# Patient Record
Sex: MALE | Race: WHITE | NOT HISPANIC OR LATINO | Employment: OTHER | URBAN - METROPOLITAN AREA
[De-identification: names, ages, dates, MRNs, and addresses within clinical notes are randomized per-mention and may not be internally consistent; named-entity substitution may affect disease eponyms.]

---

## 2017-11-20 DIAGNOSIS — I63.00 CEREBROVASCULAR ACCIDENT (CVA) DUE TO THROMBOSIS OF PRECEREBRAL ARTERY (H): Primary | ICD-10-CM

## 2017-11-20 DIAGNOSIS — I63.9 STROKE (H): Primary | ICD-10-CM

## 2017-11-22 ENCOUNTER — HOSPITAL ENCOUNTER (OUTPATIENT)
Dept: LAB | Facility: CLINIC | Age: 82
Discharge: HOME | End: 2017-11-22
Attending: PSYCHIATRY & NEUROLOGY | Admitting: PSYCHIATRY & NEUROLOGY
Payer: MEDICARE

## 2017-11-22 DIAGNOSIS — I63.9 STROKE (H): ICD-10-CM

## 2017-11-22 LAB
CHOLEST SERPL-MCNC: 146 MG/DL
HDLC SERPL-MCNC: 77 MG/DL
LDLC SERPL CALC-MCNC: 59 MG/DL
NONHDLC SERPL-MCNC: 69 MG/DL
PLATELET REACTIVITY P2Y12: 105 PRU
TRIGL SERPL-MCNC: 52 MG/DL

## 2017-11-22 PROCEDURE — 80061 LIPID PANEL: CPT | Performed by: PSYCHIATRY & NEUROLOGY

## 2017-11-22 PROCEDURE — 36415 COLL VENOUS BLD VENIPUNCTURE: CPT | Performed by: PSYCHIATRY & NEUROLOGY

## 2017-11-22 PROCEDURE — 85576 BLOOD PLATELET AGGREGATION: CPT | Performed by: PSYCHIATRY & NEUROLOGY

## 2017-12-08 ENCOUNTER — HOSPITAL ENCOUNTER (OUTPATIENT)
Dept: CARDIOLOGY | Facility: CLINIC | Age: 82
Discharge: HOME | End: 2017-12-08
Attending: PSYCHIATRY & NEUROLOGY | Admitting: PSYCHIATRY & NEUROLOGY
Payer: MEDICARE

## 2017-12-08 DIAGNOSIS — I65.29 CAROTID ARTERY STENOSIS, UNILATERAL: ICD-10-CM

## 2017-12-08 DIAGNOSIS — I63.00 CEREBROVASCULAR ACCIDENT (CVA) DUE TO THROMBOSIS OF PRECEREBRAL ARTERY (H): ICD-10-CM

## 2017-12-08 DIAGNOSIS — G45.9 TRANSIENT CEREBRAL ISCHEMIA, UNSPECIFIED TYPE: ICD-10-CM

## 2017-12-08 PROCEDURE — 27210995 ZZH RX 272: Performed by: PSYCHIATRY & NEUROLOGY

## 2017-12-08 PROCEDURE — 25500064 ZZH RX 255 OP 636: Performed by: PSYCHIATRY & NEUROLOGY

## 2017-12-08 PROCEDURE — 40000264 ECHO COMPLETE BUBBLE STUDY WITH OPTISON

## 2017-12-08 PROCEDURE — 0298T ZZC EXT ECG > 48HR TO 21 DAY REVIEW AND INTERPRETATN: CPT | Performed by: INTERNAL MEDICINE

## 2017-12-08 PROCEDURE — 93306 TTE W/DOPPLER COMPLETE: CPT | Mod: 26 | Performed by: INTERNAL MEDICINE

## 2017-12-08 RX ORDER — MAGNESIUM HYDROXIDE 1200 MG/15ML
30 LIQUID ORAL ONCE
Status: COMPLETED | OUTPATIENT
Start: 2017-12-08 | End: 2017-12-08

## 2017-12-08 RX ADMIN — HYDROCODONE BITARTRATE AND ACETAMINOPHEN 30 ML: 500; 5 TABLET ORAL at 13:52

## 2017-12-08 RX ADMIN — HUMAN ALBUMIN MICROSPHERES AND PERFLUTREN 1 ML: 10; .22 INJECTION, SOLUTION INTRAVENOUS at 14:00

## 2018-08-21 ENCOUNTER — TRANSFERRED RECORDS (OUTPATIENT)
Dept: HEALTH INFORMATION MANAGEMENT | Facility: CLINIC | Age: 83
End: 2018-08-21

## 2018-10-17 ENCOUNTER — TRANSFERRED RECORDS (OUTPATIENT)
Dept: HEALTH INFORMATION MANAGEMENT | Facility: CLINIC | Age: 83
End: 2018-10-17

## 2018-12-20 ENCOUNTER — OFFICE VISIT (OUTPATIENT)
Dept: CARDIOLOGY | Facility: CLINIC | Age: 83
End: 2018-12-20
Payer: MEDICARE

## 2018-12-20 VITALS
WEIGHT: 189.6 LBS | HEIGHT: 69 IN | SYSTOLIC BLOOD PRESSURE: 136 MMHG | DIASTOLIC BLOOD PRESSURE: 60 MMHG | BODY MASS INDEX: 28.08 KG/M2 | HEART RATE: 56 BPM

## 2018-12-20 DIAGNOSIS — I71.21 ASCENDING AORTIC ANEURYSM (H): Primary | ICD-10-CM

## 2018-12-20 DIAGNOSIS — I35.1 NON-RHEUMATIC AORTIC REGURGITATION: ICD-10-CM

## 2018-12-20 DIAGNOSIS — I10 ESSENTIAL HYPERTENSION: ICD-10-CM

## 2018-12-20 DIAGNOSIS — E78.5 HYPERLIPIDEMIA LDL GOAL <100: ICD-10-CM

## 2018-12-20 PROCEDURE — 99204 OFFICE O/P NEW MOD 45 MIN: CPT | Performed by: INTERNAL MEDICINE

## 2018-12-20 PROCEDURE — 93000 ELECTROCARDIOGRAM COMPLETE: CPT | Performed by: INTERNAL MEDICINE

## 2018-12-20 RX ORDER — CITALOPRAM HYDROBROMIDE 40 MG/1
40 TABLET ORAL DAILY
COMMUNITY
End: 2022-01-01

## 2018-12-20 RX ORDER — LANOLIN ALCOHOL/MO/W.PET/CERES
1000 CREAM (GRAM) TOPICAL DAILY
Status: ON HOLD | COMMUNITY
End: 2022-01-01

## 2018-12-20 RX ORDER — PNV NO.95/FERROUS FUM/FOLIC AC 28MG-0.8MG
1 TABLET ORAL
Status: ON HOLD | COMMUNITY
End: 2019-06-28

## 2018-12-20 ASSESSMENT — MIFFLIN-ST. JEOR: SCORE: 1555.4

## 2018-12-20 NOTE — LETTER
12/20/2018      Clinic [facility]  No address on file      RE: Tk Richmond       Dear Colleague,    I had the pleasure of seeing Tk Richmond in the [facility].    Service Date: 12/20/2018      PRIMARY CARE PHYSICIAN:  Dr. Maliha Holden at [facility].      Dear Dr. Holden:      I had the pleasure of seeing your patient, Tk Richmond, at [facility] for evaluation of moderate aortic insufficiency and moderately severe ascending aortic aneurysm.      HISTORY OF PRESENT ILLNESS:  Tk Richmond is a delightful 83-year-old male with a history of previous cerebrovascular accident in 09/2004 and 03/2012.  He had mild aphasia in 2012 which gradually has improved.  The patient underwent an echocardiogram in 2017 because of some arrhythmias.  The patient also had peripheral edema.  The echo showed mild concentric left ventricular hypertrophy with ejection fraction of 60%-65%.  Normal right heart.  The left atrium was mildly enlarged.  No intraatrial shunt.  Mild to moderate aortic regurgitation.  Mild aortic root dilatation with moderately severe dilatation of the ascending aorta at 4.6 cm.  Additionally the patient has had some dyspnea and bilateral lower extremity edema over the last 6-8 months.  A followup echocardiogram was performed on 10/16/2018 demonstrating moderate to severely dilated ascending aortic aneurysm at 4.8 cm.  There was mild aortic valvular sclerosis without stenosis and moderate central aortic regurgitation.  Left ventricular size and function was normal and LVH was no longer noted.  Ejection fraction 55%.  Grade I left ventricular diastolic dysfunction was present.  Right ventricle was grossly normal size and function.  RVSP was 25 mmHg and the inferior vena cava was normal size.  The patient has never had a known myocardial infarction.  He has been told that he had hypertension at one time but when treated he became  hypotensive and the medication was discontinued.  The patient does not exercise to any major extent.  He previously smoked cigars up until 1970.  He denies palpitations, syncope or presyncope.  His peripheral edema is being treated with compression stockings.  The patient remains on aspirin and clopidogrel for previous stroke.      The patient's most recent lipid profile on 10/17 includes total cholesterol 139, triglycerides 48, HDL 70, LDL 59, VLDL 9.6 with a ratio of 2.0.  Additionally at that time his BUN was 19, creatinine 1.36 with normal up to 1.30.  Glucose 77.      PAST MEDICAL HISTORY:  As listed.      REVIEW OF SYSTEMS:  A 12-point review of systems is performed with pertinent positives and negatives listed in the HPI.  All other review of systems are asked and are negative.  The patient notes that he is not on a low-salt diet and uses a salt shaker and eats salty foods.      PHYSICAL EXAMINATION:  As listed below.      EKG today interpreted with the patient and wife.  This demonstrates sinus bradycardia at a rate of 52 beats per minute.  Possible left axis deviation and left anterior fascicular block.  Possible left atrial enlargement.  EKG is otherwise normal.      ASSESSMENT:   1.  Tk Richmond is a pleasant 83-year-old male with multiple cardiovascular risk factors including borderline to mild hypertension, previous stroke and hyperlipidemia.  The patient is very sedentary.  He was a previous smoker until 1970.  We are asked to see this patient to evaluate his ascending aortic aneurysm and aortic insufficiency.  The aneurysm is significant and needs a better measurement.  We are going to perform a CT scan with and without dye of his thoracic vessels to look at this aorta.  The aortic insufficiency is considered at most moderate at this time.  No intervention is necessary.  There is normal left ventricular size and systolic function suggesting that the aortic insufficiency is not severe.  His  creatinine is borderline elevated.  We will retest before his procedure and after po fluids.  2.  I suspect this patient has some hypertension.  At this point I would like to see what his blood pressure does with a low-salt diet and some mild exercise.  This can include walking in a grocery store or a stationary bicycle.   3.  I have had a long discussion with this patient regarding the need for reducing his salt intake to 2000 mg or less.   4.  Despite his risk factors there is no sense of angina or increased shortness of breath.  It is not my intention to perform any kind of a cardiovascular stress test at this time.   5.  Peripheral edema.  His total protein and albumin are reduced.  This may exacerbate his peripheral edema.  A diet enriched with proteins may help reduce his edema.  Low proteins may be nutritional or renal losses.  Evaluation by his PMD is indicated.     PLAN:   1.  CT coronary angiogram of the thoracic vessels.   2.  Diet and exercise as mentioned above.   3.  Follow up with Dr. Cleveland in 6 months to evaluate his blood pressure and well-being.  An echocardiogram may be ordered for this date after we review the CT of his chest.      It is my pleasure to assist in the care of Tk Goode.  All his questions were answered to his satisfaction.  I spent 45 minutes with the patient and his wife, greater than 50% of that time counseling on all of the above issues.  I appreciate the opportunity to care for this patient.      Susie Cleveland MD       cc:   [place] [address]         SUSIE CLEVELAND MD, [facility]             D: 2018   T: 2018   MT: GLENN      Name:     TK GOODE   MRN:      [id number]        Account:      [id number]   :      1935           Service Date: 2018      Document: [id number]           Outpatient Encounter Medications as of 2018   Medication Sig Dispense  Refill     aspirin 325 MG tablet Take 1 tablet by mouth daily. 90 tablet 3     brinzolamide-brimonidine (SIMBRINZA) 1-0.2 % ophthalmic suspension 1 drop 2 times daily       Calcium Carbonate-Vitamin D (CALCIUM 600+D PO) Take by mouth daily Two tablets       citalopram (CELEXA) 40 MG tablet Take 40 mg by mouth daily       CLOPIDOGREL BISULFATE PO Take 75 mg by mouth daily.       Ferrous Sulfate (IRON) 325 (65 Fe) MG tablet Take 1 tablet by mouth 3 times daily (with meals)       LOVASTATIN PO Take 40 mg by mouth At Bedtime.       Misc Natural Products (GLUCOSAMINE CHONDROITIN ADV PO) Take by mouth daily Two tablets       Multiple Vitamins-Minerals (CENTRUM SILVER ADULT 50+ PO) Take by mouth daily       Multiple Vitamins-Minerals (PRESERVISION AREDS 2+MULTI VIT PO) Take by mouth 2 times daily Two tablets       vitamin B-12 (CYANOCOBALAMIN) 1000 MCG tablet Take by mouth daily       [DISCONTINUED] brimonidine (ALPHAGAN P) 0.1 % ophthalmic solution Place 1 drop into both eyes 2 times daily.       [DISCONTINUED] lisinopril-hydrochlorothiazide (PRINZIDE,ZESTORETIC) 20-25 MG per tablet Take 1 tablet by mouth daily.       No facility-administered encounter medications on file as of 12/20/2018.        Again, thank you for allowing me to participate in the care of your patient.      Sincerely,    Uriel Anderson MD     [facility]

## 2018-12-20 NOTE — LETTER
2018    Clinic [facility]  No address on file    RE: Tk Richmond       Dear Colleague,    I had the pleasure of seeing Tk Richmond in the [facility].    HPI and Plan:   See dictation:[id number]    Orders Placed This Encounter   Procedures     CT Chest Angio w/o & w Contrast     Follow-Up with Cardiologist     EKG 12-lead complete w/read - Clinics (performed today)       Orders Placed This Encounter   Medications     citalopram (CELEXA) 40 MG tablet     Sig: Take 40 mg by mouth daily     brinzolamide-brimonidine (SIMBRINZA) 1-0.2 % ophthalmic suspension     Si drop 2 times daily     Ferrous Sulfate (IRON) 325 (65 Fe) MG tablet     Sig: Take 1 tablet by mouth 3 times daily (with meals)     vitamin B-12 (CYANOCOBALAMIN) 1000 MCG tablet     Sig: Take by mouth daily     Multiple Vitamins-Minerals (PRESERVISION AREDS 2+MULTI VIT PO)     Sig: Take by mouth 2 times daily Two tablets     Multiple Vitamins-Minerals (CENTRUM SILVER ADULT 50+ PO)     Sig: Take by mouth daily     Calcium Carbonate-Vitamin D (CALCIUM 600+D PO)     Sig: Take by mouth daily Two tablets     Misc Natural Products (GLUCOSAMINE CHONDROITIN ADV PO)     Sig: Take by mouth daily Two tablets       Medications Discontinued During This Encounter   Medication Reason     brimonidine (ALPHAGAN P) 0.1 % ophthalmic solution Alternate therapy     lisinopril-hydrochlorothiazide (PRINZIDE,ZESTORETIC) 20-25 MG per tablet Discontinued by another Health Care Provider         Encounter Diagnoses   Name Primary?     Ascending aortic aneurysm (H) Yes     Hyperlipidemia LDL goal <100      Essential hypertension        CURRENT MEDICATIONS:  Current Outpatient Medications   Medication Sig Dispense Refill     aspirin 325 MG tablet Take 1 tablet by mouth daily. 90 tablet 3     brinzolamide-brimonidine (SIMBRINZA) 1-0.2 % ophthalmic suspension 1 drop 2 times daily       Calcium Carbonate-Vitamin D (CALCIUM 600+D PO) Take by  mouth daily Two tablets       citalopram (CELEXA) 40 MG tablet Take 40 mg by mouth daily       CLOPIDOGREL BISULFATE PO Take 75 mg by mouth daily.       Ferrous Sulfate (IRON) 325 (65 Fe) MG tablet Take 1 tablet by mouth 3 times daily (with meals)       LOVASTATIN PO Take 40 mg by mouth At Bedtime.       Misc Natural Products (GLUCOSAMINE CHONDROITIN ADV PO) Take by mouth daily Two tablets       Multiple Vitamins-Minerals (CENTRUM SILVER ADULT 50+ PO) Take by mouth daily       Multiple Vitamins-Minerals (PRESERVISION AREDS 2+MULTI VIT PO) Take by mouth 2 times daily Two tablets       vitamin B-12 (CYANOCOBALAMIN) 1000 MCG tablet Take by mouth daily         ALLERGIES   No Known Allergies    PAST MEDICAL HISTORY:  Past Medical History:   Diagnosis Date     Glaucoma      Hyperlipidemia LDL goal < 100      Hypertension      Stroke (H)     , speech deficit       PAST SURGICAL HISTORY:  History reviewed. No pertinent surgical history.    FAMILY HISTORY:  Family History   Problem Relation Age of Onset     Breast Cancer Mother      Diabetes No family hx of      Hypertension No family hx of      Cancer - colorectal No family hx of        SOCIAL HISTORY:  Social History     Socioeconomic History     Marital status:      Spouse name: None     Number of children: None     Years of education: None     Highest education level: None   Social Needs     Financial resource strain: None     Food insecurity - worry: None     Food insecurity - inability: None     Transportation needs - medical: None     Transportation needs - non-medical: None   Occupational History     None   Tobacco Use     Smoking status: Former Smoker     Years: 10.00     Types: Cigars     Last attempt to quit: 1972     Years since quittin.8     Smokeless tobacco: Never Used   Substance and Sexual Activity     Alcohol use: Yes     Alcohol/week: 0.0 oz     Comment:  oxx     Drug use: No     Sexual activity: Yes     Partners: Female   Other Topics  "Concern     Parent/sibling w/ CABG, MI or angioplasty before 65F 55M? Not Asked   Social History Narrative     None       Review of Systems:  Skin:  Negative       Eyes:  Positive for glasses    ENT:  Negative      Respiratory:  Positive for dyspnea on exertion     Cardiovascular:    edema;Positive for;dizziness occ dizziness  Gastroenterology: Positive for heartburn occ  Genitourinary:  Negative      Musculoskeletal:  Negative      Neurologic:  Negative      Psychiatric:  Negative      Heme/Lymph/Imm:  Negative      Endocrine:  Negative        Physical Exam:  Vitals: /60 (BP Location: Right arm, Patient Position: Sitting, Cuff Size: Adult Large)   Pulse 56   Ht 1.753 m (5' 9\")   Wt 86 kg (189 lb 9.6 oz)   BMI 28.00 kg/m       Constitutional:  cooperative, alert and oriented, well developed, well nourished, in no acute distress        Skin:  warm and dry to the touch;no apparent skin lesions or masses noted     healed skin graft on top of head    Head:  normocephalic, no masses or lesions        Eyes:  pupils equal and round, conjunctivae and lids unremarkable, sclera white, no xanthalasma, EOMS intact, no nystagmus        Lymph:      ENT:  no pallor or cyanosis, dentition good        Neck:  carotid pulses are full and equal bilaterally, JVP normal, no carotid bruit        Respiratory:  normal breath sounds, clear to auscultation, normal A-P diameter, normal symmetry, normal respiratory excursion, no use of accessory muscles         Cardiac: regular rhythm;normal S1 and S2;no S3 or S4;apical impulse not displaced       systolic ejection murmur;LLSB;radiation to the RUSB;grade 2   early diastolic murmur;blowing;RUSB;LLSB;grade 2    pulses full and equal, no bruits auscultated                                        GI:  abdomen soft, non-tender, BS normoactive, no mass, no HSM, no bruits        Extremities and Muscular Skeletal:  no deformities, clubbing, cyanosis, erythema observed   1+;pitting;bilateral LE " edema     bilateral compression stockings    Neurological:  no gross motor deficits;affect appropriate        Psych:  Alert and Oriented x 3        CC  No referring provider defined for this encounter.                Thank you for allowing me to participate in the care of your patient.      Sincerely,     Uriel Anderson MD     [facility]    cc:   No referring provider defined for this encounter.

## 2018-12-20 NOTE — PROGRESS NOTES
Service Date: 12/20/2018      PRIMARY CARE PHYSICIAN:  Dr. Maliha Holden at [facility].      Dear Dr. Holden:      I had the pleasure of seeing your patient, Tk Richmond, at [facility] for evaluation of moderate aortic insufficiency and moderately severe ascending aortic aneurysm.      HISTORY OF PRESENT ILLNESS:  Tk Richmond is a delightful 83-year-old male with a history of previous cerebrovascular accident in 09/2004 and 03/2012.  He had mild aphasia in 2012 which gradually has improved.  The patient underwent an echocardiogram in 2017 because of some arrhythmias.  The patient also had peripheral edema.  The echo showed mild concentric left ventricular hypertrophy with ejection fraction of 60%-65%.  Normal right heart.  The left atrium was mildly enlarged.  No intraatrial shunt.  Mild to moderate aortic regurgitation.  Mild aortic root dilatation with moderately severe dilatation of the ascending aorta at 4.6 cm.  Additionally the patient has had some dyspnea and bilateral lower extremity edema over the last 6-8 months.  A followup echocardiogram was performed on 10/16/2018 demonstrating moderate to severely dilated ascending aortic aneurysm at 4.8 cm.  There was mild aortic valvular sclerosis without stenosis and moderate central aortic regurgitation.  Left ventricular size and function was normal and LVH was no longer noted.  Ejection fraction 55%.  Grade I left ventricular diastolic dysfunction was present.  Right ventricle was grossly normal size and function.  RVSP was 25 mmHg and the inferior vena cava was normal size.  The patient has never had a known myocardial infarction.  He has been told that he had hypertension at one time but when treated he became hypotensive and the medication was discontinued.  The patient does not exercise to any major extent.  He previously smoked cigars up until 1970.  He denies palpitations, syncope or presyncope.  His peripheral  edema is being treated with compression stockings.  The patient remains on aspirin and clopidogrel for previous stroke.      The patient's most recent lipid profile on 10/17 includes total cholesterol 139, triglycerides 48, HDL 70, LDL 59, VLDL 9.6 with a ratio of 2.0.  Additionally at that time his BUN was 19, creatinine 1.36 with normal up to 1.30.  Glucose 77.      PAST MEDICAL HISTORY:  As listed.      REVIEW OF SYSTEMS:  A 12-point review of systems is performed with pertinent positives and negatives listed in the HPI.  All other review of systems are asked and are negative.  The patient notes that he is not on a low-salt diet and uses a salt shaker and eats salty foods.      PHYSICAL EXAMINATION:  As listed below.      EKG today interpreted with the patient and wife.  This demonstrates sinus bradycardia at a rate of 52 beats per minute.  Possible left axis deviation and left anterior fascicular block.  Possible left atrial enlargement.  EKG is otherwise normal.      ASSESSMENT:   1.  Tk Richmond is a pleasant 83-year-old male with multiple cardiovascular risk factors including borderline to mild hypertension, previous stroke and hyperlipidemia.  The patient is very sedentary.  He was a previous smoker until 1970.  We are asked to see this patient to evaluate his ascending aortic aneurysm and aortic insufficiency.  The aneurysm is significant and needs a better measurement.  We are going to perform a CT scan with and without dye of his thoracic vessels to look at this aorta.  The aortic insufficiency is considered at most moderate at this time.  No intervention is necessary.  There is normal left ventricular size and systolic function suggesting that the aortic insufficiency is not severe.  His creatinine is borderline elevated.  We will retest before his procedure and after po fluids.  2.  I suspect this patient has some hypertension.  At this point I would like to see what his blood pressure does with a  low-salt diet and some mild exercise.  This can include walking in a grocery store or a stationary bicycle.   3.  I have had a long discussion with this patient regarding the need for reducing his salt intake to 2000 mg or less.   4.  Despite his risk factors there is no sense of angina or increased shortness of breath.  It is not my intention to perform any kind of a cardiovascular stress test at this time.   5.  Peripheral edema.  His total protein and albumin are reduced.  This may exacerbate his peripheral edema.  A diet enriched with proteins may help reduce his edema.  Low proteins may be nutritional or renal losses.  Evaluation by his PMD is indicated.     PLAN:   1.  CT coronary angiogram of the thoracic vessels.   2.  Diet and exercise as mentioned above.   3.  Follow up with Dr. Cleveland in 6 months to evaluate his blood pressure and well-being.  An echocardiogram may be ordered for this date after we review the CT of his chest.      It is my pleasure to assist in the care of Tk Goode.  All his questions were answered to his satisfaction.  I spent 45 minutes with the patient and his wife, greater than 50% of that time counseling on all of the above issues.  I appreciate the opportunity to care for this patient.      Susie Cleevland MD       cc:   [place] [address]         SUSIE CLEVELAND MD, FACC             D: 2018   T: 2018   MT: GLENN      Name:     TK GOODE   MRN:      [id number]        Account:      [id number]   :      1935           Service Date: 2018      Document: [id number]

## 2018-12-20 NOTE — PROGRESS NOTES
HPI and Plan:   See dictation:[id number]    Orders Placed This Encounter   Procedures     CT Chest Angio w/o & w Contrast     Follow-Up with Cardiologist     EKG 12-lead complete w/read - Clinics (performed today)       Orders Placed This Encounter   Medications     citalopram (CELEXA) 40 MG tablet     Sig: Take 40 mg by mouth daily     brinzolamide-brimonidine (SIMBRINZA) 1-0.2 % ophthalmic suspension     Si drop 2 times daily     Ferrous Sulfate (IRON) 325 (65 Fe) MG tablet     Sig: Take 1 tablet by mouth 3 times daily (with meals)     vitamin B-12 (CYANOCOBALAMIN) 1000 MCG tablet     Sig: Take by mouth daily     Multiple Vitamins-Minerals (PRESERVISION AREDS 2+MULTI VIT PO)     Sig: Take by mouth 2 times daily Two tablets     Multiple Vitamins-Minerals (CENTRUM SILVER ADULT 50+ PO)     Sig: Take by mouth daily     Calcium Carbonate-Vitamin D (CALCIUM 600+D PO)     Sig: Take by mouth daily Two tablets     Misc Natural Products (GLUCOSAMINE CHONDROITIN ADV PO)     Sig: Take by mouth daily Two tablets       Medications Discontinued During This Encounter   Medication Reason     brimonidine (ALPHAGAN P) 0.1 % ophthalmic solution Alternate therapy     lisinopril-hydrochlorothiazide (PRINZIDE,ZESTORETIC) 20-25 MG per tablet Discontinued by another Health Care Provider         Encounter Diagnoses   Name Primary?     Ascending aortic aneurysm (H) Yes     Hyperlipidemia LDL goal <100      Essential hypertension        CURRENT MEDICATIONS:  Current Outpatient Medications   Medication Sig Dispense Refill     aspirin 325 MG tablet Take 1 tablet by mouth daily. 90 tablet 3     brinzolamide-brimonidine (SIMBRINZA) 1-0.2 % ophthalmic suspension 1 drop 2 times daily       Calcium Carbonate-Vitamin D (CALCIUM 600+D PO) Take by mouth daily Two tablets       citalopram (CELEXA) 40 MG tablet Take 40 mg by mouth daily       CLOPIDOGREL BISULFATE PO Take 75 mg by mouth daily.       Ferrous Sulfate (IRON) 325 (65 Fe) MG tablet Take 1  tablet by mouth 3 times daily (with meals)       LOVASTATIN PO Take 40 mg by mouth At Bedtime.       Misc Natural Products (GLUCOSAMINE CHONDROITIN ADV PO) Take by mouth daily Two tablets       Multiple Vitamins-Minerals (CENTRUM SILVER ADULT 50+ PO) Take by mouth daily       Multiple Vitamins-Minerals (PRESERVISION AREDS 2+MULTI VIT PO) Take by mouth 2 times daily Two tablets       vitamin B-12 (CYANOCOBALAMIN) 1000 MCG tablet Take by mouth daily         ALLERGIES   No Known Allergies    PAST MEDICAL HISTORY:  Past Medical History:   Diagnosis Date     Glaucoma      Hyperlipidemia LDL goal < 100      Hypertension      Stroke (H)     , speech deficit       PAST SURGICAL HISTORY:  History reviewed. No pertinent surgical history.    FAMILY HISTORY:  Family History   Problem Relation Age of Onset     Breast Cancer Mother      Diabetes No family hx of      Hypertension No family hx of      Cancer - colorectal No family hx of        SOCIAL HISTORY:  Social History     Socioeconomic History     Marital status:      Spouse name: None     Number of children: None     Years of education: None     Highest education level: None   Social Needs     Financial resource strain: None     Food insecurity - worry: None     Food insecurity - inability: None     Transportation needs - medical: None     Transportation needs - non-medical: None   Occupational History     None   Tobacco Use     Smoking status: Former Smoker     Years: 10.00     Types: Cigars     Last attempt to quit: 1972     Years since quittin.8     Smokeless tobacco: Never Used   Substance and Sexual Activity     Alcohol use: Yes     Alcohol/week: 0.0 oz     Comment:  oxx     Drug use: No     Sexual activity: Yes     Partners: Female   Other Topics Concern     Parent/sibling w/ CABG, MI or angioplasty before 65F 55M? Not Asked   Social History Narrative     None       Review of Systems:  Skin:  Negative       Eyes:  Positive for glasses    ENT:   "Negative      Respiratory:  Positive for dyspnea on exertion     Cardiovascular:    edema;Positive for;dizziness occ dizziness  Gastroenterology: Positive for heartburn occ  Genitourinary:  Negative      Musculoskeletal:  Negative      Neurologic:  Negative      Psychiatric:  Negative      Heme/Lymph/Imm:  Negative      Endocrine:  Negative        Physical Exam:  Vitals: /60 (BP Location: Right arm, Patient Position: Sitting, Cuff Size: Adult Large)   Pulse 56   Ht 1.753 m (5' 9\")   Wt 86 kg (189 lb 9.6 oz)   BMI 28.00 kg/m      Constitutional:  cooperative, alert and oriented, well developed, well nourished, in no acute distress        Skin:  warm and dry to the touch;no apparent skin lesions or masses noted     healed skin graft on top of head    Head:  normocephalic, no masses or lesions        Eyes:  pupils equal and round, conjunctivae and lids unremarkable, sclera white, no xanthalasma, EOMS intact, no nystagmus        Lymph:      ENT:  no pallor or cyanosis, dentition good        Neck:  carotid pulses are full and equal bilaterally, JVP normal, no carotid bruit        Respiratory:  normal breath sounds, clear to auscultation, normal A-P diameter, normal symmetry, normal respiratory excursion, no use of accessory muscles         Cardiac: regular rhythm;normal S1 and S2;no S3 or S4;apical impulse not displaced       systolic ejection murmur;LLSB;radiation to the RUSB;grade 2   early diastolic murmur;blowing;RUSB;LLSB;grade 2    pulses full and equal, no bruits auscultated                                        GI:  abdomen soft, non-tender, BS normoactive, no mass, no HSM, no bruits        Extremities and Muscular Skeletal:  no deformities, clubbing, cyanosis, erythema observed   1+;pitting;bilateral LE edema     bilateral compression stockings    Neurological:  no gross motor deficits;affect appropriate        Psych:  Alert and Oriented x 3        CC  No referring provider defined for this " encounter.

## 2018-12-27 ENCOUNTER — HOSPITAL ENCOUNTER (OUTPATIENT)
Dept: CARDIOLOGY | Facility: CLINIC | Age: 83
Discharge: HOME | End: 2018-12-27
Attending: INTERNAL MEDICINE | Admitting: INTERNAL MEDICINE
Payer: MEDICARE

## 2018-12-27 DIAGNOSIS — I71.21 ASCENDING AORTIC ANEURYSM (H): ICD-10-CM

## 2018-12-27 LAB
CREAT BLD-MCNC: 1.3 MG/DL (ref 0.66–1.25)
GFR SERPL CREATININE-BSD FRML MDRD: 53 ML/MIN/{1.73_M2}

## 2018-12-27 PROCEDURE — 82565 ASSAY OF CREATININE: CPT

## 2018-12-27 PROCEDURE — 25000128 H RX IP 250 OP 636: Performed by: INTERNAL MEDICINE

## 2018-12-27 PROCEDURE — 71275 CT ANGIOGRAPHY CHEST: CPT

## 2018-12-27 RX ORDER — ACYCLOVIR 200 MG/1
0-1 CAPSULE ORAL
Status: DISCONTINUED | OUTPATIENT
Start: 2018-12-27 | End: 2018-12-28 | Stop reason: HOSPADM

## 2018-12-27 RX ORDER — ONDANSETRON 2 MG/ML
4 INJECTION INTRAMUSCULAR; INTRAVENOUS
Status: DISCONTINUED | OUTPATIENT
Start: 2018-12-27 | End: 2018-12-28 | Stop reason: HOSPADM

## 2018-12-27 RX ORDER — METHYLPREDNISOLONE SODIUM SUCCINATE 125 MG/2ML
125 INJECTION, POWDER, LYOPHILIZED, FOR SOLUTION INTRAMUSCULAR; INTRAVENOUS
Status: DISCONTINUED | OUTPATIENT
Start: 2018-12-27 | End: 2018-12-28 | Stop reason: HOSPADM

## 2018-12-27 RX ORDER — DIPHENHYDRAMINE HYDROCHLORIDE 50 MG/ML
25-50 INJECTION INTRAMUSCULAR; INTRAVENOUS
Status: DISCONTINUED | OUTPATIENT
Start: 2018-12-27 | End: 2018-12-28 | Stop reason: HOSPADM

## 2018-12-27 RX ORDER — DIPHENHYDRAMINE HCL 25 MG
25 CAPSULE ORAL
Status: DISCONTINUED | OUTPATIENT
Start: 2018-12-27 | End: 2018-12-28 | Stop reason: HOSPADM

## 2018-12-27 RX ORDER — IOPAMIDOL 755 MG/ML
500 INJECTION, SOLUTION INTRAVASCULAR ONCE
Status: COMPLETED | OUTPATIENT
Start: 2018-12-27 | End: 2018-12-27

## 2018-12-27 RX ADMIN — IOPAMIDOL 90 ML: 755 INJECTION, SOLUTION INTRAVENOUS at 09:23

## 2018-12-27 RX ADMIN — SODIUM CHLORIDE 100 ML: 9 INJECTION, SOLUTION INTRAVENOUS at 09:23

## 2018-12-28 ENCOUNTER — TELEPHONE (OUTPATIENT)
Dept: CARDIOLOGY | Facility: CLINIC | Age: 83
End: 2018-12-28

## 2018-12-28 DIAGNOSIS — I71.21 ASCENDING AORTIC ANEURYSM (H): Primary | ICD-10-CM

## 2018-12-28 NOTE — TELEPHONE ENCOUNTER
Reviewed patient's scenario with Dr. Anderson. Per Dr. Anderson, patient to continue taking benadryl. If patient does not notice any improvement, patient to call back to the clinic and prednisone may be needed. Contrast allergy added to patient's allergy list. Reminder set to Team 4 for reminder to prescribe patient premedication for contrast allergy before next CT scan. Spoke with patient about Dr. Anderson's recommendations. Patient verbalized understanding and agreed with plan of care.

## 2018-12-28 NOTE — TELEPHONE ENCOUNTER
Notes recorded by Uriel Anderson MD on 12/27/2018 at 9:35 PM CST  The ascending aortic enlargement is severe but not yet large enough to send for surgical repair.  I would suggest repeating the CT scan in 6 months when I see him for BP follow up.  Thanks.  Uriel Anderson MD, [facility]  December 27, 2018 9:35 PM    Order placed for repeat CT scan in 6 months with follow up. Contacted patient to review results and Dr. Anderson's recommendations. Patient did not answer. Left message for patient to call back.

## 2018-12-28 NOTE — TELEPHONE ENCOUNTER
GABBY from patient, stating that he has noticed a rash since he had the CT scan yesterday. Patient states that he noticed the rash a couple hours after his CT scan yesterday. Patient states that the rash is mostly on his legs, but has areas all over his body that have a similar rash. Patient states that the itching kept him up last night. Patient denies any difficulty breathing or swelling anywhere. Patient states that he started taking benadryl last night and has been taking it today as well. Patient states that his symptoms have improved, but the rash is still there. Patient denies any changes in lotions or detergents. Will route to Dr. Anderson for review.

## 2018-12-28 NOTE — TELEPHONE ENCOUNTER
Spoke with patient about results and Dr. Anderson's recommendations. Patient verbalized understanding and agreed with plan of care.

## 2019-06-28 ENCOUNTER — HOSPITAL ENCOUNTER (INPATIENT)
Facility: CLINIC | Age: 84
LOS: 2 days | Discharge: HOME | DRG: 379 | End: 2019-06-30
Attending: EMERGENCY MEDICINE | Admitting: INTERNAL MEDICINE
Payer: MEDICARE

## 2019-06-28 DIAGNOSIS — K92.1 HEMATOCHEZIA: ICD-10-CM

## 2019-06-28 DIAGNOSIS — K92.2 LOWER GI BLEED: ICD-10-CM

## 2019-06-28 DIAGNOSIS — Z92.29 HX OF LONG-TERM (CURRENT) USE OF ANTICOAGULANTS: ICD-10-CM

## 2019-06-28 LAB
ABO + RH BLD: NORMAL
ABO + RH BLD: NORMAL
ANION GAP SERPL CALCULATED.3IONS-SCNC: 6 MMOL/L (ref 3–14)
APTT PPP: 27 SEC (ref 22–37)
BASOPHILS # BLD AUTO: 0 10E9/L (ref 0–0.2)
BASOPHILS NFR BLD AUTO: 0.6 %
BLD GP AB SCN SERPL QL: NORMAL
BLOOD BANK CMNT PATIENT-IMP: NORMAL
BUN SERPL-MCNC: 26 MG/DL (ref 7–30)
CALCIUM SERPL-MCNC: 8.1 MG/DL (ref 8.5–10.1)
CHLORIDE SERPL-SCNC: 114 MMOL/L (ref 94–109)
CO2 SERPL-SCNC: 24 MMOL/L (ref 20–32)
CREAT SERPL-MCNC: 1.33 MG/DL (ref 0.66–1.25)
DIFFERENTIAL METHOD BLD: ABNORMAL
EOSINOPHIL # BLD AUTO: 0.4 10E9/L (ref 0–0.7)
EOSINOPHIL NFR BLD AUTO: 7.9 %
ERYTHROCYTE [DISTWIDTH] IN BLOOD BY AUTOMATED COUNT: 15.7 % (ref 10–15)
GFR SERPL CREATININE-BSD FRML MDRD: 49 ML/MIN/{1.73_M2}
GLUCOSE SERPL-MCNC: 103 MG/DL (ref 70–99)
HCT VFR BLD AUTO: 33.1 % (ref 40–53)
HGB BLD-MCNC: 10.5 G/DL (ref 13.3–17.7)
HGB BLD-MCNC: 10.5 G/DL (ref 13.3–17.7)
HGB BLD-MCNC: 9.5 G/DL (ref 13.3–17.7)
IMM GRANULOCYTES # BLD: 0 10E9/L (ref 0–0.4)
IMM GRANULOCYTES NFR BLD: 0.4 %
INR PPP: 1.05 (ref 0.86–1.14)
LYMPHOCYTES # BLD AUTO: 0.8 10E9/L (ref 0.8–5.3)
LYMPHOCYTES NFR BLD AUTO: 17.9 %
MCH RBC QN AUTO: 30.9 PG (ref 26.5–33)
MCHC RBC AUTO-ENTMCNC: 31.7 G/DL (ref 31.5–36.5)
MCV RBC AUTO: 97 FL (ref 78–100)
MONOCYTES # BLD AUTO: 0.4 10E9/L (ref 0–1.3)
MONOCYTES NFR BLD AUTO: 7.7 %
NEUTROPHILS # BLD AUTO: 3.1 10E9/L (ref 1.6–8.3)
NEUTROPHILS NFR BLD AUTO: 65.5 %
NRBC # BLD AUTO: 0 10*3/UL
NRBC BLD AUTO-RTO: 0 /100
PLATELET # BLD AUTO: 178 10E9/L (ref 150–450)
POTASSIUM SERPL-SCNC: 4.3 MMOL/L (ref 3.4–5.3)
RBC # BLD AUTO: 3.4 10E12/L (ref 4.4–5.9)
SODIUM SERPL-SCNC: 144 MMOL/L (ref 133–144)
SPECIMEN EXP DATE BLD: NORMAL
WBC # BLD AUTO: 4.7 10E9/L (ref 4–11)

## 2019-06-28 PROCEDURE — 25000132 ZZH RX MED GY IP 250 OP 250 PS 637: Mod: GY | Performed by: INTERNAL MEDICINE

## 2019-06-28 PROCEDURE — 80048 BASIC METABOLIC PNL TOTAL CA: CPT | Performed by: EMERGENCY MEDICINE

## 2019-06-28 PROCEDURE — 86901 BLOOD TYPING SEROLOGIC RH(D): CPT | Performed by: EMERGENCY MEDICINE

## 2019-06-28 PROCEDURE — 99285 EMERGENCY DEPT VISIT HI MDM: CPT | Mod: 25

## 2019-06-28 PROCEDURE — 85025 COMPLETE CBC W/AUTO DIFF WBC: CPT | Performed by: EMERGENCY MEDICINE

## 2019-06-28 PROCEDURE — 25000128 H RX IP 250 OP 636: Performed by: HOSPITALIST

## 2019-06-28 PROCEDURE — 25000132 ZZH RX MED GY IP 250 OP 250 PS 637: Mod: GY | Performed by: HOSPITALIST

## 2019-06-28 PROCEDURE — 85018 HEMOGLOBIN: CPT | Performed by: HOSPITALIST

## 2019-06-28 PROCEDURE — 86850 RBC ANTIBODY SCREEN: CPT | Performed by: EMERGENCY MEDICINE

## 2019-06-28 PROCEDURE — 12000000 ZZH R&B MED SURG/OB

## 2019-06-28 PROCEDURE — 85730 THROMBOPLASTIN TIME PARTIAL: CPT | Performed by: EMERGENCY MEDICINE

## 2019-06-28 PROCEDURE — 36415 COLL VENOUS BLD VENIPUNCTURE: CPT | Performed by: HOSPITALIST

## 2019-06-28 PROCEDURE — 96374 THER/PROPH/DIAG INJ IV PUSH: CPT

## 2019-06-28 PROCEDURE — C9113 INJ PANTOPRAZOLE SODIUM, VIA: HCPCS | Performed by: HOSPITALIST

## 2019-06-28 PROCEDURE — 85610 PROTHROMBIN TIME: CPT | Performed by: EMERGENCY MEDICINE

## 2019-06-28 PROCEDURE — 99223 1ST HOSP IP/OBS HIGH 75: CPT | Mod: AI | Performed by: INTERNAL MEDICINE

## 2019-06-28 PROCEDURE — 86900 BLOOD TYPING SEROLOGIC ABO: CPT | Performed by: EMERGENCY MEDICINE

## 2019-06-28 PROCEDURE — C9113 INJ PANTOPRAZOLE SODIUM, VIA: HCPCS | Performed by: EMERGENCY MEDICINE

## 2019-06-28 PROCEDURE — 25800030 ZZH RX IP 258 OP 636: Performed by: EMERGENCY MEDICINE

## 2019-06-28 PROCEDURE — 25000128 H RX IP 250 OP 636: Performed by: EMERGENCY MEDICINE

## 2019-06-28 PROCEDURE — 25800030 ZZH RX IP 258 OP 636: Performed by: HOSPITALIST

## 2019-06-28 PROCEDURE — 25800030 ZZH RX IP 258 OP 636: Performed by: INTERNAL MEDICINE

## 2019-06-28 PROCEDURE — 96361 HYDRATE IV INFUSION ADD-ON: CPT

## 2019-06-28 RX ORDER — ACETAMINOPHEN 325 MG/1
650 TABLET ORAL EVERY 4 HOURS PRN
Status: DISCONTINUED | OUTPATIENT
Start: 2019-06-28 | End: 2019-06-30 | Stop reason: HOSPADM

## 2019-06-28 RX ORDER — LOVASTATIN 40 MG
40 TABLET ORAL AT BEDTIME
Status: ON HOLD | COMMUNITY
End: 2022-01-01

## 2019-06-28 RX ORDER — NALOXONE HYDROCHLORIDE 0.4 MG/ML
.1-.4 INJECTION, SOLUTION INTRAMUSCULAR; INTRAVENOUS; SUBCUTANEOUS
Status: DISCONTINUED | OUTPATIENT
Start: 2019-06-28 | End: 2019-06-30 | Stop reason: HOSPADM

## 2019-06-28 RX ORDER — SODIUM CHLORIDE, SODIUM LACTATE, POTASSIUM CHLORIDE, CALCIUM CHLORIDE 600; 310; 30; 20 MG/100ML; MG/100ML; MG/100ML; MG/100ML
INJECTION, SOLUTION INTRAVENOUS CONTINUOUS
Status: DISCONTINUED | OUTPATIENT
Start: 2019-06-28 | End: 2019-06-29

## 2019-06-28 RX ORDER — ONDANSETRON 4 MG/1
4 TABLET, ORALLY DISINTEGRATING ORAL EVERY 6 HOURS PRN
Status: DISCONTINUED | OUTPATIENT
Start: 2019-06-28 | End: 2019-06-30 | Stop reason: HOSPADM

## 2019-06-28 RX ORDER — MELOXICAM 15 MG/1
15 TABLET ORAL DAILY
Status: ON HOLD | COMMUNITY
End: 2019-07-13

## 2019-06-28 RX ORDER — LIDOCAINE 40 MG/G
CREAM TOPICAL
Status: DISCONTINUED | OUTPATIENT
Start: 2019-06-28 | End: 2019-06-30 | Stop reason: HOSPADM

## 2019-06-28 RX ORDER — CITALOPRAM HYDROBROMIDE 20 MG/1
40 TABLET ORAL DAILY
Status: DISCONTINUED | OUTPATIENT
Start: 2019-06-28 | End: 2019-06-30 | Stop reason: HOSPADM

## 2019-06-28 RX ORDER — ONDANSETRON 2 MG/ML
4 INJECTION INTRAMUSCULAR; INTRAVENOUS EVERY 6 HOURS PRN
Status: DISCONTINUED | OUTPATIENT
Start: 2019-06-28 | End: 2019-06-30 | Stop reason: HOSPADM

## 2019-06-28 RX ADMIN — SODIUM CHLORIDE 80 MG: 9 INJECTION, SOLUTION INTRAVENOUS at 05:32

## 2019-06-28 RX ADMIN — SODIUM CHLORIDE 1000 ML: 9 INJECTION, SOLUTION INTRAVENOUS at 04:57

## 2019-06-28 RX ADMIN — SODIUM CHLORIDE, POTASSIUM CHLORIDE, SODIUM LACTATE AND CALCIUM CHLORIDE: 600; 310; 30; 20 INJECTION, SOLUTION INTRAVENOUS at 21:26

## 2019-06-28 RX ADMIN — POLYETHYLENE GLYCOL 3350, SODIUM SULFATE ANHYDROUS, SODIUM BICARBONATE, SODIUM CHLORIDE, POTASSIUM CHLORIDE 4000 ML: 236; 22.74; 6.74; 5.86; 2.97 POWDER, FOR SOLUTION ORAL at 17:17

## 2019-06-28 RX ADMIN — CITALOPRAM HYDROBROMIDE 40 MG: 20 TABLET ORAL at 09:45

## 2019-06-28 RX ADMIN — PANTOPRAZOLE SODIUM 40 MG: 40 INJECTION, POWDER, FOR SOLUTION INTRAVENOUS at 21:30

## 2019-06-28 RX ADMIN — SODIUM CHLORIDE, POTASSIUM CHLORIDE, SODIUM LACTATE AND CALCIUM CHLORIDE: 600; 310; 30; 20 INJECTION, SOLUTION INTRAVENOUS at 09:02

## 2019-06-28 RX ADMIN — BRINZOLAMIDE/BRIMONIDINE TARTRATE 1 DROP: 10; 2 SUSPENSION/ DROPS OPHTHALMIC at 21:27

## 2019-06-28 RX ADMIN — PANTOPRAZOLE SODIUM 40 MG: 40 INJECTION, POWDER, FOR SOLUTION INTRAVENOUS at 09:46

## 2019-06-28 RX ADMIN — BRINZOLAMIDE/BRIMONIDINE TARTRATE 1 DROP: 10; 2 SUSPENSION/ DROPS OPHTHALMIC at 09:47

## 2019-06-28 ASSESSMENT — ENCOUNTER SYMPTOMS
ANAL BLEEDING: 1
SHORTNESS OF BREATH: 0
BLOOD IN STOOL: 0
ABDOMINAL PAIN: 0

## 2019-06-28 ASSESSMENT — ACTIVITIES OF DAILY LIVING (ADL)
ADLS_ACUITY_SCORE: 11
ADLS_ACUITY_SCORE: 11
ADLS_ACUITY_SCORE: 13
ADLS_ACUITY_SCORE: 13

## 2019-06-28 ASSESSMENT — MIFFLIN-ST. JEOR
SCORE: 1530
SCORE: 1521.38

## 2019-06-28 NOTE — ED PROVIDER NOTES
"  History     Chief Complaint:  Rectal Bleeding    HPI   Tk Richmond is a 83 year old male on plavix with a history of stroke, HLD, HTN, aortic aneurysm who presents with his wife for rectal bleeding. He has bright red blood in his stool beginning at 0100 today and worsening at 0400, prompting him to contact EMS and present to the ED.  He further reports being lightheaded. Patient denies abdominal pain, chest pain, or shortness of breath. Of note, he had an iron transfusion 3 days ago. A few weeks ago he fell resulting in a head injury and broken ribs. He did not receive a CT in clinic for the fall, but reassuringly has had no further symptoms.     Allergies:  NKDA     Medications:    Aspirin  Celexa  Clopidogrel  Lovastatin     Past Medical History:    Glaucoma  HTN  HLD  Stroke  Scalp lesion    Past Surgical History:    Cataract removal  Hernia repair  Back surgery  Left pinky surgery  Excision of scalp lesion    Family History:    Breast cancer    Social History:  Presents with wife.  Former smoker, quit 1972.  Positive for alcohol use.   Marital Status:   [2]     Review of Systems   Respiratory: Negative for shortness of breath.    Cardiovascular: Negative for chest pain.   Gastrointestinal: Positive for anal bleeding. Negative for abdominal pain and blood in stool.   All other systems reviewed and are negative.    Physical Exam     Patient Vitals for the past 24 hrs:   BP Temp Temp src Pulse Heart Rate Resp SpO2 Height Weight   06/28/19 0530 173/83 -- -- 65 -- -- 98 % -- --   06/28/19 0515 156/82 -- -- 64 -- -- 96 % -- --   06/28/19 0500 162/75 -- -- 65 -- -- 97 % -- --   06/28/19 0453 (!) 153/93 97.8  F (36.6  C) Oral -- 68 18 98 % 1.753 m (5' 9\") 83.5 kg (184 lb)     Physical Exam  General: Alert, appears elderly, otherwise well-developed and well-nourished. Cooperative.     In mild distress  HEENT:  Head:  Atraumatic  Ears:  External ears are normal  Mouth/Throat:  Oropharynx is without erythema " or exudate and mucous membranes are moist.   Eyes:   Conjunctivae normal and EOM are normal. No scleral icterus.  CV:  Normal rate, regular rhythm, normal heart sounds and radial pulses are 2+ and symmetric.  No murmur.  Resp:  Breath sounds are clear bilaterally    Non-labored, no retractions or accessory muscle use  GI:  Abdomen is soft, no distension, no tenderness. No rebound or guarding.  No CVA tenderness bilaterally  Rectal: Dark maroon stools dried around rectum.  Non-bleeding external hemorrhoid.   MS:  Normal range of motion. No edema.    Normal strength in all 4 extremities.     Back atraumatic.    No midline cervical, thoracic, or lumbar tenderness  Skin:  Warm and dry.  No rash or lesions noted.  Neuro: Alert. Normal strength.  GCS: 15  Psych:  Normal mood and affect.    Emergency Department Course     Laboratory:  CBC: WBC: 4.7, HGB: 10.5 (L), PLT: 178  BMP: Glucose 103 (H), Chloride 114 (H), Creatinine 1.33 (H), GFR Estimate 49 (L), Calcium 8.1 (L), o/w WNL     PTT: 27    INR: 1.05    ABO/Rh type and screen: pending    Interventions:  0457 NS 1L IV Bolus  0532 Protonix 80 mg IV    Emergency Department Course:  Nursing notes and vitals reviewed. 0450 I performed an exam of the patient as documented above.     IV inserted. Medicine administered as documented above. Blood drawn. This was sent to the lab for further testing, results above.    0521 I spoke with Dr. John, hospitalist, who agreed to admit the patient.    0525 I rechecked the patient and discussed the results of his workup thus far.     Findings and plan explained to the Patient who consents to admission. Discussed the patient with Dr. John, who will admit the patient to a medical bed for further monitoring, evaluation, and treatment.    I personally reviewed the laboratory results with the Patient and answered all related questions prior to admission.    Impression & Plan      Medical Decision Making:  Tk Richmond is a 83 year old  male who presents with blood in stool.  I considered a broad differential diagnosis for this patient including upper GIB (ulcer, gastritis, avm, tumor, etc) vs lower GIB (tumor, diverticulosis, avm, meckels, etc), colitis, aortoenteric fistula, hemorrhoids, fissures,  Ischemic colitis, bacterial stool infection (salmonella, shigella, e. Coli, campylobacter).    The workup in the ED is consistent with gastrointestinal bleeding, although it is unclear if it is lower or upper.  I favor lower at this time due to lack of vomiting, lack of preceding black stools, no epigastric pain, no history of ulcers or NSAID use.  I did give protonix just in case.  No indication to start octreotide as my suspicion of variceal bleed is so low.    Given amount of blood and exam, as well as hx of plavix use, I would admit at this point for serial hemoglobins.  Patient was typed and screened.  Discussed with hospitalist.  They are stable and do not need ICU care at this point.    Did not call GI consult at this time given non-massive bleed and stable patient.      Diagnosis:    ICD-10-CM   1. Lower GI bleed K92.2   2. Hematochezia K92.1   3. Hx of long-term (current) use of anticoagulants Z79.01       Disposition:  Admitted to Dr. John.    Sally Prieto  6/28/2019   [facility] EMERGENCY DEPARTMENT  Scribe Disclosure:  Sally GARAY, am serving as a scribe at 5:18 AM on 6/28/2019 to document services personally performed by Esdras Heath MD based on my observations and the provider's statements to me.     Markos GARAY, am serving as a scribe on 6/28/2019 at 6:04 AM to personally document services performed by Esdras Heath MD based on my observations and the provider's statements to me.      Esdras Heath MD  06/28/19 5003

## 2019-06-28 NOTE — H&P
[facility]    History and Physical  Hospitalist       Date of Admission:  6/28/2019    Assessment & Plan   Tk Richmond is a 83 year old male who presents with lower GI bleed.    Patient has a history of CVA and is on full dose aspirin and Plavix.  Also has a history of ascending aortic aneurysm.  He gets IV iron infusion in the outpatient setting for low iron level, ordered by his hematologist.  Also has a history of fall a few weeks ago.    He presented to the emergency room with chief complaints of bright red blood per rectum.  According to his wife, he had multiple episodes and significant amount of bleeding at home.  He felt very dizzy after standing up.  Denies any abdominal pain.  Denies any hematemesis.    In the ER, his vitals are stable.  His hemoglobin was 10.5.  Denies any significant symptoms.  No more active bleeding noted.  He is being admitted to internal medicine hospitalist service.    Problem List:    Lower GI bleed.  - Last colonoscopy 2007 which showed diverticulosis.  - GI consult.  Repeat hemoglobin and hematocrit later today.  May need PRBC transfusion.  -Hemodynamically stable.  - Hold aspirin and Plavix.    Acute on chronic anemia.  - Per patient, history of iron deficiency anemia and gets IV iron in the outpatient setting.  Last IV iron infusion about 3 to 4 days ago.  -Monitor hemoglobin and hematocrit.    History of CVA  - We will have to hold patient's aspirin and Plavix.  According to the patient, his aspirin dose was increased to full dose aspirin back in February when he had a suspected stroke.  -Obtain rest of the home medications    Plan discussed with patient and patient's wife.    DVT Prophylaxis: Pneumatic Compression Devices  Code Status: Full Code    Cristobal John MD    Primary Care Physician   Clinic [facility]    Chief Complaint   Bright red blood per rectum.      History of Present Illness   Tk Richmond is a 83 year old male presented with  lower GI bleed.      Past Medical History    I have reviewed this patient's medical history and updated it with pertinent information if needed.   Past Medical History:   Diagnosis Date     Glaucoma      Hyperlipidemia LDL goal < 100      Hypertension      Stroke (H)     2004, speech deficit       Past Surgical History   I have reviewed this patient's surgical history and updated it with pertinent information if needed.  History reviewed. No pertinent surgical history.    Prior to Admission Medications   Prior to Admission Medications   Prescriptions Last Dose Informant Patient Reported? Taking?   CLOPIDOGREL BISULFATE PO 2019 at Unknown time  Yes Yes   Sig: Take 75 mg by mouth daily.   Calcium Carbonate-Vitamin D (CALCIUM 600+D PO) 2019 at Unknown time  Yes Yes   Sig: Take by mouth daily Two tablets   Ferrous Sulfate (IRON) 325 (65 Fe) MG tablet 2019 at Unknown time  Yes Yes   Sig: Take 1 tablet by mouth 3 times daily (with meals)   LOVASTATIN PO 2019 at Unknown time  Yes Yes   Sig: Take 40 mg by mouth At Bedtime.   Misc Natural Products (GLUCOSAMINE CHONDROITIN ADV PO) 2019 at Unknown time  Yes Yes   Sig: Take by mouth daily Two tablets   Multiple Vitamins-Minerals (CENTRUM SILVER ADULT 50+ PO) 2019 at Unknown time  Yes Yes   Sig: Take by mouth daily   Multiple Vitamins-Minerals (PRESERVISION AREDS 2+MULTI VIT PO) 2019 at Unknown time  Yes Yes   Sig: Take by mouth 2 times daily Two tablets   aspirin 325 MG tablet 2019 at Unknown time  No Yes   Sig: Take 1 tablet by mouth daily.   brinzolamide-brimonidine (SIMBRINZA) 1-0.2 % ophthalmic suspension 2019 at Unknown time  Yes Yes   Si drop 2 times daily   citalopram (CELEXA) 40 MG tablet 2019 at Unknown time  Yes Yes   Sig: Take 40 mg by mouth daily   vitamin B-12 (CYANOCOBALAMIN) 1000 MCG tablet 2019 at Unknown time  Yes Yes   Sig: Take by mouth daily      Facility-Administered Medications: None     Allergies    Allergies   Allergen Reactions     Contrast Dye Rash       Social History   I have reviewed this patient's social history and updated it with pertinent information if needed. Tk Richmond  reports that he quit smoking about 47 years ago. His smoking use included cigars. He quit after 10.00 years of use. He has never used smokeless tobacco. He reports that he drinks alcohol. He reports that he does not use drugs.    Family History   I have reviewed this patient's family history and updated it with pertinent information if needed.   Family History   Problem Relation Age of Onset     Breast Cancer Mother      Diabetes No family hx of      Hypertension No family hx of      Cancer - colorectal No family hx of        Review of Systems   The 10 point Review of Systems is negative other than noted in the HPI or here.     Physical Exam   Temp: 97.8  F (36.6  C) Temp src: Oral BP: (!) 153/93   Heart Rate: 68 Resp: 18 SpO2: 98 % O2 Device: None (Room air)    Vital Signs with Ranges  Temp:  [97.8  F (36.6  C)] 97.8  F (36.6  C)  Heart Rate:  [68] 68  Resp:  [18] 18  BP: (153)/(93) 153/93  SpO2:  [98 %] 98 %  184 lbs 0 oz    Constitutional: Awake, alert, cooperative, no apparent distress.  Eyes: Conjunctiva and pupils examined and normal.  HEENT: Moist mucous membranes, normal dentition.  Respiratory: Clear to auscultation bilaterally, no crackles or wheezing.  Cardiovascular: Regular rate and rhythm, normal S1 and S2, and no murmur noted.  GI: Soft, non-distended, non-tender, normal bowel sounds.  Lymph/Hematologic: No anterior cervical or supraclavicular adenopathy.  Skin: No rashes, no cyanosis, no edema.  Musculoskeletal: No joint swelling, erythema or tenderness.  Neurologic: Cranial nerves 2-12 intact, normal strength and sensation.  Psychiatric: Alert, oriented to person, place and time, no obvious anxiety or depression.    Data     Recent Labs   Lab 06/28/19  0454   WBC 4.7   HGB 10.5*   MCV 97      INR  1.05      POTASSIUM 4.3   CHLORIDE 114*   CO2 24   BUN 26   CR 1.33*   ANIONGAP 6   WILLIAM 8.1*   *       No results found for this or any previous visit (from the past 24 hour(s)).

## 2019-06-28 NOTE — CONSULTS
GASTROENTEROLOGY CONSULTATION      Tk Richmond  [id number]  [facility] [id number]  83 year old male     Admission Date/Time: 6/28/2019  Primary Care Provider: [facility]  Referring / Attending Physician: Dr. John     We were asked to see the patient in consultation by Dr. John for evaluation of gi bleeding.        HPI:  Tk Richmond is a 83 year old male with history of stroke on Plavix and aspirin, chronic anemia, hypertension, and hyperlipidemia who presented to the hospital after one episode of rectal bleeding.    Patient reports he has been having regular brown bowel movements up until last evening.  He went to make a bowel movement but only red blood was passed.  This occurred on 2 more occasions.  This caused significant concern so they  came to the emergency room.  The patient denies any abdominal pain.  No nausea, vomiting, fever or chills.  He denies significant heartburn.    In the emergency room his hemoglobin was 10.5.  He has not had a bowel movement since yesterday.  Last colonoscopy was in 2007 which showed significant diverticulosis throughout his entire colon.  The patient's wife tells me that he follows with a hematologist for low iron/anemia.  He just received a iron infusion last week.  The patient's hematologist suggested a upper endoscopy.       PAST MEDICAL HISTORY:  Patient Active Problem List    Diagnosis Date Noted     Lower GI bleed 06/28/2019     Priority: Medium     Ascending aortic aneurysm (H) 12/20/2018     Priority: Medium     Non-rheumatic aortic regurgitation 12/20/2018     Priority: Medium     CVA (cerebral vascular accident) (H) 02/18/2012     Priority: Medium     CVA 2/18/12       Hyperlipidemia LDL goal <100 02/18/2012     Priority: Medium     HTN (hypertension) 02/18/2012     Priority: Medium          ROS: A comprehensive ten point review of systems was negative aside from those in mentioned in the HPI.       MEDICATIONS:   Prior to  Admission medications    Medication Sig Start Date End Date Taking? Authorizing Provider   aspirin 325 MG tablet Take 1 tablet by mouth daily. 12  Yes Jeremiah Shaver MD   brinzolamide-brimonidine (SIMBRINZA) 1-0.2 % ophthalmic suspension Place 1 drop into both eyes 2 times daily    Yes Reported, Patient   Calcium Carbonate-Vitamin D (CALCIUM 600+D PO) Take 1 tablet by mouth 2 times daily At noon and supper   Yes Reported, Patient   citalopram (CELEXA) 40 MG tablet Take 40 mg by mouth daily   Yes Reported, Patient   clopidogrel (PLAVIX) 75 MG tablet Take 75 mg by mouth daily    Yes Unknown, Entered By History   lovastatin (MEVACOR) 40 MG tablet Take 40 mg by mouth At Bedtime   Yes Unknown, Entered By History   meloxicam (MOBIC) 15 MG tablet Take 15 mg by mouth daily With dinner   Yes Unknown, Entered By History   Misc Natural Products (GLUCOSAMINE CHONDROITIN ADV PO) Take 1 capsule by mouth 2 times daily At noon and supper   Yes Reported, Patient   Multiple Vitamins-Minerals (CENTRUM SILVER ADULT 50+ PO) Take by mouth daily   Yes Reported, Patient   Multiple Vitamins-Minerals (PRESERVISION AREDS 2+MULTI VIT PO) Take by mouth 2 times daily Two tablets   Yes Reported, Patient   vitamin B-12 (CYANOCOBALAMIN) 1000 MCG tablet Take by mouth daily   Yes Reported, Patient        ALLERGIES:   Allergies   Allergen Reactions     Contrast Dye Rash        SOCIAL HISTORY:  Social History     Tobacco Use     Smoking status: Former Smoker     Years: 10.00     Types: Cigars     Last attempt to quit: 1972     Years since quittin.3     Smokeless tobacco: Never Used   Substance Use Topics     Alcohol use: Yes     Alcohol/week: 0.0 oz     Comment:  oxx     Drug use: No        FAMILY HISTORY:  Family History   Problem Relation Age of Onset     Breast Cancer Mother      Diabetes No family hx of      Hypertension No family hx of      Cancer - colorectal No family hx of         PHYSICAL EXAM:     /85   Pulse 65    "Temp 96  F (35.6  C) (Oral)   Resp 18   Ht 1.753 m (5' 9\")   Wt 82.6 kg (182 lb 1.6 oz)   SpO2 98%   BMI 26.89 kg/m       PHYSICAL EXAM:  GENERAL:  NAD  SKIN: no suspicious lesions, rashes, jaundice  HEAD: Normocephalic. Atraumatic.  NECK: Neck supple. No adenopathy.   EYES: No scleral icterus  RESPIRATORY: Good transmission. CTA bilaterally.   CARDIOVASCULAR: RRR, normal S1, S2,  No murmur appreciated  GASTROINTESTINAL: +BS, soft, non tender, non distended, no guarding/rebound  JOINT/EXTREMITIES:  no gross deformities noted, normal muscle tone  NEURO: CN 2-12 grossly intact, no focal deficits  PSYCH: Normal affect        ADDITIONAL COMMENTS:   I reviewed the patient's new clinical lab test results.   Recent Labs   Lab Test 06/28/19  0454 02/18/12  0936   WBC 4.7 4.2   HGB 10.5* 12.8*   MCV 97 94    142*   INR 1.05  --      Recent Labs   Lab Test 06/28/19 0454 02/19/12  0633 02/18/12  0936   POTASSIUM 4.3 4.0 3.8   CHLORIDE 114* 105 104   CO2 24 28 27   BUN 26 19 22   ANIONGAP 6 6 8         CONSULTATION ASSESSMENT AND PLAN:    Tk Richmond is a 83 year old male with history of stroke on Plavix and aspirin, chronic anemia, hypertension, and hyperlipidemia who presented to the hospital after one episode of rectal bleeding.    1.  GI bleeding: Suspect lower source, most likely a diverticular bleed in the setting of chronic aspirin and Plavix use.  Hemoglobin is stable at 10.5 need to recheck this morning.  No further bowel movements since admission.  He is hemodynamically stable.  He was started on a PPI.    -- Would suggest conservative measures, monitor serial hemoglobin.  -- Continue with plans for outpatient work-up of chronic anemia.  -- If develops melena could then consider EGD.      I discussed the patient plan with Dr. Mary. Thank you for asking us to participate in the care of this patient.    Xochitl Cobian PA-C  [facility] ( [facility])     "

## 2019-06-28 NOTE — PROGRESS NOTES
HOSPITALIST FOLLOW UP NOTE:    The patient was seen and examined, I agree with the history, exam, assessment and plan of Dr John. Discussed with MARTY Rao DO MPH  [facility] Hospitalist  [address] [place] [address]  Pager: [phone]  06/28/2019

## 2019-06-28 NOTE — ED TRIAGE NOTES
Pt arived by ambulance for bloody stools, first on at 0030 and more at 5 am. Recent fall .recent iron infusion

## 2019-06-28 NOTE — PLAN OF CARE
Pt A&O x4. VS stable; afebrile. On remote telemetry. Denies pain. CMS intact. Up w/ SBA, using gait belt. Voiding in good amts. No BM's this shift. Tolerating clears and fulls liquids-now advanced to low fiber. Plan is home @ discharge. Will continue to monitor.

## 2019-06-28 NOTE — PHARMACY-ADMISSION MEDICATION HISTORY
Admission medication history interview status for this patient is complete. See [facility] admission navigator for allergy information, prior to admission medications and immunization status.     Medication history interview source(s):Patient and wife  Medication history resources (including written lists, pill bottles, clinic record):None  Primary pharmacy: [facility] [place]    Changes made to PTA medication list:  Added: meloxicam  Deleted: iron  Changed: strength on clopidogrel, lovastatin, directions on lovastatin    Actions taken by pharmacist (provider contacted, etc):None     Additional medication history information:None    Medication reconciliation/reorder completed by provider prior to medication history? Yes    Do you take OTC medications (eg tylenol, ibuprofen, fish oil, eye/ear drops, etc)? Y (Y/N)    For patients on insulin therapy: N (Y/N)      Prior to Admission medications    Medication Sig Last Dose Taking? Auth Provider   aspirin 325 MG tablet Take 1 tablet by mouth daily. 6/27/2019 at pm Yes Jeremiah Shaver MD   brinzolamide-brimonidine (SIMBRINZA) 1-0.2 % ophthalmic suspension 1 drop 2 times daily 6/27/2019 at pm Yes Reported, Patient   Calcium Carbonate-Vitamin D (CALCIUM 600+D PO) Take 1 tablet by mouth 2 times daily At noon and supper 6/27/2019 at pm Yes Reported, Patient   citalopram (CELEXA) 40 MG tablet Take 40 mg by mouth daily 6/27/2019 at 12 pm Yes Reported, Patient   clopidogrel (PLAVIX) 75 MG tablet Take 75 mg by mouth daily  6/27/2019 at 12 pm  Yes Unknown, Entered By History   lovastatin ER (ALTOPREV) 40 MG 24 hr tablet Take 40 mg by mouth every evening With supper 6/27/2019 at pm Yes Unknown, Entered By History   meloxicam (MOBIC) 15 MG tablet Take 15 mg by mouth daily With dinner 6/27/2019 at pm Yes Unknown, Entered By History   Misc Natural Products (GLUCOSAMINE CHONDROITIN ADV PO) Take 1 capsule by mouth 2 times daily At noon and supper 6/27/2019 at pm Yes Reported, Patient    Multiple Vitamins-Minerals (CENTRUM SILVER ADULT 50+ PO) Take by mouth daily 6/27/2019 at Unknown time Yes Reported, Patient   Multiple Vitamins-Minerals (PRESERVISION AREDS 2+MULTI VIT PO) Take by mouth 2 times daily Two tablets 6/27/2019 at Unknown time Yes Reported, Patient   vitamin B-12 (CYANOCOBALAMIN) 1000 MCG tablet Take by mouth daily 6/27/2019 at 12 pm Yes Reported, Patient

## 2019-06-28 NOTE — ED NOTES
"RECEIVING UNIT ED HANDOFF REVIEW    Above ED Nurse Handoff Report was reviewed: Yes  Reviewed by: Guillermina Donato on June 28, 2019 at 6:36 AM   [facility]  ED Nurse Handoff Report    Tk Richmond is a 83 year old male   ED Chief complaint: Rectal Bleeding  . ED Diagnosis:   Final diagnoses:   Lower GI bleed   Hematochezia   Hx of long-term (current) use of anticoagulants     Allergies:   Allergies   Allergen Reactions     Contrast Dye Rash       Code Status: Full Code  Activity level - Baseline/Home:  Independent. Activity Level - Current:   Stand by Assist. Lift room needed: No. Bariatric: No   Needed: No   Isolation: No. Infection: Not Applicable.     Vital Signs:   Vitals:    06/28/19 0453   BP: (!) 153/93   Resp: 18   Temp: 97.8  F (36.6  C)   TempSrc: Oral   SpO2: 98%   Weight: 83.5 kg (184 lb)   Height: 1.753 m (5' 9\")       Cardiac Rhythm:  ,      Pain level: 0-10 Pain Scale: 0  Patient confused: No. Patient Falls Risk: Yes.   Elimination Status: Void at home prior to arrival   Patient Report - Initial Complaint: Rectal Bleeding. Focused Assessment: See Physician Note   Tests Performed: Labs. Abnormal Results: See Results.   Treatments provided: Fluids, Protonix  Family Comments: Spouse present agreeable to admit  OBS brochure/video discussed/provided to patient:  Yes  ED Medications:   Medications   0.9% sodium chloride BOLUS (1,000 mLs Intravenous New Bag 6/28/19 0457)     Drips infusing:  No  For the majority of the shift, the patient's behavior Green. Interventions performed were None.     Severe Sepsis OR Septic Shock Diagnosis Present: No      ED Nurse Name/Phone Number: Alcira Leyda,   5:17 AM    RECEIVING UNIT ED HANDOFF REVIEW    Above ED Nurse Handoff Report was reviewed: Yes  Reviewed by: Casie Roberts on June 28, 2019 at 5:52 AM         "

## 2019-06-28 NOTE — PROGRESS NOTES
GI update    Spoke with nurse this afternoon, patient passed small amount of red blood per rectum.  Will keep NPO other than colon prep this evening for likely colonoscopy tomorrow.      Continue to follow hemoglobins.  Vitals currently stable.    Nicolasa Mary MD  [facility]  Cell/Pager: [phone]  After 5pm please call [phone]

## 2019-06-28 NOTE — ED NOTES
Patient states he was slightly dizzy when up to the restroom, improved now upon arrival to ED. Patient also received Iron Infusion yesterday. Wife is present in room.

## 2019-06-29 LAB
ANION GAP SERPL CALCULATED.3IONS-SCNC: 7 MMOL/L (ref 3–14)
BUN SERPL-MCNC: 15 MG/DL (ref 7–30)
CALCIUM SERPL-MCNC: 8 MG/DL (ref 8.5–10.1)
CHLORIDE SERPL-SCNC: 114 MMOL/L (ref 94–109)
CO2 SERPL-SCNC: 25 MMOL/L (ref 20–32)
COLONOSCOPY: NORMAL
CREAT SERPL-MCNC: 1.09 MG/DL (ref 0.66–1.25)
ERYTHROCYTE [DISTWIDTH] IN BLOOD BY AUTOMATED COUNT: 16 % (ref 10–15)
GFR SERPL CREATININE-BSD FRML MDRD: 63 ML/MIN/{1.73_M2}
GLUCOSE SERPL-MCNC: 80 MG/DL (ref 70–99)
HCT VFR BLD AUTO: 28.8 % (ref 40–53)
HGB BLD-MCNC: 8.7 G/DL (ref 13.3–17.7)
HGB BLD-MCNC: 9.3 G/DL (ref 13.3–17.7)
HGB BLD-MCNC: 9.4 G/DL (ref 13.3–17.7)
HGB BLD-MCNC: 9.6 G/DL (ref 13.3–17.7)
MCH RBC QN AUTO: 31.2 PG (ref 26.5–33)
MCHC RBC AUTO-ENTMCNC: 32.3 G/DL (ref 31.5–36.5)
MCV RBC AUTO: 97 FL (ref 78–100)
PLATELET # BLD AUTO: 156 10E9/L (ref 150–450)
POTASSIUM SERPL-SCNC: 3.9 MMOL/L (ref 3.4–5.3)
RBC # BLD AUTO: 2.98 10E12/L (ref 4.4–5.9)
SODIUM SERPL-SCNC: 146 MMOL/L (ref 133–144)
WBC # BLD AUTO: 4.5 10E9/L (ref 4–11)

## 2019-06-29 PROCEDURE — C9113 INJ PANTOPRAZOLE SODIUM, VIA: HCPCS | Performed by: HOSPITALIST

## 2019-06-29 PROCEDURE — 45382 COLONOSCOPY W/CONTROL BLEED: CPT | Performed by: INTERNAL MEDICINE

## 2019-06-29 PROCEDURE — 25000128 H RX IP 250 OP 636: Performed by: INTERNAL MEDICINE

## 2019-06-29 PROCEDURE — 85027 COMPLETE CBC AUTOMATED: CPT | Performed by: HOSPITALIST

## 2019-06-29 PROCEDURE — 36415 COLL VENOUS BLD VENIPUNCTURE: CPT | Performed by: HOSPITALIST

## 2019-06-29 PROCEDURE — 25800030 ZZH RX IP 258 OP 636: Performed by: HOSPITALIST

## 2019-06-29 PROCEDURE — 85018 HEMOGLOBIN: CPT | Performed by: HOSPITALIST

## 2019-06-29 PROCEDURE — 0W3P8ZZ CONTROL BLEEDING IN GASTROINTESTINAL TRACT, VIA NATURAL OR ARTIFICIAL OPENING ENDOSCOPIC: ICD-10-PCS | Performed by: INTERNAL MEDICINE

## 2019-06-29 PROCEDURE — 25000132 ZZH RX MED GY IP 250 OP 250 PS 637: Mod: GY | Performed by: HOSPITALIST

## 2019-06-29 PROCEDURE — 25000128 H RX IP 250 OP 636: Performed by: HOSPITALIST

## 2019-06-29 PROCEDURE — 25000132 ZZH RX MED GY IP 250 OP 250 PS 637: Mod: GY | Performed by: INTERNAL MEDICINE

## 2019-06-29 PROCEDURE — 80048 BASIC METABOLIC PNL TOTAL CA: CPT | Performed by: HOSPITALIST

## 2019-06-29 PROCEDURE — 45381 COLONOSCOPY SUBMUCOUS NJX: CPT | Performed by: INTERNAL MEDICINE

## 2019-06-29 PROCEDURE — 12000000 ZZH R&B MED SURG/OB

## 2019-06-29 PROCEDURE — 99232 SBSQ HOSP IP/OBS MODERATE 35: CPT | Performed by: INTERNAL MEDICINE

## 2019-06-29 PROCEDURE — G0500 MOD SEDAT ENDO SERVICE >5YRS: HCPCS | Performed by: INTERNAL MEDICINE

## 2019-06-29 RX ORDER — PANTOPRAZOLE SODIUM 40 MG/1
40 TABLET, DELAYED RELEASE ORAL
Status: DISCONTINUED | OUTPATIENT
Start: 2019-06-30 | End: 2019-06-30 | Stop reason: HOSPADM

## 2019-06-29 RX ORDER — FENTANYL CITRATE 50 UG/ML
INJECTION, SOLUTION INTRAMUSCULAR; INTRAVENOUS PRN
Status: DISCONTINUED | OUTPATIENT
Start: 2019-06-29 | End: 2019-06-29 | Stop reason: HOSPADM

## 2019-06-29 RX ORDER — LOVASTATIN 20 MG
40 TABLET ORAL AT BEDTIME
Status: DISCONTINUED | OUTPATIENT
Start: 2019-06-29 | End: 2019-06-30 | Stop reason: HOSPADM

## 2019-06-29 RX ORDER — NALOXONE HYDROCHLORIDE 0.4 MG/ML
.1-.4 INJECTION, SOLUTION INTRAMUSCULAR; INTRAVENOUS; SUBCUTANEOUS
Status: DISCONTINUED | OUTPATIENT
Start: 2019-06-29 | End: 2019-06-30

## 2019-06-29 RX ORDER — FLUMAZENIL 0.1 MG/ML
0.2 INJECTION, SOLUTION INTRAVENOUS
Status: ACTIVE | OUTPATIENT
Start: 2019-06-29 | End: 2019-06-29

## 2019-06-29 RX ORDER — LIDOCAINE 40 MG/G
CREAM TOPICAL
Status: DISCONTINUED | OUTPATIENT
Start: 2019-06-29 | End: 2019-06-29 | Stop reason: HOSPADM

## 2019-06-29 RX ADMIN — BRINZOLAMIDE/BRIMONIDINE TARTRATE 1 DROP: 10; 2 SUSPENSION/ DROPS OPHTHALMIC at 08:37

## 2019-06-29 RX ADMIN — SODIUM CHLORIDE, POTASSIUM CHLORIDE, SODIUM LACTATE AND CALCIUM CHLORIDE: 600; 310; 30; 20 INJECTION, SOLUTION INTRAVENOUS at 08:40

## 2019-06-29 RX ADMIN — BRINZOLAMIDE/BRIMONIDINE TARTRATE 1 DROP: 10; 2 SUSPENSION/ DROPS OPHTHALMIC at 21:47

## 2019-06-29 RX ADMIN — CITALOPRAM HYDROBROMIDE 40 MG: 20 TABLET ORAL at 11:54

## 2019-06-29 RX ADMIN — LOVASTATIN 40 MG: 20 TABLET ORAL at 21:47

## 2019-06-29 RX ADMIN — PANTOPRAZOLE SODIUM 40 MG: 40 INJECTION, POWDER, FOR SOLUTION INTRAVENOUS at 08:40

## 2019-06-29 ASSESSMENT — ACTIVITIES OF DAILY LIVING (ADL)
ADLS_ACUITY_SCORE: 14
ADLS_ACUITY_SCORE: 14
ADLS_ACUITY_SCORE: 11
ADLS_ACUITY_SCORE: 13
ADLS_ACUITY_SCORE: 11
ADLS_ACUITY_SCORE: 11

## 2019-06-29 NOTE — PROGRESS NOTES
Patient NPO during shift other than colon prep per GI order for likely colonoscopy tomorrow. Pt had multiple outputs last one about 2130. Dark blood clots, nothing bright red. Calling appropriately, ambulating to the bathroom ok, not dizzy or lightheaded. Will keep monitoring.

## 2019-06-29 NOTE — PLAN OF CARE
Alert, orientated, expressive aphasia- mild- word finding difficulty at times. Pleasant mood. Returned from colonoscopy. Regular diet after scope. Per endo nurse they found a bleeder in the intestines. Hemoglbin checks. No stools this morning. Wiped some red blood from rectum but no stools. Voiding. Up with 1 assist to bedside commode.  Abd rounded, nontender. No pain. No nausea. Good bowel sounds. IV fluids. Edema right leg mild > left. Lungs clear. IV fluids. Bed alarm on for safety. Wife at bedside. Remote tele per tele tech strip SR/SB.  1200 Iv saline locked. Tolerating reg diet now.1400 no stools this afternoon. No blood from the rectum since 0745 this AM. Last hemoglobin 9.6.

## 2019-06-29 NOTE — PROGRESS NOTES
"\"large amount of retained blood in the colon, so     anticipate this will pass today. For acute, rebleeding angiography could be attempted given the know location of this bleed (clip).\" retrieved from colonoscopy, pt re-assured that it is expected. tolerating regular diet, ambulating to the bathroom with sb assist. 2 bloody stools during shift, last about 1830. Will keep monitoring.     "

## 2019-06-29 NOTE — PROGRESS NOTES
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     Patient has a history of CVA and is on full dose aspirin and Plavix.  Also has a history of ascending aortic aneurysm.  He gets IV iron infusion in the outpatient setting for low iron level, ordered by his hematologist.  Also has a history of fall a few weeks prior to admission.  Fortunately with no acute significant injuries.     He presented to the emergency room with chief complaints of bright red blood per rectum.  According to his wife, he had multiple episodes and significant amount of bleeding at home.  He felt very dizzy after standing up.  Denies any abdominal pain.  Denies any hematemesis.     In the ER, his vitals are stable.  His hemoglobin was 10.5.  Denies any significant symptoms.  No more active bleeding noted.  He was admitted to internal medicine hospitalist service.     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  This was intervened on by GI.  Hemoglobin otherwise reasonably stable with no indications for transfusion.  - Last colonoscopy 2007 which showed diverticulosis.  - GI assistance appreciated  -Hemodynamically stable.  -Holding aspirin and Plavix and plan on restarting back on July 3.  -Okay to restart patient back on a regular diet     Acute on chronic anemia.  - Per patient, history of iron deficiency anemia and gets IV iron in the outpatient setting.  Last IV iron infusion about 3 to 4 days ago.  -Monitor hemoglobin and hematocrit.     History of  "CVA  - We will have to hold patient's aspirin and Plavix until July 3 as recommended by GI. According to the patient, his aspirin dose was increased to full dose aspirin back in February when he had a suspected stroke.     Plan discussed with patient and patient's wife.     DVT Prophylaxis: Pneumatic Compression Devices  Code Status: Full Code  Discharge Dispo: Home with wife  Estimated Disch Date / # of Days until Disch: Tomorrow if tolerates regular diet, hemoglobin stable, and no evidence of rebleeding.        Interval History (Subjective):      No bloody bowel movement since yesterday.  Denies any abdominal pain.         Physical Exam:      Vital signs:  Temp: 97.4  F (36.3  C) Temp src: Oral BP: 160/78 Pulse: 57 Heart Rate: 67 Resp: 16 SpO2: 96 % O2 Device: None (Room air) Oxygen Delivery: 2 LPM Height: 175.3 cm (5' 9\") Weight: 82.6 kg (182 lb 1.6 oz)  Estimated body mass index is 26.89 kg/m  as calculated from the following:    Height as of this encounter: 1.753 m (5' 9\").    Weight as of this encounter: 82.6 kg (182 lb 1.6 oz).    I/O last 3 completed shifts:  In: 1100 [I.V.:1100]  Out: 325 [Urine:325]  Vitals:    06/28/19 0453 06/28/19 0649   Weight: 83.5 kg (184 lb) 82.6 kg (182 lb 1.6 oz)       Constitutional: Awake, alert, cooperative, no apparent distress   Respiratory: Nl work of breathing. Clear to auscultation bilaterally, no crackles or wheezing   Cardiovascular: Regular rate and rhythm, normal S1 and S2, and no murmur noted   Abdomen: Normal bowel sounds, soft, non-distended, non-tender   Skin: No rashes, no cyanosis, dry to touch   Neuro: CN 2-12 intact, no localizing weakness   Extremities: No edema, normal range of motion   HEENT Normocephalic, atraumatic, normal nasal turbinates; oropharynx clear   Neck Supple; nl inspection; trachea midline; no thryomegaly   Psychiatric: A+O x3. Normal affect          Medications:      All current medications were reviewed with changes reflected in problem " list.         Data:      All new lab and imaging data was reviewed.   Labs:  Recent Labs   Lab 06/29/19  0556 06/29/19  0017 06/28/19  1905  06/28/19  0454   WBC 4.5  --   --   --  4.7   HGB 9.3* 8.7* 10.5*   < > 10.5*   HCT 28.8*  --   --   --  33.1*   MCV 97  --   --   --  97     --   --   --  178    < > = values in this interval not displayed.     Recent Labs   Lab 06/29/19  0556 06/28/19  0454   * 144   POTASSIUM 3.9 4.3   CHLORIDE 114* 114*   CO2 25 24   ANIONGAP 7 6   GLC 80 103*   BUN 15 26   CR 1.09 1.33*   GFRESTIMATED 63 49*   GFRESTBLACK 73 57*   WILLIAM 8.0* 8.1*      Imaging:   No results found for this or any previous visit (from the past 24 hour(s)).    Edgard Hartley [phone]

## 2019-06-29 NOTE — PLAN OF CARE
Pt alert and oriented, up with standby assist to the commode. Pt with a go lyte bowel prep last evening, stool this am bloody but clear. Pt's hemoglobin 8.7 at midnight, down after the bowel prep. Awaiting am labs. Pt does have a history of iron deficient anemia. Sander for probable colonoscopy this am. Pt denies pain.

## 2019-06-30 VITALS
SYSTOLIC BLOOD PRESSURE: 150 MMHG | WEIGHT: 182.1 LBS | HEART RATE: 60 BPM | BODY MASS INDEX: 26.97 KG/M2 | RESPIRATION RATE: 16 BRPM | OXYGEN SATURATION: 96 % | HEIGHT: 69 IN | TEMPERATURE: 97 F | DIASTOLIC BLOOD PRESSURE: 67 MMHG

## 2019-06-30 LAB
ERYTHROCYTE [DISTWIDTH] IN BLOOD BY AUTOMATED COUNT: 16.1 % (ref 10–15)
HCT VFR BLD AUTO: 28.3 % (ref 40–53)
HGB BLD-MCNC: 8.9 G/DL (ref 13.3–17.7)
MCH RBC QN AUTO: 30.5 PG (ref 26.5–33)
MCHC RBC AUTO-ENTMCNC: 31.4 G/DL (ref 31.5–36.5)
MCV RBC AUTO: 97 FL (ref 78–100)
PLATELET # BLD AUTO: 163 10E9/L (ref 150–450)
RBC # BLD AUTO: 2.92 10E12/L (ref 4.4–5.9)
SODIUM SERPL-SCNC: 143 MMOL/L (ref 133–144)
WBC # BLD AUTO: 6.5 10E9/L (ref 4–11)

## 2019-06-30 PROCEDURE — 36415 COLL VENOUS BLD VENIPUNCTURE: CPT | Performed by: INTERNAL MEDICINE

## 2019-06-30 PROCEDURE — 25000132 ZZH RX MED GY IP 250 OP 250 PS 637: Mod: GY | Performed by: INTERNAL MEDICINE

## 2019-06-30 PROCEDURE — 85027 COMPLETE CBC AUTOMATED: CPT | Performed by: INTERNAL MEDICINE

## 2019-06-30 PROCEDURE — 99239 HOSP IP/OBS DSCHRG MGMT >30: CPT | Performed by: INTERNAL MEDICINE

## 2019-06-30 PROCEDURE — 84295 ASSAY OF SERUM SODIUM: CPT | Performed by: INTERNAL MEDICINE

## 2019-06-30 PROCEDURE — 25000132 ZZH RX MED GY IP 250 OP 250 PS 637: Mod: GY | Performed by: HOSPITALIST

## 2019-06-30 RX ORDER — PANTOPRAZOLE SODIUM 40 MG/1
40 TABLET, DELAYED RELEASE ORAL
Qty: 30 TABLET | Refills: 1 | Status: ON HOLD | OUTPATIENT
Start: 2019-07-01 | End: 2019-07-13

## 2019-06-30 RX ADMIN — PANTOPRAZOLE SODIUM 40 MG: 40 TABLET, DELAYED RELEASE ORAL at 07:01

## 2019-06-30 RX ADMIN — CITALOPRAM HYDROBROMIDE 40 MG: 20 TABLET ORAL at 07:49

## 2019-06-30 RX ADMIN — BRINZOLAMIDE/BRIMONIDINE TARTRATE 1 DROP: 10; 2 SUSPENSION/ DROPS OPHTHALMIC at 07:49

## 2019-06-30 ASSESSMENT — ACTIVITIES OF DAILY LIVING (ADL)
ADLS_ACUITY_SCORE: 14
ADLS_ACUITY_SCORE: 13

## 2019-06-30 NOTE — DISCHARGE SUMMARY
[facility]    Discharge Summary  Hospitalist    Date of Admission:  6/28/2019  Date of Discharge:  6/30/2019  Discharging Provider: Duglas Castillo MD    Discharge Diagnoses   GI bleed    History of Present Illness    Tk Richmond is a 83 year old male with history of stroke on Plavix and aspirin, chronic anemia, hypertension, and hyperlipidemia who presented to the hospital after one episode of rectal bleeding.     Patient reports he has been having regular brown bowel movements up until last evening.  He went to make a bowel movement but only red blood was passed.  This occurred on 2 more occasions.  This caused significant concern so they  came to the emergency room.  The patient denies any abdominal pain.  No nausea, vomiting, fever or chills.  He denies significant heartburn.     In the emergency room his hemoglobin was 10.5.  He has not had a bowel movement since yesterday.  Last colonoscopy was in 2007 which showed significant diverticulosis throughout his entire colon.  The patient's wife tells me that he follows with a hematologist for low iron/anemia.  He just received a iron infusion last week.  The patient's hematologist suggested a upper endoscopy.      Hospital Course   Tk Richmond was admitted on 6/28/2019.  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  This was intervened on by GI.  Hemoglobin otherwise reasonably stable with no indications for transfusion.  - Last colonoscopy 2007 which showed diverticulosis.  -Holding aspirin and Plavix and plan on restarting back on July 3.  -Okay to restart patient back on a regular diet     Acute on chronic anemia.  - Per patient, history of iron deficiency anemia and gets IV iron in the outpatient setting.  Last IV iron infusion about 3 to 4 days ago.     History of CVA  - We  will have to hold patient's aspirin and Plavix until July 3 as recommended by GI. According to the patient, his aspirin dose was increased to full dose aspirin back in February when he had a suspected stroke.      # Discharge Pain Plan:   - Patient currently has NO PAIN and is not being prescribed pain medications on discharge.    Duglas Castillo MD    Significant Results and Procedures   None    Pending Results   These results will be followed up by PCP  Unresulted Labs Ordered in the Past 30 Days of this Admission     No orders found from 4/29/2019 to 6/29/2019.          Code Status   Full Code       Primary Care Physician   [facility]    Physical Exam   Temp: 97  F (36.1  C) Temp src: Oral BP: 150/67 Pulse: 60 Heart Rate: 59 Resp: 16 SpO2: 96 % O2 Device: None (Room air)    Vitals:    06/28/19 0453 06/28/19 0649   Weight: 83.5 kg (184 lb) 82.6 kg (182 lb 1.6 oz)     Vital Signs with Ranges  Temp:  [97  F (36.1  C)-98.3  F (36.8  C)] 97  F (36.1  C)  Pulse:  [60-79] 60  Heart Rate:  [59-75] 59  Resp:  [16-18] 16  BP: (142-170)/(66-80) 150/67  SpO2:  [95 %-99 %] 96 %  I/O last 3 completed shifts:  In: 1025 [P.O.:800; I.V.:225]  Out: -     GEN: NAD, pleasant  HEENT: no icterus  CV: RRR, normal s1/s2, no murmurs/rubs/s3/s4, no heave. JVP normal.  CHEST: CTAB  ABD: soft, NT/ND, NABS  : no flank/suprapubic tenderness  NEURO: AA&Ox3, fluent/appropriate, motor grossly nonfocal  PSYCH: cooperative, affect appropriate    Discharge Disposition   Discharged to home  Condition at discharge: Good    Consultations This Hospital Stay   GASTROENTEROLOGY IP CONSULT    Time Spent on this Encounter   IDuglas, personally saw the patient today and spent greater than 30 minutes discharging this patient.    Discharge Orders      Reason for your hospital stay    GI bleed     Follow-up and recommended labs and tests     Follow up with primary care provider, [facility], within 7 days for hospital follow- up.   No follow up labs or test are needed.     Activity    Your activity upon discharge: activity as tolerated     Diet    Follow this diet upon discharge: Orders Placed This Encounter      Regular Diet Adult     Discharge Medications   Current Discharge Medication List      START taking these medications    Details   pantoprazole (PROTONIX) 40 MG EC tablet Take 1 tablet (40 mg) by mouth every morning (before breakfast)  Qty: 30 tablet, Refills: 1    Associated Diagnoses: Lower GI bleed         CONTINUE these medications which have NOT CHANGED    Details   aspirin 325 MG tablet Take 1 tablet by mouth daily.  Qty: 90 tablet, Refills: 3    Associated Diagnoses: CVA (cerebral infarction)      brinzolamide-brimonidine (SIMBRINZA) 1-0.2 % ophthalmic suspension Place 1 drop into both eyes 2 times daily       Calcium Carbonate-Vitamin D (CALCIUM 600+D PO) Take 1 tablet by mouth 2 times daily At noon and supper      citalopram (CELEXA) 40 MG tablet Take 40 mg by mouth daily      clopidogrel (PLAVIX) 75 MG tablet Take 75 mg by mouth daily       lovastatin (MEVACOR) 40 MG tablet Take 40 mg by mouth At Bedtime      meloxicam (MOBIC) 15 MG tablet Take 15 mg by mouth daily With dinner      Misc Natural Products (GLUCOSAMINE CHONDROITIN ADV PO) Take 1 capsule by mouth 2 times daily At noon and supper      Multiple Vitamins-Minerals (CENTRUM SILVER ADULT 50+ PO) Take by mouth daily      Multiple Vitamins-Minerals (PRESERVISION AREDS 2+MULTI VIT PO) Take by mouth 2 times daily Two tablets      vitamin B-12 (CYANOCOBALAMIN) 1000 MCG tablet Take by mouth daily           Allergies   Allergies   Allergen Reactions     Contrast Dye Rash     Data   Results for orders placed or performed during the hospital encounter of 12/27/18   CT Chest Angio w/o & w Contrast    Narrative    CTA CHEST WITH CONTRAST   12/27/2018 11:25 AM     HISTORY: Aortic disease, nontraumatic, known or suspected. Ascending  aortic aneurysm (H).    TECHNIQUE: CTA of chest  with 90mL Isovue-370 IV. Radiation dose for  this scan was reduced using automated exposure control, adjustment of  the mA and/or kV according to patient size, or iterative  reconstruction technique.     COMPARISON: None.    FINDINGS:     Chest: Incidentally noted, there is a common origin of the innominate  and left common carotid artery, which is a normal anatomic variant.  Aneurysmal dilatation of the ascending thoracic aorta measuring 4.9 x  4.8 cm. No aneurysmal dilatation of the aortic arch or descending  thoracic aorta. Great vessels are patent.    No pleural effusion, pericardial effusion or pneumothorax. No  axillary, hilar or mediastinal lymphadenopathy. Moderate hiatal  hernia. Lungs are clear.    Upper abdomen: Calcified stones are noted in the gallbladder without  signs to suggest acute cholecystitis.    Bones: No suspicious bony lesions.      Impression    IMPRESSION:  1. Aneurysmal dilatation of the ascending thoracic aorta measuring 4.9  cm.  2. Moderate hiatal hernia.  3. Cholelithiasis without evidence of cholecystitis.    LYNDA AVERY, DO

## 2019-06-30 NOTE — PLAN OF CARE
Pt is A/O, afebrile, VSS. LS clear, voiding adequate amounts. +bowel sounds, no BM overnight. Denies pain. Up with SBA and gait belt. Tolerating regular diet. Telemetry monitoring on. Plan is to discharge home when medically stable.

## 2019-06-30 NOTE — PLAN OF CARE
Alert, orientated. Mild forgetful. Difficulty with word finding at times. Pleasant. No stools today. Voiding. Stand by assist. Reg food. No nausea. No pain. Mild edema to right leg. Lungs clear. Plan is home today with wife. Bed alarm on for safety. No acute variances. 1320 no stools today. Verbalized understanding to discharge instructions. Both wife and patient in agreement of discharge home. No variances. Start plavix and ASA July 3rd. Pleasant.

## 2019-07-01 ENCOUNTER — TELEPHONE (OUTPATIENT)
Dept: CARDIOLOGY | Facility: CLINIC | Age: 84
End: 2019-07-01

## 2019-07-01 DIAGNOSIS — Z91.041 HX OF ALLERGY TO RADIOGRAPHIC CONTRAST MEDIA: Primary | ICD-10-CM

## 2019-07-01 NOTE — TELEPHONE ENCOUNTER
RN received call from  CT department advising that patient has CTA chest scheduled on 7/9/19 ordered by Dr. Anderson. CT department advised that patient has hx of contrast dye allergy (rash) and is wondering if Dr. Anderson would like to order premedication plan for patient. RN will send to Dr. Anderson for review and recommendation.

## 2019-07-02 RX ORDER — DIPHENHYDRAMINE HCL 50 MG
CAPSULE ORAL
Qty: 1 CAPSULE | Refills: 0 | Status: ON HOLD | OUTPATIENT
Start: 2019-07-02 | End: 2019-07-13

## 2019-07-02 RX ORDER — PREDNISONE 20 MG/1
TABLET ORAL
Qty: 6 TABLET | Refills: 0 | Status: ON HOLD | OUTPATIENT
Start: 2019-07-02 | End: 2019-07-13

## 2019-07-02 NOTE — TELEPHONE ENCOUNTER
Spoke with CT department about contrast allergy protocol:    Prednisone 40 mg PO every 8 hours x3  Benadryl 50 mg PO 1 hour prior to test    Spoke with patient and patient's wife about prednisone and benadryl instructions prior to CT scan. Instructed patient and patient's wife to have patient take the following prednisone doses:    1) 40 mg at 4pm on 7/8  2) 40 mg at midnight on 7/8  3) 40 mg at 8am on 7/9    Benadryl 50 mg PO on 7/9 at 9am. Patient verbalized understanding and agreed with plan of care. No further questions. Rx escripted to patient's preferred pharmacy.

## 2019-07-02 NOTE — TELEPHONE ENCOUNTER
We have a steroid/benedryl protocol for CT with dye for patient's with dye allergy.  I would proceed with the test and the dye allergy protocol.  Uriel Anderson MD, FACC  July 1, 2019 9:16 PM

## 2019-07-09 ENCOUNTER — HOSPITAL ENCOUNTER (OUTPATIENT)
Dept: CARDIOLOGY | Facility: CLINIC | Age: 84
Discharge: HOME | End: 2019-07-09
Attending: INTERNAL MEDICINE | Admitting: INTERNAL MEDICINE
Payer: MEDICARE

## 2019-07-09 DIAGNOSIS — I71.21 ASCENDING AORTIC ANEURYSM (H): ICD-10-CM

## 2019-07-09 PROCEDURE — 71275 CT ANGIOGRAPHY CHEST: CPT

## 2019-07-09 PROCEDURE — 25000128 H RX IP 250 OP 636: Performed by: INTERNAL MEDICINE

## 2019-07-09 RX ORDER — IOPAMIDOL 755 MG/ML
500 INJECTION, SOLUTION INTRAVASCULAR ONCE
Status: COMPLETED | OUTPATIENT
Start: 2019-07-09 | End: 2019-07-09

## 2019-07-09 RX ADMIN — IOPAMIDOL 100 ML: 755 INJECTION, SOLUTION INTRAVENOUS at 10:19

## 2019-07-09 RX ADMIN — SODIUM CHLORIDE 100 ML: 9 INJECTION, SOLUTION INTRAVENOUS at 10:19

## 2019-07-13 ENCOUNTER — APPOINTMENT (OUTPATIENT)
Dept: MRI IMAGING | Facility: CLINIC | Age: 84
DRG: 066 | End: 2019-07-13
Attending: EMERGENCY MEDICINE
Payer: MEDICARE

## 2019-07-13 ENCOUNTER — HOSPITAL ENCOUNTER (INPATIENT)
Facility: CLINIC | Age: 84
LOS: 2 days | Discharge: HOME | DRG: 066 | End: 2019-07-15
Attending: EMERGENCY MEDICINE | Admitting: INTERNAL MEDICINE
Payer: MEDICARE

## 2019-07-13 DIAGNOSIS — R47.01 EXPRESSIVE APHASIA: ICD-10-CM

## 2019-07-13 DIAGNOSIS — R29.898 RIGHT HAND WEAKNESS: ICD-10-CM

## 2019-07-13 DIAGNOSIS — Z86.73 H/O: CVA (CEREBROVASCULAR ACCIDENT): ICD-10-CM

## 2019-07-13 DIAGNOSIS — I10 ESSENTIAL HYPERTENSION: Primary | ICD-10-CM

## 2019-07-13 LAB
ANION GAP SERPL CALCULATED.3IONS-SCNC: 5 MMOL/L (ref 3–14)
APTT PPP: 26 SEC (ref 22–37)
BUN SERPL-MCNC: 16 MG/DL (ref 7–30)
CALCIUM SERPL-MCNC: 8.6 MG/DL (ref 8.5–10.1)
CHLORIDE SERPL-SCNC: 109 MMOL/L (ref 94–109)
CO2 SERPL-SCNC: 27 MMOL/L (ref 20–32)
CREAT SERPL-MCNC: 1.2 MG/DL (ref 0.66–1.25)
ERYTHROCYTE [DISTWIDTH] IN BLOOD BY AUTOMATED COUNT: 16.1 % (ref 10–15)
GFR SERPL CREATININE-BSD FRML MDRD: 56 ML/MIN/{1.73_M2}
GLUCOSE BLDC GLUCOMTR-MCNC: 88 MG/DL (ref 70–99)
GLUCOSE SERPL-MCNC: 91 MG/DL (ref 70–99)
HCT VFR BLD AUTO: 36.6 % (ref 40–53)
HGB BLD-MCNC: 11.7 G/DL (ref 13.3–17.7)
INR PPP: 1.04 (ref 0.86–1.14)
MCH RBC QN AUTO: 31.2 PG (ref 26.5–33)
MCHC RBC AUTO-ENTMCNC: 32 G/DL (ref 31.5–36.5)
MCV RBC AUTO: 98 FL (ref 78–100)
PLATELET # BLD AUTO: 235 10E9/L (ref 150–450)
POTASSIUM SERPL-SCNC: 4.3 MMOL/L (ref 3.4–5.3)
RBC # BLD AUTO: 3.75 10E12/L (ref 4.4–5.9)
SODIUM SERPL-SCNC: 141 MMOL/L (ref 133–144)
TROPONIN I SERPL-MCNC: <0.015 UG/L (ref 0–0.04)
WBC # BLD AUTO: 5.9 10E9/L (ref 4–11)

## 2019-07-13 PROCEDURE — 84484 ASSAY OF TROPONIN QUANT: CPT | Performed by: EMERGENCY MEDICINE

## 2019-07-13 PROCEDURE — 25000132 ZZH RX MED GY IP 250 OP 250 PS 637: Mod: GY | Performed by: INTERNAL MEDICINE

## 2019-07-13 PROCEDURE — 70553 MRI BRAIN STEM W/O & W/DYE: CPT

## 2019-07-13 PROCEDURE — 85610 PROTHROMBIN TIME: CPT | Performed by: EMERGENCY MEDICINE

## 2019-07-13 PROCEDURE — 93005 ELECTROCARDIOGRAM TRACING: CPT

## 2019-07-13 PROCEDURE — 85027 COMPLETE CBC AUTOMATED: CPT | Performed by: EMERGENCY MEDICINE

## 2019-07-13 PROCEDURE — 25000128 H RX IP 250 OP 636: Performed by: EMERGENCY MEDICINE

## 2019-07-13 PROCEDURE — 99223 1ST HOSP IP/OBS HIGH 75: CPT | Mod: AI | Performed by: INTERNAL MEDICINE

## 2019-07-13 PROCEDURE — 12000000 ZZH R&B MED SURG/OB

## 2019-07-13 PROCEDURE — 99285 EMERGENCY DEPT VISIT HI MDM: CPT | Mod: 25

## 2019-07-13 PROCEDURE — A9585 GADOBUTROL INJECTION: HCPCS | Performed by: EMERGENCY MEDICINE

## 2019-07-13 PROCEDURE — 00000146 ZZHCL STATISTIC GLUCOSE BY METER IP

## 2019-07-13 PROCEDURE — 70544 MR ANGIOGRAPHY HEAD W/O DYE: CPT

## 2019-07-13 PROCEDURE — 70549 MR ANGIOGRAPH NECK W/O&W/DYE: CPT

## 2019-07-13 PROCEDURE — 80048 BASIC METABOLIC PNL TOTAL CA: CPT | Performed by: EMERGENCY MEDICINE

## 2019-07-13 PROCEDURE — 85730 THROMBOPLASTIN TIME PARTIAL: CPT | Performed by: EMERGENCY MEDICINE

## 2019-07-13 PROCEDURE — 25500064 ZZH RX 255 OP 636: Performed by: EMERGENCY MEDICINE

## 2019-07-13 RX ORDER — ONDANSETRON 2 MG/ML
4 INJECTION INTRAMUSCULAR; INTRAVENOUS EVERY 6 HOURS PRN
Status: DISCONTINUED | OUTPATIENT
Start: 2019-07-13 | End: 2019-07-15 | Stop reason: HOSPADM

## 2019-07-13 RX ORDER — GADOBUTROL 604.72 MG/ML
10 INJECTION INTRAVENOUS ONCE
Status: DISCONTINUED | OUTPATIENT
Start: 2019-07-13 | End: 2019-07-13

## 2019-07-13 RX ORDER — GADOBUTROL 604.72 MG/ML
10 INJECTION INTRAVENOUS ONCE
Status: COMPLETED | OUTPATIENT
Start: 2019-07-13 | End: 2019-07-13

## 2019-07-13 RX ORDER — LIDOCAINE 40 MG/G
CREAM TOPICAL
Status: DISCONTINUED | OUTPATIENT
Start: 2019-07-13 | End: 2019-07-15 | Stop reason: HOSPADM

## 2019-07-13 RX ORDER — CLOPIDOGREL BISULFATE 75 MG/1
75 TABLET ORAL DAILY
Status: DISCONTINUED | OUTPATIENT
Start: 2019-07-13 | End: 2019-07-15 | Stop reason: HOSPADM

## 2019-07-13 RX ORDER — CITALOPRAM HYDROBROMIDE 20 MG/1
40 TABLET ORAL DAILY
Status: DISCONTINUED | OUTPATIENT
Start: 2019-07-14 | End: 2019-07-15 | Stop reason: HOSPADM

## 2019-07-13 RX ORDER — LOVASTATIN 20 MG
40 TABLET ORAL AT BEDTIME
Status: DISCONTINUED | OUTPATIENT
Start: 2019-07-13 | End: 2019-07-15 | Stop reason: HOSPADM

## 2019-07-13 RX ORDER — ACETAMINOPHEN 650 MG/1
650 SUPPOSITORY RECTAL EVERY 4 HOURS PRN
Status: DISCONTINUED | OUTPATIENT
Start: 2019-07-13 | End: 2019-07-15 | Stop reason: HOSPADM

## 2019-07-13 RX ORDER — ACETAMINOPHEN 325 MG/1
650 TABLET ORAL EVERY 4 HOURS PRN
Status: DISCONTINUED | OUTPATIENT
Start: 2019-07-13 | End: 2019-07-15 | Stop reason: HOSPADM

## 2019-07-13 RX ORDER — PROCHLORPERAZINE 25 MG
12.5 SUPPOSITORY, RECTAL RECTAL EVERY 12 HOURS PRN
Status: DISCONTINUED | OUTPATIENT
Start: 2019-07-13 | End: 2019-07-15 | Stop reason: HOSPADM

## 2019-07-13 RX ORDER — PANTOPRAZOLE SODIUM 40 MG/1
40 TABLET, DELAYED RELEASE ORAL
Status: DISCONTINUED | OUTPATIENT
Start: 2019-07-14 | End: 2019-07-13

## 2019-07-13 RX ORDER — AMOXICILLIN 250 MG
2 CAPSULE ORAL 2 TIMES DAILY PRN
Status: DISCONTINUED | OUTPATIENT
Start: 2019-07-13 | End: 2019-07-15 | Stop reason: HOSPADM

## 2019-07-13 RX ORDER — AMOXICILLIN 250 MG
1 CAPSULE ORAL 2 TIMES DAILY PRN
Status: DISCONTINUED | OUTPATIENT
Start: 2019-07-13 | End: 2019-07-15 | Stop reason: HOSPADM

## 2019-07-13 RX ORDER — CALCIUM CARBONATE 500 MG/1
1000 TABLET, CHEWABLE ORAL 4 TIMES DAILY PRN
Status: DISCONTINUED | OUTPATIENT
Start: 2019-07-13 | End: 2019-07-15 | Stop reason: HOSPADM

## 2019-07-13 RX ORDER — PROCHLORPERAZINE MALEATE 5 MG
5 TABLET ORAL EVERY 6 HOURS PRN
Status: DISCONTINUED | OUTPATIENT
Start: 2019-07-13 | End: 2019-07-15 | Stop reason: HOSPADM

## 2019-07-13 RX ORDER — POLYETHYLENE GLYCOL 3350 17 G/17G
17 POWDER, FOR SOLUTION ORAL DAILY PRN
Status: DISCONTINUED | OUTPATIENT
Start: 2019-07-13 | End: 2019-07-15 | Stop reason: HOSPADM

## 2019-07-13 RX ORDER — LIDOCAINE 40 MG/G
CREAM TOPICAL
Status: DISCONTINUED | OUTPATIENT
Start: 2019-07-13 | End: 2019-07-13

## 2019-07-13 RX ORDER — ONDANSETRON 4 MG/1
4 TABLET, ORALLY DISINTEGRATING ORAL EVERY 6 HOURS PRN
Status: DISCONTINUED | OUTPATIENT
Start: 2019-07-13 | End: 2019-07-15 | Stop reason: HOSPADM

## 2019-07-13 RX ORDER — NALOXONE HYDROCHLORIDE 0.4 MG/ML
.1-.4 INJECTION, SOLUTION INTRAMUSCULAR; INTRAVENOUS; SUBCUTANEOUS
Status: DISCONTINUED | OUTPATIENT
Start: 2019-07-13 | End: 2019-07-15 | Stop reason: HOSPADM

## 2019-07-13 RX ORDER — METOPROLOL TARTRATE 25 MG/1
25 TABLET, FILM COATED ORAL 2 TIMES DAILY
Status: DISCONTINUED | OUTPATIENT
Start: 2019-07-13 | End: 2019-07-15

## 2019-07-13 RX ADMIN — BRINZOLAMIDE/BRIMONIDINE TARTRATE 1 DROP: 10; 2 SUSPENSION/ DROPS OPHTHALMIC at 18:45

## 2019-07-13 RX ADMIN — ASPIRIN 325 MG: 325 TABLET, DELAYED RELEASE ORAL at 18:34

## 2019-07-13 RX ADMIN — LOVASTATIN 40 MG: 20 TABLET ORAL at 21:14

## 2019-07-13 RX ADMIN — CLOPIDOGREL BISULFATE 75 MG: 75 TABLET ORAL at 18:33

## 2019-07-13 RX ADMIN — GADOBUTROL 10 ML: 604.72 INJECTION INTRAVENOUS at 13:07

## 2019-07-13 RX ADMIN — METOPROLOL TARTRATE 25 MG: 25 TABLET, FILM COATED ORAL at 18:34

## 2019-07-13 RX ADMIN — SODIUM CHLORIDE 500 ML: 9 INJECTION, SOLUTION INTRAVENOUS at 12:41

## 2019-07-13 ASSESSMENT — ENCOUNTER SYMPTOMS
HEADACHES: 0
SPEECH DIFFICULTY: 1
FACIAL ASYMMETRY: 0
SHORTNESS OF BREATH: 0

## 2019-07-13 ASSESSMENT — ACTIVITIES OF DAILY LIVING (ADL): ADLS_ACUITY_SCORE: 15

## 2019-07-13 ASSESSMENT — MIFFLIN-ST. JEOR
SCORE: 1496.43
SCORE: 1531.25

## 2019-07-13 NOTE — ED NOTES
[facility]  ED Nurse Handoff Report    Tk Richmond is a 83 year old male   ED Chief complaint: Aphasia  . ED Diagnosis:   Final diagnoses:   None     Allergies:   Allergies   Allergen Reactions     Contrast Dye Rash       Code Status: Full Code  Activity level - Baseline/Home:  Independent. Activity Level - Current:   Stand by Assist. Lift room needed: No. Bariatric: No   Needed: No   Isolation: No. Infection: Not Applicable.     Vital Signs:   Vitals:    07/13/19 1250 07/13/19 1300 07/13/19 1415 07/13/19 1430   BP: 163/81  180/82 170/82   Pulse:   59 68   Resp: 13 15 14 15   Temp:       TempSrc:       SpO2: 97% 97% 97% 100%   Weight:       Height:           Cardiac Rhythm:  ,      Pain level:    Patient confused: No. Patient Falls Risk: Yes.   Elimination Status: Has voided; uses urinal  Patient Report - Initial Complaint: Aphasia.   Focused Assessment:   Tk Richmond is a 83 year old male, with a history of stroke, anticoagulated with Plavix, who presents with his wife aphasia. Yesterday (7/12/2019) morning before 1200, wife states patient had an onset of speech difficulty where he had trouble getting his words out while talking to her. Patient states he also had an onset of right hand numbness that is new as well. [place] and wife states that his speech difficulty worsened over the next 24 hours and continued until this morning. Patient did not want to go to an emergency department in [place], [place], so they drove back here to [facility]. Here, patient is still stuttering while being prompted. No facial asymmetry, gait problems, headache, speech changes, chest pain, or shortness of breath.     To note, patient was admitted to the hospital on 6/28/2019 for a GI bleed and was discontinued on his Plavix. He has started the Plavix again since his discharge.   Tests Performed:   MR Brain w/o & w Contrast   Final Result   IMPRESSION:   1. Linear  area of infarcts involving the left frontal lobe (centrum   semiovale and pars opercularis), consistent with reported right-sided   weakness and expressive aphasia.   2. Smaller punctate acute infarcts involving the left superior   temporal lobe, left parietal lobe, left occipital lobe.   3. Moderate volume loss, chronic small vessel ischemic change, and   multiple old infarcts.   4. Prominent susceptibility related signal loss throughout the sulci   likely related to previous intravenous contrast administration.   Intravenous contrast is also present on the scans labeled precontrast.      TRAVIS HAMMONDS MD      MRA Angiogram Neck w/o & w Contrast   Final Result   IMPRESSION: Patent vasculature. No evidence of flow-limiting stenosis.      TRAVIS HAMMONDS MD      MR Head w/o Contrast Angiogram   Final Result   IMPRESSION: Short segment high-grade stenosis versus occlusion   involving the left middle cerebral artery superior division M2 segment   along the frontal operculum, new compared to 2/28/2012.      TRAVIS HAMMONDS MD      . Abnormal Results:   Labs Ordered and Resulted from Time of ED Arrival Up to the Time of Departure from the ED   CBC WITH PLATELETS - Abnormal; Notable for the following components:       Result Value    RBC Count 3.75 (*)     Hemoglobin 11.7 (*)     Hematocrit 36.6 (*)     RDW 16.1 (*)     All other components within normal limits   BASIC METABOLIC PANEL - Abnormal; Notable for the following components:    GFR Estimate 56 (*)     All other components within normal limits   GLUCOSE MONITOR NURSING POCT   PARTIAL THROMBOPLASTIN TIME   INR   TROPONIN I   GLUCOSE BY METER   VITAL SIGNS   MEASURE WEIGHT   ACTIVITY   IV ACCESS   PERIPHERAL IV CATHETER      Treatments provided: PIV, IV fluids, warm blankets, frequent rounds and updates to pt on plan of care.  Family Comments: Wife and daughter at bedside.  OBS brochure/video discussed/provided to patient:  N/A  ED Medications:   Medications    lidocaine 1 % 0.1-1 mL (has no administration in time range)   lidocaine (LMX4) cream (has no administration in time range)   sodium chloride (PF) 0.9% PF flush 3 mL (has no administration in time range)   sodium chloride (PF) 0.9% PF flush 3 mL (has no administration in time range)   gadobutrol (GADAVIST) injection 10 mL (10 mLs Intravenous Not Given 7/13/19 1309)   sodium chloride (PF) 0.9% PF flush 60 mL (has no administration in time range)   0.9% sodium chloride BOLUS (0 mLs Intravenous Stopped 7/13/19 1311)   sodium chloride (PF) 0.9% PF flush 60 mL (60 mLs Intravenous Given 7/13/19 1308)   gadobutrol (GADAVIST) injection 10 mL (10 mLs Intravenous Given 7/13/19 1307)     Drips infusing:  No  For the majority of the shift, the patient's behavior Green.      Severe Sepsis OR Septic Shock Diagnosis Present: No      ED Nurse Name/Phone Number: Cande Do,   3:45 PM    RECEIVING UNIT ED HANDOFF REVIEW    Above ED Nurse Handoff Report was reviewed: Yes  Reviewed by: Coco Mckeon on July 13, 2019 at 3:56 PM

## 2019-07-13 NOTE — H&P
Admitted:     07/13/2019      CHIEF COMPLAINT:  Expressive aphasia.      HISTORY OF PRESENT ILLNESS:  Tk Richmond is an 83-year-old gentleman with past medical history significant for CVA, currently on aspirin and Plavix, lower GI bleeding, hypertension, hyperlipidemia, who presents to the emergency room with a complaint of difficulty expressing himself.  On the morning of 07/12/2019, that is yesterday, the patient had onset of difficulty with speech, trouble finding words and expressing himself.  The patient also noted right hand numbness.  At that time, [place] and his speech got worse in the next 24 hours.  His wife drove down here and brought him to the emergency room.  The patient still has difficulty finding words intermittently.  No facial symmetry, swallowing difficulty, dizziness, blurring of vision, chest pain or shortness of breath.  The patient also has some weakness of the right arm.  He is right-handed.  No issues with his leg.      The patient was in this hospital, admitted on 6/28/2019, for GI bleeding.  At that time, he had a colonoscopy done which showed bleeding colonic angioectasia which was treated and, after 5 days, his Plavix was restarted.      PAST MEDICAL HISTORY:   1.  Recent lower gastrointestinal bleeding.   2.  Aortic aneurysm.   3.  Nonischemic nonrheumatic aortic regurgitation.   4.  Hyperlipidemia.   5.  Hypertension.   6.  Cerebrovascular accident.   7.  Chronic anemia.      PAST SURGICAL HISTORY:   1.  Hernia repair.   2.  Back surgery.   3.  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      FAMILY HISTORY:  Noncontributory.  Mother with history of breast cancer.      SOCIAL HISTORY:  He is a former smoker, quit a long time ago.  He occasionally drinks alcohol.  He does not use illicit drugs.  He is .  He is in the emergency room with his wife and daughter.      REVIEW OF SYSTEMS:  Ten points reviewed, all are negative  except those mentioned in history of present illness.      HOME MEDCATIONS:  Reviewed and reconciled as in electronic medical record.     PHYSICAL EXAMINATION:   GENERAL:  The patient is awake, alert, oriented, difficulty expressing himself.   VITAL SIGNS:  Blood pressure 180/70, pulse rate 60, temperature 97.0, oxygen saturation 98%.   HEENT:  Pink, anicteric.  Extraocular muscle movement intact.  Moist oral mucosa.   NECK:  Supple, no JVD, no thyromegaly.   CHEST:  Good air entry bilaterally.  No wheezing, crackles or rales.   CARDIOVASCULAR:  S1 and S2 were heard.  No gallop.  A 3/6 systolic murmur at the left sternal border.   ABDOMEN:  Obese, soft, nontender, nondistended, positive bowel sounds, no organomegaly.   EXTREMITIES:  No edema, cyanosis or clubbing.     PSYCHIATRIC:  Normal mood and affect, keeps eye contact, responds to question appropriately.   NEUROLOGIC:  Alert and oriented x3.  Cranial nerves grossly intact.  No facial asymmetry.  Tongue is central.  Noted expressive aphasia.  Power on the right upper extremity 4/5, right lower extremity 5/5.  Deep tendon reflex +2.  Sensation intact.      LABORATORY DATA:  Sodium 141, potassium 4.3, BUN 16, creatinine 1.2, calcium 8.6.  Troponin less than 0.015.  Glucose 91.  WBC 5.9, hemoglobin 11.7, platelets 235.  MRI of the brain showed linear area of infarct involving the left frontal lobe, small punctate acute infarct involving the left superior temporal lobe, left parietal lobe and left occipital lobe, moderate volume loss, chronic small vessel ischemic changes.      MRA neck showed patent vasculature.  MRA brain showed short segment high-grade stenosis versus occlusion of the left middle cerebral artery in superior division M2 segment, new compared to study done in 2012.  EKG showed normal sinus rhythm at 62 beats per minute and no ST-T wave change.      ASSESSMENT:  Tk Richmond is an 83-year-old gentleman with past medical history significant for  CVA, currently on aspirin, Plavix and statin, lower GI bleeding, ascending aortic aneurysm, hyperlipidemia, hypertension, who presented today with complaint of expressive aphasia and numbness of the right upper extremity and was found to have CVA and is being admitted to the hospital.  The patient was discussed with tele stroke in the emergency room.  I also discussed with Dr. Lugo who had a sign out from a physician, Dr. Quintanilla.   1.  Expressive aphasia secondary to acute cerebrovascular accident.   2.  Hypertension.   3.  Recent lower GI bleeding, currently stable.   4.  Chronic anemia.      PLAN:  The patient is being admitted as an inpatient.  Expect at least a 2-night stay in the hospital.   Neuro Stroke is consulted.  We will continue patient's aspirin and Plavix.  The patient stated he is compliant with his medications.  He is on a statin.  We will add beta blocker for his hypertension and will monitor blood pressure to systolic less than 140.  In the meantime, the patient will get a lipid profile and echocardiogram.  Monitor on telemetry and neuro stroke will again reevaluate the patient.  PT, OT consulted.  Speech Language Pathology was consulted.  The patient has no issues with swallowing but has difficulty expressing himself and has Broca aphasia which is consistent with his stroke.        I discussed with the patient at length the plan of care.  I also discussed with his wife and daughter at bedside.  All their questions and concerns addressed and they showed understanding.  In the event of cardiorespiratory arrest, the patient is full code.         ABRAM HURT MD             D: 2019   T: 2019   MT: JASPER      Name:     AVIS GOODE   MRN:      [id number]        Account:      [id number]   :      1935        Admitted:     2019                   Document: [id number]       cc: [facility]

## 2019-07-13 NOTE — PHARMACY-ADMISSION MEDICATION HISTORY
Admission medication history interview status for this patient is complete. See [facility] admission navigator for allergy information, prior to admission medications and immunization status.     Medication history interview source(s):Patient and Family  Medication history resources (including written lists, pill bottles, clinic record):None  Primary pharmacy:-    Changes made to PTA medication list:  Added: -  Deleted: meloxicam, protonix, prednisone, benadryl  Changed: -    Actions taken by pharmacist (provider contacted, etc):None     Additional medication history information:None    Medication reconciliation/reorder completed by provider prior to medication history? No    Do you take OTC medications (eg tylenol, ibuprofen, fish oil, eye/ear drops, etc)? yes(Y/N)    For patients on insulin therapy: no (Y/N)  Lantus/levemir/NPH/Mix 70/30 dose:   (Y/N) (see Med list for doses)   Sliding scale Novolog Y/N  If Yes, do you have a baseline novolog pre-meal dose:  units with meals  Patients eat three meals a day:   Y/N    How many episodes of hypoglycemia do you have per week: _______  How many missed doses do you have per week: ______  How many times do you check your blood glucose per day: _______  Do you have a Continuous glucose monitor (CGM)   Y/N (remind pt that not approved for hospital use)   Any Barriers to therapy - Be specific :  cost of medications, comfortable with giving injections (if applicable), comfortable and confident with current diabetes regimen: Y/N ______________      Prior to Admission medications    Medication Sig Last Dose Taking? Auth Provider   aspirin 325 MG tablet Take 1 tablet by mouth daily. 7/12/2019 at dinner Yes Jeremiah Shaver MD   brinzolamide-brimonidine (SIMBRINZA) 1-0.2 % ophthalmic suspension Place 1 drop into both eyes 2 times daily  7/13/2019 at x1 Yes Reported, Patient   Calcium Carbonate-Vitamin D (CALCIUM 600+D PO) Take 1 tablet by mouth 2 times daily At noon and supper  7/13/2019 at x1 Yes Reported, Patient   citalopram (CELEXA) 40 MG tablet Take 40 mg by mouth daily 7/13/2019 at Unknown time Yes Reported, Patient   clopidogrel (PLAVIX) 75 MG tablet Take 75 mg by mouth daily  7/12/2019 at noon Yes Unknown, Entered By History   lovastatin (MEVACOR) 40 MG tablet Take 40 mg by mouth At Bedtime 7/12/2019 at Unknown time Yes Unknown, Entered By History   Misc Natural Products (GLUCOSAMINE CHONDROITIN ADV PO) Take 1 capsule by mouth 2 times daily At noon and supper 7/12/2019 at Unknown time Yes Reported, Patient   Multiple Vitamins-Minerals (CENTRUM SILVER ADULT 50+ PO) Take 1 tablet by mouth daily  7/13/2019 at Unknown time Yes Reported, Patient   Multiple Vitamins-Minerals (PRESERVISION AREDS 2+MULTI VIT PO) Take 1 tablet by mouth 2 times daily  7/13/2019 at x1 Yes Reported, Patient   vitamin B-12 (CYANOCOBALAMIN) 1000 MCG tablet Take by mouth daily 7/13/2019 at Unknown time Yes Reported, Patient

## 2019-07-13 NOTE — PLAN OF CARE
Pt alert and oriented X 4. Apical pulse regular. Lung sounds clear throughout all lobes. Pt reports right hand weakness. Mild to moderate aphasia noted. Bilateral  strengths equal at 4/5. The rest of the neuro assessment intact. Bedside nursing swallow screen passed.   Plan: Neuro/Stroke consult, PT, OT, Speech, Echo

## 2019-07-13 NOTE — ED TRIAGE NOTES
"Pt's spouse states Pt has had a difficulty speaking since Thursday night, worse since Friday am. History of stroke. \"He had a hard time getting his words out.\"  "

## 2019-07-13 NOTE — ED PROVIDER NOTES
History     Chief Complaint:  Aphasia    The history is provided by the patient and the spouse.      Tk Richmond is a 83 year old male, with a history of stroke, anticoagulated with Plavix, who presents with his wife aphasia. Yesterday (7/12/2019) morning before 1200, wife states patient had an onset of speech difficulty where he had trouble getting his words out while talking to her. Patient states he also had an onset of right hand weakness that is new as well. [place] and wife states that his speech difficulty worsened over the next 24 hours and continued until this morning. Patient did not want to go to an emergency department in [place], [place], so they drove back here to [facility]. Here, patient is still stuttering while being prompted. No numbness, facial asymmetry, gait problems, headache, speech changes, chest pain, or shortness of breath.    To note, patient was admitted to the hospital on 6/28/2019 for a GI bleed that was evidently due to an angioectasia and treated by GI and was discontinued on his Plavix. He has started the Plavix again since his discharge.     Allergies:  Contrast dye      Medications:    Aspirin 325 mg tablet   Calcium carbonate- vitamin D  Celexa  Plavix  Benadryl   Mevacor   Mobic   Multiple vitamins minerals   Protonix   Deltasone  Vitamin B12    Past Medical History:    Lower GI bleed  Ascending aorta aneurysm   Non-rheumatic aortic regurgitation  CBA  HLD   HTN   Glaucoma   Stroke      Past Surgical History:    Cataract removal   Hernia repair   Back surgery   Hand finger surgery   Excision of scalp lesion     Family History:    Breast cancer    Social History:  Former smoker.   Positive for alcohol use.   Negative for drug use.  Presents with wife.   Marital Status:  .     Review of Systems   Respiratory: Negative for shortness of breath.    Cardiovascular: Negative for chest pain.   Musculoskeletal: Negative for gait problem.  "  Neurological: Positive for speech difficulty and weakness ( right hand). Negative for facial asymmetry and headaches.   All other systems reviewed and are negative.      Physical Exam     Patient Vitals for the past 24 hrs:   BP Temp Temp src Pulse Heart Rate Resp SpO2 Height Weight   07/13/19 1300 -- -- -- -- 62 15 97 % -- --   07/13/19 1250 163/81 -- -- -- 68 13 97 % -- --   07/13/19 1236 -- -- -- -- -- -- -- 1.778 m (5' 10\") 82 kg (180 lb 12.4 oz)   07/13/19 1230 171/87 -- -- 60 67 16 98 % -- --   07/13/19 1209 (!) 208/105 98.3  F (36.8  C) Oral 70 70 24 99 % -- --     Physical Exam  General: Elderly male sitting upright  Eyes: PERRL, Conjunctive within normal limits. EOMI.  ENT: Moist mucous membranes, oropharynx clear.   Neck: No rigidity. Nontender.  CV: Normal S1S2, no murmur, rub or gallop. Regular rate and rhythm  Resp: Clear to auscultation bilaterally, no wheezes, rales or rhonchi. Normal respiratory effort.  GI: Abdomen is soft, nontender and nondistended. No palpable masses. No rebound or guarding.  MSK: No edema. Nontender. Normal active range of motion.  Skin: Warm and dry. No rashes or lesions or ecchymoses on visible skin.  Neuro: Alert and oriented. Expressive aphasia, mild. Otherwise, responds appropriately to all questions and commands. Right  3/5 compared to 5/5 left. Negative pronator drift. Peripheral vision intact. FNF intact. Sensation intact to light touch over bilateral upper and lower extremities. Normal muscle tone.  Psych: Normal mood and affect.       Emergency Department Course   ECG:  Indication: Aphasia   Time: 1218  Vent. Rate 62 bpm. CA interval 194. QRS duration 92. QT/QTc 416/422. P-R-T axis 41 -51 -1. Normal sinus rhythm left anterior fascicular block. Abnormal ECG. Agrees with computer interpretation. Read time: 1224.    Imaging:  Radiographic findings were communicated with the patient who voiced understanding of the findings.    MR Brain w/o & w Contrast    (Results " Pending)   MR Head w/o Contrast Angiogram    (Results Pending)   MRA Angiogram Neck w/o & w Contrast    (Results Pending)     Laboratory:  CBC: WBC: 5.9, HGB: 11.7 (L), PLT: 235  BMP: GFR 56 (L), o/w WNL (Creatinine: 1.20)  INR: 1.04  PTT: 26  1211 Glucose by meter: 88.   1223 Troponin: <0.015    Interventions:  1309 Gadavist 10 mL IV     Emergency Department Course:  1200 Nursing notes and vitals reviewed. I performed an exam of the patient as documented above.     IV inserted. Medicine administered as documented above. Blood drawn. This was sent to the lab for further testing, results above.    EKG obtained in the ED, see results above.     The patient was sent for a brain MR, head MR, and MRA angiogram of neck while in the emergency department, findings above.     1222 I consulted with Dr. Sibley, Stroke Neurology, regarding the patient's history and presentation here in the emergency department.    Patient reassessed. No worsening or improvement. He denies any new concerns.     The care of this patient was signed out to my partner Dr. Lugo, awaiting MRI results.    Impression & Plan    Medical Decision Making:  Tk Richmond is a 83 year old male who with history of CVA in the past who presents to the emergency department with concerns for difficulty with speech along with right hand weakness greater than 30 hours in duration. He has been taking his Plavix and aspirin as recommended. Denies any headache. He is hypertensive on arrival and had expressive aphasia with subtle right hand weakness but no other focal neurologic deficit. The patient improved over time. MRI was obtained after discussion with Dr. Sibley who said to include the head vessels and neck. This is pending on this dictation. The patient is still in MRI. His care will be transferred to my colleague Dr. Lugo for follow up of the MRI results and further discussion with Dr. Sibley of Stroke Neurology.       Diagnosis:  1. Expressive aphasia  2.  Right hand weakness  3. H/o CVA    Disposition:  Signed out to Dr. Lugo for further evaluation.    Scribe Disposition  I, Tory Sharp, am serving as a scribe on 7/13/2019 at 1:36 PM to personally document services performed by Alba Quintanilla MD based on my observations and the provider's statements to me.     Tory Sharp  7/13/2019   [facility] EMERGENCY DEPARTMENT       Alba Quintanilla MD  07/14/19 1116

## 2019-07-14 ENCOUNTER — APPOINTMENT (OUTPATIENT)
Dept: PHYSICAL THERAPY | Facility: CLINIC | Age: 84
DRG: 066 | End: 2019-07-14
Attending: INTERNAL MEDICINE
Payer: MEDICARE

## 2019-07-14 ENCOUNTER — APPOINTMENT (OUTPATIENT)
Dept: SPEECH THERAPY | Facility: CLINIC | Age: 84
DRG: 066 | End: 2019-07-14
Attending: INTERNAL MEDICINE
Payer: MEDICARE

## 2019-07-14 ENCOUNTER — APPOINTMENT (OUTPATIENT)
Dept: CARDIOLOGY | Facility: CLINIC | Age: 84
DRG: 066 | End: 2019-07-14
Attending: INTERNAL MEDICINE
Payer: MEDICARE

## 2019-07-14 ENCOUNTER — APPOINTMENT (OUTPATIENT)
Dept: OCCUPATIONAL THERAPY | Facility: CLINIC | Age: 84
DRG: 066 | End: 2019-07-14
Attending: INTERNAL MEDICINE
Payer: MEDICARE

## 2019-07-14 LAB
CHOLEST SERPL-MCNC: 132 MG/DL
HBA1C MFR BLD: 5.2 % (ref 0–5.6)
HDLC SERPL-MCNC: 71 MG/DL
LDLC SERPL CALC-MCNC: 52 MG/DL
NONHDLC SERPL-MCNC: 61 MG/DL
TRIGL SERPL-MCNC: 45 MG/DL

## 2019-07-14 PROCEDURE — 25000132 ZZH RX MED GY IP 250 OP 250 PS 637: Mod: GY | Performed by: INTERNAL MEDICINE

## 2019-07-14 PROCEDURE — 99232 SBSQ HOSP IP/OBS MODERATE 35: CPT | Performed by: INTERNAL MEDICINE

## 2019-07-14 PROCEDURE — 97161 PT EVAL LOW COMPLEX 20 MIN: CPT | Mod: GP

## 2019-07-14 PROCEDURE — 25500064 ZZH RX 255 OP 636: Performed by: INTERNAL MEDICINE

## 2019-07-14 PROCEDURE — 93306 TTE W/DOPPLER COMPLETE: CPT | Mod: 26 | Performed by: INTERNAL MEDICINE

## 2019-07-14 PROCEDURE — 97116 GAIT TRAINING THERAPY: CPT | Mod: GP

## 2019-07-14 PROCEDURE — 92523 SPEECH SOUND LANG COMPREHEN: CPT | Mod: GN | Performed by: SPEECH-LANGUAGE PATHOLOGIST

## 2019-07-14 PROCEDURE — 97166 OT EVAL MOD COMPLEX 45 MIN: CPT | Mod: GO | Performed by: OCCUPATIONAL THERAPIST

## 2019-07-14 PROCEDURE — 12000000 ZZH R&B MED SURG/OB

## 2019-07-14 PROCEDURE — 83036 HEMOGLOBIN GLYCOSYLATED A1C: CPT | Performed by: INTERNAL MEDICINE

## 2019-07-14 PROCEDURE — 80061 LIPID PANEL: CPT | Performed by: INTERNAL MEDICINE

## 2019-07-14 PROCEDURE — 97530 THERAPEUTIC ACTIVITIES: CPT | Mod: GO | Performed by: OCCUPATIONAL THERAPIST

## 2019-07-14 PROCEDURE — 36415 COLL VENOUS BLD VENIPUNCTURE: CPT | Performed by: INTERNAL MEDICINE

## 2019-07-14 PROCEDURE — 97112 NEUROMUSCULAR REEDUCATION: CPT | Mod: GP

## 2019-07-14 PROCEDURE — 40000264 ECHOCARDIOGRAM COMPLETE

## 2019-07-14 RX ORDER — HYDRALAZINE HYDROCHLORIDE 20 MG/ML
10 INJECTION INTRAMUSCULAR; INTRAVENOUS EVERY 6 HOURS PRN
Status: DISCONTINUED | OUTPATIENT
Start: 2019-07-14 | End: 2019-07-15 | Stop reason: HOSPADM

## 2019-07-14 RX ORDER — LISINOPRIL 10 MG/1
10 TABLET ORAL DAILY
Status: DISCONTINUED | OUTPATIENT
Start: 2019-07-14 | End: 2019-07-15 | Stop reason: HOSPADM

## 2019-07-14 RX ORDER — LANOLIN ALCOHOL/MO/W.PET/CERES
1000 CREAM (GRAM) TOPICAL DAILY
Status: DISCONTINUED | OUTPATIENT
Start: 2019-07-14 | End: 2019-07-15 | Stop reason: HOSPADM

## 2019-07-14 RX ORDER — MULTIPLE VITAMINS W/ MINERALS TAB 9MG-400MCG
1 TAB ORAL DAILY
Status: DISCONTINUED | OUTPATIENT
Start: 2019-07-14 | End: 2019-07-15 | Stop reason: HOSPADM

## 2019-07-14 RX ADMIN — MULTIPLE VITAMINS W/ MINERALS TAB 1 TABLET: TAB at 08:56

## 2019-07-14 RX ADMIN — CYANOCOBALAMIN TAB 1000 MCG 1000 MCG: 1000 TAB at 08:56

## 2019-07-14 RX ADMIN — CITALOPRAM HYDROBROMIDE 40 MG: 20 TABLET ORAL at 08:55

## 2019-07-14 RX ADMIN — OYSTER SHELL CALCIUM WITH VITAMIN D 1 TABLET: 500; 200 TABLET, FILM COATED ORAL at 11:47

## 2019-07-14 RX ADMIN — HUMAN ALBUMIN MICROSPHERES AND PERFLUTREN 3 ML: 10; .22 INJECTION, SOLUTION INTRAVENOUS at 09:32

## 2019-07-14 RX ADMIN — OYSTER SHELL CALCIUM WITH VITAMIN D 1 TABLET: 500; 200 TABLET, FILM COATED ORAL at 16:53

## 2019-07-14 RX ADMIN — BRINZOLAMIDE/BRIMONIDINE TARTRATE 1 DROP: 10; 2 SUSPENSION/ DROPS OPHTHALMIC at 09:18

## 2019-07-14 RX ADMIN — CLOPIDOGREL BISULFATE 75 MG: 75 TABLET ORAL at 08:55

## 2019-07-14 RX ADMIN — METOPROLOL TARTRATE 25 MG: 25 TABLET, FILM COATED ORAL at 08:55

## 2019-07-14 RX ADMIN — BRINZOLAMIDE/BRIMONIDINE TARTRATE 1 DROP: 10; 2 SUSPENSION/ DROPS OPHTHALMIC at 21:02

## 2019-07-14 RX ADMIN — LISINOPRIL 10 MG: 10 TABLET ORAL at 10:01

## 2019-07-14 RX ADMIN — ASPIRIN 325 MG: 325 TABLET, DELAYED RELEASE ORAL at 16:54

## 2019-07-14 RX ADMIN — LOVASTATIN 40 MG: 20 TABLET ORAL at 21:02

## 2019-07-14 ASSESSMENT — ACTIVITIES OF DAILY LIVING (ADL)
ADLS_ACUITY_SCORE: 15
ADLS_ACUITY_SCORE: 17
ADLS_ACUITY_SCORE: 15
ADLS_ACUITY_SCORE: 17

## 2019-07-14 ASSESSMENT — ENCOUNTER SYMPTOMS: WEAKNESS: 1

## 2019-07-14 NOTE — PLAN OF CARE
Discharge Planner SLP   Patient plan for discharge: Home with outpatient  Current status: Speech-language evaluation completed. 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  Barriers to return to prior living situation: none  Recommendations for discharge: home with outpatient   Rationale for recommendations: OP ST to evaluate and treat aphasia       Entered by: Radames Khan 07/14/2019 1:30 PM

## 2019-07-14 NOTE — PROGRESS NOTES
Speech-Language Evaluation  [facility]  07/14/19 5511   General Information, SLP   Type of Evaluation Speech and Language   Type of Visit Initial   Start of Care Date 07/14/19   Onset of Illness/Injury or Date of Surgery - Date 07/13/19   Referring Physician Dr. Meza   Patient/Family Goals Statement Talk better   Pertinent History of Current Problem CVA with aphasia   General Observations Patient pleasant and cooperative with obvious word finding difficulties   General Info Comments Patient admitted with CVA with aphasia.  Past Medical History:   Diagnosis Date     Glaucoma      Hyperlipidemia LDL goal < 100      Hypertension      Stroke (H)     2004, speech deficit      Oral Motor Sensory Function   Deficits noted in Labial Function (m-VII, S-V) Coordination   Deficits noted in Lingual Function (m-XII, s-V, VII, IX, XII) Coordination   Deficits noted in Mandibular Function (m-V) None   Functional Assessment Scale (Oral Motor) Mild Impairment   Comments Mild incoordination in labial and lingual movements.   Language: Auditory Comprehension (understanding of spoken language)   Paragraph; Discourse Comprehension Test (out of 8 total; less than 7 is below mean) 6   Functional Assessment Scale (Auditory Comprehension) Mild Impairment   Comments (Auditory Comprehension) May miss information of increased length and/or complexity   Language: Verbal Expression (use of spoken language to express information)   [place] Naming Test, short form (out of 15 total) 14   Define Words; [place] Test for Differential Diagnosis Of Aphasia (out of 10 total) 7.5   Generative Naming Score; Cognitive Linguistic Quick Test 3   Generative Naming; Cognitive Linguistic Quick Test Result Below mean   Functional Assessment Scale (Verbal Expression) Moderate Impairment   Comments (Verbal Expression) obvious word finding deficits; phonemic paraphasias; decreased thought formulation   Reading Comprehension (understanding of written language)    Practical Reading (out of 7 total) 5   Functional Assessment Scale (Reading Comprehension) Mild to Moderate Impairment   Comments (Reading Comprehension) Needed increased time; impaired oral reading   Written Expression (use of writing to express information)   Functional Assessment Scale (Written Expression) Not Tested (see Comment)   Comments (Written Expression) Did not assess due to right hand weakness   General Therapy Interventions   Intervention Comments OP therapy to address speech-language/communication deficits   Clinical Impression, SLP Eval   Criteria for Skilled Therapeutic Interventions Met Yes   SLP Diagnosis 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   Total Evaluation Time      Total Evaluation Time (Minutes) 52   Radames Roberson MS CCC-SLP

## 2019-07-14 NOTE — PROGRESS NOTES
[facility]  Hospitalist Progress Note  Garo Rossi MD, MD 07/14/2019  (Text Page)  Reason for Stay (Diagnosis): Aphasia, acute CVA         Assessment and Plan:      Summary of Stay: Tk Richmond is a 83 year old retired , male with known prior history of prior CVA currently on dual antiplatelets of aspirin and Plavix, recent hospitalization for GI bleeding of which he is antiplatelets were held at that time, hypertension, dyslipidemia and was admitted on 7/13/2019 with notable difficulty of finding words and expressing himself several hours prior to this hospitalization.    Problem List:   1. Expressive aphasia secondary to acute CVA  2. History of prior CVA on dual antiplatelets  3. Recent lower GI bleeding, stable hemoglobin.  Found with angiectasia at the ascending colon with clips  4. Chronic anemia  5. Hypertension, currently not on any maintenance medications listed on his medicine reconciliation  6. History of ascending aortic aneurysm-stable at 4.8 to 4.9 cm    Continue inpatient care.  Remain on cardiac telemetry monitoring.  Remain on dual antiplatelets.  Pending stroke neurology consultation and recommendations  Echocardiogram this morning  Close monitoring for any bleeding tendencies given recent hospitalization for GI bleeding  Started patient on antihypertensives initially on metoprolol but with limitation given low heart rate, I have started him on low-dose lisinopril for now.  PT/OT's, SLP input requested  Current working diagnosis, plan of care, indication for hospitalization were discussed in detail with our patient.  He was provided opportunity to ask questions that have answered the best my ability.    DVT Prophylaxis: Pneumatic Compression Devices  Code Status: Full Code  Discharge Dispo: Hopeful for home discharge  Estimated Disch Date / # of Days until Disch: 1-2 more days        Interval History (Subjective):      I have assume hospitalist service care  "today.  Seen and examined.  Chart reviewed.  Case discussed with nursing service.  Tk remained very pleasant, conversant and able to tolerate his breakfast tray this morning.  Denies any dysphagia or signs of aspiration.  No coughing spells.  No reported nausea or vomiting.  Denies any focal weakness.  Slept well.  Notable for intermittent slurring of speech.     # Pain Assessment:  Current Pain Score 7/14/2019   Patient currently in pain? denies   Pain score (0-10) 0   Tk s pain level was assessed and he currently denies pain.                  Physical Exam:      Last Vital Signs:  /83 (BP Location: Right arm)   Pulse 56   Temp 98.1  F (36.7  C) (Oral)   Resp 16   Ht 1.753 m (5' 9\")   Wt 80.1 kg (176 lb 9.6 oz)   SpO2 97%   BMI 26.08 kg/m      I/O last 3 completed shifts:  In: 740 [P.O.:240; IV Piggyback:500]  Out: 300 [Urine:300]  Wt Readings from Last 1 Encounters:   07/13/19 80.1 kg (176 lb 9.6 oz)     Vitals:    07/13/19 1236 07/13/19 1638   Weight: 82 kg (180 lb 12.4 oz) 80.1 kg (176 lb 9.6 oz)       Constitutional: Awake, alert, cooperative, no apparent distress   Respiratory: Clear to auscultation bilaterally, no crackles or wheezing   Cardiovascular: Regular rate and rhythm, normal S1 and S2, and no JVD   Abdomen: Normal bowel sounds, soft, non-distended, non-tender   Skin: No rashes, no cyanosis, dry to touch   Neuro: Alert and oriented x3, no weakness, spontaneous, some slurring of speech, mild expressive aphasia   Extremities: Nonpitting bilateral lower extremity edema, normal range of motion   Other(s): Euthymic mood, not agitated       All other systems: Negative          Medications:      All current medications were reviewed with changes reflected in problem list.         Data:      All new lab and imaging data was reviewed.   Labs:  No results for input(s): CULT in the last 168 hours.  Recent Labs   Lab 07/13/19  1223   WBC 5.9   HGB 11.7*   HCT 36.6*   MCV 98    "     Recent Labs   Lab 07/13/19  1223      POTASSIUM 4.3   CHLORIDE 109   CO2 27   ANIONGAP 5   GLC 91   BUN 16   CR 1.20   GFRESTIMATED 56*   GFRESTBLACK 65   WILLIAM 8.6     No results for input(s): SED, CRP in the last 168 hours.  Recent Labs   Lab 07/13/19  1223 07/13/19  1211   GLC 91  --    BGM  --  88     No results for input(s): LIPASE in the last 168 hours.  Recent Labs   Lab 07/14/19  0635   CHOL 132   HDL 71   LDL 52   TRIG 45     Recent Labs   Lab 07/13/19  1223   TROPI <0.015     No results for input(s): COLOR, APPEARANCE, URINEGLC, URINEBILI, URINEKETONE, SG, UBLD, URINEPH, PROTEIN, UROBILINOGEN, NITRITE, LEUKEST, RBCU, WBCU in the last 168 hours.   Imaging:   Results for orders placed or performed during the hospital encounter of 07/13/19   MR Brain w/o & w Contrast    Narrative    MRI BRAIN WITHOUT AND WITH CONTRAST July 13, 2019 2:24 PM    HISTORY: Focal neuro deficit greater than six hours, stroke suspected.      TECHNIQUE: Multiplanar, multisequence MRI of the brain without and  with 10 mL  Gadavist.    COMPARISON: Head MRI 2/18/2012.    FINDINGS: Intravenous contrast is present on the labeled precontrast  images with enhancement of the veins and paranasal sinus mucosa.    Patchy/linear area of diffusion restriction is present involving the  left inferior frontal lobe at the pars opercularis and within the  insula. Punctate infarcts are present involving the left superior  temporal lobe and left occipital lobe cuneus. Punctate area of  diffusion restriction is present involving the left precuneus. No  evidence of hemorrhage or significant mass effect.    Moderate volume loss is present. Patchy white matter hypoattenuation  likely represents chronic small vessel ischemic change. Old left  middle frontal gyrus, left superior parietal lobule, and left  occipital lobe cuneus infarcts are present. Old left basal ganglia  lacunar infarcts are present. Small old bilateral cerebellar infarcts  are  present. There is prominent susceptibility related signal loss  within the sulci overlying the bilateral cerebral hemispheres. No  abnormal enhancement is identified.    The visualized calvarium, tympanic cavities, mastoid cavities, and  extracranial soft tissues are unremarkable. Bilateral lens  replacements are present. There is loss of subcutaneous skin thickness  overlying the posterior right parietal lobe with possible changes of  underlying prior instrumentation.      Impression    IMPRESSION:  1. Linear area of infarcts involving the left frontal lobe (centrum  semiovale and pars opercularis), consistent with reported right-sided  weakness and expressive aphasia.  2. Smaller punctate acute infarcts involving the left superior  temporal lobe, left parietal lobe, left occipital lobe.  3. Moderate volume loss, chronic small vessel ischemic change, and  multiple old infarcts.  4. Prominent susceptibility related signal loss throughout the sulci  likely related to previous intravenous contrast administration.  Intravenous contrast is also present on the scans labeled precontrast.    TRAVIS HAMMONDS MD   MR Head w/o Contrast Angiogram    Narrative    MR ANGIOGRAM OF THE HEAD WITHOUT CONTRAST July 13, 2019 2:22 PM     HISTORY: Expressive aphasia, right hand weakness.    TECHNIQUE: 3D time-of-flight MR angiogram of the head without  contrast.    COMPARISON: None.    FINDINGS: Short segment high-grade stenosis is present involving the  left middle cerebral artery M2 segment superior division (series 1  image 128) new compared to 2/28/2012. Otherwise, the internal carotid  arteries, anterior cerebral arteries, and right middle cerebral artery  are patent. Intracranial vertebral, basilar artery, and posterior  arteries are patent. The internal carotid arteries, anterior cerebral  arteries and middle cerebral arteries are patent. No aneurysms or  vascular malformations are identified.      Impression    IMPRESSION: Short  segment high-grade stenosis versus occlusion  involving the left middle cerebral artery superior division M2 segment  along the frontal operculum, new compared to 2/28/2012.    TRAVIS HAMMONDS MD   MRA Angiogram Neck w/o & w Contrast    Narrative    MRA NECK WITHOUT AND WITH CONTRAST July 13, 2019 2:22 PM     HISTORY: Expressive aphasia, right hand weakness.    TECHNIQUE: 2D time-of-flight MR angiogram of the neck without contrast  and 3D MR angiogram of the neck with  10mL Gadavist. Estimates of  carotid stenoses are made relative to the distal internal carotid  artery diameters except as noted.    COMPARISON: None.    FINDINGS: Intravenous contrast is present on the labeled precontrast  imaging.    Normal origin of the great vessels from the aortic arch.    Right carotid artery: The right common and internal carotid arteries  are patent. There is approximately 20% stenosis. No evidence of  flow-limiting stenosis.    Left carotid artery: The left common and internal carotid arteries are  patent. There is approximately 20% stenosis. No evidence of  flow-limiting stenosis.    Vertebral arteries: Vertebral arteries appear patent without evidence  of dissection. No significant stenosis.        Impression    IMPRESSION: Patent vasculature. No evidence of flow-limiting stenosis.    TRAVIS HAMMONDS MD

## 2019-07-14 NOTE — PROGRESS NOTES
07/14/19 1400   Quick Adds   Type of Visit Initial PT Evaluation   Living Environment   Lives With spouse   Living Arrangements house   Transportation Anticipated family or friend will provide   Living Environment Comment Multi-level home, B rail on stairs   Self-Care   Usual Activity Tolerance moderate   Current Activity Tolerance moderate   Regular Exercise No   Equipment Currently Used at Home none  (Has SEC and FWW from knee sx)   Activity/Exercise/Self-Care Comment Pt and spouse report typically fairly sedentary   Functional Level Prior   Ambulation 0-->independent   Transferring 0-->independent   Toileting 0-->independent   Bathing 1-->assistive equipment   Fall history within last six months yes   Number of times patient has fallen within last six months 1   Which of the above functional risks had a recent onset or change? ambulation;communication/speech   Prior Functional Level Comment IND with mobility and ADLs at baseline, one fall reported tripping over unseen object   General Information   Onset of Illness/Injury or Date of Surgery - Date 07/13/19   Referring Physician Maxi Meza MD.   Patient/Family Goals Statement Home   Pertinent History of Current Problem (include personal factors and/or comorbidities that impact the POC)  Pt. is an 82 yo male who presented with R UE weakness and aphasia secondary to ischemic stroke (h/o previous CVA).   Precautions/Limitations fall precautions   General Info Comments BP goal <140/90   Cognitive Status Examination   Orientation orientation to person, place and time   Level of Consciousness alert   Follows Commands and Answers Questions 100% of the time   Cognitive Comment expressive aphasia, able to word find with time   Pain Assessment   Patient Currently in Pain No   Integumentary/Edema   Integumentary/Edema no deficits were identifed   Posture    Posture Forward head position   Range of Motion (ROM)   ROM Comment WFL   Strength   Strength Comments  "Decreased R , but seems to be improving. B hip flex 4/5, B quad and DF 5/5   Bed Mobility   Bed Mobility Comments SBA supine<>sit   Transfer Skills   Transfer Comments SBA sit<>stand   Gait   Gait Comments SBA without AD, WBOS with decreased step-length, mild unsteadiness throughout   Balance   Balance Comments Fair dynamic balance. SBA with NBOS EO and EC   Sensory Examination   Sensory Perception Comments LT intact throughout   Coordination   Coordination Comments intact heel/shin. Slowed finger/nose and NORMA on R UE   General Therapy Interventions   Planned Therapy Interventions progressive activity/exercise;risk factor education;home program guidelines;strengthening;neuromuscular re-education;gait training;bed mobility training   Clinical Impression   Criteria for Skilled Therapeutic Intervention yes, treatment indicated   PT Diagnosis Impaired functional mobiltiy   Influenced by the following impairments Impaired balance   Functional limitations due to impairments Safety/IND with gait   Clinical Presentation Evolving/Changing   Clinical Presentation Rationale Evolving presentation from CVA   Clinical Decision Making (Complexity) Low complexity   Therapy Frequency Daily   Predicted Duration of Therapy Intervention (days/wks) 3 days   Anticipated Equipment Needs at Discharge   (Has FWW and SEC)   Anticipated Discharge Disposition Home with Outpatient Therapy   Risk & Benefits of therapy have been explained Yes   Patient, Family & other staff in agreement with plan of care Yes   [facility] [employer] TM \"6 Clicks\"   2016, Trustees of [facility], under license to [employer].  All rights reserved.   6 Clicks Short Forms Basic Mobility Inpatient Short Form   [facility] [employer]  \"6 Clicks\" V.2 Basic Mobility Inpatient Short Form   1. Turning from your back to your side while in a flat bed without using bedrails? 4 - None   2. Moving from lying on your back to sitting on the side of a flat bed " without using bedrails? 4 - None   3. Moving to and from a bed to a chair (including a wheelchair)? 3 - A Little   4. Standing up from a chair using your arms (e.g., wheelchair, or bedside chair)? 3 - A Little   5. To walk in hospital room? 3 - A Little   6. Climbing 3-5 steps with a railing? 3 - A Little   Basic Mobility Raw Score (Score out of 24.Lower scores equate to lower levels of function) 20   Total Evaluation Time   Total Evaluation Time (Minutes) 12

## 2019-07-14 NOTE — PLAN OF CARE
Discharge Planner PT     PT: Orders received, eval completed, tx initiated.  Pt lives in a multi-floor home with his spouse. At baseline ambulates IND, IND with ADLs, lives relatively sedentary life.    Patient plan for discharge: Home tomorrow  Current status: /70; above current goal and nursing notified. SBA for bed mobility and transfers. Ambulates 800' SBA without AD, WBOS with consistent mild trunk sway, one LOB bumping an object recovered IND. Demonstrates some dynamic balance impairments. Performs flights of stairs with B rail SBA.  Barriers to return to prior living situation: None anticipated  Recommendations for discharge: Home with supervision for mobility, OP PT  Rationale for recommendations: Anticipate patient will progress well given PLOF and current functional status. Will benefit from ongoing skilled PT intervention to improve mobility status prior to returning home. Will plan to see pt tomorrow morning to further evaluate balance and gait to formulate AD recommendation for discharge.       Entered by: Dakota Duncan 07/14/2019 3:04 PM

## 2019-07-14 NOTE — PLAN OF CARE
Elevated blood pressure, otherwise VSS. Tele - SB. Denies pain. Neuros every 4 hours - mild to moderate aphasia otherwise intact. Neurology, PT, OT, Speech to see tomorrow. Up SBA.

## 2019-07-14 NOTE — PLAN OF CARE
OT deisi completed and treatment initiated.  Pt. is an 82 yo male who presented with R UE weakness and aphasia secondary to ischemic stroke (h/o previous CVA).  Pt. lives at home with his wife and has a shower chair and stairs and was functionally independent at baseline.  Skilled IP OT necessary for ADL training, fine motor coordination training, progressive activity/exercise, and home program guidelines for safe discharge home and OP OT.    Discharge Planner OT   Patient plan for discharge: home with OP therapy and family to assist  Current status: OT provided SBA with sit to stand from bed, SBA with ambulation in hallway for ~350 feet, min A with walking down the stairs and SBA with going up the stairs.  Pt. was able to complete toilet transfer with verbal cue and SBA to move slowly.  OT provided handouts on progressive activity/exercise to review and will complete at next session.  OT provided foam handles for utensils and pen/pencil to use to assist with self feeding and writing.  Pt. and wife verbalized appreciation.  Session cut short due to tele neuro meeting.  Pt. And wife reporting pt. At baseline for cognition and had baseline R UE weakness but worse after recent stroke.  Barriers to return to prior living situation: stairs  Recommendations for discharge: home with OP OT and family to assist with IADLs and setup for ADLs  Rationale for recommendations: Pt. Demonstrates near baseline independence with functional ADLs and transfers but having increased weakness with R UE impairing self feeding, writing and would benefit from one additional IP OT session for ADL training and HEP and follow up with OP OT session.       Entered by: Kelsey Roman 07/14/2019 1:38 PM

## 2019-07-14 NOTE — PROGRESS NOTES
[facility] Progress Note  [facility] 5th Floor    SH attempted to visit pt twice.     Pt had family visit and requested a later time. Pt was in therapy throughout the afternoon.    SH will follow-up with pt.    SH remains available per request or consult.      Tyra Cisneros   Intern  Pager: [phone]

## 2019-07-14 NOTE — PLAN OF CARE
"Presentation/Diagnosis: Pt is a 83 yr male presenting to ED with difficulty with speech. He has had past CVA's. Pt is diagnosed with CVA.  History: recent lower GI bleed, aortic aneurysm, aortic regurgitation, HLD, HTN, CVA, anemia  Labs/Protocols: 7/13: K (4.3), Cr (1.2), INR (1.04) cholesterol (132), triglycerides (45)  Vitals: HR is monitored in 50's, BP are elevated @0746 (154/74), @0853 (189/83), @1000 (143/67). Pt is on RA at 97%. Pt is denying any pain.   Cardiac: Lisinopril started this shift. ECHO done 7/14 EF: 60-65%  Telemetry: SR bradycardiac  Respiratory: Clear bilateral, SOB present pt states \"it is normal for me to be SOB, it hasn't changed\"  Neuro: Neuro checks Q4hrs. Pt is A&Ox4 but has difficulty with word finding. Mild expressive aphasia. Tele Stroke today @ 1100  GI/: WDL, LBM (7/13)  Skin: Some bruising, noted moles on back as well as face. No edema. PCD's on.   LDAs: Left PIV, saline locked  Diet: Regular, no difficulty swollowing  Activity: SBA  Teaching: educate patient on plan of care as well as Blood pressure control. Stroke precaution.   Plan: Lisinopril started, discharge possibly 7/15. PT/OT/Speech/Neuro following. Continue 80mg Aspirin + Plavix x3 wks. Home with event monitor.       "

## 2019-07-14 NOTE — PROGRESS NOTES
07/14/19 1313   Quick Adds   Type of Visit Initial Occupational Therapy Evaluation   Living Environment   Lives With spouse   Living Arrangements house   Home Accessibility stairs within home   Transportation Anticipated family or friend will provide   Living Environment Comment Pt. has stairs with railings, walk in shower and shower chair.   Self-Care   Usual Activity Tolerance good   Current Activity Tolerance moderate   Regular Exercise No   Equipment Currently Used at Home none   Activity/Exercise/Self-Care Comment Pt. and spouse report independence with ADLs at baseline and assist each other with IADLs.   Functional Level   Ambulation 0-->independent   Transferring 0-->independent   Toileting 0-->independent   Bathing 1-->assistive equipment   Dressing 0-->independent   Eating 1-->assistive equipment   Communication 2-->difficulty speaking (not related to language barrier)   Cognition 0 - no cognition issues reported   Fall history within last six months yes   Number of times patient has fallen within last six months 2   Which of the above functional risks had a recent onset or change? communication/speech  (stairs)   General Information   Onset of Illness/Injury or Date of Surgery - Date 07/13/19   Referring Physician Susana   Patient/Family Goals Statement increase strength and coordination   Additional Occupational Profile Info/Pertinent History of Current Problem 83 year old retired , male with known prior history of prior CVA currently on dual antiplatelets of aspirin and Plavix, recent hospitalization for GI bleeding of which he is antiplatelets were held at that time, hypertension, dyslipidemia and was admitted on 7/13/2019 with notable difficulty of finding words and expressing himself several hours prior to this hospitalization.   Precautions/Limitations fall precautions   Cognitive Status Examination   Orientation orientation to person, place and time   Level of Consciousness alert    Follows Commands (Cognition) WFL   Memory intact   Cognitive Comment monitor speed with transfers   Visual Perception   Visual Perception No deficits were identified;Wears glasses   Sensory Examination   Sensory Comments reports weakness in R hand   Pain Assessment   Patient Currently in Pain No   Range of Motion (ROM)   ROM Comment WFL   Strength   Strength Comments R hand < L hand, R shoulder < L shoulder, elbow flexion symmetrical   Coordination   Upper Extremity Coordination Right UE impaired   Coordination Comments decreased utensil use of self feeding and writing noted at baseline worsened slightly   Mobility   Bed Mobility Comments independent   Transfer Skills   Transfer Comments SBA   Toilet Transfer   Toilet Transfer Comments SBA to modified independent   Balance   Balance Comments unable to stand on one foot with eyes open, able to stand on two feet with eyes closed, struggled with walking down stairs   Upper Body Dressing   Level of [place]: Dress Upper Body independent   Lower Body Dressing   Level of [place]: Dress Lower Body minimum assist (75% patients effort)   Toileting   Level of [place]: Toilet independent   Eating/Self Feeding   Level of [place]: Eating minimum assist (75% patients effort)   Physical Assist/Nonphysical Assist: Eating set-up required   Instrumental Activities of Daily Living (IADL)   IADL Comments wife does most IADLs   Activities of Daily Living Analysis   Impairments Contributing to Impaired Activities of Daily Living balance impaired;coordination impaired;strength decreased   General Therapy Interventions   Planned Therapy Interventions ADL retraining;fine motor coordination training;strengthening;home program guidelines;progressive activity/exercise   Clinical Impression   Criteria for Skilled Therapeutic Interventions Met yes, treatment indicated   OT Diagnosis impaired strength and coordination for ADLs   Influenced by the following impairments see  "above   Assessment of Occupational Performance 3-5 Performance Deficits   Identified Performance Deficits decreased dressing, decreased self feeding, decreased R UE strength for ADLs, monitor safety with functional transfers   Clinical Decision Making (Complexity) Moderate complexity   Therapy Frequency Daily   Predicted Duration of Therapy Intervention (days/wks) 3 days   Anticipated Equipment Needs at Discharge shower chair   Anticipated Discharge Disposition Home with Outpatient Therapy   Risks and Benefits of Treatment have been explained. Yes   Patient, Family & other staff in agreement with plan of care Yes   Clinical Impression Comments Pt. is an 82 yo male who presented with R UE weakness and aphasia secondary to ischemic stroke (h/o previous CVA).  Pt. lives at home with his wife and has a shower chair and stairs and was functionally independent at baseline.  Skilled IP OT necessary for ADL training, fine motor coordination training, progressive activity/exercise, and home program guidelines for safe discharge home and OP OT.   [facility] TM \"6 Clicks\"   2016, Trustees of [facility], under license to [employer].  All rights reserved.   6 Clicks Short Forms Daily Activity Inpatient Short Form   [facility]  \"6 Clicks\" Daily Activity Inpatient Short Form   1. Putting on and taking off regular lower body clothing? 3 - A Little   2. Bathing (including washing, rinsing, drying)? 3 - A Little   3. Toileting, which includes using toilet, bedpan or urinal? 3 - A Little   4. Putting on and taking off regular upper body clothing? 3 - A Little   5. Taking care of personal grooming such as brushing teeth? 3 - A Little   6. Eating meals? 3 - A Little   Daily Activity Raw Score (Score out of 24.Lower scores equate to lower levels of function) 18   Total Evaluation Time   Total Evaluation Time (Minutes) 10     "

## 2019-07-14 NOTE — CONSULTS
[facility]      Stroke Consult Note    Reason for Consult:  Non-emergent consult request for aphasia    Chief Complaint: Aphasia    History of Present Illness     Tk Richmond is a 83 year old male with prior Left frontal stroke (on aspirin and Plavix), HTN, HL, and lower GI bleed, HTN, HL, who presented with expressive aphasia.  The patient woke up with these symptoms the day prior, [place] and did not want to to a hospital until returning home.  He also has some R hand numbness.  Symptoms have been worsening slightly.      In the ED the patient underwent MRI because he was not an acute interventional candidate.      The patient was in this hospital, admitted on 6/28/2019, for GI bleeding.  At that time, he had a colonoscopy done which showed bleeding colonic angioectasia which was treated and, after 5 days, his Plavix was   restarted.     Ziopatch in 2017 with up to 14 seconds of SVT.      Assessment & Plan      Stroke Evaluation summarized:  MRI/Head CT: New R frontal opercular ischemic stroke and scattered punctate strokes in left hemisphere including occipital lobe.  Chronic left frontal and occipital embolic appearing strokes  Intracranial Vascular Imaging: area of Left M2 stenosis, probably thrombus  Cervical Carotid and Vertebral Artery Vascular Imaging: scattered atherosclerosis that is non-hemodynamically significant  Echocardiogram: pending  EKG/Telemetry: NSR  LDL: 52  A1c: ordered  Troponin: < 0.015        Stroke Education  -Patient/family advised regarding healthy diet and regular aerobic exercise.  -Patient/family advised regarding the signs and symptoms of stroke and that immediate presentation to an ED for evaluation is critical.    PHQ-2 Depression Screening  Over the last 2 weeks, how many days have you noted: Never   (0 points) Several  (1 point) More than half  (2 points) Nearly all  (3 points)   Little interest/pleasure in doing things?   x      Feeling  down, depressed, or hopeless? x        PHQ-2 depression score: 0, consistent with a negative screen for depression.        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 Endovascular Treatment was Not initiated due to absence of proximal vessel occlusion    Recommendations  1. Aspirin 81mg and Plavix 300mg x 3 weeks  --then Aspirin 325mg alone  --unclear why he was on long-term DAPT, if the only indication was stroke then he should switch to monotherapy after 3 weeks  --will have the JULIO clinical trial contact him  2.telemetry and 30 day event monitor  --if negative, refer to Cardiology for implantable loop monitor.  He has has 3 clear strokes on MRI attributable to Embolic stroke of undetermined source  3. BP goal < 140/90, with tighter control associated with lower overall CV risk, if tolerated  4. Continue Lovastatin 40mg  5. A1c and Echo pending    Ok to discharge pending the A1c and Echo results.      Patient Follow-up    - in the next 1-2 week(s) with PCP  - in the next 4-6 weeks with a local neurologist    Please contact the Stroke Service with any questions.   Raheel Sibley MD, MS  Neurology  July 14, 2019    Please contact the stroke service with any questions: link to Text page      __________________________________________________    Past Medical History   Past Medical History:   Diagnosis Date     Glaucoma      Hyperlipidemia LDL goal < 100      Hypertension      Stroke (H)     2004, speech deficit       Past Surgical History   No past surgical history on file.    Medications     Home Meds  Prior to Admission medications    Medication Sig Start Date End Date Taking? Authorizing Provider   aspirin 325 MG tablet Take 1 tablet by mouth daily. 2/20/12  Yes Jeremiah Shaver MD   brinzolamide-brimonidine (SIMBRINZA) 1-0.2 % ophthalmic suspension Place 1 drop into both eyes 2 times daily    Yes  Reported, Patient   Calcium Carbonate-Vitamin D (CALCIUM 600+D PO) Take 1 tablet by mouth 2 times daily At noon and supper   Yes Reported, Patient   citalopram (CELEXA) 40 MG tablet Take 40 mg by mouth daily   Yes Reported, Patient   clopidogrel (PLAVIX) 75 MG tablet Take 75 mg by mouth daily    Yes Unknown, Entered By History   lovastatin (MEVACOR) 40 MG tablet Take 40 mg by mouth At Bedtime   Yes Unknown, Entered By History   Misc Natural Products (GLUCOSAMINE CHONDROITIN ADV PO) Take 1 capsule by mouth 2 times daily At noon and supper   Yes Reported, Patient   Multiple Vitamins-Minerals (CENTRUM SILVER ADULT 50+ PO) Take 1 tablet by mouth daily    Yes Reported, Patient   Multiple Vitamins-Minerals (PRESERVISION AREDS 2+MULTI VIT PO) Take 1 tablet by mouth 2 times daily    Yes Reported, Patient   vitamin B-12 (CYANOCOBALAMIN) 1000 MCG tablet Take by mouth daily   Yes Reported, Patient       Scheduled meds    aspirin  325 mg Oral Daily with supper     brinzolamide-brimonidine  1 drop Both Eyes BID     calcium carbonate-vitamin D  1 tablet Oral BID w/meals     citalopram  40 mg Oral Daily     clopidogrel  75 mg Oral Daily     cyanocobalamin  1,000 mcg Oral Daily     lisinopril  10 mg Oral Daily     lovastatin  40 mg Oral At Bedtime     metoprolol tartrate  25 mg Oral BID     multivitamin w/minerals  1 tablet Oral Daily     sodium chloride (PF)  3 mL Intracatheter Q8H       Infusion meds      PRN meds  acetaminophen, acetaminophen, calcium carbonate, hydrALAZINE, lidocaine 4%, lidocaine (buffered or not buffered), melatonin, naloxone, ondansetron **OR** ondansetron, polyethylene glycol, prochlorperazine **OR** prochlorperazine **OR** prochlorperazine, senna-docusate **OR** senna-docusate, sodium chloride (PF)    Allergies   Allergies   Allergen Reactions     Contrast Dye Rash       Family History   Family History   Problem Relation Age of Onset     Breast Cancer Mother      Diabetes No family hx of      Hypertension  No family hx of      Cancer - colorectal No family hx of        Social History   Social History     Tobacco Use     Smoking status: Former Smoker     Years: 10.00     Types: Cigars     Last attempt to quit: 1972     Years since quittin.4     Smokeless tobacco: Never Used   Substance Use Topics     Alcohol use: Yes     Alcohol/week: 0.0 oz     Comment:  oxx     Drug use: No       Review of Systems   The 10 point Review of Systems is negative other than noted in the HPI or here.     Physical Exam     Temp:  [97.2  F (36.2  C)-98.6  F (37  C)] 98.6  F (37  C)  Pulse:  [56-70] 56  Heart Rate:  [52-87] 52  Resp:  [13-24] 24  BP: (143-208)/() 148/68  SpO2:  [97 %-100 %] 97 %    Neuro:  Mental status: Awake, alert, attentive, oriented x3. Speech is sparse with word-finding difficulty.  He has trouble with two-step commands that include right/left differentiation.  Repetition/naming slowed.  No dysarthria.  Poor historian.  No neglect.  Cranial nerves: EOMI, visual fields full, face symmetric, facial sensation intact, shoulder shrug strong, tongue/uvula midline, hearing intact to conversation.  Motor: 5/5 strength in all 4 extremities without drift.  Proximal right arm movements are slowed and with decreased fine motor movement  Sensory (assessed via nursing): Intact to light touch in face, arms, and legs  Coordination: Finger to nose intact bilaterally without dysmetria.  Normal heel-shin test bilaterally  Gait: Deferred    Stroke Scales      NIHSS  Interval baseline (19 1218)   Interval Comments     1a. Level of Consciousness 0-->Alert, keenly responsive   1b. LOC Questions 0-->Answers both questions correctly   1c. LOC Commands 0-->Performs both tasks correctly   2.   Best Gaze 0-->Normal   3.   Visual 0-->No visual loss   4.   Facial Palsy 0-->Normal symmetrical movements   5a. Motor Arm, Left 0-->No drift, limb holds 90 (or 45) degrees for full 10 secs   5b. Motor Arm, Right 0-->No drift, limb  holds 90 (or 45) degrees for full 10 secs   6a. Motor Leg, Left 0-->No drift, leg holds 30 degree position for full 5 secs   6b. Motor Leg, right 0-->No drift, leg holds 30 degree position for full 5 secs   7.   Limb Ataxia 0-->Absent   8.   Sensory 0-->Normal, no sensory loss   9.   Best Language 1-->Mild-to-moderate aphasia, some obvious loss of fluency or facility of comprehension, without significant limitation on ideas expressed or form of expression. Reduction of speech and/or comprehension, however, makes conversation. . . (see row details)   10. Dysarthria 1-->Mild-to-moderate dysarthria, patient slurs at least some words and, at worst, can be understood with some difficulty   11. Extinction and Inattention  0-->No abnormality   Total 2 (07/13/19 1640)       Data     Imaging  I personally reviewed all neuroimaging     Labs:  CBC:  Recent Labs   Lab 07/13/19  1223   WBC 5.9   RBC 3.75*   HGB 11.7*   HCT 36.6*          Basic Metabolic Panel:   Recent Labs   Lab Test 07/13/19  1223 06/30/19  0551 06/29/19  0556    143 146*   POTASSIUM 4.3  --  3.9   CHLORIDE 109  --  114*   CO2 27  --  25   BUN 16  --  15   CR 1.20  --  1.09   GLC 91  --  80   WILLIAM 8.6  --  8.0*       Liver panel:  No lab results found.    INR:  Recent Labs   Lab Test 07/13/19  1223 06/28/19  0454   INR 1.04 1.05        Lipid Profile:  Recent Labs   Lab Test 07/14/19  0635 11/22/17  1048   CHOL 132 146   HDL 71 77   LDL 52 59   TRIG 45 52       A1C: No lab results found.    Troponin I:   Recent Labs   Lab Test 07/13/19  1223   TROPI <0.015            I have personally spent a total of 60 minutes providing care and consulting with this patient's medical providers today, with more than 50% of this time spent in consultation, coordination of care, and discussion with the patient and/or family regarding diagnostic results, prognosis, symptom management, risks and benefits of management options, and development of plan of care..      Telestroke Service Details  (for non-emergent stroke consult with tele)  Video start time 07/14/19   1116   Video end time 07/14/19   1150   Type of service telemedicine diagnostic assessment of acute neurological changes   Reason telemedicine is appropriate patient requires assessment with a specialist for diagnosis and treatment of neurological symptoms   Mode of transmission secure interactive audio and video communication per Chanelle   Originating site (patient location) [facility]    Distant site (provider location) [facility]

## 2019-07-14 NOTE — PLAN OF CARE
Pt alert and oriented, up with standby assist. Pt continues with some word finding difficulties, otherwise neuro exam WDL. Pt to have a formal tele stroke consult as well as PT/OT and speech.

## 2019-07-15 ENCOUNTER — APPOINTMENT (OUTPATIENT)
Dept: CARDIOLOGY | Facility: CLINIC | Age: 84
DRG: 066 | End: 2019-07-15
Attending: INTERNAL MEDICINE
Payer: MEDICARE

## 2019-07-15 ENCOUNTER — APPOINTMENT (OUTPATIENT)
Dept: OCCUPATIONAL THERAPY | Facility: CLINIC | Age: 84
DRG: 066 | End: 2019-07-15
Payer: MEDICARE

## 2019-07-15 ENCOUNTER — APPOINTMENT (OUTPATIENT)
Dept: SPEECH THERAPY | Facility: CLINIC | Age: 84
DRG: 066 | End: 2019-07-15
Payer: MEDICARE

## 2019-07-15 ENCOUNTER — APPOINTMENT (OUTPATIENT)
Dept: PHYSICAL THERAPY | Facility: CLINIC | Age: 84
DRG: 066 | End: 2019-07-15
Payer: MEDICARE

## 2019-07-15 VITALS
DIASTOLIC BLOOD PRESSURE: 67 MMHG | TEMPERATURE: 97.5 F | SYSTOLIC BLOOD PRESSURE: 136 MMHG | WEIGHT: 176.6 LBS | BODY MASS INDEX: 26.16 KG/M2 | OXYGEN SATURATION: 96 % | RESPIRATION RATE: 16 BRPM | HEART RATE: 56 BPM | HEIGHT: 69 IN

## 2019-07-15 LAB — INTERPRETATION ECG - MUSE: NORMAL

## 2019-07-15 PROCEDURE — 25000132 ZZH RX MED GY IP 250 OP 250 PS 637: Mod: GY | Performed by: INTERNAL MEDICINE

## 2019-07-15 PROCEDURE — 99239 HOSP IP/OBS DSCHRG MGMT >30: CPT | Performed by: INTERNAL MEDICINE

## 2019-07-15 PROCEDURE — 92507 TX SP LANG VOICE COMM INDIV: CPT | Mod: GN | Performed by: SPEECH-LANGUAGE PATHOLOGIST

## 2019-07-15 PROCEDURE — 97535 SELF CARE MNGMENT TRAINING: CPT | Mod: GO | Performed by: STUDENT IN AN ORGANIZED HEALTH CARE EDUCATION/TRAINING PROGRAM

## 2019-07-15 PROCEDURE — 97110 THERAPEUTIC EXERCISES: CPT | Mod: GO | Performed by: STUDENT IN AN ORGANIZED HEALTH CARE EDUCATION/TRAINING PROGRAM

## 2019-07-15 PROCEDURE — 93270 REMOTE 30 DAY ECG REV/REPORT: CPT

## 2019-07-15 PROCEDURE — 93272 ECG/REVIEW INTERPRET ONLY: CPT | Performed by: INTERNAL MEDICINE

## 2019-07-15 PROCEDURE — 97530 THERAPEUTIC ACTIVITIES: CPT | Mod: GP | Performed by: PHYSICAL THERAPIST

## 2019-07-15 RX ORDER — LISINOPRIL 10 MG/1
10 TABLET ORAL DAILY
Qty: 30 TABLET | Refills: 0 | Status: ON HOLD | OUTPATIENT
Start: 2019-07-16 | End: 2019-07-20

## 2019-07-15 RX ORDER — CLOPIDOGREL 300 MG/1
300 TABLET, FILM COATED ORAL DAILY
Qty: 21 TABLET | Refills: 0 | Status: ON HOLD | OUTPATIENT
Start: 2019-07-15 | End: 2019-07-20

## 2019-07-15 RX ORDER — ASPIRIN 325 MG
325 TABLET ORAL DAILY
Qty: 30 TABLET | Refills: 0 | Status: ON HOLD | OUTPATIENT
Start: 2019-08-06 | End: 2019-07-20

## 2019-07-15 RX ADMIN — CLOPIDOGREL BISULFATE 75 MG: 75 TABLET ORAL at 09:17

## 2019-07-15 RX ADMIN — BRINZOLAMIDE/BRIMONIDINE TARTRATE 1 DROP: 10; 2 SUSPENSION/ DROPS OPHTHALMIC at 09:21

## 2019-07-15 RX ADMIN — CITALOPRAM HYDROBROMIDE 40 MG: 20 TABLET ORAL at 09:17

## 2019-07-15 RX ADMIN — LISINOPRIL 10 MG: 10 TABLET ORAL at 09:17

## 2019-07-15 ASSESSMENT — ACTIVITIES OF DAILY LIVING (ADL)
ADLS_ACUITY_SCORE: 17

## 2019-07-15 NOTE — PROGRESS NOTES
"SPIRITUAL HEALTH SERVICES Progress Note  [facility] Med/Surg 3rd Floor     visited pt per standard admission request.    Pt, Tk, was sitting up in bed. Pt admitted to hospital following stroke. Pt shares he and his wife were at their cabin Friday evening when he suspected he had a stroke. However, he shared that he decided to wait until Saturday to drive to the this hospital. In conversation pt spends time searching for words and occasionally is unable to finish a thought.     Pt says that he has a loving family whom he names as his support system. He and his wife have been  for 59 years. Pt enjoys reflecting on his years as a  and .    Pt explains he grew up [employer]ism and converted to Catholicism when he was . Pt views his wife as \"the faithful one\" in their marriage. Pt attributes his \"it is what it is\" ideology to his belief in God. He shares, \"God has a plan\".    Pt was oriented to Spiritual Health. He shares that he looks forward to discharging soon.     remains available.        Tyra Cisneros   Intern  Pager: [phone]  "

## 2019-07-15 NOTE — PLAN OF CARE
A&O. VSS. Denies pain. Neuro's unchanged. Tele SB. Up SBA. Possible discharge today.  Lilly Mcdaniel RN on 7/15/2019 at 5:08 AM

## 2019-07-15 NOTE — DISCHARGE SUMMARY
[facility]  Discharge Summary  Name: Tk Richmond    MRN: [id number]  YOB: 1935    Age: 83 year old  Date of Discharge:  7/15/2019  Date of Admission: 7/13/2019  Primary Care Provider: [facility]  Discharge Physician:  Garo Rossi MD  Discharging Service:  Hospitalist      Discharge Diagnosis:  Acute ischemic stroke due to embolic stroke of undetermined source  Prior CVA  Benign essential hypertension  Dyslipidemia     Other Diagnosis:  Past Medical History:   Diagnosis Date     Glaucoma      Hyperlipidemia LDL goal < 100      Hypertension      Stroke (H)     2004, speech deficit          Discharge Disposition:  Discharged to home     Allergies:  Allergies   Allergen Reactions     Contrast Dye Rash        Discharge Medications:   Current Discharge Medication List      START taking these medications    Details   aspirin (ASA) 81 MG EC tablet Take 1 tablet (81 mg) by mouth daily for 21 days  Qty: 21 tablet, Refills: 0    Comments: Take aspirin 81 mg daily together with your Plavix 300 mg daily only for 21 days then switch to aspirin 325 mg daily after that  Associated Diagnoses: H/O: CVA (cerebrovascular accident)      lisinopril (PRINIVIL/ZESTRIL) 10 MG tablet Take 1 tablet (10 mg) by mouth daily  Qty: 30 tablet, Refills: 0    Associated Diagnoses: Essential hypertension         CONTINUE these medications which have CHANGED    Details   aspirin (ASA) 325 MG tablet Take 1 tablet (325 mg) by mouth daily  Qty: 30 tablet, Refills: 0    Comments: Please take this 325 mg of aspirin daily only after you finish the 3-week course of Plavix 300 mg daily and aspirin 81 mg daily.  Associated Diagnoses: H/O: CVA (cerebrovascular accident)      clopidogrel (PLAVIX) 300 MG TABS tablet Take 1 tablet (300 mg) by mouth daily for 21 doses  Qty: 21 tablet, Refills: 0    Comments: Please take Plavix 300 mg daily only for 3 weeks as recommended by neurology service.  Associated Diagnoses:  "H/O: CVA (cerebrovascular accident)         CONTINUE these medications which have NOT CHANGED    Details   brinzolamide-brimonidine (SIMBRINZA) 1-0.2 % ophthalmic suspension Place 1 drop into both eyes 2 times daily       Calcium Carbonate-Vitamin D (CALCIUM 600+D PO) Take 1 tablet by mouth 2 times daily At noon and supper      citalopram (CELEXA) 40 MG tablet Take 40 mg by mouth daily      lovastatin (MEVACOR) 40 MG tablet Take 40 mg by mouth At Bedtime      Misc Natural Products (GLUCOSAMINE CHONDROITIN ADV PO) Take 1 capsule by mouth 2 times daily At noon and supper      Multiple Vitamins-Minerals (CENTRUM SILVER ADULT 50+ PO) Take 1 tablet by mouth daily       Multiple Vitamins-Minerals (PRESERVISION AREDS 2+MULTI VIT PO) Take 1 tablet by mouth 2 times daily       vitamin B-12 (CYANOCOBALAMIN) 1000 MCG tablet Take by mouth daily              Condition on Discharge:  Discharge condition: Stable   Discharge vitals: Blood pressure 136/67, pulse 56, temperature 97.5  F (36.4  C), temperature source Oral, resp. rate 16, height 1.753 m (5' 9\"), weight 80.1 kg (176 lb 9.6 oz), SpO2 96 %.   Code status on discharge: Full Code     History of Present Illness:  See detailed admission note for full details.        Significant Physical Exam Findings Day of Discharge:  HEENT; Atraumatic, normocephalic, pinkish conjuctiva, pupils bilateral reactive   Skin: warm and moist, no rashes  Lymphatics: no cervical or axillary lymphandenopathy  Lungs: equal chest expansion, clear to auscultation, no wheezes, no stridor, no crackles,   Heart: normal rate, normal rhythm, no rubs or gallops.   Abdomen: normal bowel sounds, no tenderness, no peritoneal signs, no guarding  Extremities: no deformities, no edema   Neuro; follow commands, alert and oriented x3, spontaneous speech, coherent, moves all extremities spontaneously  Psych; no hallucination, euthymic mood, not agitated        Procedures other than Imaging:  None     Imaging:  Results " for orders placed or performed during the hospital encounter of 07/13/19   MR Brain w/o & w Contrast    Narrative    MRI BRAIN WITHOUT AND WITH CONTRAST July 13, 2019 2:24 PM    HISTORY: Focal neuro deficit greater than six hours, stroke suspected.      TECHNIQUE: Multiplanar, multisequence MRI of the brain without and  with 10 mL  Gadavist.    COMPARISON: Head MRI 2/18/2012.    FINDINGS: Intravenous contrast is present on the labeled precontrast  images with enhancement of the veins and paranasal sinus mucosa.    Patchy/linear area of diffusion restriction is present involving the  left inferior frontal lobe at the pars opercularis and within the  insula. Punctate infarcts are present involving the left superior  temporal lobe and left occipital lobe cuneus. Punctate area of  diffusion restriction is present involving the left precuneus. No  evidence of hemorrhage or significant mass effect.    Moderate volume loss is present. Patchy white matter hypoattenuation  likely represents chronic small vessel ischemic change. Old left  middle frontal gyrus, left superior parietal lobule, and left  occipital lobe cuneus infarcts are present. Old left basal ganglia  lacunar infarcts are present. Small old bilateral cerebellar infarcts  are present. There is prominent susceptibility related signal loss  within the sulci overlying the bilateral cerebral hemispheres. No  abnormal enhancement is identified.    The visualized calvarium, tympanic cavities, mastoid cavities, and  extracranial soft tissues are unremarkable. Bilateral lens  replacements are present. There is loss of subcutaneous skin thickness  overlying the posterior right parietal lobe with possible changes of  underlying prior instrumentation.      Impression    IMPRESSION:  1. Linear area of infarcts involving the left frontal lobe (centrum  semiovale and pars opercularis), consistent with reported right-sided  weakness and expressive aphasia.  2. Smaller  punctate acute infarcts involving the left superior  temporal lobe, left parietal lobe, left occipital lobe.  3. Moderate volume loss, chronic small vessel ischemic change, and  multiple old infarcts.  4. Prominent susceptibility related signal loss throughout the sulci  likely related to previous intravenous contrast administration.  Intravenous contrast is also present on the scans labeled precontrast.    TRAVIS HAMMONDS MD   MR Head w/o Contrast Angiogram    Narrative    MR ANGIOGRAM OF THE HEAD WITHOUT CONTRAST July 13, 2019 2:22 PM     HISTORY: Expressive aphasia, right hand weakness.    TECHNIQUE: 3D time-of-flight MR angiogram of the head without  contrast.    COMPARISON: None.    FINDINGS: Short segment high-grade stenosis is present involving the  left middle cerebral artery M2 segment superior division (series 1  image 128) new compared to 2/28/2012. Otherwise, the internal carotid  arteries, anterior cerebral arteries, and right middle cerebral artery  are patent. Intracranial vertebral, basilar artery, and posterior  arteries are patent. The internal carotid arteries, anterior cerebral  arteries and middle cerebral arteries are patent. No aneurysms or  vascular malformations are identified.      Impression    IMPRESSION: Short segment high-grade stenosis versus occlusion  involving the left middle cerebral artery superior division M2 segment  along the frontal operculum, new compared to 2/28/2012.    TRAVIS HAMMONDS MD   MRA Angiogram Neck w/o & w Contrast    Narrative    MRA NECK WITHOUT AND WITH CONTRAST July 13, 2019 2:22 PM     HISTORY: Expressive aphasia, right hand weakness.    TECHNIQUE: 2D time-of-flight MR angiogram of the neck without contrast  and 3D MR angiogram of the neck with  10mL Gadavist. Estimates of  carotid stenoses are made relative to the distal internal carotid  artery diameters except as noted.    COMPARISON: None.    FINDINGS: Intravenous contrast is present on the labeled  precontrast  imaging.    Normal origin of the great vessels from the aortic arch.    Right carotid artery: The right common and internal carotid arteries  are patent. There is approximately 20% stenosis. No evidence of  flow-limiting stenosis.    Left carotid artery: The left common and internal carotid arteries are  patent. There is approximately 20% stenosis. No evidence of  flow-limiting stenosis.    Vertebral arteries: Vertebral arteries appear patent without evidence  of dissection. No significant stenosis.        Impression    IMPRESSION: Patent vasculature. No evidence of flow-limiting stenosis.    TRAVIS HAMMONDS MD        Consultations:  Consultation during this admission received from neurology.     Recent Lab Results:  Recent Labs   Lab 07/13/19  1223   WBC 5.9   HGB 11.7*   HCT 36.6*   MCV 98        No results for input(s): CULT in the last 168 hours.  Recent Labs   Lab 07/13/19  1223      POTASSIUM 4.3   CHLORIDE 109   CO2 27   ANIONGAP 5   GLC 91   BUN 16   CR 1.20   GFRESTIMATED 56*   GFRESTBLACK 65   WILLIAM 8.6     Recent Labs   Lab 07/13/19  1223 07/13/19  1211   GLC 91  --    BGM  --  88     No results for input(s): LACT in the last 168 hours.  Recent Labs   Lab 07/13/19  1223   TROPI <0.015     No results for input(s): COLOR, APPEARANCE, URINEGLC, URINEBILI, URINEKETONE, SG, UBLD, URINEPH, PROTEIN, UROBILINOGEN, NITRITE, LEUKEST, RBCU, WBCU in the last 168 hours.       Pending Results:    Unresulted Labs Ordered in the Past 30 Days of this Admission     No orders found from 5/14/2019 to 7/14/2019.           Discharge Instructions and Follow-Up:   Discharge diet: Orders Placed This Encounter      Combination Diet Regular Diet Adult      Diet     Discharge activity: Activity as tolerated   Discharge follow-up: 1-2 weeks with PCP   Outpatient therapy:  Physical therapy, speech therapy   Other instructions: None      Hospital Course:  Continuing care for this very pleasant elderly gentleman  with a known prior history of CVA in the past being maintained on dual antiplatelets with aspirin and Plavix, hypertension not on daily medication as he was taken off in the past for soft BP levels, recent hospitalization for lower GI bleeding secondary to angiectasia, 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  Eventually found with acute ischemic CVA likely embolic source of undetermined origin.  He did well and has no further issues here in the hospital.  Mental state remained at baseline.  Slurring of speech, dysarthria and expressive aphasia has significantly been improving.  He is tolerating oral diet with no signs and symptoms of aspiration-like complaints.  Started on antihypertensives due to elevated blood pressure appears to be not tolerating beta-blockers due to baseline asymptomatic bradycardia at high 50s.  Started on low-dose lisinopril of which he is tolerating well.  Continued on statins.  Seen by stroke neurology and recommended continue dual antiplatelets with 300 mg of Plavix only for 3 weeks while taking 81 mg of aspirin at that time and eventually converted to aspirin alone after 3 weeks at 325 mg daily.  New prescriptions for these changes on his dual antiplatelets were provided and discussed extensively with Tk and his wife Norma at bedside.  Discussed with them as well regarding the risks of recurrent lower GI bleeding of which he was hospitalized 2 weeks ago and prompt follow-up care or seek medical attention if there is further recurrence of any bleeding-like symptoms.  He will also be sent home with a 30-day cardiac event monitor as recommended by stroke neurology.  He needs to follow-up with PCP and neurology service as scheduled.  Currently Tk is hemodynamically stable and  has been requesting for home discharge since yesterday we will proceed with it today.       Total time spent in face to face contact with the patient and coordinating discharge was:  > 30 Minutes.

## 2019-07-15 NOTE — PLAN OF CARE
Discharge Planner OT   Patient plan for discharge: home with OP therapy and family to assist  Current status: pt able to complete 4 standing g/h tasks with SBA; pt provided set-up for built up foam handle with toothbrush, noted improvement in management; pt provided with recommendations on use of push down items (toothpaste, soap, shampoo, etc); pt receptive to education on fine motor coordination exercises, provided handout for activities to try once at home; pt receptive to foam block exercises, able to complete ~10 reps with R hand, 6 exercises; pt did require hand over hand assist with ~50% of exercises; pt able to complete ~6 reps of B UE AROM for shoulder flex/extension, elbow flex/extension, pronation/supination and shoulder shrugs  Barriers to return to prior living situation: none anticipated  Recommendations for discharge: home with OP OT for further R UE strengthening and coordination. Recommend family supervise/assist as needed with IADLs such as management of medications, meals, bathing, laundry  Rationale for recommendations: Pt continues to have weakness with R UE impairing self feeding, writing and manipulating items with ADLs        Entered by: Natalie Figueroa 07/15/2019 9:36 AM

## 2019-07-15 NOTE — PLAN OF CARE
Discharge Planner SLP   Patient plan for discharge: Home with spouse, OP therapies  Current status: Patient seen for language treatment.  Patient highly pleasant, motivated to improve language function and demonstrating good self-monitoring and correction of paraphasias.  Recommend continue OP SLP services for language function.  Patient to discharge home this pm.  Barriers to return to prior living situation: Language impairment.  Recommendations for discharge: Home with spouse assistance for communication tasks, OP SLP services.   Rationale for recommendations: Patient would likely benefit from 1-2x/weekly SLP services for expressive and receptive language improvement.       Entered by: Erika Hernandez 07/15/2019 11:48 AM        Speech Language Therapy Discharge Summary    Reason for therapy discharge:    Discharged to home with outpatient therapy.    Progress towards therapy goal(s). See goals on Care Plan in Epic electronic health record for goal details.  Goals partially met.  Barriers to achieving goals:   discharge from facility.    Therapy recommendation(s):    Continued therapy is recommended.  Rationale/Recommendations:  See above.

## 2019-07-15 NOTE — PLAN OF CARE
Vss; denies pain; A&O x4; trouble finding words and communicating, but all other neuro checks are normal; PT returning home with wife; cardiac monitor ordered for 30 days; consults with outpatient PT/OT/SLP and neurology

## 2019-07-15 NOTE — PLAN OF CARE
Discharge Planner PT     Patient plan for discharge: Home tomorrow  Current status: Pt seen for assistive device assessment prior to DISCHARGE. Pt ambulating 400 feet, no LOB but veering with 4 item DGI. Pt did score 9/12 on DGI which is borderline risk for falls. No assistive device needed at this time. Recommending further PT to progress balance at this time.   Barriers to return to prior living situation: None anticipated  Recommendations for discharge: Home with supervision for mobility, OP PT  Rationale for recommendations: Pt meeting PT goals       Entered by: Shelli Barry 07/15/2019 4:23 PM     Physical Therapy Discharge Summary    Reason for therapy discharge:    Discharged to home with outpatient therapy.    Progress towards therapy goal(s). See goals on Care Plan in [facility] electronic health record for goal details.  Goals met    Therapy recommendation(s):    Continued therapy is recommended.  Rationale/Recommendations: Pt is scoring low on DGI, recommend continued skilled therapy to address these deficits.

## 2019-07-15 NOTE — PLAN OF CARE
VSS, afeb., LS are clear, 96% on RA. Pt denies pain. Good appetite, ate 100% for dinner. Pt up to BR, ambulated in mascorro x1 this shift, gina well. Neuros stable, still has difficulty with some word finding, but right arm is stronger, just some mild weakness noted. POC reviewed with pt and wife, questions answered.Tele is SB.

## 2019-07-16 ENCOUNTER — TELEPHONE (OUTPATIENT)
Dept: CARDIOLOGY | Facility: CLINIC | Age: 84
End: 2019-07-16

## 2019-07-16 DIAGNOSIS — I71.21 ASCENDING AORTIC ANEURYSM (H): Primary | ICD-10-CM

## 2019-07-16 NOTE — TELEPHONE ENCOUNTER
Notes recorded by Uriel Anderson MD on 7/10/2019 at 7:53 AM CDT  The CT angiogram agrees with the echo evaluation of the aortic aneurysm.  We will repeat an echo in one year and consider CTA whenever the measurements show >5.0 cm aneurysm.  Uriel Anderson MD, [facility]  July 10, 2019 7:53 AM    Spoke to patients wife regarding result above. Pts wife verbalized understanding. They stated they had to rescheduled OV with Dr. Anderson from 7/16/19 to October as they just got out of the hospital and he won't be able to make appointment today. Echo ordered for 1 year. No questions or concerns at this time.

## 2019-07-16 NOTE — PLAN OF CARE
Occupational Therapy Discharge Summary    Reason for therapy discharge:    Discharged to home with outpatient therapy.    Progress towards therapy goal(s). See goals on Care Plan in [facility] electronic health record for goal details.  Goals not met.  Barriers to achieving goals:   discharge from facility.    Therapy recommendation(s):    Continued therapy is recommended.  Rationale/Recommendations:  Home with OP OT for further R UE strengthening and coordination. Recommend family supervise/assist as needed with IADLs such as management of medications, meals, bathing, laundry.      **Pt not seen by discharging therapist on this date, note written based on previous treating therapist's notes and recommendations

## 2019-07-17 ENCOUNTER — APPOINTMENT (OUTPATIENT)
Dept: CT IMAGING | Facility: CLINIC | Age: 84
DRG: 065 | End: 2019-07-17
Attending: EMERGENCY MEDICINE
Payer: MEDICARE

## 2019-07-17 ENCOUNTER — APPOINTMENT (OUTPATIENT)
Dept: INTERVENTIONAL RADIOLOGY/VASCULAR | Facility: CLINIC | Age: 84
DRG: 065 | End: 2019-07-17
Attending: PSYCHIATRY & NEUROLOGY
Payer: MEDICARE

## 2019-07-17 ENCOUNTER — HOSPITAL ENCOUNTER (OUTPATIENT)
Dept: SPEECH THERAPY | Facility: CLINIC | Age: 84
End: 2019-07-17
Payer: MEDICARE

## 2019-07-17 ENCOUNTER — APPOINTMENT (OUTPATIENT)
Dept: MRI IMAGING | Facility: CLINIC | Age: 84
DRG: 065 | End: 2019-07-17
Attending: INTERNAL MEDICINE
Payer: MEDICARE

## 2019-07-17 ENCOUNTER — HOSPITAL ENCOUNTER (INPATIENT)
Facility: CLINIC | Age: 84
LOS: 3 days | Discharge: NURSING, REHAB OR LONG-TERM CARE | DRG: 065 | End: 2019-07-20
Attending: INTERNAL MEDICINE | Admitting: INTERNAL MEDICINE
Payer: MEDICARE

## 2019-07-17 DIAGNOSIS — Z86.73 H/O: CVA (CEREBROVASCULAR ACCIDENT): ICD-10-CM

## 2019-07-17 DIAGNOSIS — I10 ESSENTIAL HYPERTENSION: ICD-10-CM

## 2019-07-17 DIAGNOSIS — I63.9 ACUTE EMBOLIC STROKE (H): Primary | ICD-10-CM

## 2019-07-17 DIAGNOSIS — R29.810 FACIAL DROOP: ICD-10-CM

## 2019-07-17 DIAGNOSIS — R29.898 ARM WEAKNESS: ICD-10-CM

## 2019-07-17 DIAGNOSIS — I65.29 STENOSIS OF CAROTID ARTERY, UNSPECIFIED LATERALITY: ICD-10-CM

## 2019-07-17 DIAGNOSIS — R47.01 EXPRESSIVE APHASIA: Primary | ICD-10-CM

## 2019-07-17 LAB
ANION GAP SERPL CALCULATED.3IONS-SCNC: 6 MMOL/L (ref 3–14)
APTT PPP: 27 SEC (ref 22–37)
BASOPHILS # BLD AUTO: 0 10E9/L (ref 0–0.2)
BASOPHILS NFR BLD AUTO: 0.4 %
BUN SERPL-MCNC: 24 MG/DL (ref 7–30)
CALCIUM SERPL-MCNC: 8.8 MG/DL (ref 8.5–10.1)
CHLORIDE SERPL-SCNC: 109 MMOL/L (ref 94–109)
CO2 SERPL-SCNC: 26 MMOL/L (ref 20–32)
CREAT SERPL-MCNC: 1.39 MG/DL (ref 0.66–1.25)
DIFFERENTIAL METHOD BLD: ABNORMAL
EOSINOPHIL # BLD AUTO: 0.2 10E9/L (ref 0–0.7)
EOSINOPHIL NFR BLD AUTO: 3.4 %
ERYTHROCYTE [DISTWIDTH] IN BLOOD BY AUTOMATED COUNT: 16.5 % (ref 10–15)
GFR SERPL CREATININE-BSD FRML MDRD: 47 ML/MIN/{1.73_M2}
GLUCOSE SERPL-MCNC: 164 MG/DL (ref 70–99)
HCT VFR BLD AUTO: 37.3 % (ref 40–53)
HGB BLD-MCNC: 12.1 G/DL (ref 13.3–17.7)
IMM GRANULOCYTES # BLD: 0 10E9/L (ref 0–0.4)
IMM GRANULOCYTES NFR BLD: 0.8 %
INR PPP: 1.06 (ref 0.86–1.14)
LYMPHOCYTES # BLD AUTO: 1.3 10E9/L (ref 0.8–5.3)
LYMPHOCYTES NFR BLD AUTO: 25.1 %
MCH RBC QN AUTO: 31 PG (ref 26.5–33)
MCHC RBC AUTO-ENTMCNC: 32.4 G/DL (ref 31.5–36.5)
MCV RBC AUTO: 96 FL (ref 78–100)
MONOCYTES # BLD AUTO: 0.4 10E9/L (ref 0–1.3)
MONOCYTES NFR BLD AUTO: 6.7 %
NEUTROPHILS # BLD AUTO: 3.4 10E9/L (ref 1.6–8.3)
NEUTROPHILS NFR BLD AUTO: 63.6 %
NRBC # BLD AUTO: 0 10*3/UL
NRBC BLD AUTO-RTO: 0 /100
PLATELET # BLD AUTO: 199 10E9/L (ref 150–450)
POTASSIUM SERPL-SCNC: 4.6 MMOL/L (ref 3.4–5.3)
RBC # BLD AUTO: 3.9 10E12/L (ref 4.4–5.9)
SODIUM SERPL-SCNC: 141 MMOL/L (ref 133–144)
WBC # BLD AUTO: 5.3 10E9/L (ref 4–11)

## 2019-07-17 PROCEDURE — 99223 1ST HOSP IP/OBS HIGH 75: CPT | Mod: AI | Performed by: INTERNAL MEDICINE

## 2019-07-17 PROCEDURE — 27211192 ZZ H SHEATH CR14

## 2019-07-17 PROCEDURE — 80048 BASIC METABOLIC PNL TOTAL CA: CPT | Performed by: EMERGENCY MEDICINE

## 2019-07-17 PROCEDURE — 00000146 ZZHCL STATISTIC GLUCOSE BY METER IP

## 2019-07-17 PROCEDURE — 95813 EEG EXTND MNTR 61-119 MIN: CPT

## 2019-07-17 PROCEDURE — 36223 PLACE CATH CAROTID/INOM ART: CPT

## 2019-07-17 PROCEDURE — 27210887 ZZH ACCESSORY CR6

## 2019-07-17 PROCEDURE — 27210886 ZZH ACCESSORY CR5

## 2019-07-17 PROCEDURE — 85610 PROTHROMBIN TIME: CPT | Performed by: EMERGENCY MEDICINE

## 2019-07-17 PROCEDURE — C1769 GUIDE WIRE: HCPCS

## 2019-07-17 PROCEDURE — 99222 1ST HOSP IP/OBS MODERATE 55: CPT | Performed by: SURGERY

## 2019-07-17 PROCEDURE — 25000128 H RX IP 250 OP 636: Performed by: PSYCHIATRY & NEUROLOGY

## 2019-07-17 PROCEDURE — 25500064 ZZH RX 255 OP 636: Performed by: PSYCHIATRY & NEUROLOGY

## 2019-07-17 PROCEDURE — 85025 COMPLETE CBC W/AUTO DIFF WBC: CPT | Performed by: EMERGENCY MEDICINE

## 2019-07-17 PROCEDURE — 25000128 H RX IP 250 OP 636

## 2019-07-17 PROCEDURE — 25000125 ZZHC RX 250: Performed by: EMERGENCY MEDICINE

## 2019-07-17 PROCEDURE — 12000000 ZZH R&B MED SURG/OB

## 2019-07-17 PROCEDURE — 70498 CT ANGIOGRAPHY NECK: CPT

## 2019-07-17 PROCEDURE — 25000128 H RX IP 250 OP 636: Performed by: INTERNAL MEDICINE

## 2019-07-17 PROCEDURE — 25500064 ZZH RX 255 OP 636: Performed by: EMERGENCY MEDICINE

## 2019-07-17 PROCEDURE — B31Q1ZZ FLUOROSCOPY OF CERVICO-CEREBRAL ARCH USING LOW OSMOLAR CONTRAST: ICD-10-PCS | Performed by: PSYCHIATRY & NEUROLOGY

## 2019-07-17 PROCEDURE — 25000125 ZZHC RX 250

## 2019-07-17 PROCEDURE — 0042T CT HEAD PERFUSION WITH CONTRAST: CPT

## 2019-07-17 PROCEDURE — 40000061 ZZH STATISTIC EEG TIME EA 10 MIN

## 2019-07-17 PROCEDURE — 25800030 ZZH RX IP 258 OP 636: Performed by: PSYCHIATRY & NEUROLOGY

## 2019-07-17 PROCEDURE — 25800030 ZZH RX IP 258 OP 636: Performed by: INTERNAL MEDICINE

## 2019-07-17 PROCEDURE — 93005 ELECTROCARDIOGRAM TRACING: CPT

## 2019-07-17 PROCEDURE — 27210732 ZZH ACCESSORY CR1

## 2019-07-17 PROCEDURE — 27210738 ZZH ACCESSORY CR2

## 2019-07-17 PROCEDURE — 99285 EMERGENCY DEPT VISIT HI MDM: CPT | Mod: 25

## 2019-07-17 PROCEDURE — 70450 CT HEAD/BRAIN W/O DYE: CPT

## 2019-07-17 PROCEDURE — C1760 CLOSURE DEV, VASC: HCPCS

## 2019-07-17 PROCEDURE — 70551 MRI BRAIN STEM W/O DYE: CPT

## 2019-07-17 PROCEDURE — 85730 THROMBOPLASTIN TIME PARTIAL: CPT | Performed by: EMERGENCY MEDICINE

## 2019-07-17 PROCEDURE — 93010 ELECTROCARDIOGRAM REPORT: CPT | Performed by: INTERNAL MEDICINE

## 2019-07-17 PROCEDURE — 96374 THER/PROPH/DIAG INJ IV PUSH: CPT

## 2019-07-17 PROCEDURE — 99223 1ST HOSP IP/OBS HIGH 75: CPT | Mod: GC | Performed by: PSYCHIATRY & NEUROLOGY

## 2019-07-17 PROCEDURE — 96375 TX/PRO/DX INJ NEW DRUG ADDON: CPT

## 2019-07-17 PROCEDURE — 92523 SPEECH SOUND LANG COMPREHEN: CPT | Mod: GN | Performed by: SPEECH-LANGUAGE PATHOLOGIST

## 2019-07-17 PROCEDURE — 27210742 ZZH CATH CR1

## 2019-07-17 RX ORDER — SODIUM CHLORIDE 9 MG/ML
INJECTION, SOLUTION INTRAVENOUS CONTINUOUS
Status: DISCONTINUED | OUTPATIENT
Start: 2019-07-17 | End: 2019-07-17

## 2019-07-17 RX ORDER — SODIUM CHLORIDE 9 MG/ML
INJECTION, SOLUTION INTRAVENOUS CONTINUOUS
Status: DISCONTINUED | OUTPATIENT
Start: 2019-07-17 | End: 2019-07-20

## 2019-07-17 RX ORDER — CALCIUM CARBONATE 500 MG/1
1000 TABLET, CHEWABLE ORAL 4 TIMES DAILY PRN
Status: DISCONTINUED | OUTPATIENT
Start: 2019-07-17 | End: 2019-07-20 | Stop reason: HOSPADM

## 2019-07-17 RX ORDER — ONDANSETRON 4 MG/1
4 TABLET, ORALLY DISINTEGRATING ORAL EVERY 6 HOURS PRN
Status: DISCONTINUED | OUTPATIENT
Start: 2019-07-17 | End: 2019-07-20 | Stop reason: HOSPADM

## 2019-07-17 RX ORDER — POTASSIUM CHLORIDE 7.45 MG/ML
10 INJECTION INTRAVENOUS
Status: DISCONTINUED | OUTPATIENT
Start: 2019-07-17 | End: 2019-07-20 | Stop reason: HOSPADM

## 2019-07-17 RX ORDER — POTASSIUM CL/LIDO/0.9 % NACL 10MEQ/0.1L
10 INTRAVENOUS SOLUTION, PIGGYBACK (ML) INTRAVENOUS
Status: DISCONTINUED | OUTPATIENT
Start: 2019-07-17 | End: 2019-07-20 | Stop reason: HOSPADM

## 2019-07-17 RX ORDER — ASPIRIN 300 MG/1
300 SUPPOSITORY RECTAL DAILY
Status: DISCONTINUED | OUTPATIENT
Start: 2019-07-18 | End: 2019-07-20

## 2019-07-17 RX ORDER — LABETALOL 20 MG/4 ML (5 MG/ML) INTRAVENOUS SYRINGE
10-40 EVERY 10 MIN PRN
Status: DISCONTINUED | OUTPATIENT
Start: 2019-07-17 | End: 2019-07-20 | Stop reason: HOSPADM

## 2019-07-17 RX ORDER — MAGNESIUM SULFATE HEPTAHYDRATE 40 MG/ML
4 INJECTION, SOLUTION INTRAVENOUS EVERY 4 HOURS PRN
Status: DISCONTINUED | OUTPATIENT
Start: 2019-07-17 | End: 2019-07-20 | Stop reason: HOSPADM

## 2019-07-17 RX ORDER — FLUMAZENIL 0.1 MG/ML
0.2 INJECTION, SOLUTION INTRAVENOUS
Status: DISCONTINUED | OUTPATIENT
Start: 2019-07-17 | End: 2019-07-17 | Stop reason: HOSPADM

## 2019-07-17 RX ORDER — ACETAMINOPHEN 325 MG/1
650 TABLET ORAL EVERY 4 HOURS PRN
Status: DISCONTINUED | OUTPATIENT
Start: 2019-07-17 | End: 2019-07-17

## 2019-07-17 RX ORDER — AMOXICILLIN 250 MG
1 CAPSULE ORAL 2 TIMES DAILY PRN
Status: DISCONTINUED | OUTPATIENT
Start: 2019-07-17 | End: 2019-07-20 | Stop reason: HOSPADM

## 2019-07-17 RX ORDER — ONDANSETRON 2 MG/ML
4 INJECTION INTRAMUSCULAR; INTRAVENOUS EVERY 6 HOURS PRN
Status: DISCONTINUED | OUTPATIENT
Start: 2019-07-17 | End: 2019-07-20 | Stop reason: HOSPADM

## 2019-07-17 RX ORDER — LEVETIRACETAM 10 MG/ML
1000 INJECTION INTRAVASCULAR ONCE
Status: COMPLETED | OUTPATIENT
Start: 2019-07-17 | End: 2019-07-17

## 2019-07-17 RX ORDER — HEPARIN SODIUM 1000 [USP'U]/ML
500-6000 INJECTION, SOLUTION INTRAVENOUS; SUBCUTANEOUS
Status: DISCONTINUED | OUTPATIENT
Start: 2019-07-17 | End: 2019-07-17 | Stop reason: HOSPADM

## 2019-07-17 RX ORDER — HEPARIN SODIUM 200 [USP'U]/100ML
INJECTION, SOLUTION INTRAVENOUS
Status: DISCONTINUED
Start: 2019-07-17 | End: 2019-07-17 | Stop reason: HOSPADM

## 2019-07-17 RX ORDER — NALOXONE HYDROCHLORIDE 0.4 MG/ML
.1-.4 INJECTION, SOLUTION INTRAMUSCULAR; INTRAVENOUS; SUBCUTANEOUS
Status: DISCONTINUED | OUTPATIENT
Start: 2019-07-17 | End: 2019-07-17 | Stop reason: HOSPADM

## 2019-07-17 RX ORDER — NICOTINE POLACRILEX 4 MG
15-30 LOZENGE BUCCAL
Status: DISCONTINUED | OUTPATIENT
Start: 2019-07-17 | End: 2019-07-20 | Stop reason: HOSPADM

## 2019-07-17 RX ORDER — DEXTROSE MONOHYDRATE 25 G/50ML
25-50 INJECTION, SOLUTION INTRAVENOUS
Status: DISCONTINUED | OUTPATIENT
Start: 2019-07-17 | End: 2019-07-20 | Stop reason: HOSPADM

## 2019-07-17 RX ORDER — SODIUM PHOSPHATE,MONO-DIBASIC 19G-7G/118
1 ENEMA (ML) RECTAL 2 TIMES DAILY
COMMUNITY

## 2019-07-17 RX ORDER — HYDRALAZINE HYDROCHLORIDE 20 MG/ML
10-20 INJECTION INTRAMUSCULAR; INTRAVENOUS
Status: DISCONTINUED | OUTPATIENT
Start: 2019-07-17 | End: 2019-07-20 | Stop reason: HOSPADM

## 2019-07-17 RX ORDER — DIPHENHYDRAMINE HYDROCHLORIDE 50 MG/ML
INJECTION INTRAMUSCULAR; INTRAVENOUS
Status: COMPLETED
Start: 2019-07-17 | End: 2019-07-17

## 2019-07-17 RX ORDER — LIDOCAINE 40 MG/G
CREAM TOPICAL
Status: DISCONTINUED | OUTPATIENT
Start: 2019-07-17 | End: 2019-07-20 | Stop reason: HOSPADM

## 2019-07-17 RX ORDER — BISACODYL 10 MG
10 SUPPOSITORY, RECTAL RECTAL DAILY PRN
Status: DISCONTINUED | OUTPATIENT
Start: 2019-07-17 | End: 2019-07-20 | Stop reason: HOSPADM

## 2019-07-17 RX ORDER — LEVETIRACETAM 5 MG/ML
500 INJECTION INTRAVASCULAR EVERY 12 HOURS
Status: DISCONTINUED | OUTPATIENT
Start: 2019-07-18 | End: 2019-07-17

## 2019-07-17 RX ORDER — POTASSIUM CHLORIDE 1.5 G/1.58G
20-40 POWDER, FOR SOLUTION ORAL
Status: DISCONTINUED | OUTPATIENT
Start: 2019-07-17 | End: 2019-07-20 | Stop reason: HOSPADM

## 2019-07-17 RX ORDER — ACETAMINOPHEN 500 MG
500 TABLET ORAL EVERY 6 HOURS PRN
Status: DISCONTINUED | OUTPATIENT
Start: 2019-07-17 | End: 2019-07-20 | Stop reason: HOSPADM

## 2019-07-17 RX ORDER — DIPHENHYDRAMINE HYDROCHLORIDE 50 MG/ML
50 INJECTION INTRAMUSCULAR; INTRAVENOUS ONCE
Status: COMPLETED | OUTPATIENT
Start: 2019-07-17 | End: 2019-07-17

## 2019-07-17 RX ORDER — IOPAMIDOL 612 MG/ML
75 INJECTION, SOLUTION INTRAVASCULAR ONCE
Status: COMPLETED | OUTPATIENT
Start: 2019-07-17 | End: 2019-07-17

## 2019-07-17 RX ORDER — AMOXICILLIN 250 MG
2 CAPSULE ORAL 2 TIMES DAILY PRN
Status: DISCONTINUED | OUTPATIENT
Start: 2019-07-17 | End: 2019-07-20 | Stop reason: HOSPADM

## 2019-07-17 RX ORDER — PROTAMINE SULFATE 10 MG/ML
10 INJECTION, SOLUTION INTRAVENOUS ONCE
Status: COMPLETED | OUTPATIENT
Start: 2019-07-17 | End: 2019-07-17

## 2019-07-17 RX ORDER — POTASSIUM CHLORIDE 29.8 MG/ML
20 INJECTION INTRAVENOUS
Status: DISCONTINUED | OUTPATIENT
Start: 2019-07-17 | End: 2019-07-20 | Stop reason: HOSPADM

## 2019-07-17 RX ORDER — LANOLIN ALCOHOL/MO/W.PET/CERES
3 CREAM (GRAM) TOPICAL
Status: DISCONTINUED | OUTPATIENT
Start: 2019-07-17 | End: 2019-07-20 | Stop reason: HOSPADM

## 2019-07-17 RX ORDER — POTASSIUM CHLORIDE 1500 MG/1
20-40 TABLET, EXTENDED RELEASE ORAL
Status: DISCONTINUED | OUTPATIENT
Start: 2019-07-17 | End: 2019-07-20 | Stop reason: HOSPADM

## 2019-07-17 RX ORDER — LIDOCAINE HYDROCHLORIDE 10 MG/ML
INJECTION, SOLUTION INFILTRATION; PERINEURAL
Status: DISCONTINUED
Start: 2019-07-17 | End: 2019-07-17 | Stop reason: HOSPADM

## 2019-07-17 RX ORDER — CLOPIDOGREL BISULFATE 75 MG/1
75 TABLET ORAL DAILY
Status: DISCONTINUED | OUTPATIENT
Start: 2019-07-18 | End: 2019-07-20 | Stop reason: HOSPADM

## 2019-07-17 RX ORDER — FENTANYL CITRATE 50 UG/ML
INJECTION, SOLUTION INTRAMUSCULAR; INTRAVENOUS
Status: DISCONTINUED
Start: 2019-07-17 | End: 2019-07-17 | Stop reason: HOSPADM

## 2019-07-17 RX ORDER — ACETAMINOPHEN 650 MG/1
650 SUPPOSITORY RECTAL EVERY 4 HOURS PRN
Status: DISCONTINUED | OUTPATIENT
Start: 2019-07-17 | End: 2019-07-17

## 2019-07-17 RX ORDER — HEPARIN SODIUM 200 [USP'U]/100ML
500 INJECTION, SOLUTION INTRAVENOUS CONTINUOUS PRN
Status: DISCONTINUED | OUTPATIENT
Start: 2019-07-17 | End: 2019-07-17 | Stop reason: HOSPADM

## 2019-07-17 RX ORDER — ASPIRIN 325 MG
325 TABLET ORAL DAILY
Status: DISCONTINUED | OUTPATIENT
Start: 2019-07-18 | End: 2019-07-20

## 2019-07-17 RX ORDER — NALOXONE HYDROCHLORIDE 0.4 MG/ML
.1-.4 INJECTION, SOLUTION INTRAMUSCULAR; INTRAVENOUS; SUBCUTANEOUS
Status: DISCONTINUED | OUTPATIENT
Start: 2019-07-17 | End: 2019-07-20 | Stop reason: HOSPADM

## 2019-07-17 RX ORDER — FENTANYL CITRATE 50 UG/ML
25-50 INJECTION, SOLUTION INTRAMUSCULAR; INTRAVENOUS EVERY 5 MIN PRN
Status: DISCONTINUED | OUTPATIENT
Start: 2019-07-17 | End: 2019-07-17 | Stop reason: HOSPADM

## 2019-07-17 RX ORDER — POLYETHYLENE GLYCOL 3350 17 G/17G
17 POWDER, FOR SOLUTION ORAL DAILY PRN
Status: DISCONTINUED | OUTPATIENT
Start: 2019-07-17 | End: 2019-07-20 | Stop reason: HOSPADM

## 2019-07-17 RX ORDER — HEPARIN SODIUM 1000 [USP'U]/ML
INJECTION, SOLUTION INTRAVENOUS; SUBCUTANEOUS
Status: DISCONTINUED
Start: 2019-07-17 | End: 2019-07-17 | Stop reason: HOSPADM

## 2019-07-17 RX ORDER — PROTAMINE SULFATE 10 MG/ML
INJECTION, SOLUTION INTRAVENOUS
Status: COMPLETED
Start: 2019-07-17 | End: 2019-07-17

## 2019-07-17 RX ADMIN — DIPHENHYDRAMINE HYDROCHLORIDE 50 MG: 50 INJECTION, SOLUTION INTRAMUSCULAR; INTRAVENOUS at 13:16

## 2019-07-17 RX ADMIN — DIPHENHYDRAMINE HYDROCHLORIDE 50 MG: 50 INJECTION INTRAMUSCULAR; INTRAVENOUS at 13:16

## 2019-07-17 RX ADMIN — HYDRALAZINE HYDROCHLORIDE 20 MG: 20 INJECTION INTRAMUSCULAR; INTRAVENOUS at 19:08

## 2019-07-17 RX ADMIN — PROTAMINE SULFATE 10 MG: 10 INJECTION, SOLUTION INTRAVENOUS at 13:42

## 2019-07-17 RX ADMIN — MIDAZOLAM HYDROCHLORIDE 0.5 MG: 1 INJECTION, SOLUTION INTRAMUSCULAR; INTRAVENOUS at 13:15

## 2019-07-17 RX ADMIN — IOPAMIDOL 48 ML: 612 INJECTION, SOLUTION INTRAVENOUS at 13:45

## 2019-07-17 RX ADMIN — IOHEXOL 120 ML: 350 INJECTION, SOLUTION INTRAVENOUS at 12:39

## 2019-07-17 RX ADMIN — SODIUM CHLORIDE: 9 INJECTION, SOLUTION INTRAVENOUS at 17:59

## 2019-07-17 RX ADMIN — HEPARIN SODIUM 10000 UNITS: 10000 INJECTION INTRAVENOUS; SUBCUTANEOUS at 14:02

## 2019-07-17 RX ADMIN — LEVETIRACETAM 1000 MG: 10 INJECTION INTRAVENOUS at 18:01

## 2019-07-17 RX ADMIN — HEPARIN SODIUM 10000 UNITS: 10000 INJECTION INTRAVENOUS; SUBCUTANEOUS at 14:01

## 2019-07-17 RX ADMIN — LIDOCAINE HYDROCHLORIDE 10 ML: 10 INJECTION, SOLUTION INFILTRATION; PERINEURAL at 14:00

## 2019-07-17 RX ADMIN — SODIUM CHLORIDE 100 ML: 9 INJECTION, SOLUTION INTRAVENOUS at 12:41

## 2019-07-17 RX ADMIN — FENTANYL CITRATE 25 MCG: 50 INJECTION INTRAMUSCULAR; INTRAVENOUS at 13:26

## 2019-07-17 RX ADMIN — FENTANYL CITRATE 25 MCG: 50 INJECTION, SOLUTION INTRAMUSCULAR; INTRAVENOUS at 13:26

## 2019-07-17 ASSESSMENT — ENCOUNTER SYMPTOMS
COUGH: 1
FEVER: 0
FACIAL ASYMMETRY: 1
SPEECH DIFFICULTY: 1
WEAKNESS: 1

## 2019-07-17 ASSESSMENT — ACTIVITIES OF DAILY LIVING (ADL)
ADLS_ACUITY_SCORE: 19
ADLS_ACUITY_SCORE: 19

## 2019-07-17 NOTE — IR NOTE
Interventional Radiology Intra-procedural Nursing Note     Patient Name:  Tk Richmond    Medical Record Number: [id number]  Today's Date:  July 17, 2019  Procedure: Cerebral Angiogram  Start Time: 1315  End of procedure time: 1342  Report given to: TRACE Rice station 77  Time pt departs:  1405       Notes:      Patient brought into IR room # 3 at 1409.      Informed consent obtained by Marlee Padilla MD.      Timeout at 1320       Allergies   Allergen Reactions     Contrast Dye Rash       Labs reviewed:  Results for orders placed or performed during the hospital encounter of 07/17/19 (from the past 24 hour(s))   Basic metabolic panel   Result Value Ref Range    Sodium 141 133 - 144 mmol/L    Potassium 4.6 3.4 - 5.3 mmol/L    Chloride 109 94 - 109 mmol/L    Carbon Dioxide 26 20 - 32 mmol/L    Anion Gap 6 3 - 14 mmol/L    Glucose 164 (H) 70 - 99 mg/dL    Urea Nitrogen 24 7 - 30 mg/dL    Creatinine 1.39 (H) 0.66 - 1.25 mg/dL    GFR Estimate 47 (L) >60 mL/min/[1.73_m2]    GFR Estimate If Black 54 (L) >60 mL/min/[1.73_m2]    Calcium 8.8 8.5 - 10.1 mg/dL   CBC with platelets differential   Result Value Ref Range    WBC 5.3 4.0 - 11.0 10e9/L    RBC Count 3.90 (L) 4.4 - 5.9 10e12/L    Hemoglobin 12.1 (L) 13.3 - 17.7 g/dL    Hematocrit 37.3 (L) 40.0 - 53.0 %    MCV 96 78 - 100 fl    MCH 31.0 26.5 - 33.0 pg    MCHC 32.4 31.5 - 36.5 g/dL    RDW 16.5 (H) 10.0 - 15.0 %    Platelet Count 199 150 - 450 10e9/L    Diff Method Automated Method     % Neutrophils 63.6 %    % Lymphocytes 25.1 %    % Monocytes 6.7 %    % Eosinophils 3.4 %    % Basophils 0.4 %    % Immature Granulocytes 0.8 %    Nucleated RBCs 0 0 /100    Absolute Neutrophil 3.4 1.6 - 8.3 10e9/L    Absolute Lymphocytes 1.3 0.8 - 5.3 10e9/L    Absolute Monocytes 0.4 0.0 - 1.3 10e9/L    Absolute Eosinophils 0.2 0.0 - 0.7 10e9/L    Absolute Basophils 0.0 0.0 - 0.2 10e9/L    Abs Immature Granulocytes 0.0 0 - 0.4 10e9/L    Absolute Nucleated RBC 0.0    INR   Result Value Ref  Range    INR 1.06 0.86 - 1.14   Partial thromboplastin time   Result Value Ref Range    PTT 27 22 - 37 sec   CT Head w/o Contrast    Narrative    CT SCAN OF THE HEAD WITHOUT CONTRAST July 17, 2019 12:37 PM     HISTORY: Code stroke. Right facial droop and right arm weakness and  dysphagia since 11:30 AM. Discharged from [facility]  five days ago for an acute stroke that affected the speech.    TECHNIQUE: Axial images of the head and coronal reformations without  IV contrast material. Radiation dose for this scan was reduced using  automated exposure control, adjustment of the mA and/or kV according  to patient size, or iterative reconstruction technique.    COMPARISON: MRI 7/13/2019. CT scan 2/18/2012.    FINDINGS: There is generalized atrophy of the brain. There is low  attenuation in the white matter of the cerebral hemispheres consistent  with sequelae of small vessel ischemic disease. Areas of  encephalomalacia are again seen in the central and subcortical white  matter of the left frontal and parietal lobes and the left occipital  lobe, unchanged. The recent infarct in the left frontal lobe operculum  is not visible. There is no evidence of intracranial hemorrhage, mass,  acute infarct or anomaly. Calcification is seen in the left sylvian  fissure presumably in a branch of left middle cerebral artery, but  this is unchanged since the CT scan at 2/18/2012.    The visualized portions of the sinuses and mastoids appear normal.  There is no evidence of trauma.      Impression    IMPRESSION:   1. No acute infarct is visible.  2.  Atrophy of the brain. White matter changes consistent with  sequelae of small vessel ischemic disease. This is unchanged.  3. Encephalomalacia in the left frontal, parietal and occipital lobes  in a predominantly parasagittal distribution.  4. Old calcification in a branch of left middle cerebral artery in the  sylvian fissure, unchanged.     TRAVIS MILLER MD   CTA Head Neck  with Contrast    Narrative    CT ANGIOGRAM OF THE HEAD AND NECK WITHOUT AND WITH CONTRAST  7/17/2019  12:49 PM     HISTORY: Code stroke. At 11:30 AM patient developed right facial droop  and right arm weakness. On Plavix for a recent infarct in the left  frontal lobe operculum.    TECHNIQUE:  Precontrast localizing scans were followed by CT  angiography with an injection of 70 mL Omnipaque-350 (accession  [id number]), 50 mL Omnipaque-350 (accession [id number]) IV with scans  through the head and neck.  3D post processing was performed, images  were archived to PACS and used in interpretation of this study.   Estimates of carotid stenoses are made relative to the distal internal  carotid artery diameters except as noted. Radiation dose for this scan  was reduced using automated exposure control, adjustment of the mA  and/or kV according to patient size, or iterative reconstruction  technique.  Perfusion scans were performed at three levels with  injection of an additional 40 mL IV nonionic contrast and 20 mL saline  flush.  These images were processed on a separate 3-D workstation.    COMPARISON: MRI brain and MR angiogram on 7/13/2019.    CT HEAD FINDINGS:  No contrast enhancing lesions.   Perfusion CT scan  of the brain shows slight decrease in cerebral blood volume in the  central white matter of the left cerebral hemisphere and generalized  mild decrease in time to drain in the left middle cerebral artery  territory without corresponding decreased cerebral blood volume. This  suggests ischemia in the distribution of the left middle cerebral  artery superior M2 division. There is also linear focal decreased time  to drain in the region of the previous infarct in the left frontal  lobe operculum as expected.    CT ANGIOGRAM HEAD FINDINGS:  As noted previously there is focal  stenosis of the left middle cerebral artery superior M2 division. This  corresponds with the calcifications seen on the noncontrast CT  scan.  However the arteries are patent above this calcification. No new  arterial occlusion is seen. No aneurysm is present. There is fetal  origin of the right posterior cerebral artery from the internal  carotid, a normal variant. Venous circulation is unremarkable.     CT ANGIOGRAM NECK FINDINGS:   Right carotid artery:  Extensive calcified atherosclerotic plaque in  the distal common carotid and proximal internal carotid arteries.  Residual luminal diameter is 2.3 mm compared with distal internal  carotid diameter of 4.9 mm, indicating 53% diameter stenosis by NASCET  criteria. Tortuous distal cervical right internal carotid artery and  tortuous right common carotid proximally.      Left carotid artery:  Left common carotid arises from the innominate  artery as a normal variant. Tortuous left common carotid artery  extends behind the hypopharynx on the left. Calcified atherosclerotic  plaque causes narrowing of the proximal internal carotid with residual  luminal diameter of 1.8 mm compared to distal internal carotid  diameter of 3.9 mm, indicating 54% diameter stenosis by NASCET  criteria.      Vertebral arteries:  Multiple mild to moderate  stenoses are seen in  the proximal right vertebral artery, likely from atherosclerosis. Left  vertebral artery appears widely patent as does the basilar.      Other findings: None.      Impression    IMPRESSION:   1. Perfusion CT scan suggests ischemia in the left middle cerebral  artery superior M2 division territory, likely caused by stenosis at  the point of calcification seen on the CT scan. Presumably this is an  old embolized plaque and unchanged since 2012.  2. Perfusion CT scan also shows persistent ischemia in the region of  the linear infarct in the left frontal lobe operculum.  3. No evidence of intracranial arterial occlusion.  4. Right internal carotid artery atherosclerotic plaque causes 53%  diameter stenosis by NASCET criteria.  5. Left internal carotid  artery atherosclerotic plaque causes 54%  diameter stenosis by NASCET criteria.    I called the report to Dr. Chad East in the emergency room on  7/17/2019 at 1:10 PM.    TRAVIS MILLER MD   CT Head Perfusion w Contrast    Narrative    CT ANGIOGRAM OF THE HEAD AND NECK WITHOUT AND WITH CONTRAST  7/17/2019  12:49 PM     HISTORY: Code stroke. At 11:30 AM patient developed right facial droop  and right arm weakness. On Plavix for a recent infarct in the left  frontal lobe operculum.    TECHNIQUE:  Precontrast localizing scans were followed by CT  angiography with an injection of 70 mL Omnipaque-350 (accession  [id number]), 50 mL Omnipaque-350 (accession [id number]) IV with scans  through the head and neck.  3D post processing was performed, images  were archived to PACS and used in interpretation of this study.   Estimates of carotid stenoses are made relative to the distal internal  carotid artery diameters except as noted. Radiation dose for this scan  was reduced using automated exposure control, adjustment of the mA  and/or kV according to patient size, or iterative reconstruction  technique.  Perfusion scans were performed at three levels with  injection of an additional 40 mL IV nonionic contrast and 20 mL saline  flush.  These images were processed on a separate 3-D workstation.    COMPARISON: MRI brain and MR angiogram on 7/13/2019.    CT HEAD FINDINGS:  No contrast enhancing lesions.   Perfusion CT scan  of the brain shows slight decrease in cerebral blood volume in the  central white matter of the left cerebral hemisphere and generalized  mild decrease in time to drain in the left middle cerebral artery  territory without corresponding decreased cerebral blood volume. This  suggests ischemia in the distribution of the left middle cerebral  artery superior M2 division. There is also linear focal decreased time  to drain in the region of the previous infarct in the left frontal  lobe operculum as  expected.    CT ANGIOGRAM HEAD FINDINGS:  As noted previously there is focal  stenosis of the left middle cerebral artery superior M2 division. This  corresponds with the calcifications seen on the noncontrast CT scan.  However the arteries are patent above this calcification. No new  arterial occlusion is seen. No aneurysm is present. There is fetal  origin of the right posterior cerebral artery from the internal  carotid, a normal variant. Venous circulation is unremarkable.     CT ANGIOGRAM NECK FINDINGS:   Right carotid artery:  Extensive calcified atherosclerotic plaque in  the distal common carotid and proximal internal carotid arteries.  Residual luminal diameter is 2.3 mm compared with distal internal  carotid diameter of 4.9 mm, indicating 53% diameter stenosis by NASCET  criteria. Tortuous distal cervical right internal carotid artery and  tortuous right common carotid proximally.      Left carotid artery:  Left common carotid arises from the innominate  artery as a normal variant. Tortuous left common carotid artery  extends behind the hypopharynx on the left. Calcified atherosclerotic  plaque causes narrowing of the proximal internal carotid with residual  luminal diameter of 1.8 mm compared to distal internal carotid  diameter of 3.9 mm, indicating 54% diameter stenosis by NASCET  criteria.      Vertebral arteries:  Multiple mild to moderate  stenoses are seen in  the proximal right vertebral artery, likely from atherosclerosis. Left  vertebral artery appears widely patent as does the basilar.      Other findings: None.      Impression    IMPRESSION:   1. Perfusion CT scan suggests ischemia in the left middle cerebral  artery superior M2 division territory, likely caused by stenosis at  the point of calcification seen on the CT scan. Presumably this is an  old embolized plaque and unchanged since 2012.  2. Perfusion CT scan also shows persistent ischemia in the region of  the linear infarct in the left  frontal lobe operculum.  3. No evidence of intracranial arterial occlusion.  4. Right internal carotid artery atherosclerotic plaque causes 53%  diameter stenosis by NASCET criteria.  5. Left internal carotid artery atherosclerotic plaque causes 54%  diameter stenosis by NASCET criteria.    I called the report to Dr. Chad East in the emergency room on  7/17/2019 at 1:10 PM.    TRAVIS MILLER MD       Neuro assessment completed and charted in flowsheet.    Peripheral pulses checked and documented in Epic Flowsheet.     Patient prepped with 2% Chlorhexidine and draped in supine position.  VSS.  Monitor reads NSR with rate in the 70's.      MD first needle stick time was 1323. A total of 10 ml of 1% lidocaine was used locally at the puncture site.    A 6Fr arterial sheath was placed at 1323    Debrief was completed by Randy Whalen MD.       Patient received a total of 0.5 mg Versed and 25 mcg fentanyl for sedation during the procedure. The last dose was given at.      Additional medications given:    Benadryl 50mg IVP  Heparin 5000 units IVP  Protamine 10mg IVP    The patient tolerated the procedure well.     Unable to assess neuro status during procedure due to sedation. Neuro assessment remained unchanged at procedure end.     A 6Fr arterial sheath was removed at 1340 and a 6f angioseal was placed to Right femoral artery.  The site was dressed with gauze and tegaderm, C/D/I at procedure end without hematoma.     Pt. Transported to station 77 with RN, monitored.

## 2019-07-17 NOTE — ED NOTES
Bed: ST03  Expected date:   Expected time:   Means of arrival:   Comments:  594  83 M R sided facial droop/weakness,unable to speak  1217

## 2019-07-17 NOTE — PHARMACY-ADMISSION MEDICATION HISTORY
Admission medication history interview status for the 7/17/2019  admission is complete. See EPIC admission navigator for prior to admission medications     Medication history source reliability:Good    Actions taken by pharmacist (provider contacted, etc): Recent discharge from [facility] - also spoke to patient's wife about what doses he took today PTA.     Additional medication history information not noted on PTA med list :     NOTE: patient did not yet start aspirin since being discharged from previous hospital stay- only start plavix 300 mg today    Wife will bring in Simbrinza eye drops for patient own use    Medication reconciliation/reorder completed by provider prior to medication history? Yes    Time spent in this activity: 20 minutes    Prior to Admission medications    Medication Sig Last Dose Taking? Auth Provider   aspirin (ASA) 325 MG tablet Take 1 tablet (325 mg) by mouth daily  Yes Garo Rossi MD   aspirin (ASA) 81 MG EC tablet Take 1 tablet (81 mg) by mouth daily for 21 days  Yes Garo Rossi MD   brinzolamide-brimonidine (SIMBRINZA) 1-0.2 % ophthalmic suspension Place 1 drop into both eyes 2 times daily  7/17/2019 at am Yes Reported, Patient   Calcium Carbonate-Vitamin D (CALCIUM 600+D PO) Take 1 tablet by mouth 2 times daily At noon and supper Past Week at Unknown time Yes Reported, Patient   citalopram (CELEXA) 40 MG tablet Take 40 mg by mouth daily 7/17/2019 at am Yes Reported, Patient   clopidogrel (PLAVIX) 300 MG TABS tablet Take 1 tablet (300 mg) by mouth daily for 21 doses 7/17/2019 at am Yes Garo Rossi MD   glucosamine-chondroitin 500-400 MG CAPS per capsule Take 1 capsule by mouth 2 times daily At noon and supper Past Week at Unknown time Yes Unknown, Entered By History   lisinopril (PRINIVIL/ZESTRIL) 10 MG tablet Take 1 tablet (10 mg) by mouth daily 7/17/2019 at am Yes Garo Rossi MD   lovastatin (MEVACOR) 40 MG tablet Take 40 mg by mouth At Bedtime  7/16/2019 at pm Yes Unknown, Entered By History   Multiple Vitamins-Minerals (CENTRUM SILVER ADULT 50+ PO) Take 1 tablet by mouth daily  Past Week at Unknown time Yes Reported, Patient   Multiple Vitamins-Minerals (PRESERVISION AREDS 2+MULTI VIT PO) Take 1 tablet by mouth 2 times daily  Past Week at Unknown time Yes Reported, Patient   vitamin B-12 (CYANOCOBALAMIN) 1000 MCG tablet Take 1,000 mcg by mouth daily  Past Week at Unknown time Yes Reported, Patient     Kerry Colon,  PharmD  [phone]

## 2019-07-17 NOTE — PROCEDURES
Procedure Date: 2019      ONE-HOUR ELECTROENCEPHALOGRAM WITH VIDEO       EEG # [facility]        TYPE OF STUDY:  One-hour, no video      DATE OF RECORDIN2019      SOURCE FILE DURATION:  1 hour and 0 minutes.      CLINICAL HISTORY:  This patient is an 83-year-old male with history of left MCA stroke with right facial drooping and right arm weakness.  EEG was requested for evaluation for spells of altered mental status and seizures.      CURRENT MEDICATIONS:  Protamine, aspirin, Celexa, Plavix, lisinopril, lovastatin.      TECHNICAL SUMMARY: This EEG monitoring procedure was performed with 23 scalp electrodes in 10-20 electrode system placements, and additional scalp, precordial and other surface electrodes used for electrical referencing and artifact detection.     BACKGROUND ACTIVITIES:  The background activities of this EEG consisted of poorly formed 8-1/2 Hz posterior dominant rhythm over the right occipital region.  The background is asymmetric with mixed theta and delta slowing over the left hemisphere.  No well-defined posterior dominant rhythm was observed over the left hemisphere.  Hyperventilation and photic stimulation were not performed.  No sleep stages were recorded.      No interictal epileptiform activities were observed.      ICTAL ACTIVITIES:  No seizures were recorded.      IMPRESSION:  This EEG is abnormal due to the presence of mixed theta and delta slowing over the left hemisphere.  These findings are suggestive of structural abnormalities over the left hemisphere.  No epileptiform activities or seizures were recorded.         DANICA ARORA MD             D: 2019   T: 2019   MT: SONIYA      Name:     AVIS GOODE   MRN:      [id number]        Account:        [id number]   :      1935           Procedure Date: 2019      Document: [id number]

## 2019-07-17 NOTE — ED TRIAGE NOTES
Today while having lunch with wife at 1140 pt developed rt sided weakness and rt sided facial droop

## 2019-07-17 NOTE — ED PROVIDER NOTES
History     Chief Complaint:  Cerebrovascular Accident    The history is provided by the patient, the EMS personnel and the spouse.      Tk Richmond is a 83 year old male who presents with cerebrovascular accident. The patient was out to lunch with his wife today and while eating he started coughing and then the patient s wife noticed a right sided facial droop and right arm weakness at 1130. The patient was seen 5 day ago at [facility] for a stroke and was discharged 2 days ago in which the stroke effected his speech. The patient was on low dose Plavix  and was started back to full dose yesterday. EMS reports a blood sugar of 121.     Allergies:  Contrast Dye     Medications:    Aspirin (asa) 81 mg ec tablet  Citalopram (celexa) 40 mg tablet  Clopidogrel (plavix) 300 mg tabs tablet  Lisinopril (prinivil/zestril) 10 mg tablet  Lovastatin (mevacor) 40 mg tablet     Past Medical History:    Glaucoma  Hyperlipidemia  Hypertension  Stroke  Lower GI bleed  Ascending aortic aneurysm  Cerebral vascular accident    Past Surgical History:    History reviewed. No pertinent past surgical history.    Family History:    Cancer    Social History:  Patient is   Tobacco Use: Former smoker  Alcohol Use: Yes  PCP: [facility]     Review of Systems   Constitutional: Negative for fever.   Respiratory: Positive for cough.    Neurological: Positive for facial asymmetry, speech difficulty and weakness.   All other systems reviewed and are negative.    Physical Exam   First Vitals:  Patient Vitals for the past 24 hrs:   BP Temp Pulse Heart Rate Resp SpO2 Weight   07/17/19 1330 136/68 -- 72 72 13 100 % --   07/17/19 1325 136/70 -- 71 69 12 99 % --   07/17/19 1320 161/75 -- 71 74 10 99 % --   07/17/19 1310 164/79 -- 74 75 16 97 % --   07/17/19 1254 143/67 -- -- 81 15 97 % --   07/17/19 1226 147/69 -- 85 -- -- -- --   07/17/19 1225 156/73 97.8  F (36.6  C) 69 -- 20 97 % 79.9 kg (176 lb 3.2 oz)     Physical Exam  Vitals:  reviewed by me  General: Pt seen on \[facility], [facility], cooperative, and alert to conversation  Eyes: Tracking well, clear conjunctiva BL  ENT: MMM, midline trachea.   Lungs:   No tachypnea, no accessory muscle use. No respiratory distress.   CV: Rate as above, regular rhythm.    Abd: Soft, non tender, no guarding, no rebound. Non distended  MSK: no peripheral edema or joint effusion.  No evidence of trauma  Skin: No rash, normal turgor and temperature  Neuro: Slurred speech with right facial droop.  Right upper extremity with 2 out of 5 strength.  Left upper extremity and bilateral lower extremities with sensation intact light touch and 5 out of 5 motor throughout.  Psych: Not RIS, no e/o AH/VH      Emergency Department Course     Imaging:  Radiographic findings were communicated with the patient and family who voiced understanding of the findings.  CT head w/o contrast:  1. No acute infarct is visible.  2.  Atrophy of the brain. White matter changes consistent with  sequelae of small vessel ischemic disease. This is unchanged.  3. Encephalomalacia in the left frontal, parietal and occipital lobes  in a predominantly parasagittal distribution.  4. Old calcification in a branch of left middle cerebral artery in the  sylvian fissure, unchanged. Per radiology read.  CTA head neck with contrast:  1. Perfusion CT scan suggests ischemia in the left middle cerebral  artery superior M2 division territory, likely caused by stenosis at  the point of calcification seen on the CT scan. Presumably this is an  old embolized plaque and unchanged since 2012.  2. Perfusion CT scan also shows persistent ischemia in the region of  the linear infarct in the left frontal lobe operculum.  3. No evidence of intracranial arterial occlusion.  4. Right internal carotid artery atherosclerotic plaque causes 53%  diameter stenosis by NASCET criteria.  5. Left internal carotid artery atherosclerotic plaque causes 54%  diameter stenosis  by NASCET criteria. Per radiology read.  CT head perfusion w contrast:  1. Perfusion CT scan suggests ischemia in the left middle cerebral  artery superior M2 division territory, likely caused by stenosis at  the point of calcification seen on the CT scan. Presumably this is an  old embolized plaque and unchanged since 2012.  2. Perfusion CT scan also shows persistent ischemia in the region of  the linear infarct in the left frontal lobe operculum.  3. No evidence of intracranial arterial occlusion.  4. Right internal carotid artery atherosclerotic plaque causes 53%  diameter stenosis by NASCET criteria.  5. Left internal carotid artery atherosclerotic plaque causes 54%  diameter stenosis by NASCET criteria. Per radiology read.    Laboratory:  CBC:  WBC 5.3, HGB 12.1 low,   BMP: Glucose 164 high, Creatinine 1.39 high, GFR 47 low, o/w WNL.   INR: 1.06  PTT: 27    Interventions:  1239: Omnipaque 120mL IV  1315: Versed 0.5mg IV  1316: Benadryl 50mg IV  1326: Sublimaze 25mcg IV    Emergency Department Course:  12:17 PM Nursing notes and vitals reviewed.  I performed an exam of the patient as documented above.     12:17 PM The patient arrived in stabilization room 3 and I began my evaluation. The Neuro stroke team was here at the time of the patient's arrival.     1:05 PM Patient transferred to     Impression & Plan      CMS Diagnoses: The patient has stroke symptoms:        Medical Decision Making:  Tk Richmond is a unfortunate 83 year old male who presents to the emergency room with acute onset stroke like symptoms. He certainly has right sided facial droop as well as right arm weakness, and his CT angio does not show dramatic stenosis of his carotid artery based on my discussion with radiology.  However there is a certain amount of calcified plaque that may be causing emboli, and when compared with previous imaging done at [facility], our neurology team thought it best to pursue a formal angiography of the  left carotid artery.  Will defer to their expertise in this. Pt is not a TPA candidate.  Based on Dr. Whalen and the neurology team recommendation he will be transferred immediately to IR for treatment and further evaluation of his carotid stenosis. No indication to give heparin. The patient is already on very high dose plavix, and will be transferred expeditiously. The family is okay with the plan, no progression of deficits here while in the ER, patient will be monitored very carefully until transfer available.         Diagnosis:    ICD-10-CM    1. Stenosis of carotid artery, unspecified laterality I65.29 Basic metabolic panel     INR     Partial thromboplastin time   2. Facial droop R29.810    3. Arm weakness R29.898        Disposition:  Transferred to IR    I, Bradley Aasen, am serving as a scribe on 7/17/2019 at 12:24 PM to personally document services performed by Gregg East MD based on my observations and the provider's statements to me.          Gregg East MD  07/17/19 4790

## 2019-07-17 NOTE — CONSULTS
[facility]      Stroke Consult Note    Reason for Consult:  Stroke Code    Chief Complaint: Cerebrovascular Accident      History of Present Illness     Tk Richmond is a 83 year old male with a recent L MCA stroke on dual antiplatelet, hypertension and hyperlipidemia who presented as a stroke code with right-sided weakness, facial droop and dysarthria..     The patient was out to lunch with his wife today and while eating he started coughing and then the patient s wife noticed a right sided facial droop and right arm weakness at 1130. The patient was seen 5 day ago at [facility] for a stroke and was discharged 2 days ago in which the stroke affected his speech.     Upon arrival to ER, patient was awake, right-sided weakness and facial droop, left gaze preference, sensory aphasia and dysarthria.   NIHSS 12.    Patient appeared to have left MCA syndrome clinically.  CT head was negative for any acute abnormality.  CT angiogram head and neck showed moderate to severe stenosis in the left ICA.  No MCA stenosis.  CT perfusion showed perfusion mismatch in the left MCA territory.    Assessment & Plan     Impression  Right-sided weakness: Differentials include left MCA syndrome secondary to stroke versus seizure    TPA Treatment was Not given due to stroke within last 2 weeks, GI bleed in past 21 days. And eligible for emergent intervention.   Endovascular Treatment was not performed due to no large vessel occlusion or any critical stenosis.    Recommendations  - Loaded with 1500 mg Keppra, and will start him on 500 mg twice daily EEG  - Ordered EEG  - permissive HTN; labetalol PRN for SBP > 220  - Statin: continue home statin  -Continue aspirin 81 mg and Plavix 75 mg daily  - MRI Stroke Protocol  - Telemetry, EKG  - Bedside Glucose Monitoring  - PT/OT/SLP  - PM&R      Patient Follow-up    - final recommendation pending work-up    Please contact the Stroke Service with any questions.    Cristobal Pérez  MD  Fellow, Vascular Neurology  Pager:  [phone]    __________________________________________________    Past Medical History   Past Medical History:   Diagnosis Date     Glaucoma      Hyperlipidemia LDL goal < 100      Hypertension      Stroke (H)     2004, speech deficit       Past Surgical History   History reviewed. No pertinent surgical history.    Medications     Home Meds  Prior to Admission medications    Medication Sig Start Date End Date Taking? Authorizing Provider   aspirin (ASA) 325 MG tablet Take 1 tablet (325 mg) by mouth daily 8/6/19   Garo Rossi MD   aspirin (ASA) 81 MG EC tablet Take 1 tablet (81 mg) by mouth daily for 21 days 7/15/19 8/5/19  Garo Rossi MD   brinzolamide-brimonidine (SIMBRINZA) 1-0.2 % ophthalmic suspension Place 1 drop into both eyes 2 times daily     Reported, Patient   Calcium Carbonate-Vitamin D (CALCIUM 600+D PO) Take 1 tablet by mouth 2 times daily At noon and supper    Reported, Patient   citalopram (CELEXA) 40 MG tablet Take 40 mg by mouth daily    Reported, Patient   clopidogrel (PLAVIX) 300 MG TABS tablet Take 1 tablet (300 mg) by mouth daily for 21 doses 7/15/19 8/5/19  Garo Rossi MD   lisinopril (PRINIVIL/ZESTRIL) 10 MG tablet Take 1 tablet (10 mg) by mouth daily 7/16/19   Garo Rossi MD   lovastatin (MEVACOR) 40 MG tablet Take 40 mg by mouth At Bedtime    Unknown, Entered By History   Misc Natural Products (GLUCOSAMINE CHONDROITIN ADV PO) Take 1 capsule by mouth 2 times daily At noon and supper    Reported, Patient   Multiple Vitamins-Minerals (CENTRUM SILVER ADULT 50+ PO) Take 1 tablet by mouth daily     Reported, Patient   Multiple Vitamins-Minerals (PRESERVISION AREDS 2+MULTI VIT PO) Take 1 tablet by mouth 2 times daily     Reported, Patient   vitamin B-12 (CYANOCOBALAMIN) 1000 MCG tablet Take by mouth daily    Reported, Patient       Scheduled meds    iopamidol  75 mL Arterial Once       Infusion meds    sodium chloride  0.9 %       sodium chloride 0.9 %       heparin (PRESSURE BAG) 2 unit/mL in 0.9% NaCl       heparin 10,000 units in 1000 mL NS       heparin 10,000 units in 1000 mL NS       niCARdipine 40 mg in 200 mL 0.9% NaCl         PRN meds  sodium chloride 0.9 %, sodium chloride 0.9 %, fentaNYL, flumazenil, heparin (porcine), heparin (PRESSURE BAG) 2 unit/mL in 0.9% NaCl, heparin 10,000 units in 1000 mL NS, heparin 10,000 units in 1000 mL NS, lidocaine (buffered or not buffered), midazolam, naloxone    Allergies   Allergies   Allergen Reactions     Contrast Dye Rash       Family History   Family History   Problem Relation Age of Onset     Breast Cancer Mother      Diabetes No family hx of      Hypertension No family hx of      Cancer - colorectal No family hx of        Social History   Social History     Tobacco Use     Smoking status: Former Smoker     Years: 10.00     Types: Cigars     Last attempt to quit: 1972     Years since quittin.4     Smokeless tobacco: Never Used   Substance Use Topics     Alcohol use: Yes     Alcohol/week: 0.0 oz     Comment:  oxx     Drug use: No       Review of Systems   The 10 point Review of Systems is negative other than noted in the HPI or here.     Physical Exam     Temp:  [97.8  F (36.6  C)] 97.8  F (36.6  C)  Pulse:  [69-85] 72  Heart Rate:  [69-81] 72  Resp:  [10-20] 13  BP: (136-164)/(67-79) 136/68  SpO2:  [97 %-100 %] 100 %    Neurologic  Mental Status:  awake, orientedx2, following all commands  Cranial Nerves:  PERRL, palate elevation symmetric and uvula midline, tongue protrusion midline, left gaze preference; facial asymmetry present, dysarthria and sensory aphasia  Motor:  normal muscle tone and bulk, no abnormal movements, strength 5/5 on left; 1/5 right upper and 2/5 in right lower  Reflexes:  toes down-going  Sensory:  light touch sensation intact and symmetric throughout upper and lower extremities  Coordination:  normal finger-to-nose and heel-to-shin bilaterally  without dysmetria  Station/Gait:  deferred    Stroke Scales      NIHSS  Interval baseline (07/17/19 1336)   Interval Comments     1a. Level of Consciousness 0-->Alert, keenly responsive   1b. LOC Questions 1-->Answers one question correctly   1c. LOC Commands 0-->Performs both tasks correctly   2.   Best Gaze 1-->Partial gaze palsy, gaze is abnormal in one or both eyes, but forced deviation or total gaze paresis is not present   3.   Visual 1-->Partial hemianopia   4.   Facial Palsy 1-->Minor paralysis (flattened nasolabial fold, asymmetry on smiling)   5a. Motor Arm, Left 0-->No drift, limb holds 90 (or 45) degrees for full 10 secs   5b. Motor Arm, Right 3-->No effort against gravity, limb falls   6a. Motor Leg, Left 0-->No drift, leg holds 30 degree position for full 5 secs   6b. Motor Leg, right 2-->Some effort against gravity, leg falls to bed by 5 secs, but has some effort against gravity   7.   Limb Ataxia 0-->Absent   8.   Sensory 0-->Normal, no sensory loss   9.   Best Language 2-->Severe aphasia, all communication is through fragmentary expression, great need for inference, questioning, and guessing by the listener. Range of information that can be exchanged is limited, listener carries burden of. . . (see row details)   10. Dysarthria 1-->Mild-to-moderate dysarthria, patient slurs at least some words and, at worst, can be understood with some difficulty   11. Extinction and Inattention  0-->No abnormality   Total 12 (07/17/19 1336)       Data     Imaging  I personally reviewed all neuroimaging     Labs:  CBC:  Recent Labs   Lab 07/17/19  1226 07/13/19  1223   WBC 5.3 5.9   RBC 3.90* 3.75*   HGB 12.1* 11.7*   HCT 37.3* 36.6*    235       Basic Metabolic Panel:   Recent Labs   Lab Test 07/17/19  1226 07/13/19  1223    141   POTASSIUM 4.6 4.3   CHLORIDE 109 109   CO2 26 27   BUN 24 16   CR 1.39* 1.20   * 91   WILLIAM 8.8 8.6       Liver panel:  No lab results found.    INR:  Recent Labs   Lab  Test 07/17/19  1226 07/13/19  1223 06/28/19  0454   INR 1.06 1.04 1.05        Lipid Profile:  Recent Labs   Lab Test 07/14/19  0635 11/22/17  1048   CHOL 132 146   HDL 71 77   LDL 52 59   TRIG 45 52       A1C:   Recent Labs   Lab Test 07/14/19  0635   A1C 5.2       Troponin I:   Recent Labs   Lab Test 07/13/19  1223   TROPI <0.015              Stroke Code Data  (for stroke code without tele)  Stroke code activated 07/17/19   1212   First stroke provider response 07/17/19   1214   Last known normal 07/17/19   1130   Time of discovery   (or onset of symptoms) 07/17/19   1130   Head CT read by me 07/17/19   1225   Was stroke code de-escalated? No

## 2019-07-17 NOTE — BRIEF OP NOTE
[facility]     Endovascular Surgical Neuroradiology Post-Procedure Note    Pre-Procedure Diagnosis:  Left carotid stenosis  Post-Procedure Diagnosis:  Left carotid stenosis    Procedure(s):   Diagnostic cervicocerebral angiography    Findings:  Left carotid stenosis measuring 50%. No occlusion or significant stenosis seen intracranially. Right CFA closure with Angio-Seal.    Primary Surgeon:  Randy Whalen  Secondary Surgeon:  Not applicable  Secondary Surgeon Review:  None  Fellow:  None  Additional Assistants:  None    Prior to the start of the procedure and with procedural staff participation, I verbally confirmed: the patient s identity using two indicators, relevant allergies, that the procedure was appropriate and matched the consent or emergent situation, and that the correct equipment/implants were available. Immediately prior to starting the procedure I conducted the Time Out with the procedural staff and re-confirmed the patient s name, procedure, and site/side. (The Joint Commission universal protocol was followed.)  Yes    PRU value: Not applicable    Anesthesia:  Conscious Sedation  Medications:  Fentanyl, Heparin, Lidocaine, Midazolam  Puncture site:  Right Femoral Artery    Fluoroscopy time (minutes):  7.4  Radiation dose (mGy):  995.12    Contrast amount (mL):  48    Estimated blood loss (mL):  10    Closure:  Device    Sedation Post-Procedure Summary    Sedatives: Fentanyl and Midazolam (Versed)    Vital signs and pulse oximetry were monitored and remained stable throughout the procedure, and sedation was maintained until the procedure was complete.  The patient was monitored by staff until sedation discharge criteria were met.    Patient tolerance:  Patient tolerated the procedure well with no immediate complications.    Time of sedation in minutes:  27 minutes from beginning to end of physician one to one monitoring.    Randy Whalen  Pager:  [phone]

## 2019-07-17 NOTE — H&P
[facility]    History and Physical  Hospitalist       Date of Admission:  7/17/2019  Date of Service (when I saw the patient): 07/17/19    Assessment & Plan   Tk Richmond is an 83 year old male with hx of recurrent CVAs including recent hospitalization (7/13-7/15) for embolic stroke of undetermined source who presents with right facial droop and expressive aphasia, and is being admitted for suspected recurrent stroke    Acute embolic stroke of undetermined source  History of recurrent CVA (this will be the patient's 4th presentation for stroke)  Left carotid artery stenosis  Dilated ascening aorta  Hospitalized at [facility] (7/13-7/15) for stroke wherein he initially presented with expressive aphasia. Was improving at the time of discharge until 7/17 when he developed acute right-sided facial droop and aphasia. CT head on arrival showed no evidence of new stroke. CTA head/neck showed ischemia in the left MCA superior M2 division territory felt to be an old embolized plaque and unchanged from 2012; it also showed 53% stenosis of R ICA and 54% stenosis of left ICA. While degree of stenoses is only moderate, CTA was reviewed with Radiology who felt that there was a degree of calcified plaque in the left ICA that could be causing emboli. Neurology was notified in the ED, and the patient was taken to IR for cervicocerebral angiography which showed 50% left carotid stenosis without significant intracranial occlusions or stenoses. TTE (7/14) showed LVEF 60-65% with grade I diastolic dysfunction, severely dilated ascending aorta, 1+ TR, and 1-2+ AI. Negative bubble study. Lipids well controlled: Tchol 132, HDL 71, LDL 52. A1c 5.2.  - Residual slurred speech and word-finding difficulty  - Holding PTA meds until cleared to take PO. Aspirin [place] for now  - Brain MRI ordered  - EEG ordered   - Left carotid US ordered  - Vascular Surgery has been consulted by Neurology   - Patient was discharged with 30-day  event monitor on 7/17 with plan to pursue an implantable loop monitor if negative  - Neurology (Dr. Sibley) to have the JULIO clinical trial contact patient  - PT/OT/SLP  - Neurology following, appreciate assistance    Essential hypertension  - Holding PTA lisinopril for permissive hypertension  - Consider alternative agent due to elevated creatinine    Major recurrent depressive disorder with anxiety  - Holding PTA citalopram while NPO    Glaucoma  - Continues on PTA eye drops    FEN: NPO, NS at 100 ml/h  DVT Prophylaxis: Pneumatic Compression Devices  Code Status: DNR / DNI    Disposition: Expected discharge once cleared by Neurology, 2-3 days    Toshavin Farias    Primary Care Physician   [facility]    Chief Complaint   Right facial droop and aphasia    History is obtained from the patient, his spouse, and medical records    History of Present Illness   Tk Richmond is an 83 year old male with hx of recurrent CVAs including recent hospitalization (7/13-7/15) for embolic stroke of undetermined source who presents with right facial droop and expressive aphasia. The patient has deferred the HPI to his wife Ml.    [facility] wherein he was hospitalized for stroke. He was improving at the time of discharge, and had been doing well until today during lunch when his wife noticed that he was choking on his food. She subsequently noticed clear right facial droop and expressive aphasia which prompted her to activate EMS.     CT head on arrival showed no evidence of new stroke. CTA head/neck showed ischemia in the left MCA superior M2 division territory felt to be an old embolized plaque and unchanged from 2012; it also showed 53% stenosis of R ICA and 54% stenosis of left ICA. While degree of stenoses is only moderate, CTA was reviewed with Radiology who felt that there was a degree of calcified plaque in the left ICA that could be causing emboli.  Neurology was notified in the ED, and the patient was taken to IR for cervicocerebral angiography which showed 50% left carotid stenosis without significant intracranial occlusions or stenoses.    Currently, patient offers no complaints. He continues to exhibit slurred speech and word-finding difficulty. He denies cp/sob, dizziness/lightheadedness, pain, or nausea.    Past Medical History    I have reviewed this patient's medical history and updated the medical record  Past Medical History:   Diagnosis Date     Glaucoma      Hyperlipidemia LDL goal < 100      Hypertension      Stroke (H)     2004, speech deficit       Past Surgical History   I have reviewed this patient's surgical history and updated the medical record  History reviewed. No pertinent surgical history.    Prior to Admission Medications   Prior to Admission Medications   Prescriptions Last Dose Informant Patient Reported? Taking?   Calcium Carbonate-Vitamin D (CALCIUM 600+D PO)   Yes No   Sig: Take 1 tablet by mouth 2 times daily At noon and supper   Misc Natural Products (GLUCOSAMINE CHONDROITIN ADV PO)   Yes No   Sig: Take 1 capsule by mouth 2 times daily At noon and supper   Multiple Vitamins-Minerals (CENTRUM SILVER ADULT 50+ PO)   Yes No   Sig: Take 1 tablet by mouth daily    Multiple Vitamins-Minerals (PRESERVISION AREDS 2+MULTI VIT PO)   Yes No   Sig: Take 1 tablet by mouth 2 times daily    aspirin (ASA) 325 MG tablet   No No   Sig: Take 1 tablet (325 mg) by mouth daily   aspirin (ASA) 81 MG EC tablet   No No   Sig: Take 1 tablet (81 mg) by mouth daily for 21 days   brinzolamide-brimonidine (SIMBRINZA) 1-0.2 % ophthalmic suspension   Yes No   Sig: Place 1 drop into both eyes 2 times daily    citalopram (CELEXA) 40 MG tablet   Yes No   Sig: Take 40 mg by mouth daily   clopidogrel (PLAVIX) 300 MG TABS tablet   No No   Sig: Take 1 tablet (300 mg) by mouth daily for 21 doses   lisinopril (PRINIVIL/ZESTRIL) 10 MG tablet   No No   Sig: Take 1 tablet  (10 mg) by mouth daily   lovastatin (MEVACOR) 40 MG tablet   Yes No   Sig: Take 40 mg by mouth At Bedtime   vitamin B-12 (CYANOCOBALAMIN) 1000 MCG tablet   Yes No   Sig: Take by mouth daily      Facility-Administered Medications: None     Allergies   Allergies   Allergen Reactions     Contrast Dye Rash       Social History   Remote history of smoking. He drinks a beer on occasion. He lives with his wife and has otherwise been independent of cares.    Family History   I have reviewed this patient's family history  Family History   Problem Relation Age of Onset     Breast Cancer Mother      Diabetes No family hx of      Hypertension No family hx of      Cancer - colorectal No family hx of        Review of Systems   The 10 point Review of Systems is negative other than noted in the HPI    Physical Exam   Temp: 97.8  F (36.6  C)   BP: 118/69 Pulse: 73 Heart Rate: 63 Resp: 18 SpO2: 93 % O2 Device: None (Room air)    Vital Signs with Ranges  Temp:  [97.8  F (36.6  C)] 97.8  F (36.6  C)  Pulse:  [69-85] 73  Heart Rate:  [63-81] 63  Resp:  [10-22] 18  BP: (118-181)/() 118/69  SpO2:  [93 %-100 %] 93 %  176 lbs 3.2 oz    Constitutional: Appears comfortable, NAD  HEENT: NC/AT, sclera white, conjunctiva clear, EOMI, MMM  Respiratory: Breathing non-labored. Lungs CTAB - no wheezes/crackles/rhonchi  Cardiovascular: Heart RRR, no m/r/g. No pedal edema.   GI: +BS. Abd soft/NT  Lymph/Hematologic: No cervical LAD  Skin: Warm and dry. No rash.  Musculoskeletal: Normal muscle bulk and tone  Neurologic: Alert and appropriate. +slurred speech and word-finding difficulty. REZA. 5/5 strength throughout  Psychiatric: Calm and cooperative    Data   Data reviewed today:  I personally reviewed the head CT image(s) showing no new strokes and the CTA image(s) showing ~50% stenoses of the R and L ICAs.  Recent Labs   Lab 07/17/19  1226 07/13/19  1223   WBC 5.3 5.9   HGB 12.1* 11.7*   MCV 96 98    235   INR 1.06 1.04    141    POTASSIUM 4.6 4.3   CHLORIDE 109 109   CO2 26 27   BUN 24 16   CR 1.39* 1.20   ANIONGAP 6 5   WILLIAM 8.8 8.6   * 91   TROPI  --  <0.015       Recent Results (from the past 24 hour(s))   CT Head w/o Contrast    Narrative    CT SCAN OF THE HEAD WITHOUT CONTRAST July 17, 2019 12:37 PM     HISTORY: Code stroke. Right facial droop and right arm weakness and  dysphagia since 11:30 AM. Discharged from [facility]  five days ago for an acute stroke that affected the speech.    TECHNIQUE: Axial images of the head and coronal reformations without  IV contrast material. Radiation dose for this scan was reduced using  automated exposure control, adjustment of the mA and/or kV according  to patient size, or iterative reconstruction technique.    COMPARISON: MRI 7/13/2019. CT scan 2/18/2012.    FINDINGS: There is generalized atrophy of the brain. There is low  attenuation in the white matter of the cerebral hemispheres consistent  with sequelae of small vessel ischemic disease. Areas of  encephalomalacia are again seen in the central and subcortical white  matter of the left frontal and parietal lobes and the left occipital  lobe, unchanged. The recent infarct in the left frontal lobe operculum  is not visible. There is no evidence of intracranial hemorrhage, mass,  acute infarct or anomaly. Calcification is seen in the left sylvian  fissure presumably in a branch of left middle cerebral artery, but  this is unchanged since the CT scan at 2/18/2012.    The visualized portions of the sinuses and mastoids appear normal.  There is no evidence of trauma.      Impression    IMPRESSION:   1. No acute infarct is visible.  2.  Atrophy of the brain. White matter changes consistent with  sequelae of small vessel ischemic disease. This is unchanged.  3. Encephalomalacia in the left frontal, parietal and occipital lobes  in a predominantly parasagittal distribution.  4. Old calcification in a branch of left middle cerebral  artery in the  sylvian fissure, unchanged.     TRAVIS MILLER MD   CTA Head Neck with Contrast    Narrative    CT ANGIOGRAM OF THE HEAD AND NECK WITHOUT AND WITH CONTRAST  7/17/2019  12:49 PM     HISTORY: Code stroke. At 11:30 AM patient developed right facial droop  and right arm weakness. On Plavix for a recent infarct in the left  frontal lobe operculum.    TECHNIQUE:  Precontrast localizing scans were followed by CT  angiography with an injection of 70 mL Omnipaque-350 (accession  [id number]), 50 mL Omnipaque-350 (accession [id number]) IV with scans  through the head and neck.  3D post processing was performed, images  were archived to PACS and used in interpretation of this study.   Estimates of carotid stenoses are made relative to the distal internal  carotid artery diameters except as noted. Radiation dose for this scan  was reduced using automated exposure control, adjustment of the mA  and/or kV according to patient size, or iterative reconstruction  technique.  Perfusion scans were performed at three levels with  injection of an additional 40 mL IV nonionic contrast and 20 mL saline  flush.  These images were processed on a separate 3-D workstation.    COMPARISON: MRI brain and MR angiogram on 7/13/2019.    CT HEAD FINDINGS:  No contrast enhancing lesions.   Perfusion CT scan  of the brain shows slight decrease in cerebral blood volume in the  central white matter of the left cerebral hemisphere and generalized  mild decrease in time to drain in the left middle cerebral artery  territory without corresponding decreased cerebral blood volume. This  suggests ischemia in the distribution of the left middle cerebral  artery superior M2 division. There is also linear focal decreased time  to drain in the region of the previous infarct in the left frontal  lobe operculum as expected.    CT ANGIOGRAM HEAD FINDINGS:  As noted previously there is focal  stenosis of the left middle cerebral artery superior M2  division. This  corresponds with the calcifications seen on the noncontrast CT scan.  However the arteries are patent above this calcification. No new  arterial occlusion is seen. No aneurysm is present. There is fetal  origin of the right posterior cerebral artery from the internal  carotid, a normal variant. Venous circulation is unremarkable.     CT ANGIOGRAM NECK FINDINGS:   Right carotid artery:  Extensive calcified atherosclerotic plaque in  the distal common carotid and proximal internal carotid arteries.  Residual luminal diameter is 2.3 mm compared with distal internal  carotid diameter of 4.9 mm, indicating 53% diameter stenosis by NASCET  criteria. Tortuous distal cervical right internal carotid artery and  tortuous right common carotid proximally.      Left carotid artery:  Left common carotid arises from the innominate  artery as a normal variant. Tortuous left common carotid artery  extends behind the hypopharynx on the left. Calcified atherosclerotic  plaque causes narrowing of the proximal internal carotid with residual  luminal diameter of 1.8 mm compared to distal internal carotid  diameter of 3.9 mm, indicating 54% diameter stenosis by NASCET  criteria.      Vertebral arteries:  Multiple mild to moderate  stenoses are seen in  the proximal right vertebral artery, likely from atherosclerosis. Left  vertebral artery appears widely patent as does the basilar.      Other findings: None.      Impression    IMPRESSION:   1. Perfusion CT scan suggests ischemia in the left middle cerebral  artery superior M2 division territory, likely caused by stenosis at  the point of calcification seen on the CT scan. Presumably this is an  old embolized plaque and unchanged since 2012.  2. Perfusion CT scan also shows persistent ischemia in the region of  the linear infarct in the left frontal lobe operculum.  3. No evidence of intracranial arterial occlusion.  4. Right internal carotid artery atherosclerotic plaque  causes 53%  diameter stenosis by NASCET criteria.  5. Left internal carotid artery atherosclerotic plaque causes 54%  diameter stenosis by NASCET criteria.    I called the report to Dr. Chad East in the emergency room on  7/17/2019 at 1:10 PM.    TRAVIS MILLER MD   CT Head Perfusion w Contrast    Narrative    CT ANGIOGRAM OF THE HEAD AND NECK WITHOUT AND WITH CONTRAST  7/17/2019  12:49 PM     HISTORY: Code stroke. At 11:30 AM patient developed right facial droop  and right arm weakness. On Plavix for a recent infarct in the left  frontal lobe operculum.    TECHNIQUE:  Precontrast localizing scans were followed by CT  angiography with an injection of 70 mL Omnipaque-350 (accession  [id number]), 50 mL Omnipaque-350 (accession [id number]) IV with scans  through the head and neck.  3D post processing was performed, images  were archived to PACS and used in interpretation of this study.   Estimates of carotid stenoses are made relative to the distal internal  carotid artery diameters except as noted. Radiation dose for this scan  was reduced using automated exposure control, adjustment of the mA  and/or kV according to patient size, or iterative reconstruction  technique.  Perfusion scans were performed at three levels with  injection of an additional 40 mL IV nonionic contrast and 20 mL saline  flush.  These images were processed on a separate 3-D workstation.    COMPARISON: MRI brain and MR angiogram on 7/13/2019.    CT HEAD FINDINGS:  No contrast enhancing lesions.   Perfusion CT scan  of the brain shows slight decrease in cerebral blood volume in the  central white matter of the left cerebral hemisphere and generalized  mild decrease in time to drain in the left middle cerebral artery  territory without corresponding decreased cerebral blood volume. This  suggests ischemia in the distribution of the left middle cerebral  artery superior M2 division. There is also linear focal decreased time  to drain in the  region of the previous infarct in the left frontal  lobe operculum as expected.    CT ANGIOGRAM HEAD FINDINGS:  As noted previously there is focal  stenosis of the left middle cerebral artery superior M2 division. This  corresponds with the calcifications seen on the noncontrast CT scan.  However the arteries are patent above this calcification. No new  arterial occlusion is seen. No aneurysm is present. There is fetal  origin of the right posterior cerebral artery from the internal  carotid, a normal variant. Venous circulation is unremarkable.     CT ANGIOGRAM NECK FINDINGS:   Right carotid artery:  Extensive calcified atherosclerotic plaque in  the distal common carotid and proximal internal carotid arteries.  Residual luminal diameter is 2.3 mm compared with distal internal  carotid diameter of 4.9 mm, indicating 53% diameter stenosis by NASCET  criteria. Tortuous distal cervical right internal carotid artery and  tortuous right common carotid proximally.      Left carotid artery:  Left common carotid arises from the innominate  artery as a normal variant. Tortuous left common carotid artery  extends behind the hypopharynx on the left. Calcified atherosclerotic  plaque causes narrowing of the proximal internal carotid with residual  luminal diameter of 1.8 mm compared to distal internal carotid  diameter of 3.9 mm, indicating 54% diameter stenosis by NASCET  criteria.      Vertebral arteries:  Multiple mild to moderate  stenoses are seen in  the proximal right vertebral artery, likely from atherosclerosis. Left  vertebral artery appears widely patent as does the basilar.      Other findings: None.      Impression    IMPRESSION:   1. Perfusion CT scan suggests ischemia in the left middle cerebral  artery superior M2 division territory, likely caused by stenosis at  the point of calcification seen on the CT scan. Presumably this is an  old embolized plaque and unchanged since 2012.  2. Perfusion CT scan also shows  persistent ischemia in the region of  the linear infarct in the left frontal lobe operculum.  3. No evidence of intracranial arterial occlusion.  4. Right internal carotid artery atherosclerotic plaque causes 53%  diameter stenosis by NASCET criteria.  5. Left internal carotid artery atherosclerotic plaque causes 54%  diameter stenosis by NASCET criteria.    I called the report to Dr. Chad East in the emergency room on  7/17/2019 at 1:10 PM.    TRAVIS MILLER MD

## 2019-07-18 ENCOUNTER — APPOINTMENT (OUTPATIENT)
Dept: OCCUPATIONAL THERAPY | Facility: CLINIC | Age: 84
DRG: 065 | End: 2019-07-18
Attending: INTERNAL MEDICINE
Payer: MEDICARE

## 2019-07-18 ENCOUNTER — APPOINTMENT (OUTPATIENT)
Dept: SPEECH THERAPY | Facility: CLINIC | Age: 84
DRG: 065 | End: 2019-07-18
Attending: INTERNAL MEDICINE
Payer: MEDICARE

## 2019-07-18 ENCOUNTER — APPOINTMENT (OUTPATIENT)
Dept: ULTRASOUND IMAGING | Facility: CLINIC | Age: 84
DRG: 065 | End: 2019-07-18
Attending: INTERNAL MEDICINE
Payer: MEDICARE

## 2019-07-18 ENCOUNTER — APPOINTMENT (OUTPATIENT)
Dept: PHYSICAL THERAPY | Facility: CLINIC | Age: 84
DRG: 065 | End: 2019-07-18
Attending: INTERNAL MEDICINE
Payer: MEDICARE

## 2019-07-18 LAB
ANION GAP SERPL CALCULATED.3IONS-SCNC: 7 MMOL/L (ref 3–14)
BUN SERPL-MCNC: 19 MG/DL (ref 7–30)
CALCIUM SERPL-MCNC: 8.2 MG/DL (ref 8.5–10.1)
CHLORIDE SERPL-SCNC: 113 MMOL/L (ref 94–109)
CO2 SERPL-SCNC: 23 MMOL/L (ref 20–32)
CREAT SERPL-MCNC: 1.17 MG/DL (ref 0.66–1.25)
ERYTHROCYTE [DISTWIDTH] IN BLOOD BY AUTOMATED COUNT: 16.8 % (ref 10–15)
GFR SERPL CREATININE-BSD FRML MDRD: 58 ML/MIN/{1.73_M2}
GLUCOSE BLDC GLUCOMTR-MCNC: 103 MG/DL (ref 70–99)
GLUCOSE SERPL-MCNC: 92 MG/DL (ref 70–99)
HCT VFR BLD AUTO: 31.6 % (ref 40–53)
HGB BLD-MCNC: 10.6 G/DL (ref 13.3–17.7)
MAGNESIUM SERPL-MCNC: 2 MG/DL (ref 1.6–2.3)
MCH RBC QN AUTO: 31.5 PG (ref 26.5–33)
MCHC RBC AUTO-ENTMCNC: 33.5 G/DL (ref 31.5–36.5)
MCV RBC AUTO: 94 FL (ref 78–100)
PLATELET # BLD AUTO: 173 10E9/L (ref 150–450)
POTASSIUM SERPL-SCNC: 3.8 MMOL/L (ref 3.4–5.3)
RBC # BLD AUTO: 3.37 10E12/L (ref 4.4–5.9)
SODIUM SERPL-SCNC: 143 MMOL/L (ref 133–144)
WBC # BLD AUTO: 5.3 10E9/L (ref 4–11)

## 2019-07-18 PROCEDURE — 92526 ORAL FUNCTION THERAPY: CPT | Mod: GN

## 2019-07-18 PROCEDURE — 97110 THERAPEUTIC EXERCISES: CPT | Mod: GO

## 2019-07-18 PROCEDURE — 12000000 ZZH R&B MED SURG/OB

## 2019-07-18 PROCEDURE — 80048 BASIC METABOLIC PNL TOTAL CA: CPT | Performed by: INTERNAL MEDICINE

## 2019-07-18 PROCEDURE — 25000132 ZZH RX MED GY IP 250 OP 250 PS 637: Mod: GY | Performed by: PSYCHIATRY & NEUROLOGY

## 2019-07-18 PROCEDURE — 99233 SBSQ HOSP IP/OBS HIGH 50: CPT | Mod: GC | Performed by: PSYCHIATRY & NEUROLOGY

## 2019-07-18 PROCEDURE — 92610 EVALUATE SWALLOWING FUNCTION: CPT | Mod: GN

## 2019-07-18 PROCEDURE — 97110 THERAPEUTIC EXERCISES: CPT | Mod: GP

## 2019-07-18 PROCEDURE — 97116 GAIT TRAINING THERAPY: CPT | Mod: GP

## 2019-07-18 PROCEDURE — 97530 THERAPEUTIC ACTIVITIES: CPT | Mod: GO

## 2019-07-18 PROCEDURE — 36415 COLL VENOUS BLD VENIPUNCTURE: CPT | Performed by: INTERNAL MEDICINE

## 2019-07-18 PROCEDURE — 93882 EXTRACRANIAL UNI/LTD STUDY: CPT | Mod: LT

## 2019-07-18 PROCEDURE — 25000132 ZZH RX MED GY IP 250 OP 250 PS 637: Mod: GY | Performed by: INTERNAL MEDICINE

## 2019-07-18 PROCEDURE — 97166 OT EVAL MOD COMPLEX 45 MIN: CPT | Mod: GO

## 2019-07-18 PROCEDURE — 97530 THERAPEUTIC ACTIVITIES: CPT | Mod: GP

## 2019-07-18 PROCEDURE — 83735 ASSAY OF MAGNESIUM: CPT | Performed by: INTERNAL MEDICINE

## 2019-07-18 PROCEDURE — 85027 COMPLETE CBC AUTOMATED: CPT | Performed by: INTERNAL MEDICINE

## 2019-07-18 PROCEDURE — 99233 SBSQ HOSP IP/OBS HIGH 50: CPT | Performed by: INTERNAL MEDICINE

## 2019-07-18 PROCEDURE — 25800030 ZZH RX IP 258 OP 636: Performed by: INTERNAL MEDICINE

## 2019-07-18 RX ORDER — LOVASTATIN 20 MG
40 TABLET ORAL AT BEDTIME
Status: DISCONTINUED | OUTPATIENT
Start: 2019-07-18 | End: 2019-07-20 | Stop reason: HOSPADM

## 2019-07-18 RX ORDER — MULTIPLE VITAMINS W/ MINERALS TAB 9MG-400MCG
1 TAB ORAL DAILY
Status: DISCONTINUED | OUTPATIENT
Start: 2019-07-18 | End: 2019-07-20 | Stop reason: HOSPADM

## 2019-07-18 RX ORDER — CITALOPRAM HYDROBROMIDE 20 MG/1
40 TABLET ORAL DAILY
Status: DISCONTINUED | OUTPATIENT
Start: 2019-07-18 | End: 2019-07-20 | Stop reason: HOSPADM

## 2019-07-18 RX ORDER — SODIUM PHOSPHATE,MONO-DIBASIC 19G-7G/118
1 ENEMA (ML) RECTAL 2 TIMES DAILY
Status: DISCONTINUED | OUTPATIENT
Start: 2019-07-18 | End: 2019-07-20 | Stop reason: HOSPADM

## 2019-07-18 RX ORDER — LANOLIN ALCOHOL/MO/W.PET/CERES
1000 CREAM (GRAM) TOPICAL DAILY
Status: DISCONTINUED | OUTPATIENT
Start: 2019-07-18 | End: 2019-07-20 | Stop reason: HOSPADM

## 2019-07-18 RX ORDER — VIT C/E/ZN/COPPR/LUTEIN/ZEAXAN 60 MG-6 MG
1 CAPSULE ORAL 2 TIMES DAILY
Status: DISCONTINUED | OUTPATIENT
Start: 2019-07-18 | End: 2019-07-20 | Stop reason: HOSPADM

## 2019-07-18 RX ADMIN — CLOPIDOGREL BISULFATE 75 MG: 75 TABLET ORAL at 09:52

## 2019-07-18 RX ADMIN — Medication 1 CAPSULE: at 18:00

## 2019-07-18 RX ADMIN — ASPIRIN 325 MG ORAL TABLET 325 MG: 325 PILL ORAL at 09:52

## 2019-07-18 RX ADMIN — Medication 1 TABLET: at 23:22

## 2019-07-18 RX ADMIN — MULTIPLE VITAMINS W/ MINERALS TAB 1 TABLET: TAB at 14:19

## 2019-07-18 RX ADMIN — LOVASTATIN 40 MG: 20 TABLET ORAL at 23:22

## 2019-07-18 RX ADMIN — CYANOCOBALAMIN TAB 1000 MCG 1000 MCG: 1000 TAB at 14:19

## 2019-07-18 RX ADMIN — Medication 1 CAPSULE: at 14:57

## 2019-07-18 RX ADMIN — Medication 1 CAPSULE: at 23:23

## 2019-07-18 RX ADMIN — CITALOPRAM HYDROBROMIDE 40 MG: 20 TABLET ORAL at 14:19

## 2019-07-18 RX ADMIN — SODIUM CHLORIDE: 9 INJECTION, SOLUTION INTRAVENOUS at 03:36

## 2019-07-18 ASSESSMENT — ACTIVITIES OF DAILY LIVING (ADL)
ADLS_ACUITY_SCORE: 22
RETIRED_COMMUNICATION: 2-->DIFFICULTY UNDERSTANDING (NOT RELATED TO LANGUAGE BARRIER)
ADLS_ACUITY_SCORE: 23
SWALLOWING: 0-->SWALLOWS FOODS/LIQUIDS WITHOUT DIFFICULTY
AMBULATION: 0-->INDEPENDENT
RETIRED_EATING: 0-->INDEPENDENT
COGNITION: 0 - NO COGNITION ISSUES REPORTED
ADLS_ACUITY_SCORE: 22
DRESS: 0-->INDEPENDENT
BATHING: 1-->ASSISTIVE EQUIPMENT
ADLS_ACUITY_SCORE: 23
FALL_HISTORY_WITHIN_LAST_SIX_MONTHS: YES
ADLS_ACUITY_SCORE: 22
TOILETING: 0-->INDEPENDENT
TRANSFERRING: 0-->INDEPENDENT
NUMBER_OF_TIMES_PATIENT_HAS_FALLEN_WITHIN_LAST_SIX_MONTHS: 1
ADLS_ACUITY_SCORE: 22
WHICH_OF_THE_ABOVE_FUNCTIONAL_RISKS_HAD_A_RECENT_ONSET_OR_CHANGE?: AMBULATION;COMMUNICATION/SPEECH

## 2019-07-18 NOTE — PROGRESS NOTES
Speech Language Evaluation   07/17/19 1600       Present No   General Information   Type of Evaluation Speech and Language;Dysarthria   Type Of Visit Initial   Start Of Care Date 07/17/19   Referring Physician Dr. Rossi   Orders Evaluate And Treat   Orders Comment H.O. stroke   Medical Diagnosis L CVA   Onset Of Illness/injury Or Date Of Surgery 07/13/19   Hearing WNL   Surgical/Medical history reviewed Yes   Pertinent History Of Current Problem HISTORY OF PRESENT ILLNESS:  Tk Richmond is an 83-year-old gentleman with past medical history significant for CVA, currently on aspirin and Plavix, lower GI bleeding, hypertension, hyperlipidemia, who presented to the emergency room with a complaint of difficulty expressing himself.  On the morning of 07/12/2019 the patient had onset of difficulty with speech, trouble finding words and expressing himself, the patient also noted right hand numbness.  [place] his speech got worse in the next 24 hours.  The wife drove down here brought him to the emergency room.  The patient still has difficulty finding words intermittently.  No facial asymmetry, swallowing difficulty, dizziness, blurring of vision, chest pain or shortness of breath.  The patient also has some slight weakness of the right arm.  He is right-handed.   The patient presents to SLP today for an evaluation of his expressive and receptive language abilities.    Current Community Support  Family/friend caregiver   Patient Role/employment History Retired   Living environment [facility]   General Observations Patient is a very pleasant man and appears to be very motivated to work hard with SLP.   Patient/family Goals Be able to speak fluently   Abuse Screen (yes response referral indicated)   Feels Unsafe at Home or Work/School no   Feels Threatened by Someone no   Does Anyone Try to Keep You From Having Contact with Others or Doing Things  Outside Your Home? no   Speech   Apraxia Battery:  Sounds (out of 10 possible) 10   Apraxia Battery:  Word Repetition - single syllable (out of 10 possible) 10   Apraxia Battery:  Increasing word length (out of 10 possible) 8   Apraxia Battery:  Word Repetition - mulitple syllables (out of 10 possible) 8   Apraxia Battery:  Repeat Sentences (out of 5 possible) 3   Speech Comments Patient has aphasia and dysarthria and has occasional moments where he cannot find words or words come out inintelligible. Patient has a difficult time repeating and speaking longer sentences and repeating multisyllabic words. It is estimated patient is about 65%-70% intelligible at the conversation level partly due to aphasia and partly due to dysarthria    Language: Auditory Comprehension (understanding of spoken language)   One Step Commands (out of 10 total) 10   Y/N Simple Questions (out of 10 total) 10   Yes/No Sentence and Simple Paragraph; [place] Diagnostic Aphasia Exam 3 short (out of 6 total) 10   Paragraph; Discourse Comprehension Test (out of 8 total; less than 7 is below mean) 8   Functional Assessment Scale (Auditory Comprehension) No Impairment   Comments (Auditory Comprehension) Patient appears to have good auditory comprehension and was able to correctly answer all y/n questions as well as answer questions from short stories and sentences with 100% accuracy.   Language: Verbal Expression (use of spoken language to express information)   Automatized Sequences; [place] Diagnostic Aphasia Exam (out of 8 total) 8   Phrase/Sentence Completion (out of 10 total) 10   Responsive Naming; [place] Diagnostic Aphasia Exam 3 (out of 20 total) 20   [place] Naming Test, short form (out of 15 total) 15   Define Words; [place] Test for Differential Diagnosis Of Aphasia (out of 10 total) 8   Generative Naming Score; Cognitive Linguistic Quick Test 11   Generative Naming; Cognitive Linguistic Quick Test Result Below mean   Functional  Assessment Scale (Verbal Expression) Moderate Impairment   Comments (Verbal Expression) obvious word finding deficits; phonemic paraphasias; decreased thought formulation. Increased frustration during task noted. Generative naming, defining words and upper level word finding tasks are much more difficult than expected.    Reading Comprehension (understanding of written language)   Practical Reading (out of 7 total) 5   Functional Assessment Scale (Reading Comprehension) Mild to Moderate Impairment   Comments (Reading Comprehension) Needed increased time; impaired oral reading   Written Expression (use of writing to express information)   Comments (Written Expression) Patient had a difficult time writing his name. Patient has had increased difficulties with writing since his first set of strokes but has even more weakness now. Patient would like to work on his writing.    General Therapy Interventions   Planned Therapy Interventions Language   Language Verbal expression;Written expression   Intervention Comments Patient would benefit from skilled SLP services in order to address his dysarthria and aphasia in order to be able to communicate more effectively with those in his immediate environment.    Clinical Impression, SLP Eval   Criteria for Skilled Therapeutic Interventions Met (SLP Eval) skilled criteria for speech language intervention met   SLP Diagnosis Moderate expressive aphasia and moderate dysarthria   Therapy Frequency 2 times;per week   Predicted Duration of Therapy Intervention (days/wks) 90   Risks and Benefits of Treatment have been explained. Yes   Patient, Family & other staff in agreement with plan of care Yes   Clinical Impression Comments Patient is a 83 year old male with previous strokes referred to SLP after recent stroke affecting his speech. Patient presents with dysarthria characterized by slurring of fricatives and difficulty producing longer sentences as well as multisyllabic words.  Patient also has moderate expressive aphasia characterized by phonemic paraphasias and difficulties with moderate to complex naming tasks. Patient would benefit from skilled SLP in order be able to communicate more funcitonally across environments.   Language/Cognition Goal 1   Goal Identifier Writing    Goal Description Patient will be able to copy full sentences with no mistakes in 3/4 opportunities.   Target Date 10/15/19   Language/Cognition Goal 2   Goal Identifier Naming   Goal Description Patient will provide definitions or desciptions of words or objects with 80% accuracy.    Target Date 10/15/19   Language/Cognition Goal 3   Goal Identifier generative naming   Goal Description Patient will complete generative naming tasks by naming atleast 10+ items per category.    Target Date 10/15/19   Language/Cognition Goal 4   Goal Identifier dysarthria   Goal Description Patient will use dysarthria strategies such as (pacing, overarticulation, breath support etc) in order to increase his intelligibility to 95%.    Target Date 10/15/19   Total Session Time   Sound production with lang comprehension and expression minutes ([id number]) 45   Total Evaluation Time 45

## 2019-07-18 NOTE — PROGRESS NOTES
Rehabilitation Services          OUTPATIENT SPEECH LANGUAGE PATHOLOGY LANGUAGE-COGNITION  EVALUATION  PLAN OF TREATMENT FOR OUTPATIENT REHABILITATION  (COMPLETE FOR INITIAL CLAIMS ONLY)  Patient's Last Name, First Name, M.I.  YOB: 1935  Tk Richmond                        Provider s Name: Joaquin Acuna Medical Record No.  [id number]     Onset Date:  07/13/19   Start of Care Date: 07/17/19   Type:     ___PT  __OT   _X_SLP    Medical Diagnosis:   L CVA   Speech Language Pathology Diagnosis:  Moderate expressive aphasia and moderate dysarthria    Visits from SOC: 1                                        ________________________________________________________________________________  Plan of Treatment/Functional Goals:   Planned Therapy Interventions: Language   Intervention Comments: Patient would benefit from skilled SLP services in order to address his dysarthria and aphasia in order to be able to communicate more effectively with those in his immediate environment.       Language / Cognition Goals  1. Goal Identifier: Writing        Goal Description: Patient will be able to copy full sentences with no mistakes in 3/4 opportunities.       Target Date: 10/15/19   2. Goal Identifier: Naming       Goal Description: Patient will provide definitions or desciptions of words or objects with 80% accuracy.        Target Date: 10/15/19   3. Goal Identifier: generative naming       Goal Description: Patient will complete generative naming tasks by naming atleast 10+ items per category.        Target Date: 10/15/19   4. Goal Identifier: dysarthria       Goal Description: Patient will use dysarthria strategies such as (pacing, overarticulation, breath support etc) in order to increase his intelligibility to 95%.        Target Date: 10/15/19        Predicted Duration of Therapy Intervention (days/wks): 90    Joaquin Acuna, SLP        I CERTIFY THE NEED FOR THESE SERVICES FURNISHED UNDER        THIS PLAN OF TREATMENT AND WHILE UNDER MY CARE     (Physician co-signature of this document indicates review and certification of the therapy plan).                  Certification Date From:   7/17/2017  Certification Date To:    10/15/2019          Referring Physician:  Dr. Rossi    Initial Assessment        See Epic Evaluation Start Of Care Date: 07/17/19

## 2019-07-18 NOTE — PROGRESS NOTES
[facility]    Vascular Neurology Progress Note      Hospital Course   Patient has been stable since the admission with no worsening of any symptoms.  Continues to have aphasia and right-sided weakness.  MRI brain was performed which revealed acute ischemic strokes bilaterally.    Interval History   No acute overnight events.    Assessment & Plan     Impression:  Ischemic Stroke due to large versus atherosclerosis versus embolic stroke of undetermined source (ESUS).   Discussed about Ally study with patient's wife.  Patient wife would like her grandson to be called for more information.      Recommendations:  -We will ask vascular surgery to consider for left CEA  -Maintain permissive hypertension with systolic blood pressure up to 180 mm Hg.  -Observe for atrial fibrillation on telemetry.  -Resume patient's aspirin 81 mg daily and Plavix 75 mg for 3 weeks.   -Resume Lovastatin 40 mg daily  -NPO until patient passes bedside swallow test.  -Start DVT prophylaxis with SCDs.  -PT/OT/SLP      We will continue to follow.     Patient follow up:  - final recommendation pending work-up   -He will need to be on Holter monitor if you do not capture any during the admission    The Stroke Staff is Dr. Whalen.    Cristobal Pérez MD  Fellow, Vascular Neurology  Pager:  [phone]    _______________________________________________________________      Medications     Scheduled medications    aspirin  325 mg Oral Daily    Or     aspirin  300 mg Rectal Daily     clopidogrel  75 mg Oral Daily     sodium chloride (PF)  3 mL Intracatheter Q8H       Infusion medications    - MEDICATION INSTRUCTIONS -       sodium chloride 100 mL/hr at 07/18/19 0336       PRN medications  Current Facility-Administered Medications   Medication Dose Route Frequency     acetaminophen  500 mg Oral Q6H PRN     bisacodyl  10 mg Rectal Daily PRN     calcium carbonate  1,000 mg Oral 4x Daily PRN     glucose  15-30 g Oral Q15 Min PRN    Or      dextrose  25-50 mL Intravenous Q15 Min PRN    Or     glucagon  1 mg Subcutaneous Q15 Min PRN     hydrALAZINE  10-20 mg Intravenous Q1H PRN     labetalol  10-40 mg Intravenous Q10 Min PRN     lidocaine 4%   Topical Q1H PRN     lidocaine (buffered or not buffered)  0.1-1 mL Other Q1H PRN     magnesium sulfate  4 g Intravenous Q4H PRN     - MEDICATION INSTRUCTIONS -   Does not apply Continuous PRN     melatonin  3 mg Oral At Bedtime PRN     naloxone  0.1-0.4 mg Intravenous Q2 Min PRN     ondansetron  4 mg Oral Q6H PRN    Or     ondansetron  4 mg Intravenous Q6H PRN     polyethylene glycol  17 g Oral Daily PRN     potassium chloride  20-40 mEq Oral or Feeding Tube Q2H PRN     potassium chloride with lidocaine  10 mEq Intravenous Q1H PRN     potassium chloride  10 mEq Intravenous Q1H PRN     potassium chloride  20 mEq Intravenous Q1H PRN     potassium chloride  20-40 mEq Oral Q2H PRN     senna-docusate  1 tablet Oral BID PRN    Or     senna-docusate  2 tablet Oral BID PRN     sodium chloride (PF)  3 mL Intracatheter q1 min prn       Allergies   Allergies   Allergen Reactions     Contrast Dye Rash       Physical Exam     Temp:  [97.8  F (36.6  C)-99.6  F (37.6  C)] 98.2  F (36.8  C)  Pulse:  [52-85] 52  Heart Rate:  [63-91] 78  Resp:  [10-22] 16  BP: (118-210)/() 138/67  SpO2:  [93 %-100 %] 98 %    General:  patient lying in bed without any acute distress    HEENT:  normocephalic/atraumatic  Cardio:  RRR  Pulmonary:  no respiratory distress  Abdomen:  soft  Extremities:  no edema  Skin:  intact     Neurologic  Mental Status:  follows commands, Alert oriented to self, dysarthric speech with postop mild global aphasia  Cranial Nerves:  PERRL, EOMI with normal smooth pursuit, facial sensation intact and symmetric, hearing not formally tested but intact to conversation, palate elevation symmetric and uvula midline, tongue protrusion midline, Left facial droop, dysarthria  Motor:  normal muscle tone and bulk, no abnormal  movements, Strength 5/5 left side extremities, 4/5 right upper extremity and 4+/5 right lower extremity  Reflexes:  no clonus, toes down-going  Sensory:  Decreased sensations over the right arm   Coordination:  normal finger-to-nose and heel-to-shin bilaterally without dysmetria  Station/Gait:  deferred    Stroke Scales      NIHSS  Interval 24 hrs post onset of symptoms +/- 20 mins (07/18/19 1111)   Interval Comments     1a. Level of Consciousness 0-->Alert, keenly responsive   1b. LOC Questions 0-->Answers both questions correctly   1c. LOC Commands 0-->Performs both tasks correctly   2.   Best Gaze 0-->Normal   3.   Visual 1-->Partial hemianopia   4.   Facial Palsy 2-->Partial paralysis (total or near-total paralysis of lower face)   5a. Motor Arm, Left 0-->No drift, limb holds 90 (or 45) degrees for full 10 secs   5b. Motor Arm, Right 1-->Drift, limb holds 90 (or 45) degrees, but drifts down before full 10 secs, does not hit bed or other support   6a. Motor Leg, Left 0-->No drift, leg holds 30 degree position for full 5 secs   6b. Motor Leg, right 0-->No drift, leg holds 30 degree position for full 5 secs   7.   Limb Ataxia 0-->Absent   8.   Sensory 1-->Mild-to-moderate sensory loss, patient feels pinprick is less sharp or is dull on the affected side, or there is a loss of superficial pain with pinprick, but patient is aware of being touched   9.   Best Language 1-->Mild-to-moderate aphasia, some obvious loss of fluency or facility of comprehension, without significant limitation on ideas expressed or form of expression. Reduction of speech and/or comprehension, however, makes conversation. . . (see row details)   10. Dysarthria 2-->Severe dysarthria, patients speech is so slurred as to be unintelligible in the absence of or out of proportion to any dysphasia, or is mute/anarthric   11. Extinction and Inattention  1-->Visual, tactile, auditory, spatial, or personal inattention or extinction to bilateral  simultaneous stimulation in one of the sensory modalities   Total 9 (07/18/19 1111)       Data     Imaging  I personally reviewed      Labs:  CBC:  Recent Labs   Lab 07/18/19  0740 07/17/19  1226 07/13/19  1223   WBC 5.3 5.3 5.9   RBC 3.37* 3.90* 3.75*   HGB 10.6* 12.1* 11.7*   HCT 31.6* 37.3* 36.6*    199 235       Basic Metabolic Panel:   Recent Labs   Lab Test 07/18/19  0740 07/17/19  1226    141   POTASSIUM 3.8 4.6   CHLORIDE 113* 109   CO2 23 26   BUN 19 24   CR 1.17 1.39*   GLC 92 164*   WILLIAM 8.2* 8.8       Liver panel:  No lab results found.    INR:  Recent Labs   Lab Test 07/17/19  1226 07/13/19  1223 06/28/19  0454   INR 1.06 1.04 1.05        Lipid Profile:  Recent Labs   Lab Test 07/14/19  0635 11/22/17  1048   CHOL 132 146   HDL 71 77   LDL 52 59   TRIG 45 52       A1C:   Recent Labs   Lab Test 07/14/19  0635   A1C 5.2       Troponin I:   Recent Labs   Lab Test 07/13/19  1223   TROPI <0.015

## 2019-07-18 NOTE — PLAN OF CARE
Discharge Planner SLP   Patient plan for discharge: Did not discuss  Current status: Pt seen for clinical swallow evaluation. Speech/lang evaluation completed at [facility] during recent admission, no dysphagia at that time. Pt has not developed significnatly worse dysathria, right facial droop and aphasia. Pt tolerated 8 oz of thin liquids via cup sip without overt s/sx of aspiration, changes in breath sounds or vocal quality. Delayed cough x 1 with consecutive straw sips. Pt also consumed 4 oz of puree and 2 crackers, feeding self with left hand. Right lip reduced sensation noted by residue remaining, no significant pocketing. Prolonged manipulation, bolus formation and oral clearance with regular solids d/t right sided weakness, incoodindation and reduced sensation.     Recommend dysphagia diet 3 and thin liquids, no straws. Pt must be sitting upright, take small bites/sips, alternate solids and liquids and eat/drink at a slow rate.     Barriers to return to prior living situation: Below baseline, impaired functional communication   Recommendations for discharge: ARU  Rationale for recommendations: SLP at next level of care for management of dysphagia and functional communication given pt is below baseline in these areas           Entered by: Elisabeth Nazario 07/18/2019 9:52 AM

## 2019-07-18 NOTE — PLAN OF CARE
Pt here with left MCA CVA. A&O x4. Neuros include right facial droop, right sided hemiplegia, WFD, and slurred speech. VSS with 1 episode of HTN treated with Hydralazine after Keppra was given. Tele SR. NPO. Up with A2 + lift. Denies pain. Patient had reaction to Keppra (elevated BP, temperature, increased WFD, and rash on chest)-MD notified and Keppra discontinued. Continue to monitor and follow POC.

## 2019-07-18 NOTE — CONSULTS
[facility] PATIENT CONSULTATION NOTE  Consulted by:neurology  Reason for consultation: L sided CVA    HPI:  Tk Richmond is a 83 year old male recently discharged after a L frontal/temporal/parietal/occipital stroke which has caused R sided weakness and expressive aphasia. He now presents with recurrence of his improved symptoms. He underwent cerebral angiogram today which demonstrated a previously seen chronic L MCA lesion and <50% B/L carotid stenosis. Currently the patient is significantly aphasic and is unable to answer questions. He has a history of prior diffuse L sided infarcts seen on his original MRI.     Review Of Systems: cannot be obtained due to aphasia    PAST MEDICAL HISTORY:  Past Medical History:   Diagnosis Date     Glaucoma      Hyperlipidemia LDL goal < 100      Hypertension      Stroke (H)     , speech deficit       PAST SURGICAL HISTORY:  History reviewed. No pertinent surgical history.    FAMILY HISTORY:  Family History   Problem Relation Age of Onset     Breast Cancer Mother      Diabetes No family hx of      Hypertension No family hx of      Cancer - colorectal No family hx of        SOCIAL HISTORY:   Social History     Tobacco Use     Smoking status: Former Smoker     Years: 10.00     Types: Cigars     Last attempt to quit: 1972     Years since quittin.4     Smokeless tobacco: Never Used   Substance Use Topics     Alcohol use: Yes     Alcohol/week: 0.0 oz     Comment:  oxx         HOME MEDICATIONS:  Prior to Admission medications    Medication Sig Start Date End Date Taking? Authorizing Provider   aspirin (ASA) 325 MG tablet Take 1 tablet (325 mg) by mouth daily 19  Yes Garo Rossi MD   aspirin (ASA) 81 MG EC tablet Take 1 tablet (81 mg) by mouth daily for 21 days 7/15/19 8/5/19 Yes Garo Rossi MD   brinzolamide-brimonidine (SIMBRINZA) 1-0.2 % ophthalmic suspension Place 1 drop into both eyes 2 times daily    Yes Reported, Patient    Calcium Carbonate-Vitamin D (CALCIUM 600+D PO) Take 1 tablet by mouth 2 times daily At noon and supper   Yes Reported, Patient   citalopram (CELEXA) 40 MG tablet Take 40 mg by mouth daily   Yes Reported, Patient   clopidogrel (PLAVIX) 300 MG TABS tablet Take 1 tablet (300 mg) by mouth daily for 21 doses 7/15/19 8/5/19 Yes Garo Rossi MD   glucosamine-chondroitin 500-400 MG CAPS per capsule Take 1 capsule by mouth 2 times daily At noon and supper   Yes Unknown, Entered By History   lisinopril (PRINIVIL/ZESTRIL) 10 MG tablet Take 1 tablet (10 mg) by mouth daily 7/16/19  Yes Garo Rossi MD   lovastatin (MEVACOR) 40 MG tablet Take 40 mg by mouth At Bedtime   Yes Unknown, Entered By History   Multiple Vitamins-Minerals (CENTRUM SILVER ADULT 50+ PO) Take 1 tablet by mouth daily    Yes Reported, Patient   Multiple Vitamins-Minerals (PRESERVISION AREDS 2+MULTI VIT PO) Take 1 tablet by mouth 2 times daily    Yes Reported, Patient   vitamin B-12 (CYANOCOBALAMIN) 1000 MCG tablet Take 1,000 mcg by mouth daily    Yes Reported, Patient       VITAL SIGNS:  /72   Pulse 73   Temp 99.6  F (37.6  C)   Resp 20   Wt 79.9 kg (176 lb 3.2 oz)   SpO2 97%   BMI 26.02 kg/m    No intake or output data in the 24 hours ending 07/17/19 2017    Labs:  ROUTINE IP LABS (Last four results)  BMP  Recent Labs   Lab 07/17/19  1226 07/13/19  1223    141   POTASSIUM 4.6 4.3   CHLORIDE 109 109   WILLIAM 8.8 8.6   CO2 26 27   BUN 24 16   CR 1.39* 1.20   * 91     CBC  Recent Labs   Lab 07/17/19  1226 07/13/19  1223   WBC 5.3 5.9   RBC 3.90* 3.75*   HGB 12.1* 11.7*   HCT 37.3* 36.6*   MCV 96 98   MCH 31.0 31.2   MCHC 32.4 32.0   RDW 16.5* 16.1*    235     INR  Recent Labs   Lab 07/17/19  1226 07/13/19  1223   INR 1.06 1.04       PHYSICAL EXAM:  Constitutional: alert, no acute distress and cooperative   Cardiovascular: RRR  Respiratory: CTAB anteriorly, breathing unlabored without secondary muscle  use  Psychiatric: confused  Neck: no asymmetry  GI/Abdomen: +BS, abdomen soft, non-tender. No masses, no CVAT  MSK: no gross bony abnormalities  Extremities: no open lesions, extremities warm and well perfused  Hematology: no bruising on visible skin  Neurology: no gross CN deficits, severe expressive aphasia, RUE loss of motor, B/L lower extremity strength intact    IMAGING:  CT ANGIOGRAM OF THE HEAD AND NECK WITHOUT AND WITH CONTRAST  7/17/2019  12:49 PM      HISTORY: Code stroke. At 11:30 AM patient developed right facial droop  and right arm weakness. On Plavix for a recent infarct in the left  frontal lobe operculum.     TECHNIQUE:  Precontrast localizing scans were followed by CT  angiography with an injection of 70 mL Omnipaque-350 (accession  [id number]), 50 mL Omnipaque-350 (accession [id number]) IV with scans  through the head and neck.  3D post processing was performed, images  were archived to PACS and used in interpretation of this study.   Estimates of carotid stenoses are made relative to the distal internal  carotid artery diameters except as noted. Radiation dose for this scan  was reduced using automated exposure control, adjustment of the mA  and/or kV according to patient size, or iterative reconstruction  technique.  Perfusion scans were performed at three levels with  injection of an additional 40 mL IV nonionic contrast and 20 mL saline  flush.  These images were processed on a separate 3-D workstation.     COMPARISON: MRI brain and MR angiogram on 7/13/2019.     CT HEAD FINDINGS:  No contrast enhancing lesions.   Perfusion CT scan  of the brain shows slight decrease in cerebral blood volume in the  central white matter of the left cerebral hemisphere and generalized  mild decrease in time to drain in the left middle cerebral artery  territory without corresponding decreased cerebral blood volume. This  suggests ischemia in the distribution of the left middle cerebral  artery superior M2  division. There is also linear focal decreased time  to drain in the region of the previous infarct in the left frontal  lobe operculum as expected.     CT ANGIOGRAM HEAD FINDINGS:  As noted previously there is focal  stenosis of the left middle cerebral artery superior M2 division. This  corresponds with the calcifications seen on the noncontrast CT scan.  However the arteries are patent above this calcification. No new  arterial occlusion is seen. No aneurysm is present. There is fetal  origin of the right posterior cerebral artery from the internal  carotid, a normal variant. Venous circulation is unremarkable.      CT ANGIOGRAM NECK FINDINGS:   Right carotid artery:  Extensive calcified atherosclerotic plaque in  the distal common carotid and proximal internal carotid arteries.  Residual luminal diameter is 2.3 mm compared with distal internal  carotid diameter of 4.9 mm, indicating 53% diameter stenosis by NASCET  criteria. Tortuous distal cervical right internal carotid artery and  tortuous right common carotid proximally.       Left carotid artery:  Left common carotid arises from the innominate  artery as a normal variant. Tortuous left common carotid artery  extends behind the hypopharynx on the left. Calcified atherosclerotic  plaque causes narrowing of the proximal internal carotid with residual  luminal diameter of 1.8 mm compared to distal internal carotid  diameter of 3.9 mm, indicating 54% diameter stenosis by NASCET  criteria.       Vertebral arteries:  Multiple mild to moderate  stenoses are seen in  the proximal right vertebral artery, likely from atherosclerosis. Left  vertebral artery appears widely patent as does the basilar.       Other findings: None.                                                                      IMPRESSION:   1. Perfusion CT scan suggests ischemia in the left middle cerebral  artery superior M2 division territory, likely caused by stenosis at  the point of calcification  seen on the CT scan. Presumably this is an  old embolized plaque and unchanged since 2012.  2. Perfusion CT scan also shows persistent ischemia in the region of  the linear infarct in the left frontal lobe operculum.  3. No evidence of intracranial arterial occlusion.  4. Right internal carotid artery atherosclerotic plaque causes 53%  diameter stenosis by NASCET criteria.  5. Left internal carotid artery atherosclerotic plaque causes 54%  diameter stenosis by NASCET criteria.    MRI BRAIN WITHOUT AND WITH CONTRAST July 13, 2019 2:24 PM     HISTORY: Focal neuro deficit greater than six hours, stroke suspected.        TECHNIQUE: Multiplanar, multisequence MRI of the brain without and  with 10 mL  Gadavist.     COMPARISON: Head MRI 2/18/2012.     FINDINGS: Intravenous contrast is present on the labeled precontrast  images with enhancement of the veins and paranasal sinus mucosa.     Patchy/linear area of diffusion restriction is present involving the  left inferior frontal lobe at the pars opercularis and within the  insula. Punctate infarcts are present involving the left superior  temporal lobe and left occipital lobe cuneus. Punctate area of  diffusion restriction is present involving the left precuneus. No  evidence of hemorrhage or significant mass effect.     Moderate volume loss is present. Patchy white matter hypoattenuation  likely represents chronic small vessel ischemic change. Old left  middle frontal gyrus, left superior parietal lobule, and left  occipital lobe cuneus infarcts are present. Old left basal ganglia  lacunar infarcts are present. Small old bilateral cerebellar infarcts  are present. There is prominent susceptibility related signal loss  within the sulci overlying the bilateral cerebral hemispheres. No  abnormal enhancement is identified.     The visualized calvarium, tympanic cavities, mastoid cavities, and  extracranial soft tissues are unremarkable. Bilateral lens  replacements are  present. There is loss of subcutaneous skin thickness  overlying the posterior right parietal lobe with possible changes of  underlying prior instrumentation.                                                                      IMPRESSION:  1. Linear area of infarcts involving the left frontal lobe (centrum  semiovale and pars opercularis), consistent with reported right-sided  weakness and expressive aphasia.  2. Smaller punctate acute infarcts involving the left superior  temporal lobe, left parietal lobe, left occipital lobe.  3. Moderate volume loss, chronic small vessel ischemic change, and  multiple old infarcts.  4. Prominent susceptibility related signal loss throughout the sulci  likely related to previous intravenous contrast administration.  Intravenous contrast is also present on the scans labeled precontrast.    Patient Active Problem List   Diagnosis     CVA (cerebral vascular accident) (H)     Hyperlipidemia LDL goal <100     HTN (hypertension)     Ascending aortic aneurysm (H)     Non-rheumatic aortic regurgitation     Lower GI bleed     Acute embolic stroke (H)       ASSESSMENT:  83M with L sided stroke & >50% L ICA stenosis with lesions that extend outside the area of the MCA no with worsening deficits.      PLAN:  Discussed with neuro - check MRI today  ?L ICA stenosis source of CVA  Pt high risk for surgery with severe deficits at this time  Continue ASA & Plavix when able  Will follow up further imaging      Mitzy Zhao MD  Vascular Surgery Fellow  [facility]   Pager: [phone]

## 2019-07-18 NOTE — PROGRESS NOTES
VASCULAR SURGERY PROGRESS NOTE    Subjective:  MRI last night with new punctate lesions on the L and now with lesion in R frontal lobe. Still severely aphasic. RUE strength appears mildly improved.    Objective:    Intake/Output Summary (Last 24 hours) at 7/18/2019 0906  Last data filed at 7/18/2019 0600  Gross per 24 hour   Intake 800 ml   Output --   Net 800 ml     Labs:  ROUTINE IP LABS (Last four results)  BMP  Recent Labs   Lab 07/18/19  0740 07/17/19  1226 07/13/19  1223    141 141   POTASSIUM 3.8 4.6 4.3   CHLORIDE 113* 109 109   WILLIAM 8.2* 8.8 8.6   CO2 23 26 27   BUN 19 24 16   CR 1.17 1.39* 1.20   GLC 92 164* 91     CBC  Recent Labs   Lab 07/18/19  0740 07/17/19  1226 07/13/19  1223   WBC 5.3 5.3 5.9   RBC 3.37* 3.90* 3.75*   HGB 10.6* 12.1* 11.7*   HCT 31.6* 37.3* 36.6*   MCV 94 96 98   MCH 31.5 31.0 31.2   MCHC 33.5 32.4 32.0   RDW 16.8* 16.5* 16.1*    199 235     INR  Recent Labs   Lab 07/17/19  1226 07/13/19  1223   INR 1.06 1.04     PHYSICAL EXAM:  /67 (BP Location: Left arm)   Pulse 52   Temp 98.2  F (36.8  C) (Oral)   Resp 16   Wt 81.9 kg (180 lb 9.6 oz)   SpO2 98%   BMI 26.67 kg/m    General: Appropriate. No acute distress  Skin: Color appropriate for race, warm, dry.  Respiratory: The patient does not require supplemental oxygen. Breathing unlabored  GI:  Abdomen soft, nontender to light palpation.  Neurology: severe aphasic, RUE strength 3/5      ASSESSMENT:  83M now with B/L strokes & >50% B/L ICA stenosis      PLAN:  MRI findings suggestive of B/L infarcts  Etiology unlikely to be carotid disease in that case  Echo from 7/13 negative  Defer to neurology    Mitzy Zhao MD  Vascular Surgery Fellow  Pgr [phone]

## 2019-07-18 NOTE — PLAN OF CARE
[id number] RN. A&O. HTN noted, within parameters. AOVSS on RA. Denies pain. Neuros: WFD, RUE, RLE hemiparesis. R pronator drift. GRAZYNA. Tele SR. Plan to start eliquis in AM. Suspected rash reaction to keppra improving.

## 2019-07-18 NOTE — PROGRESS NOTES
Update: Notified of rash after receiving Keppra which Neuro had empirically ordered.  EEG results reviewed and was negative for seizures.    - d/c Javier Farias MD  Hospitalist  [phone]

## 2019-07-18 NOTE — PLAN OF CARE
Discharge Planner PT   Patient plan for discharge: agreeable to ARU  Current status: PT orders received, eval completed, treatment initiated. Patient is 83 y.o. M with recent admission to [facility] 7/13-7/15 for embolic stroke; admitted to [facility] 7/17 presenting with R facial droop and aphasia, MRI revealed new punctate areas of restricted diffusion in L frontal and parietal lobes, new restricted diffusion in R frontal lobe in addition to recent L MCA infarct that was present on 7/13 scan. Pt with difficulty communicating due to aphasia, able to answer yes/no with extra time. Living environment and PLOF from pat, spouse and chart. Pt lives with spouse in house with 3 steps to enter with L rail and flight of steps to lower level. Previously ind with all mobility, does have 4WW from previous knee surgery.   Pt received supine in bed, agreeable to PT. Spouse present and supportive. Performed supine>sit with HOB elevated and SBA. Sit>stand with FWW and Jeff, needs assist for R hand placement on walker. Ambulated 50' with FWW and ModA with 2nd assist for IV pole mgmt with assist needed to maintain R hand on walker; decreased clearance R LE with decreased step length R LE, tendency for LOB R needing assist of PT to recover. SpO2 97 on RA post gait. Engaged in seated LE exercises in recliner. Pt sitting up in recliner with alarm on, all needs in reach upon departure of PT. RN updated.     Barriers to return to prior living situation: stairs, current level of assist, high fall risk  Recommendations for discharge: ARU  Rationale for recommendations: Patient previously independent with all mobility, now needing Min A for transfers, Mod A for ambulation. Patient will benefit from continued skilled therapies in multidisciplinary, intensive environment in order to progress safety and independence with mobility prior to returning home. Anticipate patient will tolerate 3 hrs of therapy per day.        Entered by: Hannah Dunlap 07/18/2019  10:38 AM

## 2019-07-18 NOTE — PROGRESS NOTES
07/18/19 1000   Quick Adds   Type of Visit Initial PT Evaluation   Living Environment   Lives With spouse   Living Arrangements house   Home Accessibility stairs to enter home;stairs within home   Number of Stairs, Main Entrance 3   Stair Railings, Main Entrance railing on left side (ascending)   Number of Stairs, Within Home, Primary other (see comments)  (flight to lower level TV room and second shower)   Transportation Anticipated family or friend will provide   Living Environment Comment Walk in shower stall (*with small lip)   Self-Care   Usual Activity Tolerance good   Current Activity Tolerance fair   Equipment Currently Used at Home none  (does own 4WW from prior knee surgery)   Functional Level Prior   Ambulation 0-->independent   Transferring 0-->independent   Toileting 0-->independent   Bathing 0-->independent   Fall history within last six months yes   Number of times patient has fallen within last six months 1   General Information   Onset of Illness/Injury or Date of Surgery - Date 07/17/19   Referring Physician Tosha Farias MD   Patient/Family Goals Statement to ultimately be able to return home, open to rehab to move toward this goal   Pertinent History of Current Problem (include personal factors and/or comorbidities that impact the POC) Patient is 83 y.o. M with recent admission to [facility] 7/13-7/15 for embolic stroke; admitted to [facility] 7/17 presenting with R facial droop and aphasia, MRI revealed new punctate areas of restricted diffusion in L frontal and parietal lobes, new restricted diffusion in R frontal lobe in addition to recent L MCA infarct that was present on 7/13 scan.    Precautions/Limitations fall precautions   General Info Comments Activity: Up with assist   Cognitive Status Examination   Orientation   (difficulty assessing due to aphasia)   Level of Consciousness lethargic/somnolent  (difficulty keeping eyes open initially in bed)   Follows Commands and Answers Questions 75%  of the time;able to follow single-step instructions   Personal Safety and Judgment impaired   Pain Assessment   Patient Currently in Pain   (difficulty assessing due to aphasia, does not appear distres)   Posture    Posture Forward head position;Protracted shoulders   Range of Motion (ROM)   ROM Comment ROM appears grossly WFL   Strength   Strength Comments R UE weakness- lacking  strength, needing assist to keep hand on walker; limited R UE AROM at shoulder and elbow due to weakness; hip flexion 4/5 L 4-/5 R, knee extension 5/5 B   Bed Mobility   Bed Mobility Comments Supine>sit with HOB elevated and SBA with use of rail, needs extra time   Transfer Skills   Transfer Comments Sit>stand with Jeff and CGA of second person, needs assist to place R hand on walker   Gait   Gait Comments Ambulated with FWW and ModA with assist to maintain R hand on walker and assist to manage/navigate walker. pattern appears hemiparetic R with R LE externally rotated and with short steps, minimal clearnce of floor. Demos tendency for LOB R with ambulaiton   Balance   Balance Comments Fair - standing balance with FWW, poor balance ambulating with FWW   Coordination   Coordination Comments intially overshooting with finger-nose L UE, corrected with repeated trial, heel-shin limited due to ROM limitations   Modality Interventions   Planned Modality Interventions Cryotherapy   General Therapy Interventions   Planned Therapy Interventions balance training;bed mobility training;gait training;motor coordination training;neuromuscular re-education;ROM;strengthening;stretching;transfer training;home program guidelines;progressive activity/exercise   Clinical Impression   Criteria for Skilled Therapeutic Intervention yes, treatment indicated   PT Diagnosis difficulty ambulating   Influenced by the following impairments impaired balance, decreased R side strength UE>LE, lacking R  strength, decreased activity tolerance   Functional  "limitations due to impairments difficulty with bed mobility, transfers, ambulation and stairs.    Clinical Presentation Evolving/Changing  (recent hx of stroke with <1 wk ago with new strokes )   Clinical Presentation Rationale clinical judgement   Clinical Decision Making (Complexity) Low complexity   Therapy Frequency 2x/day   Predicted Duration of Therapy Intervention (days/wks) 4 days   Anticipated Equipment Needs at Discharge   (none, rec ARU)   Anticipated Discharge Disposition Acute Rehabilitation Facility   Risk & Benefits of therapy have been explained Yes   Patient, Family & other staff in agreement with plan of care Yes   Clinical Impression Comments Patient previously independent with all mobility, now needing Min A for transfers, Mod A for ambulation. Patient will benefit from continued skilled therapies in multidisciplinary, intensive environment in order to progress safety and independence with mobility prior to returning home. Anticipate patient will tolerate 3 hrs of therapy per day.    [facility] [employer] TM \"6 Clicks\"   2016, Trustees of [facility], under license to [employer].  All rights reserved.   6 Clicks Short Forms Basic Mobility Inpatient Short Form   [facility]  \"6 Clicks\" V.2 Basic Mobility Inpatient Short Form   1. Turning from your back to your side while in a flat bed without using bedrails? 3 - A Little   2. Moving from lying on your back to sitting on the side of a flat bed without using bedrails? 2 - A Lot   3. Moving to and from a bed to a chair (including a wheelchair)? 3 - A Little   4. Standing up from a chair using your arms (e.g., wheelchair, or bedside chair)? 3 - A Little   5. To walk in hospital room? 2 - A Lot   6. Climbing 3-5 steps with a railing? 2 - A Lot   Basic Mobility Raw Score (Score out of 24.Lower scores equate to lower levels of function) 15   Total Evaluation Time   Total Evaluation Time (Minutes) 12     "

## 2019-07-18 NOTE — ADDENDUM NOTE
Encounter addended by: Joaquin Acuna, SLP on: 7/18/2019 11:00 AM   Actions taken: Sign clinical note, Flowsheet accepted

## 2019-07-18 NOTE — PROGRESS NOTES
07/18/19 0937   General Information   Onset Date 07/17/19   Start of Care Date 07/18/19   Referring Physician Tosha Farias MD   Patient Profile Review/OT: Additional Occupational Profile Info See Profile for full history and prior level of function   Patient/Family Goals Statement None stated    Swallowing Evaluation Bedside swallow evaluation   Behaviorial Observations WFL (within functional limits);Alert  (Aphasia )   Mode of current nutrition NPO   Respiratory Status Room air   Comments Tk Richmond is an 83 year old male with hx of recurrent CVAs including recent hospitalization (7/13-7/15) for embolic stroke of undetermined source who presents with right facial droop and expressive aphasia, and is being admitted for suspected recurrent stroke. During recent admission to [facility] pt was evaluated by SLP department for speech/language evaluation. Since this time pt has developed significantly worse dysarthria and aphasia. Pt denies that swallowing has been difficult. Pt's wife was present and supportive towards end of evaluation    Clinical Swallow Evaluation   Oral Musculature anomalies present   Structural Abnormalities none present   Dentition present and adequate   Mucosal Quality adequate   Oral Labial Strength and Mobility impaired retraction;impaired pursing;impaired seal;impaired coordination   Lingual Strength and Mobility impaired protrusion;impaired anterior elevation;impaired right lateral movement;impaired left lateral movement;impaired coordination   Buccal Strength and Mobility impaired   Laryngeal Function Cough;Swallow;Voicing initiated   Oral Musculature Comments Right facial droop (moderate), dysarthria and aphasic   Additional Documentation Yes   Swallow Eval   Feeding Assistance set up only required   Clinical Swallow Eval: Thin Liquid Texture Trial   Mode of Presentation, Thin Liquids cup;straw;self-fed   Volume of Liquid or Food Presented 8 oz    Oral Phase of Swallow WFL   Oral  Residue right lip drooling  (x 1 )   Pharyngeal Phase of Swallow intact   Diagnostic Statement Delayed cough with straw sips, otherwise pt tolerated 8 oz of thin liquids via cup sip without overt s/sx of aspiration, changes in breath sounds or vocal quality    Clinical Swallow Eval: Puree Solid Texture Trial   Mode of Presentation, Puree spoon;self-fed   Volume of Puree Presented 4 oz    Oral Phase, Puree WFL;Residue in oral cavity   Oral Residue, Puree right anterior lateral sulci   Pharyngeal Phase, Puree intact   Diagnostic Statement Reduced sensation, right lip. Adequate manipulation and oral clearance, no s/sx of aspiration    Clinical Swallow Eval: Solid Food Texture Trial   Mode of Presentation, Solid self-fed   Volume of Solid Food Presented 2 crackers    Oral Phase, Solid WFL;Residue in oral cavity   Oral Residue, Solid right anterior lateral sulci;mid posterior tongue   Pharyngeal Phase, Solid intact   Diagnostic Statement Slightly prolonged manipulation, bolus formation and oral clearance, no s/sx of aspiration    Swallow Compensations   Swallow Compensations Alternate viscosity of consistencies;Pacing   Results No difficulties noted   Esophageal Phase of Swallow   Patient reports or presents with symptoms of esophageal dysphagia No   General Therapy Interventions   Planned Therapy Interventions Dysphagia Treatment   Dysphagia treatment Oropharyngeal exercise training;Modified diet education;Instruction of safe swallow strategies   Swallow Eval: Clinical Impressions   Skilled Criteria for Therapy Intervention Skilled criteria met.  Treatment indicated.   Functional Assessment Scale (FAS) 4   Treatment Diagnosis Mild-moderate oropharyngeal dysphagia    Diet texture recommendations Dysphagia diet level 3;Thin liquids  (No straws)   Recommended Feeding/Eating Techniques alternate between small bites and sips of food/liquid;check mouth frequently for oral residue/pocketing;no straws;small sips/bites   Therapy  Frequency Daily   Predicted Duration of Therapy Intervention (days/wks) 1 week   Anticipated Discharge Disposition inpatient rehabilitation facility   Risks and Benefits of Treatment have been explained. Yes   Patient, family and/or staff in agreement with Plan of Care Yes   Clinical Impression Comments Pt seen for clinical swallow evaluation. [facility], no dysphagia at that time. Pt has not developed significnatly worse dysathria, right facial droop and aphasia. Pt tolerated 8 oz of thin liquids via cup sip without overt s/sx of aspiration, changes in breath sounds or vocal quality. Delayed cough x 1 with consecutive straw sips. Pt also consumed 4 oz of puree and 2 crackers, feeding self with left hand. Right lip reduced sensation noted by residue remaining, no significant pocketing. Prolonged manipulation, bolus formation and oral clearance with regular solids d/t right sided weakness, incoodindation and reduced sensation. Recommend dysphagia diet 3 and thin liquids, no straws. Pt must be sitting upright, take small bites/sips, alternate solids and liquids and eat/drink at a slow rate.    Total Evaluation Time   Total Evaluation Time (Minutes) 15

## 2019-07-18 NOTE — PROGRESS NOTES
BULL  I: Per chart review, PT/OT/SLP are all recommending ARU at discharge. BULL sent an page to  ARU Liaison to assess. BULL awaits a c/b after assessment is complete.     P: BULL will continue to follow and assist as needed.    Mariana Be, LSW   *[phone]

## 2019-07-18 NOTE — PLAN OF CARE
[id number]: Pt here with L MCA CVA and new punctate infarcts in L frontal and parietal lobes & R Frontal. Carotid stenosis 50% after cervicocerebral angio. Disoriented to place, time, and situation but difficult to assess d/t severe expressive aphasia. Neuros include RUE/RLE hemiparesis, improving, R facial droop, expressive aphasia, Slurred/garbled/slow speech, R neglect and field cut. VSS. Tele SD. DD3 diet with thin liquids no straws. Takes pills whole with pudding. Up with 2/gb/w. R groin site, WDL. K 4 days, Mg 2.0 within protocol. Incontinent of bladder at times. Denies pain. US complete of L carotid. Vascular surg consult complete, no L CEA,  and they are deferring to neurology. Plan to discharge to ARU pending, page sent to [facility] Liaison to assess.  [id number]: No changes from previous assessment. Neurology planning on possibly stenting patient since Vascular Surgery deferred to them and will not perform L CEA.

## 2019-07-18 NOTE — PLAN OF CARE
Pt. Here with Left MCA. DNR/DNI. NPO diet. Alert and Oriented. Up with lift. Incontinent of B/B. VSS on Ra. Neuros- Right facial droop. Right side hemiparesis. Right pronator drift. Word finding difficulty and slurred speech. Tele SR BBB. Denies pain. Discharge pending.

## 2019-07-18 NOTE — PROGRESS NOTES
[facility]  Hospitalist Progress Note  Presley Fernando MD  07/18/2019    Assessment & Plan   Tk Richmond is an 83 year old male with hx of recurrent CVAs including recent hospitalization (7/13-7/15) for embolic stroke of undetermined source who presents with right facial droop and expressive aphasia, and is being admitted for suspected recurrent stroke     Acute embolic stroke of undetermined source with   History of recurrent CVA (this will be the patient's 4th presentation for stroke)  Left carotid artery stenosis  Dilated ascening aorta  Hospitalized at [facility] (7/13-7/15) for stroke wherein he initially presented with expressive aphasia. Was improving at the time of discharge until 7/17 when he developed acute right-sided facial droop and aphasia. CT head on arrival showed no evidence of new stroke. CTA head/neck showed ischemia in the left MCA superior M2 division territory felt to be an old embolized plaque and unchanged from 2012; it also showed 53% stenosis of R ICA and 54% stenosis of left ICA. While degree of stenoses is only moderate, CTA was reviewed with Radiology who felt that there was a degree of calcified plaque in the left ICA that could be causing emboli. Neurology was notified in the ED, and the patient was taken to IR for cervicocerebral angiography which showed 50% left carotid stenosis without significant intracranial occlusions or stenoses. TTE (7/14) showed LVEF 60-65% with grade I diastolic dysfunction, severely dilated ascending aorta, 1+ TR, and 1-2+ AI. Negative bubble study. Lipids well controlled: Tchol 132, HDL 71, LDL 52. A1c 5.2.  - Residual slurred speech and word-finding difficulty  - seen by speech an on DD3 diet  - Brain MRI confirmed left MCA M2 distribution CVA  - EEG shows no seizure activity, keppra discontinued due to rash  - Left carotid US ordered showed 50-69% irregular plaque  - Vascular Surgery consult reviewed, he had evidence of bi-  hemispheric strokes, acute and chronic.  - Patient was discharged with 30-day event monitor on 7/17 with plan to pursue an implantable loop monitor if negative  - Neurology (Dr. Sibley) to have the JULIO clinical trial contact patient  - PT/OT/SLP, recommending ARU  - Neurology following, appreciate assistance     Essential hypertension  - Holding PTA lisinopril for permissive hypertension  - Consider alternative agent due to elevated creatinine     Acute kidney injury  -- creatinine 1.39 at admission and received contrast  -- creatinine 1.39 >> 1.17, continue IVF  -- hold ace, consider other agents      Major recurrent depressive disorder with anxiety  - resume citalopram       Glaucoma  - Continue  on PTA eye drops     FEN: NPO, NS at 100 ml/h  DVT Prophylaxis: Pneumatic Compression Devices  Code Status: DNR / DNI     Disposition:  ARU vs TCU on 7/19 vs 7/20    Interval History   -- chart reviewed  -- appreciate vascular surgery and neurology consultations  -- angiogram reviewed  -- wife at bedside, still quite aphasic    -Data reviewed today: I reviewed all new labs and imaging over the last 24 hours. I personally reviewed no images or EKG's today.    Physical Exam   Heart Rate: 78, Blood pressure 138/67, pulse 52, temperature 98.2  F (36.8  C), temperature source Oral, resp. rate 16, weight 81.9 kg (180 lb 9.6 oz), SpO2 98 %.  Vitals:    07/17/19 1225 07/18/19 0500   Weight: 79.9 kg (176 lb 3.2 oz) 81.9 kg (180 lb 9.6 oz)     Vital Signs with Ranges  Temp:  [98.2  F (36.8  C)-99.6  F (37.6  C)] 98.2  F (36.8  C)  Pulse:  [52-84] 52  Heart Rate:  [63-91] 78  Resp:  [10-22] 16  BP: (118-210)/() 138/67  SpO2:  [93 %-100 %] 98 %  I/O's Last 24 hours  I/O last 3 completed shifts:  In: 800 [I.V.:800]  Out: -     Constitutional: Awake, alert, cooperative, no apparent distress  Respiratory: Clear to auscultation bilaterally, no crackles or wheezing  Cardiovascular: Regular rate and rhythm, normal S1 and S2, and  no murmur noted  GI: Normal bowel sounds, soft, non-distended, non-tender  Skin/Integumen: No rashes, no cyanosis, no edema  Other:      Medications   All medications were reviewed.    - MEDICATION INSTRUCTIONS -       sodium chloride 100 mL/hr at 07/18/19 0336       aspirin  325 mg Oral Daily    Or     aspirin  300 mg Rectal Daily     clopidogrel  75 mg Oral Daily     sodium chloride (PF)  3 mL Intracatheter Q8H        Data   Recent Labs   Lab 07/18/19  0740 07/17/19  1226 07/13/19  1223   WBC 5.3 5.3 5.9   HGB 10.6* 12.1* 11.7*   MCV 94 96 98    199 235   INR  --  1.06 1.04    141 141   POTASSIUM 3.8 4.6 4.3   CHLORIDE 113* 109 109   CO2 23 26 27   BUN 19 24 16   CR 1.17 1.39* 1.20   ANIONGAP 7 6 5   WILLIAM 8.2* 8.8 8.6   GLC 92 164* 91   TROPI  --   --  <0.015       Recent Results (from the past 24 hour(s))   MR Brain w/o Contrast    Narrative    MRI BRAIN WITHOUT CONTRAST  7/17/2019 5:40 PM    HISTORY:  Stroke, follow up; L MCA syndrome    TECHNIQUE:  Multiplanar, multisequence MRI of the brain without  gadolinium IV contrast material.    COMPARISON:  MR scan dated 7/13/2018    FINDINGS:  There is diffuse parenchymal volume loss.  White matter  changes are present in the cerebral hemispheres that are consistent  with small vessel ischemic disease in this age patient. There are  multiple areas of restricted diffusion in the left middle cerebral  artery distribution. The largest area is in the region of the left  frontal operculum. This measures about 1.7 cm in transverse dimension  by 1 cm in AP dimension. Multiple additional punctate areas of  restricted diffusion are seen in the cortex and white matter of the  left frontal and left parietal lobes. A small area of restricted  diffusion is seen in the cortex of the right frontal lobe. There are  T2 hyperintensities in the white matter and cortex of the left frontal  left parietal lobes consistent with chronic white matter and cortical  infarcts in the  left middle cerebral artery distribution. There is a  small susceptibility hypointensity in the left temporal-parietal  region. This is consistent with hemosiderin deposition. The facial  structures appear normal. The arteries at the base of the brain and  the dural venous sinuses appear patent.      Impression    IMPRESSION:    1. Study is consistent with recent areas of infarction in the left  middle cerebral artery distribution. The largest area of recent  infarction is in the left frontal operculum. This was present on the  scan of 7/13/2019. There are multiple new punctate areas of restricted  diffusion in the white matter and cortex of the left frontal and  parietal lobes.  2. There is also a new area of restricted diffusion in the cortex of  the right frontal lobe.  3. Chronic areas of encephalomalacia are seen in the left frontal and  parietal lobes.  4. Small susceptibility hypointensity in the left temporal-parietal  region consistent with a small punctate area of hemosiderin deposition  secondary to small chronic microhemorrhage.      CELESTE GRIFFIN MD   US Carotid Left    Narrative    BILATERAL CAROTID ULTRASOUND   7/18/2019 8:22 AM     HISTORY:  P/W left MCA syndrome; carotid stenosis moderate versus  severe.    COMPARISON: None.    LEFT CAROTID FINDINGS:  There is moderate-to-severe irregular  atherosclerotic mixed plaque in the proximal internal carotid artery  extending into the mid segment of the internal carotid artery.  Left ICA PSV:  217  cm/sec.  Left ICA EDV:  55 cm/sec.  Left ICA/CCA PSV Ratio:  2.47    These indicate  50 - 69%  diameter stenosis of the left ICA.    Left Vertebral: Antegrade flow.   Left ECA: Antegrade flow.     Causes of Decreased Accuracy:   None.       Impression    IMPRESSION:    50-69% diameter stenosis of the left ICA relative to  the distal ICA diameter.    MD Presley GREY MD  Text Page  (7am to 6pm)

## 2019-07-18 NOTE — PROGRESS NOTES
07/18/19 1336   Quick Adds   Type of Visit Initial Occupational Therapy Evaluation   Living Environment   Lives With spouse   Living Arrangements house   Home Accessibility stairs to enter home   Number of Stairs, Main Entrance 3   Stair Railings, Main Entrance railing on left side (ascending)   Number of Stairs, Within Home, Primary   (flight to lower level TV room and second shower)   Transportation Anticipated family or friend will provide   Living Environment Comment Walk in shower stall (*with small lip)   Self-Care   Usual Activity Tolerance good   Current Activity Tolerance fair   Equipment Currently Used at Home none  (does own 4WW from prior knee surgery)   Functional Level   Ambulation 0-->independent   Transferring 0-->independent   Toileting 0-->independent   Bathing 0-->independent   Dressing 0-->independent   Eating 0-->independent   Fall history within last six months yes   Number of times patient has fallen within last six months 1   Prior Functional Level Comment IND with mobility and ADLs at baseline, one fall reported tripping over unseen object   General Information   Onset of Illness/Injury or Date of Surgery - Date 07/17/19   Referring Physician Tosha Farias MD   Patient/Family Goals Statement Unable to state d/t aphasia   Additional Occupational Profile Info/Pertinent History of Current Problem Patient is 83 y.o. M [facility]; admitted to [facility] 7/17 presenting with R facial droop and aphasia, MRI revealed new punctate areas of restricted diffusion in L frontal and parietal lobes, new restricted diffusion in R frontal lobe in addition to recent L MCA infarct that was present on 7/13 scan.    Precautions/Limitations fall precautions   Cognitive Status Examination   Orientation   (unable to assess d/t aphasia)   Level of Consciousness alert   Cognitive Comment Cognition appears intact   Visual Perception   Visual Perception Comments pt  wears glasses but does not have here. Appears to have some nystagmus at times   Sensory Examination   Sensory Comments some numbness in R hand   Range of Motion (ROM)   ROM Comment R shoulder flexion 100 degrees; and elbow flex/ext, LUE WNL   Strength   Strength Comments RUE: elbow flex/ext: 4-/5, LUE 4+/5 biceps/triceps, shoulder flex/ext: 4-/5   Hand Strength   Hand Strength Comments L WFL, n/a on R   Coordination   Functional Limitations Impaired ability to perform bilateral tasks;Fine motor ADL performance impaired;Object transport impaired;Reach to targets impaired;Decreased speed   Coordination Comments Impaired motr control of LUE, unable on RUE   Mobility   Bed Mobility Comments Min A sup>sit   Transfer Skill: Sit to Stand   Level of [place]: Sit/Stand minimum assist (75% patients effort)   Upper Body Dressing   Level of [place]: Dress Upper Body moderate assist (50% patients effort)   Lower Body Dressing   Level of [place]: Dress Lower Body maximum assist (25% patients effort)   Toileting   Level of [place]: Toilet maximum assist (25% patients effort)   Grooming   Level of [place]: Grooming moderate assist (50% patients effort)   Eating/Self Feeding   Level of [place]: Eating minimum assist (75% patients effort)   Activities of Daily Living Analysis   Impairments Contributing to Impaired Activities of Daily Living balance impaired;coordination impaired;ROM decreased;postural control impaired;motor control impaired;strength decreased;sensation decreased   General Therapy Interventions   Planned Therapy Interventions ADL retraining;fine motor coordination training;motor coordination training;neuromuscular re-education;ROM;stretching;strengthening;home program guidelines;progressive activity/exercise;transfer training   Clinical Impression   Criteria for Skilled Therapeutic Interventions Met yes, treatment indicated   OT Diagnosis Decreased functional mobility, ADL's, and IADL's  "  Influenced by the following impairments Impaired motor control, coordination, RUE ROM/function, balance, safety awareness   Assessment of Occupational Performance 3-5 Performance Deficits   Identified Performance Deficits Decreased functional mobility, ADL's and IADL's   Clinical Decision Making (Complexity) Moderate complexity   Therapy Frequency 2x/day   Predicted Duration of Therapy Intervention (days/wks) 7 days   Anticipated Discharge Disposition Acute Rehabilitation Facility   Risks and Benefits of Treatment have been explained. Yes   Patient, Family & other staff in agreement with plan of care Yes   [facility] TM \"6 Clicks\"   2016, Trustees of [facility], under license to [employer].  All rights reserved.   6 Clicks Short Forms Daily Activity Inpatient Short Form   [facility]  \"6 Clicks\" Daily Activity Inpatient Short Form   1. Putting on and taking off regular lower body clothing? 2 - A Lot   2. Bathing (including washing, rinsing, drying)? 2 - A Lot   3. Toileting, which includes using toilet, bedpan or urinal? 2 - A Lot   4. Putting on and taking off regular upper body clothing? 3 - A Little   5. Taking care of personal grooming such as brushing teeth? 3 - A Little   6. Eating meals? 3 - A Little   Daily Activity Raw Score (Score out of 24.Lower scores equate to lower levels of function) 15   Total Evaluation Time   Total Evaluation Time (Minutes) 10     "

## 2019-07-18 NOTE — PLAN OF CARE
Discharge Planner OT   Patient plan for discharge: ARU  Current status: OT eval/tx initiated. Min A for bed mobility, CGA for transfers, then A required to achieve and maintain R grasp on walker. Pt w/ mild LOB w/ mobility w/in room, assist required to manage walker safely. Pt guided through AAROM and AROM HEP for RUE to improve ROM/function.   Pt has noted RUE hemiparesis, severe expressive aphasia (which appeared to get better throughout the session), likely visual deficits, but difficult to assess d/t aphasia.   Barriers to return to prior living situation: neuro deficits mentioned above, high fall risk, current level of A   Recommendations for discharge: ARU  Rationale for recommendations: Pt will require continued skilled therapy intervention at discharge to improve deficits prior to returning home. Anticipate pt will tolerate 3 hrs of therapy daily and will require 3 disciplines. Pt has very supportive wife and is very motivated to work w/ therapy.       Entered by: Jennifer Guzman 07/18/2019 4:49 PM

## 2019-07-19 ENCOUNTER — APPOINTMENT (OUTPATIENT)
Dept: OCCUPATIONAL THERAPY | Facility: CLINIC | Age: 84
DRG: 065 | End: 2019-07-19
Payer: MEDICARE

## 2019-07-19 ENCOUNTER — APPOINTMENT (OUTPATIENT)
Dept: PHYSICAL THERAPY | Facility: CLINIC | Age: 84
DRG: 065 | End: 2019-07-19
Payer: MEDICARE

## 2019-07-19 ENCOUNTER — APPOINTMENT (OUTPATIENT)
Dept: SPEECH THERAPY | Facility: CLINIC | Age: 84
DRG: 065 | End: 2019-07-19
Payer: MEDICARE

## 2019-07-19 LAB
ANION GAP SERPL CALCULATED.3IONS-SCNC: 7 MMOL/L (ref 3–14)
BUN SERPL-MCNC: 18 MG/DL (ref 7–30)
CALCIUM SERPL-MCNC: 8.1 MG/DL (ref 8.5–10.1)
CHLORIDE SERPL-SCNC: 114 MMOL/L (ref 94–109)
CO2 SERPL-SCNC: 23 MMOL/L (ref 20–32)
CREAT SERPL-MCNC: 1.16 MG/DL (ref 0.66–1.25)
ERYTHROCYTE [DISTWIDTH] IN BLOOD BY AUTOMATED COUNT: 16.8 % (ref 10–15)
GFR SERPL CREATININE-BSD FRML MDRD: 58 ML/MIN/{1.73_M2}
GLUCOSE SERPL-MCNC: 89 MG/DL (ref 70–99)
HCT VFR BLD AUTO: 31.4 % (ref 40–53)
HGB BLD-MCNC: 10.5 G/DL (ref 13.3–17.7)
INTERPRETATION ECG - MUSE: NORMAL
MCH RBC QN AUTO: 31.5 PG (ref 26.5–33)
MCHC RBC AUTO-ENTMCNC: 33.4 G/DL (ref 31.5–36.5)
MCV RBC AUTO: 94 FL (ref 78–100)
PLATELET # BLD AUTO: 160 10E9/L (ref 150–450)
POTASSIUM SERPL-SCNC: 3.9 MMOL/L (ref 3.4–5.3)
RBC # BLD AUTO: 3.33 10E12/L (ref 4.4–5.9)
SODIUM SERPL-SCNC: 144 MMOL/L (ref 133–144)
WBC # BLD AUTO: 5.3 10E9/L (ref 4–11)

## 2019-07-19 PROCEDURE — 25000132 ZZH RX MED GY IP 250 OP 250 PS 637: Mod: GY | Performed by: INTERNAL MEDICINE

## 2019-07-19 PROCEDURE — 36415 COLL VENOUS BLD VENIPUNCTURE: CPT | Performed by: INTERNAL MEDICINE

## 2019-07-19 PROCEDURE — 99232 SBSQ HOSP IP/OBS MODERATE 35: CPT | Performed by: INTERNAL MEDICINE

## 2019-07-19 PROCEDURE — 97530 THERAPEUTIC ACTIVITIES: CPT | Mod: GP

## 2019-07-19 PROCEDURE — 97535 SELF CARE MNGMENT TRAINING: CPT | Mod: GO

## 2019-07-19 PROCEDURE — 99232 SBSQ HOSP IP/OBS MODERATE 35: CPT | Mod: GC | Performed by: PSYCHIATRY & NEUROLOGY

## 2019-07-19 PROCEDURE — 92526 ORAL FUNCTION THERAPY: CPT | Mod: GN

## 2019-07-19 PROCEDURE — 25000132 ZZH RX MED GY IP 250 OP 250 PS 637: Mod: GY | Performed by: PSYCHIATRY & NEUROLOGY

## 2019-07-19 PROCEDURE — 12000000 ZZH R&B MED SURG/OB

## 2019-07-19 PROCEDURE — 97112 NEUROMUSCULAR REEDUCATION: CPT | Mod: GO

## 2019-07-19 PROCEDURE — 92526 ORAL FUNCTION THERAPY: CPT | Mod: GN | Performed by: SPEECH-LANGUAGE PATHOLOGIST

## 2019-07-19 PROCEDURE — 92507 TX SP LANG VOICE COMM INDIV: CPT | Mod: GN | Performed by: SPEECH-LANGUAGE PATHOLOGIST

## 2019-07-19 PROCEDURE — 25800030 ZZH RX IP 258 OP 636: Performed by: INTERNAL MEDICINE

## 2019-07-19 PROCEDURE — 80048 BASIC METABOLIC PNL TOTAL CA: CPT | Performed by: INTERNAL MEDICINE

## 2019-07-19 PROCEDURE — 85027 COMPLETE CBC AUTOMATED: CPT | Performed by: INTERNAL MEDICINE

## 2019-07-19 PROCEDURE — 97116 GAIT TRAINING THERAPY: CPT | Mod: GP

## 2019-07-19 PROCEDURE — 92523 SPEECH SOUND LANG COMPREHEN: CPT | Mod: GN | Performed by: SPEECH-LANGUAGE PATHOLOGIST

## 2019-07-19 RX ADMIN — MULTIPLE VITAMINS W/ MINERALS TAB 1 TABLET: TAB at 09:05

## 2019-07-19 RX ADMIN — Medication 1 TABLET: at 21:16

## 2019-07-19 RX ADMIN — LOVASTATIN 40 MG: 20 TABLET ORAL at 21:15

## 2019-07-19 RX ADMIN — CLOPIDOGREL BISULFATE 75 MG: 75 TABLET ORAL at 09:05

## 2019-07-19 RX ADMIN — Medication 1 CAPSULE: at 14:52

## 2019-07-19 RX ADMIN — Medication 1 CAPSULE: at 21:16

## 2019-07-19 RX ADMIN — Medication 1 CAPSULE: at 18:31

## 2019-07-19 RX ADMIN — CYANOCOBALAMIN TAB 1000 MCG 1000 MCG: 1000 TAB at 09:05

## 2019-07-19 RX ADMIN — CITALOPRAM HYDROBROMIDE 40 MG: 20 TABLET ORAL at 09:05

## 2019-07-19 RX ADMIN — Medication 1 TABLET: at 09:05

## 2019-07-19 RX ADMIN — Medication 1 CAPSULE: at 09:05

## 2019-07-19 RX ADMIN — SODIUM CHLORIDE: 9 INJECTION, SOLUTION INTRAVENOUS at 01:11

## 2019-07-19 RX ADMIN — SODIUM CHLORIDE: 9 INJECTION, SOLUTION INTRAVENOUS at 16:49

## 2019-07-19 RX ADMIN — ASPIRIN 325 MG ORAL TABLET 325 MG: 325 PILL ORAL at 09:05

## 2019-07-19 ASSESSMENT — ACTIVITIES OF DAILY LIVING (ADL)
ADLS_ACUITY_SCORE: 21

## 2019-07-19 NOTE — PLAN OF CARE
Discharge Planner SLP   Patient plan for discharge: Not discussed this date.   Current status: Swallow Tx provided this AM. SLP present during med pass with RN. Pt tolerated meds crushed in pureed and whole with sip of thin by cup. Pt demonstrated wet vocal quality x1 secondary to large consecutive sips by cup. Pt cued to clear throat resulting in burp. Pt tolerated x2 crackers self-feeding with left hand and ~5oz of thin by cup with no additional changes in wet vocal quality, nor s/sx of aspiration. Prolonged oral manipulation noted with very mild oral residue with regular solid. Recommend continuation of DD3 Advanced solids with thin liquids by cup. NO STRAWS.Pt sitting upright, small bites/sips, liquid wash, buccal check to ensure oral clearance.   Barriers to return to prior living situation: Below baseline; impaired functional communication.   Recommendations for discharge: ARU  Rationale for recommendations: Skilled ST indicated at next level of care for management of dysphagia and functional communication due to current status below baseline.        Entered by: Lisbeth Maxwell 07/19/2019 9:27 AM

## 2019-07-19 NOTE — PLAN OF CARE
Pt here with L MCA stroke. Alert, word finding difficulties, slurred speech. Right facial droop,RUE hemiparesis, RLE hemiparesis, right field cut. Difficulty with following commands. VSS. Tele SB. DD3 diet with thin liquids. Takes pills with water or applesauce, crushed bigger pills. Up with assist of one with GB and walker. Denies pain. Discharge recommendation to ARU.

## 2019-07-19 NOTE — PLAN OF CARE
RN. A&O. HTN noted, within parameters. A&Ox4, VSS on RA. Denies pain. Neuros: WFD, RUE, RLE hemiparesis. R pronator drift. R field cut, R neglect PERRLA. Tele SR.   Patient has slurred speech, aphagia and word finding difficulty.  DD3 with thin liquids. Neurology consult tomorrow regarding possible stent carotid

## 2019-07-19 NOTE — PROGRESS NOTES
[facility]    Vascular Neurology Progress Note      Hospital Course   Patient has been stable since the admission with no worsening of any symptoms.  Continues to have aphasia and right-sided weakness.  MRI brain was performed which revealed acute ischemic strokes bilaterally.    Interval History   No acute overnight events.    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   Discussed about [place] study with patient's wife.  Patient wife would like her grandson to be called for more information.    Less likely LV athero is the etiology in his case, so no CEA/stent recommended at this point. Medical management would be a best option for him for now.     Recommendations:  -Maintain permissive hypertension with systolic blood pressure up to 180 mm Hg.  -Observe for atrial fibrillation on telemetry.  -Resume patient's aspirin 81 mg daily and Plavix 75 mg daily for 3 months.  -Resume Lovastatin 40 mg daily  -NPO until patient passes bedside swallow test.  -Start DVT prophylaxis with SCDs.  -Dispo: ARU    Discussed with grandson about JULIO.    Will sign off. Please call us with any questions.       Patient follow up:  -Follow-up with Dr Whalen in 4 weeks  -Holter monitor at discharge    The Stroke Staff is Dr. Whalen.    Cristobal Pérez MD  Fellow, Vascular Neurology  Pager:  [phone]    _______________________________________________________________      Medications     Scheduled medications    aspirin  325 mg Oral Daily    Or     aspirin  300 mg Rectal Daily     brinzolamide-brimonidine  1 drop Both Eyes BID     calcium carbonate 600 mg-vitamin D 400 units  1 tablet Oral BID     citalopram  40 mg Oral Daily     clopidogrel  75 mg Oral Daily     cyanocobalamin  1,000 mcg Oral Daily     glucosamine-chondroitin  1 capsule Oral BID     lovastatin  40 mg Oral At Bedtime     multivitamin  with lutein  1 capsule Oral BID     multivitamin w/minerals  1  tablet Oral Daily     sodium chloride (PF)  3 mL Intracatheter Q8H       Infusion medications    - MEDICATION INSTRUCTIONS -       sodium chloride 100 mL/hr at 07/19/19 0111       PRN medications  Current Facility-Administered Medications   Medication Dose Route Frequency     acetaminophen  500 mg Oral Q6H PRN     bisacodyl  10 mg Rectal Daily PRN     calcium carbonate  1,000 mg Oral 4x Daily PRN     glucose  15-30 g Oral Q15 Min PRN    Or     dextrose  25-50 mL Intravenous Q15 Min PRN    Or     glucagon  1 mg Subcutaneous Q15 Min PRN     hydrALAZINE  10-20 mg Intravenous Q1H PRN     labetalol  10-40 mg Intravenous Q10 Min PRN     lidocaine 4%   Topical Q1H PRN     lidocaine (buffered or not buffered)  0.1-1 mL Other Q1H PRN     magnesium sulfate  4 g Intravenous Q4H PRN     - MEDICATION INSTRUCTIONS -   Does not apply Continuous PRN     melatonin  3 mg Oral At Bedtime PRN     naloxone  0.1-0.4 mg Intravenous Q2 Min PRN     ondansetron  4 mg Oral Q6H PRN    Or     ondansetron  4 mg Intravenous Q6H PRN     polyethylene glycol  17 g Oral Daily PRN     potassium chloride  20-40 mEq Oral or Feeding Tube Q2H PRN     potassium chloride with lidocaine  10 mEq Intravenous Q1H PRN     potassium chloride  10 mEq Intravenous Q1H PRN     potassium chloride  20 mEq Intravenous Q1H PRN     potassium chloride  20-40 mEq Oral Q2H PRN     senna-docusate  1 tablet Oral BID PRN    Or     senna-docusate  2 tablet Oral BID PRN     sodium chloride (PF)  3 mL Intracatheter q1 min prn       Allergies   Allergies   Allergen Reactions     Contrast Dye Rash       Physical Exam     Temp:  [97.9  F (36.6  C)-98.7  F (37.1  C)] 98.3  F (36.8  C)  Heart Rate:  [59-72] 59  Resp:  [16] 16  BP: (126-150)/(65-81) 150/81  SpO2:  [94 %-98 %] 98 %    General:  patient lying in bed without any acute distress    HEENT:  normocephalic/atraumatic  Cardio:  RRR  Pulmonary:  no respiratory distress  Abdomen:  soft  Extremities:  no edema  Skin:  intact      Neurologic  Mental Status:  follows commands, Alert oriented to self, dysarthric speech with moderate degree of receptive and expressive aphasia  Cranial Nerves:  PERRL, EOMI with normal smooth pursuit, facial sensation intact and symmetric, hearing not formally tested but intact to conversation, palate elevation symmetric and uvula midline, tongue protrusion midline, Left facial droop, dysarthria  Motor:  normal muscle tone and bulk, no abnormal movements, Strength 5/5 left side extremities, 4/5 right upper extremity and 4+/5 right lower extremity  Reflexes:  no clonus, toes down-going  Sensory:  Decreased sensations over the right arm   Coordination:  normal finger-to-nose and heel-to-shin bilaterally without dysmetria  Station/Gait:  deferred      Data     Imaging  I personally reviewed      Labs:  CBC:  Recent Labs   Lab 07/19/19  0756 07/18/19  0740 07/17/19  1226   WBC 5.3 5.3 5.3   RBC 3.33* 3.37* 3.90*   HGB 10.5* 10.6* 12.1*   HCT 31.4* 31.6* 37.3*    173 199       Basic Metabolic Panel:   Recent Labs   Lab Test 07/19/19  0756 07/18/19  0740    143   POTASSIUM 3.9 3.8   CHLORIDE 114* 113*   CO2 23 23   BUN 18 19   CR 1.16 1.17   GLC 89 92   WILLIAM 8.1* 8.2*       Liver panel:  No lab results found.    INR:  Recent Labs   Lab Test 07/17/19  1226 07/13/19  1223 06/28/19  0454   INR 1.06 1.04 1.05        Lipid Profile:  Recent Labs   Lab Test 07/14/19  0635 11/22/17  1048   CHOL 132 146   HDL 71 77   LDL 52 59   TRIG 45 52       A1C:   Recent Labs   Lab Test 07/14/19  0635   A1C 5.2       Troponin I:   Recent Labs   Lab Test 07/13/19  1223   TROPI <0.015

## 2019-07-19 NOTE — PLAN OF CARE
Discharge Planner PT   Patient plan for discharge: agreeable to ARU  Current status: Pt received supine in bed, agreeable to PT. Performed supine>sit with HOB minimally elevated and SBA with verbal cues for increased use of R UE and LE. Sit<>stand with FWW with R platform attachment, Joselyn for R hand placement on/off walker, and assist to ensure safety with sitting. Ambulated 50'x2 with FWW with platform, Joselyn to ensure safe stepping, decreased clearance R LE with decreased step length R LE. Pt demos appropriate WB through R platform following height adjustment and education on use of UE for support. Pt sitting up in recliner with alarm on, all needs in reach upon departure of PT. Difficulty communicating more than 1-3 words at a time throughout session; PT encouraged continued attempts at communication.    Barriers to return to prior living situation: stairs, current level of assist, high fall risk  Recommendations for discharge: ARU  Rationale for recommendations: Patient previously independent with all mobility, now needing Min A for transfers, ambulation. Patient will benefit from continued skilled therapies in multidisciplinary, intensive environment in order to progress safety and independence with mobility prior to returning home. Anticipate patient will tolerate 3 hrs of therapy per day.        Entered by: Lisbeth Salas 07/19/2019 8:28 AM

## 2019-07-19 NOTE — CONSULTS
Care Transition Initial Assessment - RN        Met with: Patient, spouse and grandson.  DATA   Active Problems:    Acute embolic stroke (H)       Cognitive Status: awake, alert and aphasic.        Contact information and PCP information verified: Yes (PCP updated in Epic)  Lives With: spouse   Living Arrangements: house     Insurance concerns: No Insurance issues identified  ASSESSMENT  Patient currently receives the following services:  N/A        Identified issues/concerns regarding health management:  Met with patient and spouse regarding the recommendation of acute rehab after discharge.  They had met with [facility] liason earlier today and stated understanding of the goals of ARC and were in agreement with this discharge plan.  Patient lives with his spouse and she is able to assist after discharge from [facility].  Pt has 3 steps to enter home, but then can be on main level.  Patient would not be able to get into a private car at this time, and therefore wife requests WC transport.  She understands there would be a charge for this and she may speak with SW to determine what that cost may be.    Referral sent to [facility] via DOD.    PLAN  Financial costs for the patient include  .  Patient given options and choices for discharge yes .  Patient/family is agreeable to the plan?  Yes  Patient anticipates discharging to [facility] .        Patient anticipates needs for home equipment: Does not know    Plan/Disposition: ARF   Appointments: TBD      Care  (CTS) will continue to follow as needed.

## 2019-07-19 NOTE — PLAN OF CARE
Discharge Planner SLP   Patient plan for discharge: Wife reported ARU    Current status: SLP: Pt presents with baseline aphasia and dysarthria (reported by SLP on evaluation 7/17) with new apraxia and more severe dysarthria/aphasia (per wife report). Pt alert with wife present for evaluation. Pt unable to effectively communicate wants/needs, but did use gestures/facial expressions and gestured to wife for assistance.  Speech: Dysarthria informally assessed with slurred quality and reduced intelligibility. Unable to formally assess due to apraxia and aphasia. Apraxia screen used with inaccuracies noted for vowel sounds of CVC words and 5/10 words repeated correctly. Increasing word length repetition with 9/10 correct, repeating each word 3 times with 1/10 correct. Language: Receptive skills: one step commands 3/10 correct and frequent perseverations and need for cues to start and stop tasks. Yes/no questions 5/10 correct with self-corrections. Expressively, 4/8 on automatized sequences, 10/10 on phrase completion, 9/20 on responsive naming, 5/15 on picture naming increasing to 10/15 with phonemic cues, and 2 intelligible animals named in 1 minute (several unintelligible attempts). Score of 1 on the BDAE scale. Note that singing happy birthday was independent and intelligible. 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     Barriers to return to prior living situation: Speech/language  Recommendations for discharge: ARU  Rationale for recommendations: Continue ST for swallowing, speech and language goals. Pt will be able to tolerate 3 hours of therapy a day       Entered by: Shanna Grayson 07/19/2019 1:44 PM

## 2019-07-19 NOTE — PLAN OF CARE
Pt here with L MCA CVA. A&O to self. Neuros R hemiparesis, R facial droop, Slurred speech, R neglect, WFD, R field cut. VSS. Tele SB. DD3 diet, thin liquids. Takes pills whole in applesauce. Incontinent of bladder and bowel. Up with A2/GB/W. Denies pain. Discharge pending TCU vs ARU.

## 2019-07-19 NOTE — PLAN OF CARE
Discharge Planner OT   Patient plan for discharge: ARC    Current status: Pt required min A for sit <> stand with cues for safety with FWW. A needed to position RUE in platform walker. Pt required min A for ambulation into bathroom due to lateral lean to R, impaired balance. Pt stood at sink x 6 mins with mod A for balance, lateral lean to R with cues needed for standing balance and posture. Pt completed toilet transfer with mod A for sit <> stand and for positioning of RUE. Poor motor control RUE/LE. Dependent to don/doff briefs. Aphasia noted however able to answer yes/no and occasionally other one worded questions.     Barriers to return to prior living situation: RUE/LE weakness, high fall risk, current level of A for all ADL's and transfers     Recommendations for discharge: ARC    Rationale for recommendations: Pt would benefit from intensive OT services to improve IND with ADL's and transfers as pt was IND prior. Pt will be able to tolerate 3 hours of therapies. Pt motivated to participate in therapy and has good family support. Pt would make good ARC candidate at this time.          Entered by: Thania Singh 07/19/2019 10:17 AM     PM: OT provided AROM/AAROM for RUE including shoulder flexion, elbow flex/ext, forearm supination/pronation. Provided PROM for wrist flex/ext and finger flex/ext. Pt completed 3 sets x 10 reps of each exercise. OT facilitated RUE neuro re ed with use of washcloth on table to reduce friction. Pt able to complete horitzontal adduction with min A, mod A needed for abuction. Pt completed 20 reps of exercise. Facilitated R shoulder stretching as internal rotators tight. High tone noted RUE.

## 2019-07-20 ENCOUNTER — HOSPITAL ENCOUNTER (INPATIENT)
Facility: CLINIC | Age: 84
LOS: 11 days | Discharge: HOME | DRG: 057 | End: 2019-07-31
Attending: PHYSICAL MEDICINE & REHABILITATION | Admitting: PHYSICAL MEDICINE & REHABILITATION
Payer: MEDICARE

## 2019-07-20 ENCOUNTER — APPOINTMENT (OUTPATIENT)
Dept: PHYSICAL THERAPY | Facility: CLINIC | Age: 84
DRG: 065 | End: 2019-07-20
Payer: MEDICARE

## 2019-07-20 ENCOUNTER — APPOINTMENT (OUTPATIENT)
Dept: SPEECH THERAPY | Facility: CLINIC | Age: 84
DRG: 065 | End: 2019-07-20
Payer: MEDICARE

## 2019-07-20 VITALS
OXYGEN SATURATION: 96 % | TEMPERATURE: 98.2 F | RESPIRATION RATE: 16 BRPM | WEIGHT: 188 LBS | BODY MASS INDEX: 27.76 KG/M2 | DIASTOLIC BLOOD PRESSURE: 61 MMHG | SYSTOLIC BLOOD PRESSURE: 127 MMHG | HEART RATE: 52 BPM

## 2019-07-20 DIAGNOSIS — I10 ESSENTIAL HYPERTENSION: ICD-10-CM

## 2019-07-20 DIAGNOSIS — I63.512 ACUTE ISCHEMIC CEREBROVASCULAR ACCIDENT (CVA) INVOLVING LEFT MIDDLE CEREBRAL ARTERY TERRITORY (H): Primary | ICD-10-CM

## 2019-07-20 DIAGNOSIS — R29.898 ARM WEAKNESS: ICD-10-CM

## 2019-07-20 DIAGNOSIS — I63.9 ACUTE EMBOLIC STROKE (H): ICD-10-CM

## 2019-07-20 LAB
ALBUMIN UR-MCNC: 10 MG/DL
APPEARANCE UR: CLEAR
BILIRUB UR QL STRIP: NEGATIVE
COLOR UR AUTO: ABNORMAL
GLUCOSE BLDC GLUCOMTR-MCNC: 107 MG/DL (ref 70–99)
GLUCOSE UR STRIP-MCNC: NEGATIVE MG/DL
HGB UR QL STRIP: NEGATIVE
KETONES UR STRIP-MCNC: NEGATIVE MG/DL
LEUKOCYTE ESTERASE UR QL STRIP: NEGATIVE
MUCOUS THREADS #/AREA URNS LPF: PRESENT /LPF
NITRATE UR QL: NEGATIVE
PH UR STRIP: 5.5 PH (ref 5–7)
RBC #/AREA URNS AUTO: 0 /HPF (ref 0–2)
SOURCE: ABNORMAL
SP GR UR STRIP: 1.01 (ref 1–1.03)
UROBILINOGEN UR STRIP-MCNC: NORMAL MG/DL (ref 0–2)
WBC #/AREA URNS AUTO: <1 /HPF (ref 0–5)

## 2019-07-20 PROCEDURE — 12800006 ZZH R&B REHAB

## 2019-07-20 PROCEDURE — 92526 ORAL FUNCTION THERAPY: CPT | Mod: GN

## 2019-07-20 PROCEDURE — 25000132 ZZH RX MED GY IP 250 OP 250 PS 637: Mod: GY | Performed by: INTERNAL MEDICINE

## 2019-07-20 PROCEDURE — 97112 NEUROMUSCULAR REEDUCATION: CPT | Mod: GP | Performed by: PHYSICAL THERAPIST

## 2019-07-20 PROCEDURE — 81001 URINALYSIS AUTO W/SCOPE: CPT | Performed by: STUDENT IN AN ORGANIZED HEALTH CARE EDUCATION/TRAINING PROGRAM

## 2019-07-20 PROCEDURE — 97530 THERAPEUTIC ACTIVITIES: CPT | Mod: GP | Performed by: PHYSICAL THERAPIST

## 2019-07-20 PROCEDURE — 92507 TX SP LANG VOICE COMM INDIV: CPT | Mod: GN

## 2019-07-20 PROCEDURE — 97116 GAIT TRAINING THERAPY: CPT | Mod: GP | Performed by: PHYSICAL THERAPIST

## 2019-07-20 PROCEDURE — 99238 HOSP IP/OBS DSCHRG MGMT 30/<: CPT | Performed by: INTERNAL MEDICINE

## 2019-07-20 PROCEDURE — 25000132 ZZH RX MED GY IP 250 OP 250 PS 637: Mod: GY | Performed by: STUDENT IN AN ORGANIZED HEALTH CARE EDUCATION/TRAINING PROGRAM

## 2019-07-20 PROCEDURE — 25000132 ZZH RX MED GY IP 250 OP 250 PS 637: Mod: GY | Performed by: PSYCHIATRY & NEUROLOGY

## 2019-07-20 PROCEDURE — 00000146 ZZHCL STATISTIC GLUCOSE BY METER IP

## 2019-07-20 PROCEDURE — 25800030 ZZH RX IP 258 OP 636: Performed by: INTERNAL MEDICINE

## 2019-07-20 RX ORDER — ONDANSETRON 4 MG/1
4 TABLET, ORALLY DISINTEGRATING ORAL EVERY 6 HOURS PRN
Status: DISCONTINUED | OUTPATIENT
Start: 2019-07-20 | End: 2019-07-31 | Stop reason: HOSPADM

## 2019-07-20 RX ORDER — AMLODIPINE BESYLATE 5 MG/1
5 TABLET ORAL DAILY
Status: ON HOLD | DISCHARGE
Start: 2019-07-20 | End: 2019-07-30

## 2019-07-20 RX ORDER — CITALOPRAM HYDROBROMIDE 40 MG/1
40 TABLET ORAL DAILY
Status: DISCONTINUED | OUTPATIENT
Start: 2019-07-21 | End: 2019-07-31 | Stop reason: HOSPADM

## 2019-07-20 RX ORDER — CALCIUM CARBONATE 500 MG/1
500 TABLET, CHEWABLE ORAL DAILY PRN
Status: DISCONTINUED | OUTPATIENT
Start: 2019-07-20 | End: 2019-07-31 | Stop reason: HOSPADM

## 2019-07-20 RX ORDER — SODIUM PHOSPHATE,MONO-DIBASIC 19G-7G/118
1 ENEMA (ML) RECTAL 2 TIMES DAILY
Status: DISCONTINUED | OUTPATIENT
Start: 2019-07-20 | End: 2019-07-31 | Stop reason: HOSPADM

## 2019-07-20 RX ORDER — VIT C/E/ZN/COPPR/LUTEIN/ZEAXAN 60 MG-6 MG
1 CAPSULE ORAL 2 TIMES DAILY
Status: DISCONTINUED | OUTPATIENT
Start: 2019-07-20 | End: 2019-07-31 | Stop reason: HOSPADM

## 2019-07-20 RX ORDER — LANOLIN ALCOHOL/MO/W.PET/CERES
1000 CREAM (GRAM) TOPICAL DAILY
Status: DISCONTINUED | OUTPATIENT
Start: 2019-07-21 | End: 2019-07-31 | Stop reason: HOSPADM

## 2019-07-20 RX ORDER — ASPIRIN 81 MG/1
81 TABLET ORAL DAILY
Status: DISCONTINUED | OUTPATIENT
Start: 2019-07-21 | End: 2019-07-31 | Stop reason: HOSPADM

## 2019-07-20 RX ORDER — AMOXICILLIN 250 MG
1-4 CAPSULE ORAL 2 TIMES DAILY PRN
Status: DISCONTINUED | OUTPATIENT
Start: 2019-07-20 | End: 2019-07-31 | Stop reason: HOSPADM

## 2019-07-20 RX ORDER — LOVASTATIN 20 MG
40 TABLET ORAL AT BEDTIME
Status: DISCONTINUED | OUTPATIENT
Start: 2019-07-20 | End: 2019-07-31 | Stop reason: HOSPADM

## 2019-07-20 RX ORDER — AMLODIPINE BESYLATE 5 MG/1
5 TABLET ORAL DAILY
Status: DISCONTINUED | OUTPATIENT
Start: 2019-07-20 | End: 2019-07-20 | Stop reason: HOSPADM

## 2019-07-20 RX ORDER — CLOPIDOGREL BISULFATE 75 MG/1
75 TABLET ORAL DAILY
Status: ON HOLD | DISCHARGE
Start: 2019-07-21 | End: 2019-10-07

## 2019-07-20 RX ORDER — AMLODIPINE BESYLATE 5 MG/1
5 TABLET ORAL DAILY
Status: DISCONTINUED | OUTPATIENT
Start: 2019-07-21 | End: 2019-07-31 | Stop reason: HOSPADM

## 2019-07-20 RX ORDER — ALUMINA, MAGNESIA, AND SIMETHICONE 2400; 2400; 240 MG/30ML; MG/30ML; MG/30ML
15 SUSPENSION ORAL EVERY 4 HOURS PRN
Status: DISCONTINUED | OUTPATIENT
Start: 2019-07-20 | End: 2019-07-31 | Stop reason: HOSPADM

## 2019-07-20 RX ORDER — ASPIRIN 81 MG/1
81 TABLET ORAL DAILY
Status: DISCONTINUED | OUTPATIENT
Start: 2019-07-21 | End: 2019-07-20 | Stop reason: HOSPADM

## 2019-07-20 RX ORDER — CLOPIDOGREL BISULFATE 75 MG/1
75 TABLET ORAL DAILY
Status: DISCONTINUED | OUTPATIENT
Start: 2019-07-21 | End: 2019-07-31 | Stop reason: HOSPADM

## 2019-07-20 RX ORDER — ACETAMINOPHEN 325 MG/1
325-975 TABLET ORAL EVERY 6 HOURS PRN
Status: DISCONTINUED | OUTPATIENT
Start: 2019-07-20 | End: 2019-07-31 | Stop reason: HOSPADM

## 2019-07-20 RX ADMIN — Medication 1 CAPSULE: at 11:22

## 2019-07-20 RX ADMIN — LOVASTATIN 40 MG: 20 TABLET ORAL at 20:01

## 2019-07-20 RX ADMIN — SODIUM CHLORIDE: 9 INJECTION, SOLUTION INTRAVENOUS at 03:12

## 2019-07-20 RX ADMIN — MULTIPLE VITAMINS W/ MINERALS TAB 1 TABLET: TAB at 09:34

## 2019-07-20 RX ADMIN — CYANOCOBALAMIN TAB 1000 MCG 1000 MCG: 1000 TAB at 09:34

## 2019-07-20 RX ADMIN — Medication 1 TABLET: at 09:34

## 2019-07-20 RX ADMIN — Medication 1 CAPSULE: at 20:01

## 2019-07-20 RX ADMIN — SENNOSIDES AND DOCUSATE SODIUM 1 TABLET: 8.6; 5 TABLET ORAL at 21:31

## 2019-07-20 RX ADMIN — AMLODIPINE BESYLATE 5 MG: 5 TABLET ORAL at 11:22

## 2019-07-20 RX ADMIN — ASPIRIN 325 MG ORAL TABLET 325 MG: 325 PILL ORAL at 09:35

## 2019-07-20 RX ADMIN — Medication 1 CAPSULE: at 09:35

## 2019-07-20 RX ADMIN — CITALOPRAM HYDROBROMIDE 40 MG: 20 TABLET ORAL at 09:34

## 2019-07-20 RX ADMIN — CLOPIDOGREL BISULFATE 75 MG: 75 TABLET ORAL at 09:34

## 2019-07-20 ASSESSMENT — ACTIVITIES OF DAILY LIVING (ADL)
ADLS_ACUITY_SCORE: 21

## 2019-07-20 ASSESSMENT — MIFFLIN-ST. JEOR: SCORE: 1508.67

## 2019-07-20 NOTE — PLAN OF CARE
A/O to self, word finding difficulty, slow speech. AVSS on RA except HTN. R facial droop, R field cut and neglect, RUE paresis, pronator drift present. Tele SR. Denies pain. R groin site puncture, open to air, mild ecchymosis. Incontinent of urine x2. Turn/repo q2h. IV infusing NS 100ml/hr. Plan pending discharge to ARC, will continue to monitor.

## 2019-07-20 NOTE — H&P
Post Admission Physician Evaluation:    I have compared Tk Richmond's condition on admission to acute rehabilitation to that outlined in the preadmission screen. History and physical exam performed by me. No significant differences are identified and the patient remains appropriate for an inpatient rehabilitation facility level of care to manage medical issues and address functional impairments due to ischemic left MCA stroke, right frontal punctate stroke.    Comorbid medical conditions being managed: Hypertension, previous CVA, carotid artery stenosis, dilated ascending aorta, glaucoma, depression, anxiety.    Prior functional level: Patient was previously independent with all ADLs/IADLs, transfers, mobility and gait, without an assistive device. Was driving and active in the community.      Present function: Currently in PT, patient performs bed mobility with rail and standby assist.  He can go from sit to stand with min assist and uses a right platform walker.  He performs gait x50 feet with right platform front wheeled walker with min assist he is limited by safety, strength, balance, and activity tolerance.  In OT, the patient transfers with min assist mod assist for upper body dressing and max assist for lower body dressing, max assist for toileting, mod assist for grooming, and min assist for self-feeding.  In SLP, the patient is noted to have aphasia, new cognitive deficits, and dysphasia.  He is requiring a DDD 3 diet with thin liquids.    Anticipated rehabilitation course: Anticipate that patient will improve to modified independent with basic mobility on 1 level, and be modified independent with contact-guard or standby assist for ADLs and stairs.  Goal will be for him to be able to tolerate the least restrictive diet and to be able to communicate his needs so they can go home with family with outpatient therapies.    Will benefit from intensive rehabilitation includin minutes each of PT,  OT and SLP  Rehabilitation nursing  Close management by physiatry    Prognosis: Rehab prognosis is good.    Estimated length of stay: 10-12 days.      Susana Araiza MD

## 2019-07-20 NOTE — PLAN OF CARE
OT: Attempted to see pt for OT POC.  Pt getting ready for discharge to ARU.  Will discharge pt from IP OT at this time.    Occupational Therapy Discharge Summary    Reason for therapy discharge:    Discharged to acute rehabilitation facility.    Progress towards therapy goal(s). See goals on Care Plan in [facility] electronic health record for goal details.  Goals partially met.  Barriers to achieving goals:   discharge from facility.    Therapy recommendation(s):    Continued therapy is recommended.  Rationale/Recommendations:  Pt would benefit from therapy at ARU to increase endurance and independence to be able to return home..  Pt has shown improvement for completion of bathroom transfers and dressing tasks but still needs assist for these tasks.  Anticipate able to tolerate 3 hours of daily therapy at ARU to make further gains on these skills.

## 2019-07-20 NOTE — PLAN OF CARE
Pt here with L MCA stroke. Alert, word finding difficulties, slurred speech, right UE hemiparesis, lower extremities strength 4/5 bilaterally, right facial droop, right field cut. VSS. DD3 diet with thin liquids, good appetite. Takes pills crushed, smaller pills whole with applesauce. Up with assist of one with GB, sat in chair for meals. Denies pain. Patient discharged to ARU this afternoon, with wheel chair transport.  Dishcharge instructions discussed with patient and spouse.

## 2019-07-20 NOTE — H&P
[facility]   Acute Rehabilitation Unit  Admission History and Physical    CHIEF COMPLAINT   Right body, left brain stroke, status post left MCA M2 distribution CVA    HISTORY OF PRESENT ILLNESS  Tk Richmond is a 83 year old male with a history of recurrent CVAs who presents to the acute rehab unit for rehabilitation following left CVA of undetermined source.  Patient initially presented to [facility] on 7/13 for stroke with  expressive aphasia.  He was improving at the time of discharge 7/15, but then developed an acute right sided facial drop and worsened aphasia on 7/17.  CTA showed ischemia in the left MCA M2 division territory which was felt to be an old embolized plaque and unchanged from 2012, it also demonstrated 53% stenosis of the right ICA and 54% stenosis of the left ICA.  Radiology felt that there is a degree of calcified plaque in the left ICA that could be causing emboli, multiple consults and IR cervicocerebral angiography were obtained but ultimately team did not feel this to be the cause of his stroke so no CEA or stent was not recommended.  TTE on 7/14 showed LV EF of 60 to 65% with grade 1 diastolic dysfunction and severely dilated ascending aorta as well as a negative bubble study.   Patient has significant expressive aphasia and wife was not present so HPI was gathered largely from chart review.  However patient was able to express that he has had some new urinary urgency.  He denies prostate history or previous urinary issues.  He denies any pain or bowel issues.    Patients current deficits include: Right upper extremity weakness, dysphasia, dysarthria, word finding difficulty, mobility and ADLs  During her acute hospitalization, patient was seen and evaluated by  PT, OT, SLP and PM&R consult service. Currently the patient is medically appropriate and all specialties collectively recommended that patient would benefit from ongoing therapies  in the acute inpatient rehabilitation setting as well as supervision and management of Rehab Nursing and Rehab MD.       In review of the therapy notes,   Speech: Patient demonstrates aphasia, new cognitive and swallow deficits.  He is currently on the DDL 3 diet with thin liquids.  Goal is to improve communication skills for effective interpersonal interactions to return home.  OT: Patient performs bed mobility with min assist transfers with min assist, upper body dressing with mod assist and lower body dressing with max assist, toileting max assist, grooming mod assist, self-feeding min assist.  Patient is limited by right upper extremity control strength balance and safety.  Goal is to improve ADLs to contact guard/standby assist to allow return home with family.  PT: Patient performs sit to stand with min assist and is utilizing a right platform walker.  Patient can ambulate 50 feet with platform 4 wheeled walker and min assist.  Goal is to improve basic mobility on level with CGA/SBA on stairs to allow return home with wife.        PAST MEDICAL HISTORY  Past Medical History:   Diagnosis Date     Glaucoma      Hyperlipidemia LDL goal < 100      Hypertension      Stroke (H)     2004, speech deficit       SURGICAL HISTORY  Past Surgical History:   Procedure Laterality Date     IR CAROTID CEREBRAL ANGIOGRAM LEFT  7/17/2019       SOCIAL HISTORY  Marital Status:   Living situation: Patient lives with his wife in the home with all needs on the main level and 3 stairs.  Family support: Patient has his wife and supportive family who are local and able to assist as needed he will be able to return home with 24-hour supervision and assist as needed.  Tobacco use: Former cigar smoker quit 47 years ago  Alcohol use: Rarely  Illicit drug use: None  FAMILY HISTORY  Family History   Problem Relation Age of Onset     Breast Cancer Mother      Diabetes No family hx of      Hypertension No family hx of      Cancer -  colorectal No family hx of          PRIOR FUNCTIONAL HISTORY   Pt was independent with all ADLs/IADLs, transfers, mobility and gait.      MEDICATIONS  Medications Prior to Admission   Medication Sig Dispense Refill Last Dose     aspirin (ASA) 81 MG EC tablet Take 1 tablet (81 mg) by mouth daily for 21 days 21 tablet 0      brinzolamide-brimonidine (SIMBRINZA) 1-0.2 % ophthalmic suspension Place 1 drop into both eyes 2 times daily    7/17/2019 at am     Calcium Carbonate-Vitamin D (CALCIUM 600+D PO) Take 1 tablet by mouth 2 times daily At noon and supper   Past Week at Unknown time     citalopram (CELEXA) 40 MG tablet Take 40 mg by mouth daily   7/17/2019 at am     glucosamine-chondroitin 500-400 MG CAPS per capsule Take 1 capsule by mouth 2 times daily At noon and supper   Past Week at Unknown time     lovastatin (MEVACOR) 40 MG tablet Take 40 mg by mouth At Bedtime   7/16/2019 at pm     Multiple Vitamins-Minerals (CENTRUM SILVER ADULT 50+ PO) Take 1 tablet by mouth daily    Past Week at Unknown time     Multiple Vitamins-Minerals (PRESERVISION AREDS 2+MULTI VIT PO) Take 1 tablet by mouth 2 times daily    Past Week at Unknown time     vitamin B-12 (CYANOCOBALAMIN) 1000 MCG tablet Take 1,000 mcg by mouth daily    Past Week at Unknown time     [DISCONTINUED] aspirin (ASA) 325 MG tablet Take 1 tablet (325 mg) by mouth daily 30 tablet 0      [DISCONTINUED] clopidogrel (PLAVIX) 300 MG TABS tablet Take 1 tablet (300 mg) by mouth daily for 21 doses 21 tablet 0 7/17/2019 at am     [DISCONTINUED] lisinopril (PRINIVIL/ZESTRIL) 10 MG tablet Take 1 tablet (10 mg) by mouth daily 30 tablet 0 7/17/2019 at am       ALLERGIES     Allergies   Allergen Reactions     Contrast Dye Rash         REVIEW OF SYSTEMS  A 10 point ROS was performed and negative unless otherwise noted in HPI.       PHYSICAL EXAM  VITAL SIGNS:  There were no vitals taken for this visit.  BMI:  Estimated body mass index is 27.76 kg/m  as calculated from the  "following:    Height as of 7/13/19: 1.753 m (5' 9\").    Weight as of an earlier encounter on 7/20/19: 85.3 kg (188 lb).     General: alert and oriented x 3, NAD  HEENT: AT/NC, PERRL  Pulmonary: Comfortable on room air, no accessory muscle use  Cardiovascular: Regular pulse, extremities warm and well-perfused  Abdominal: Pos bowel sounds, soft, non tender, non distended  Extremities:  no edema, good pulses  MSK/neuro:   Mental Status:  Cooperative, appropriate, extremely pleasant   Cranial Nerves: Extraocular movements intact, Lisette, mild right facial droop, tongue midline   Sensory: no obvious sensory deficits   Strength: 5/5 bilateral lower extremities and left upper extremity.0/5 right wrist flexion and wrist extension.  Bicep extension/flexion and shoulder AB/adduction difficult to assess due to impaired coordination/apraxia   Tone:no increased tone, no clonus    Abnormal movements: none   Coordination: no dysmetria left upper extremity or bilateral lower extremities   Speech: Dysarthric, significant word finding difficulties   Cognition: Difficult to assess due to expressive aphasia, no obvious neglect,  Skin: warm and dry      LABS  Pertinent labs    Lab Results   Component Value Date    WBC 5.3 07/19/2019    HGB 10.5 (L) 07/19/2019    HCT 31.4 (L) 07/19/2019    MCV 94 07/19/2019     07/19/2019     Lab Results   Component Value Date     07/19/2019    POTASSIUM 3.9 07/19/2019    CHLORIDE 114 (H) 07/19/2019    CO2 23 07/19/2019    GLC 89 07/19/2019     Lab Results   Component Value Date    GFRESTIMATED 58 (L) 07/19/2019    GFRESTBLACK 68 07/19/2019     No results found for: AST, ALT, GGT, ALKPHOS, BILITOTAL, BILICONJ, BILIDIRECT, HEATHER  Lab Results   Component Value Date    INR 1.06 07/17/2019     Lab Results   Component Value Date    BUN 18 07/19/2019    CR 1.16 07/19/2019         ASSESSMENT/PLAN:  Tk Richmond is a 83 year old t male  with a past medical history significant for multiple strokes " of unknown etiology who was admitted to the ARU on 7/20/2019  for functional deficits secondary to right body, left brain stroke; status post left MCA M2 distribution CVA.   Current deficits include right upper extremity weakness, dysarthria, dysphasia, and cognition.  Admission to acute inpatient rehab 7/20/2019.    Impairment group code: 01.2    Acute embolic CVA of the left MCA M2 distribution of undetermined etiology with history of recurrent CVAs.  We will continue to monitor risk factors as listed below and treat appropriately.  -Anticoagulation: Continue aspirin plus Plavix for 3 months  -Lipids well controlled total cholesterol 132, LDL 52, will continue lovastatin 40 mg daily  -Hypertension: Holding PTA lisinopril for permissive hypertension, consider alternative agent due to elevated creatinine.  -A1c 5.2      Patient will receive PT, OT and  minutes total on a daily basis, in addition to rehab nursing and close management of physiatrist.      Impairment of ADL's: OT for 60 minutes daily to work on ADL re-training such as grooming, self cares and bathing.    Impairment of mobility:  PT for 60 minutes daily to work on neuromuscular re-education focusing on strength, balance, coordination, and endurance.    Impairment of cognition/language/swallow:  SLP for 60 minutes daily to work on cognitive and linguistic deficits.  Rehab RN for med administration, bladder and bowel regimen, glucose monitoring and wound care.    Adjustment to disability:  Clinical psychology to eval and treat as neccessary  FEN: DD3  Bowel: Continent, PRN senna and Miralax available.  Bladder: Continent, PVR X 2 on admission, bladder program in place if no spontaneous voids.  UA collected for urinary urgency.  DVT Prophylaxis: Lovenox 40mg daily  GI Prophylaxis: None  Code: DNR/DNI  Disposition: Home with family  ELOS: 12 to 16 days.  Rehab prognosis:  Fair    Medical Conditions    Essential hypertension  - Holding PTA lisinopril  for permissive hypertension  - Consider alternative agent due to elevated creatinine     Acute kidney injury  -- creatinine 1.39 at admission and received contrast, improved with IVF  -- hold ace, consider other agents     Major recurrent depressive disorder with anxiety  - continue citalopram       Glaucoma  - Continue  on PTA eye drops    Pt staffed with Dr. Teodora Thomas MD  Physical Medicine & Rehabilitation

## 2019-07-20 NOTE — DISCHARGE SUMMARY
[facility]  Hospitalist Discharge Summary       Date of Admission:  7/17/2019  Date of Discharge:  7/20/2019  Discharging Provider: Paris Moore MD, FACP      Discharge Diagnoses   Right-sided weakness and aphasia, secondary to acute left MCA ischemic stroke in right frontal punctate stroke.  History of recurrent CVA.  Left carotid artery stenosis.  Dilated ascending aorta.  Essential hypertension.  Acute kidney injury, resolved.  Major recurrent depressive disorder with anxiety.  Glaucoma.    Follow-ups Needed After Discharge   Follow-up Appointments     Follow Up and recommended labs and tests      Follow up with primary care provider in 7 days after discharge from   rehab.  Please follow up with neurology in 6-8 weeks .             Unresulted Labs Ordered in the Past 30 Days of this Admission     No orders found from 6/17/2019 to 7/18/2019.          Discharge Disposition   Discharged to rehabilitation facility  Condition at discharge: Good    Hospital Course   Tk Richmond is an 83 year old male with hx of recurrent CVAs including recent hospitalization (7/13-7/15) for embolic stroke of undetermined source who presents with right facial droop and expressive aphasia, and is being admitted for suspected recurrent stroke.  Here are further details regarding his hospitalization:     Acute embolic stroke of undetermined source with   History of recurrent CVA (this was patient's 4th presentation for stroke)  Left carotid artery stenosis  Dilated ascening aorta    Hospitalized at [facility] (7/13-7/15) for stroke wherein he initially presented with expressive aphasia. Was improving at the time of discharge until 7/17 when he developed acute right-sided facial droop and aphasia. CT head on arrival showed no evidence of new stroke. CTA head/neck showed ischemia in the left MCA superior M2 division territory felt to be an old embolized plaque and unchanged from 2012; it also showed 53% stenosis of R  ICA and 54% stenosis of left ICA. While degree of stenoses is only moderate, CTA was reviewed with Radiology who felt that there was a degree of calcified plaque in the left ICA that could be causing emboli. Neurology was notified in the ED, and the patient was taken to IR for cervicocerebral angiography which showed 50% left carotid stenosis without significant intracranial occlusions or stenoses. TTE (7/14) showed LVEF 60-65% with grade I diastolic dysfunction, severely dilated ascending aorta, 1+ TR, and 1-2+ AI. Negative bubble study. Lipids well controlled: Tchol 132, HDL 71, LDL 52. A1c 5.2.  Continues to have Residual slurred speech and word-finding difficulty + RUE weakness  Seen by speech an on DD3 diet    - Brain MRI confirmed left MCA M2 distribution CVA  - EEG showed no seizure activity, keppra discontinued due to rash  - Left carotid US ordered showed 50-69% irregular plaque  - Vascular Surgery was consulted , he had evidence of bi- hemispheric strokes, acute and chronic, so no plan for CEA  - Patient was discharged with 30-day event monitor on 7/17 with plan to pursue an implantable loop monitor if negative  - Neurology (Dr. Sibley) to have the JULIO clinical trial contact patient  - PT/OT/SLP, recommending ARU, he is on DD3 diet  - plan for ASA 81 mg + plavix 75 mg for 3 months per Neirology recommendation , lovastatin 40 mg.  - Started patient on Norvasc 5 mg from today , medication can be further adjusted in rehab , ok to normalize blood pressure now per neurology   - Neurology has signed off, plan for out patient follow up      Essential hypertension  - Not discharging patient on PTA lisinopril due to patient presentation with mild DAVID.  - Start patient on Norvasc 5 mg po daily      Acute kidney injury, resolved.  -- creatinine 1.39 at admission and received contrast, did receive IV fluids , now creatinine in normal range   -- Took patient off Ace Inh and started him on Norvasc for BP      Major  recurrent depressive disorder with anxiety  -- Continue citalopram on discharge        Glaucoma  -- Continue  on PTA eye drops     Patient care was assumed this morning ,was seen and examined on the day of discharge , he is feeling well, continues to have dysarthria and RUE weakness, working with therapy this morning ,very pleasant and cooperative , in agreement with the plan for acute rehab after the discharge. I did review the discharge medications and instructions with the patient and plan for him to follow up with the PCP and neurology after the Rehab .patient was in agreement , he is discharged in stable condition back to acute rehab.    Consultations This Hospital Stay   VASCULAR SURGERY IP CONSULT  PHYSICAL THERAPY ADULT IP CONSULT  OCCUPATIONAL THERAPY ADULT IP CONSULT  SPEECH LANGUAGE PATH ADULT IP CONSULT  SWALLOW EVAL SPEECH PATH AT BEDSIDE IP CONSULT  PATIENT LEARNING CENTER IP CONSULT  CARE TRANSITION RN/SW IP CONSULT  PHYSICAL THERAPY ADULT IP CONSULT  OCCUPATIONAL THERAPY ADULT IP CONSULT  SPEECH LANGUAGE PATH ADULT IP CONSULT    Code Status   DNR/DNI    Time Spent on this Encounter   IParis, personally saw the patient today and spent less than or equal to 30 minutes discharging this patient.     Paris Moore MD, FACP  [facility]  ______________________________________________________________________    Physical Exam   Vital Signs: Temp: 98.3  F (36.8  C) Temp src: Oral BP: 153/78   Heart Rate: 60 Resp: 16 SpO2: 97 % O2 Device: None (Room air)    Weight: 188 lbs 0 oz     Physical Exam   Constitutional: He is oriented to person, place, and time. He appears well-developed and well-nourished.   Elderly, pleasant and cooperative    HENT:   Head: Normocephalic and atraumatic.   Eyes: Pupils are equal, round, and reactive to light. EOM are normal.   Neck: Normal range of motion. Neck supple. No thyromegaly present.   Cardiovascular: Normal rate, regular rhythm and normal heart  sounds.   Pulmonary/Chest: Effort normal and breath sounds normal. No respiratory distress.   Abdominal: Soft. Bowel sounds are normal. He exhibits no distension.   Musculoskeletal: Normal range of motion. He exhibits no edema or tenderness.   Neurological: He is alert and oriented to person, place, and time. He displays normal reflexes. No sensory deficit. He exhibits normal muscle tone. Coordination normal.   RUE weakness 3/5, continues to have dysarthria with speech , RLE , LUE and LLE 5/5. Right Field cut and neglect.   Skin: Skin is warm and dry.   Psychiatric: He has a normal mood and affect. His behavior is normal.   Nursing note and vitals reviewed.           Primary Care Physician   Shyam Mclean    Discharge Orders      General info for SNF    Length of Stay Estimate: Short Term Care: Estimated # of Days <30  Condition at Discharge: Improving  Level of care:skilled   Rehabilitation Potential: Good  Admission H&P remains valid and up-to-date: Yes  Recent Chemotherapy: N/A  Use Nursing Home Standing Orders: Yes     Mantoux instructions    Give two-step Mantoux (PPD) Per Facility Policy Yes     Reason for your hospital stay    You were admitted to the hospital secondary to stroke.     Follow Up and recommended labs and tests    Follow up with primary care provider in 7 days after discharge from rehab.  Please follow up with neurology in 6-8 weeks .     Activity - Up with nursing assistance     Encourage PO fluids     DNR/DNI     Physical Therapy Adult Consult    Evaluate and treat as clinically indicated.    Reason: stroke causing dysphagia and RUE weakness     Occupational Therapy Adult Consult    Evaluate and treat as clinically indicated.    Reason: stroke causing dysphagia and RUE weakness     Speech Language Path Adult Consult    Evaluate and treat as clinically indicated.    Reason:  Dysphagia , recent stroke     Fall precautions     Advance Diet as Tolerated    Follow this diet upon discharge:  Orders Placed This Encounter      Room Service      Combination Diet Dysphagia Diet Level 3: Advanced; Thin Liquids (water, ice chips, juice, milk gelatin, ice cream, etc) (No straws)         Significant Results and Procedures   Results for orders placed or performed during the hospital encounter of 07/17/19   CT Head w/o Contrast    Narrative    CT SCAN OF THE HEAD WITHOUT CONTRAST July 17, 2019 12:37 PM     HISTORY: Code stroke. Right facial droop and right arm weakness and  dysphagia since 11:30 AM. Discharged from [facility]  five days ago for an acute stroke that affected the speech.    TECHNIQUE: Axial images of the head and coronal reformations without  IV contrast material. Radiation dose for this scan was reduced using  automated exposure control, adjustment of the mA and/or kV according  to patient size, or iterative reconstruction technique.    COMPARISON: MRI 7/13/2019. CT scan 2/18/2012.    FINDINGS: There is generalized atrophy of the brain. There is low  attenuation in the white matter of the cerebral hemispheres consistent  with sequelae of small vessel ischemic disease. Areas of  encephalomalacia are again seen in the central and subcortical white  matter of the left frontal and parietal lobes and the left occipital  lobe, unchanged. The recent infarct in the left frontal lobe operculum  is not visible. There is no evidence of intracranial hemorrhage, mass,  acute infarct or anomaly. Calcification is seen in the left sylvian  fissure presumably in a branch of left middle cerebral artery, but  this is unchanged since the CT scan at 2/18/2012.    The visualized portions of the sinuses and mastoids appear normal.  There is no evidence of trauma.      Impression    IMPRESSION:   1. No acute infarct is visible.  2.  Atrophy of the brain. White matter changes consistent with  sequelae of small vessel ischemic disease. This is unchanged.  3. Encephalomalacia in the left frontal, parietal and  occipital lobes  in a predominantly parasagittal distribution.  4. Old calcification in a branch of left middle cerebral artery in the  sylvian fissure, unchanged.     TRAVIS MILLER MD   CTA Head Neck with Contrast    Narrative    CT ANGIOGRAM OF THE HEAD AND NECK WITHOUT AND WITH CONTRAST  7/17/2019  12:49 PM     HISTORY: Code stroke. At 11:30 AM patient developed right facial droop  and right arm weakness. On Plavix for a recent infarct in the left  frontal lobe operculum.    TECHNIQUE:  Precontrast localizing scans were followed by CT  angiography with an injection of 70 mL Omnipaque-350 (accession  [id number]), 50 mL Omnipaque-350 (accession [id number]) IV with scans  through the head and neck.  3D post processing was performed, images  were archived to PACS and used in interpretation of this study.   Estimates of carotid stenoses are made relative to the distal internal  carotid artery diameters except as noted. Radiation dose for this scan  was reduced using automated exposure control, adjustment of the mA  and/or kV according to patient size, or iterative reconstruction  technique.  Perfusion scans were performed at three levels with  injection of an additional 40 mL IV nonionic contrast and 20 mL saline  flush.  These images were processed on a separate 3-D workstation.    COMPARISON: MRI brain and MR angiogram on 7/13/2019.    CT HEAD FINDINGS:  No contrast enhancing lesions.   Perfusion CT scan  of the brain shows slight decrease in cerebral blood volume in the  central white matter of the left cerebral hemisphere and generalized  mild decrease in time to drain in the left middle cerebral artery  territory without corresponding decreased cerebral blood volume. This  suggests ischemia in the distribution of the left middle cerebral  artery superior M2 division. There is also linear focal decreased time  to drain in the region of the previous infarct in the left frontal  lobe operculum as expected.    CT  ANGIOGRAM HEAD FINDINGS:  As noted previously there is focal  stenosis of the left middle cerebral artery superior M2 division. This  corresponds with the calcifications seen on the noncontrast CT scan.  However the arteries are patent above this calcification. No new  arterial occlusion is seen. No aneurysm is present. There is fetal  origin of the right posterior cerebral artery from the internal  carotid, a normal variant. Venous circulation is unremarkable.     CT ANGIOGRAM NECK FINDINGS:   Right carotid artery:  Extensive calcified atherosclerotic plaque in  the distal common carotid and proximal internal carotid arteries.  Residual luminal diameter is 2.3 mm compared with distal internal  carotid diameter of 4.9 mm, indicating 53% diameter stenosis by NASCET  criteria. Tortuous distal cervical right internal carotid artery and  tortuous right common carotid proximally.      Left carotid artery:  Left common carotid arises from the innominate  artery as a normal variant. Tortuous left common carotid artery  extends behind the hypopharynx on the left. Calcified atherosclerotic  plaque causes narrowing of the proximal internal carotid with residual  luminal diameter of 1.8 mm compared to distal internal carotid  diameter of 3.9 mm, indicating 54% diameter stenosis by NASCET  criteria.      Vertebral arteries:  Multiple mild to moderate  stenoses are seen in  the proximal right vertebral artery, likely from atherosclerosis. Left  vertebral artery appears widely patent as does the basilar.      Other findings: None.      Impression    IMPRESSION:   1. Perfusion CT scan suggests ischemia in the left middle cerebral  artery superior M2 division territory, likely caused by stenosis at  the point of calcification seen on the CT scan. Presumably this is an  old embolized plaque and unchanged since 2012.  2. Perfusion CT scan also shows persistent ischemia in the region of  the linear infarct in the left frontal lobe  operculum.  3. No evidence of intracranial arterial occlusion.  4. Right internal carotid artery atherosclerotic plaque causes 53%  diameter stenosis by NASCET criteria.  5. Left internal carotid artery atherosclerotic plaque causes 54%  diameter stenosis by NASCET criteria.    I called the report to Dr. Chad East in the emergency room on  7/17/2019 at 1:10 PM.    TRAVIS MILLER MD   CT Head Perfusion w Contrast    Narrative    CT ANGIOGRAM OF THE HEAD AND NECK WITHOUT AND WITH CONTRAST  7/17/2019  12:49 PM     HISTORY: Code stroke. At 11:30 AM patient developed right facial droop  and right arm weakness. On Plavix for a recent infarct in the left  frontal lobe operculum.    TECHNIQUE:  Precontrast localizing scans were followed by CT  angiography with an injection of 70 mL Omnipaque-350 (accession  [id number]), 50 mL Omnipaque-350 (accession [id number]) IV with scans  through the head and neck.  3D post processing was performed, images  were archived to PACS and used in interpretation of this study.   Estimates of carotid stenoses are made relative to the distal internal  carotid artery diameters except as noted. Radiation dose for this scan  was reduced using automated exposure control, adjustment of the mA  and/or kV according to patient size, or iterative reconstruction  technique.  Perfusion scans were performed at three levels with  injection of an additional 40 mL IV nonionic contrast and 20 mL saline  flush.  These images were processed on a separate 3-D workstation.    COMPARISON: MRI brain and MR angiogram on 7/13/2019.    CT HEAD FINDINGS:  No contrast enhancing lesions.   Perfusion CT scan  of the brain shows slight decrease in cerebral blood volume in the  central white matter of the left cerebral hemisphere and generalized  mild decrease in time to drain in the left middle cerebral artery  territory without corresponding decreased cerebral blood volume. This  suggests ischemia in the distribution of  the left middle cerebral  artery superior M2 division. There is also linear focal decreased time  to drain in the region of the previous infarct in the left frontal  lobe operculum as expected.    CT ANGIOGRAM HEAD FINDINGS:  As noted previously there is focal  stenosis of the left middle cerebral artery superior M2 division. This  corresponds with the calcifications seen on the noncontrast CT scan.  However the arteries are patent above this calcification. No new  arterial occlusion is seen. No aneurysm is present. There is fetal  origin of the right posterior cerebral artery from the internal  carotid, a normal variant. Venous circulation is unremarkable.     CT ANGIOGRAM NECK FINDINGS:   Right carotid artery:  Extensive calcified atherosclerotic plaque in  the distal common carotid and proximal internal carotid arteries.  Residual luminal diameter is 2.3 mm compared with distal internal  carotid diameter of 4.9 mm, indicating 53% diameter stenosis by NASCET  criteria. Tortuous distal cervical right internal carotid artery and  tortuous right common carotid proximally.      Left carotid artery:  Left common carotid arises from the innominate  artery as a normal variant. Tortuous left common carotid artery  extends behind the hypopharynx on the left. Calcified atherosclerotic  plaque causes narrowing of the proximal internal carotid with residual  luminal diameter of 1.8 mm compared to distal internal carotid  diameter of 3.9 mm, indicating 54% diameter stenosis by NASCET  criteria.      Vertebral arteries:  Multiple mild to moderate  stenoses are seen in  the proximal right vertebral artery, likely from atherosclerosis. Left  vertebral artery appears widely patent as does the basilar.      Other findings: None.      Impression    IMPRESSION:   1. Perfusion CT scan suggests ischemia in the left middle cerebral  artery superior M2 division territory, likely caused by stenosis at  the point of calcification seen on  the CT scan. Presumably this is an  old embolized plaque and unchanged since .  2. Perfusion CT scan also shows persistent ischemia in the region of  the linear infarct in the left frontal lobe operculum.  3. No evidence of intracranial arterial occlusion.  4. Right internal carotid artery atherosclerotic plaque causes 53%  diameter stenosis by NASCET criteria.  5. Left internal carotid artery atherosclerotic plaque causes 54%  diameter stenosis by NASCET criteria.    I called the report to Dr. Chad East in the emergency room on  2019 at 1:10 PM.    TRAVIS MILLER MD   IR Carotid Cerebral Angiogram Left    Narrative    Date of Study: 2019  Name: Tk Richmond  MRN: [id number]  : 1935                                                          Procedures:  1) Diagnostic conventional angiography of the right common femoral  artery.  2) Diagnostic conventional cerebral/cervical angiography of the left  common carotid artery.  3) Percutaneous closure of a right femoral arteriotomy using an  Angio-Seal closure device.  4) Interpretation of films.     Attending: Randy Whalen MD    Clinical Information: Mr. Richmond is a 83 year old man who presents  with aphasia and right sided weakness. CT angiogram was done which  showed left carotid occlusion and possible intracranial stenosis vs  occlusion. He presents to the angiography suite for further evaluation  and treatment if necessary.     Consent: The risks and benefits of conventional diagnostic cerebral  angiography under moderate sedation were discussed with the patient?s  wife who agreed to proceed.    Fluoro time: 7.4 minutes (Total 995.12 mGy)  Contrast used: 48 ml of Isovue    Sedation: Moderate sedation was initiated at 1315 hours and terminated  at 1342 hours. The patient was monitored utilizing continuous pulse  oximetry, continuous telemetry and intermittent blood pressure  measurements throughout the procedure without notable aberration  in  vitals. Please see the patient?s procedure log for further  information. The patient received the medications under my supervision  during the procedure. The medications were administered by the  radiology nursing staff. The nursing staff monitored the patient's  vital signs during the procedure.    Technique/findings: Sumaya Richmond was brought to the angiography suite  and placed in supine position. Patient?s right groin was prepped and  draped in standard fashion. The patient?s right common femoral artery  was palpated. 21 gauge needle was placed into the right femoral artery  which was exchanged for a 4 [place] sheath over a micro-wire. We  advanced the glidewire into the descending aorta. We removed the  microsheath and a 6 [place] Neuron Max was advanced over the glidewire.  The dilator and wire were removed. The sheath was connected to a  continuous flush of heparinized saline.     Right common femoral artery injection. Pelvic view  Angiography was performed over the right pelvis by injection of the  sheath. Pelvic view of the right common femoral artery in the HURST  projection demonstrates a normal right common femoral artery,  superficial femoral artery, and deep femoral artery. The insertion  point of the sheath is located above the bifurcation. No stenosis,  dissection or pseudoaneurysm is visualized.    A 5 [place] Seay-2 diagnostic catheter was advanced through the  sheath into the abdominal and thoracic aorta over a glidewire. Double  flush technique was used throughout the procedure.      Left common carotid artery injection: Cervical view  Under fluoroscopic guidance and roadmap technique the catheter was  advanced over the glidewire into the left common carotid artery.   Biplane angiography was performed over the neck. Cervical view of the  left common carotid artery injection in the AP and lateral projections  demonstrate normal common carotid artery. There is moderate stenosis  at the proximal  ICA measuring 51% by NASCET criteria due to ulcerated  calcified atherosclerotic plaque. The plaque extends 2 cm distal to  the carotid bifurcation. The carotid bifurcation is at the C2-3.        Left common carotid artery injection: Cranial view  With the catheter in the CCA, biplane angiography was performed over  the cranium. Intracranial view of the left common carotid artery  injection in the AP and lateral projections demonstrate normal  petrous, laceral, cavernous, clinoidal, ophthalmic, and communicating  segments of the left internal carotid artery. The left internal  carotid artery bifurcates into the left anterior cerebral artery and  left middle cerebral artery. The proximal segments and distal branches  of the left anterior cerebral artery and left middle cerebral artery  are normal. No stenosis is visualized. There is normal venous filling  of the cerebral veins and sinuses.     Upon completion of the procedure, the 5 [place] sheath was removed.  Hemostasis was obtained with a 6 [place] Angio-Seal closure device. The  procedure was completed without any immediate complications. Mr. Richmond was transferred to the neurology floor unit in stable  condition. I was present for the entire procedure.      Impression    Impression:    Moderate left proximal ICA stenosis measuring 51% due to ulcerated  calcified atheroscelrotic plaque. No occlusion or significant stenosis  seen intracranially.    JOSÉ ANTONIO BETTENCOURT MD   US Carotid Left    Narrative    BILATERAL CAROTID ULTRASOUND   7/18/2019 8:22 AM     HISTORY:  P/W left MCA syndrome; carotid stenosis moderate versus  severe.    COMPARISON: None.    LEFT CAROTID FINDINGS:  There is moderate-to-severe irregular  atherosclerotic mixed plaque in the proximal internal carotid artery  extending into the mid segment of the internal carotid artery.  Left ICA PSV:  217  cm/sec.  Left ICA EDV:  55 cm/sec.  Left ICA/CCA PSV Ratio:  2.47    These indicate  50 - 69%  diameter  stenosis of the left ICA.    Left Vertebral: Antegrade flow.   Left ECA: Antegrade flow.     Causes of Decreased Accuracy:   None.       Impression    IMPRESSION:    50-69% diameter stenosis of the left ICA relative to  the distal ICA diameter.    OBINNA TIMMONS MD   MR Brain w/o Contrast    Narrative    MRI BRAIN WITHOUT CONTRAST  7/17/2019 5:40 PM    HISTORY:  Stroke, follow up; L MCA syndrome    TECHNIQUE:  Multiplanar, multisequence MRI of the brain without  gadolinium IV contrast material.    COMPARISON:  MR scan dated 7/13/2018    FINDINGS:  There is diffuse parenchymal volume loss.  White matter  changes are present in the cerebral hemispheres that are consistent  with small vessel ischemic disease in this age patient. There are  multiple areas of restricted diffusion in the left middle cerebral  artery distribution. The largest area is in the region of the left  frontal operculum. This measures about 1.7 cm in transverse dimension  by 1 cm in AP dimension. Multiple additional punctate areas of  restricted diffusion are seen in the cortex and white matter of the  left frontal and left parietal lobes. A small area of restricted  diffusion is seen in the cortex of the right frontal lobe. There are  T2 hyperintensities in the white matter and cortex of the left frontal  left parietal lobes consistent with chronic white matter and cortical  infarcts in the left middle cerebral artery distribution. There is a  small susceptibility hypointensity in the left temporal-parietal  region. This is consistent with hemosiderin deposition. The facial  structures appear normal. The arteries at the base of the brain and  the dural venous sinuses appear patent.      Impression    IMPRESSION:    1. Study is consistent with recent areas of infarction in the left  middle cerebral artery distribution. The largest area of recent  infarction is in the left frontal operculum. This was present on the  scan of 7/13/2019. There are  multiple new punctate areas of restricted  diffusion in the white matter and cortex of the left frontal and  parietal lobes.  2. There is also a new area of restricted diffusion in the cortex of  the right frontal lobe.  3. Chronic areas of encephalomalacia are seen in the left frontal and  parietal lobes.  4. Small susceptibility hypointensity in the left temporal-parietal  region consistent with a small punctate area of hemosiderin deposition  secondary to small chronic microhemorrhage.      CELESTE GRIFFIN MD       Discharge Medications   Current Discharge Medication List      START taking these medications    Details   amLODIPine (NORVASC) 5 MG tablet Take 1 tablet (5 mg) by mouth daily    Associated Diagnoses: Arm weakness; Acute embolic stroke (H); Essential hypertension         CONTINUE these medications which have CHANGED    Details   clopidogrel (PLAVIX) 75 MG tablet Take 1 tablet (75 mg) by mouth daily    Associated Diagnoses: Arm weakness; Acute embolic stroke (H); Essential hypertension         CONTINUE these medications which have NOT CHANGED    Details   aspirin (ASA) 81 MG EC tablet Take 1 tablet (81 mg) by mouth daily for 21 days  Qty: 21 tablet, Refills: 0    Comments: Take aspirin 81 mg daily together with your Plavix 300 mg daily only for 21 days then switch to aspirin 325 mg daily after that  Associated Diagnoses: H/O: CVA (cerebrovascular accident)      brinzolamide-brimonidine (SIMBRINZA) 1-0.2 % ophthalmic suspension Place 1 drop into both eyes 2 times daily       Calcium Carbonate-Vitamin D (CALCIUM 600+D PO) Take 1 tablet by mouth 2 times daily At noon and supper      citalopram (CELEXA) 40 MG tablet Take 40 mg by mouth daily      glucosamine-chondroitin 500-400 MG CAPS per capsule Take 1 capsule by mouth 2 times daily At noon and supper      lovastatin (MEVACOR) 40 MG tablet Take 40 mg by mouth At Bedtime      Multiple Vitamins-Minerals (CENTRUM SILVER ADULT 50+ PO) Take 1 tablet by mouth daily        Multiple Vitamins-Minerals (PRESERVISION AREDS 2+MULTI VIT PO) Take 1 tablet by mouth 2 times daily       vitamin B-12 (CYANOCOBALAMIN) 1000 MCG tablet Take 1,000 mcg by mouth daily          STOP taking these medications       aspirin (ASA) 325 MG tablet Comments:   Reason for Stopping:         lisinopril (PRINIVIL/ZESTRIL) 10 MG tablet Comments:   Reason for Stopping:             Allergies   Allergies   Allergen Reactions     Contrast Dye Rash

## 2019-07-20 NOTE — PLAN OF CARE
Discharge Planner SLP   Patient plan for discharge: Not stated this date.   Current status: Speech/language tx provided this AM. Pt able to accurately label 7/10 images with demonstration of perseveration on previous target word. Pt read CVC combinations with 70% accuracy given phonemic cues.   Pt able to follow 1-step command to identify cheri vs. Different in 3/3 opportunities. Pt benefited from written and phonemic cues to retrieve word. Given verbal models with segmentation, pt demonstrated imitation with improved speech intelligibility.     Swallow Tx provided. Pt tolerated po trials of thin by cup and regular solids. Improved tongue lateralization present to clear oral residue, however pt continues to demonstrate prolonged oral phase and transit, disorganized oral phase. Recommend continuation of DD3 Advanced solids with thin liquids by cup. NO STRAW. Pt sitting upright, small bites/sips, alternate liquids/solids, liquid wash.   Barriers to return to prior living situation: functional communication deficits  Recommendations for discharge: ARU  Rationale for recommendations: Skilled ST indicated for management of dysphagia and speech/language. Pt able to tolerate 3 hours therapy daily.        Entered by: Lisbeth Maxwell 07/20/2019 9:15 AM

## 2019-07-20 NOTE — PROGRESS NOTES
BULL    D: SW following for discharge needs. BULL spoke with Veronica at  ARU. Per veronica, plan for patient to admit to  ARU later today pending availability of nursing staff. BULL scheduled HE wheelchair ride at 13:30. SW updated charge nurse. Attempted to updated patient's spouse via phone. Unable to reach patient's spouse. Updated charge nurse on transport time    P:  Patient to discharge to  ARU at 1330. No other SW needs at this time.

## 2019-07-20 NOTE — PROVIDER NOTIFICATION
MD Notification    Notified Person: MD    Notified Person Name: Teresa    Notification Date/Time: 7/20/19 9:34 a.m.    Notification Interaction: FYI page regarding discharge orders for ARU SW is confirming time with ARU Liaison.     Purpose of Notification: FYI page for ARU discharge orders and discharge summary if medically stable for discharge.     Orders Received:Awaiting discharge orders and discharge summary or call back     Comments: Orders are in Writer notified BULL and BULL is working with ARU Liaison for discharge today (pending staffing ride set for ~13:00)

## 2019-07-20 NOTE — PLAN OF CARE
Physical Therapy Discharge Summary    Reason for therapy discharge:    Discharged to acute rehabilitation facility.    Progress towards therapy goal(s). See goals on Care Plan in [facility] electronic health record for goal details.  Goals not met.  Barriers to achieving goals:   discharge from facility.    Therapy recommendation(s):    Continued therapy is recommended.  Rationale/Recommendations:  Intensive skilled PT intervention at ARU to progress functional transfers, gait and ADLs for return to PLOF.

## 2019-07-20 NOTE — PLAN OF CARE
Pt here with Left MCA stroke. Alert and orientated to self. Slurred speech and word finding difficulty. Generalized weakness. RUE weakness with drift and absent grasp. Right facial droop. Right visual field cut and neglect. VSS. Tele SD. DD1 diet with thin liquids, no straw. Assist with feeding. Takes pills crush in apple sauce. Up with 1 with belt and walker. incontinent of bladder. Denies pain. Plan continue to monitor. Discharge to ARU pending.

## 2019-07-20 NOTE — PLAN OF CARE
Discharge Planner PT   Patient plan for discharge: ARU  Current status: Pt making steady progress with skilled PT intervention; completes supine PNF strengthening exercises, with need for VCs for breathing secondary to significant breath holding. Transfers supine>sit with significant VCing but SBA, sit<>stand SBA with Hand over hand assist to place R UE in platform, and ambulates 125ft x2 with R UE platform walker and CGA. Pt able to convey needs and answer yes/no and 2 choice questions throughout. PT encouraged continued attempts at communication.   Barriers to return to prior living situation: stairs, current level of assist, high fall risk  Recommendations for discharge: ARU  Rationale for recommendations: Patient previously independent with all mobility, now needing SBA/Min A and maximal cuing for transfers, ambulation. Patient will benefit from continued skilled therapies in multidisciplinary, intensive environment in order to progress safety and independence with mobility prior to returning home. Anticipate patient will tolerate 3 hrs of therapy per day.        Entered by: Deja Valdez 07/20/2019 10:32 AM

## 2019-07-21 LAB
ANION GAP SERPL CALCULATED.3IONS-SCNC: 5 MMOL/L (ref 3–14)
BASOPHILS # BLD AUTO: 0 10E9/L (ref 0–0.2)
BASOPHILS NFR BLD AUTO: 0.2 %
BUN SERPL-MCNC: 21 MG/DL (ref 7–30)
CALCIUM SERPL-MCNC: 8.4 MG/DL (ref 8.5–10.1)
CHLORIDE SERPL-SCNC: 111 MMOL/L (ref 94–109)
CO2 SERPL-SCNC: 26 MMOL/L (ref 20–32)
CREAT SERPL-MCNC: 1.06 MG/DL (ref 0.66–1.25)
DIFFERENTIAL METHOD BLD: ABNORMAL
EOSINOPHIL # BLD AUTO: 0.3 10E9/L (ref 0–0.7)
EOSINOPHIL NFR BLD AUTO: 5.5 %
ERYTHROCYTE [DISTWIDTH] IN BLOOD BY AUTOMATED COUNT: 15.8 % (ref 10–15)
GFR SERPL CREATININE-BSD FRML MDRD: 65 ML/MIN/{1.73_M2}
GLUCOSE BLDC GLUCOMTR-MCNC: 129 MG/DL (ref 70–99)
GLUCOSE SERPL-MCNC: 97 MG/DL (ref 70–99)
HCT VFR BLD AUTO: 34.4 % (ref 40–53)
HGB BLD-MCNC: 11.2 G/DL (ref 13.3–17.7)
IMM GRANULOCYTES # BLD: 0 10E9/L (ref 0–0.4)
IMM GRANULOCYTES NFR BLD: 0.3 %
LYMPHOCYTES # BLD AUTO: 1 10E9/L (ref 0.8–5.3)
LYMPHOCYTES NFR BLD AUTO: 17.7 %
MCH RBC QN AUTO: 30.7 PG (ref 26.5–33)
MCHC RBC AUTO-ENTMCNC: 32.6 G/DL (ref 31.5–36.5)
MCV RBC AUTO: 94 FL (ref 78–100)
MONOCYTES # BLD AUTO: 0.5 10E9/L (ref 0–1.3)
MONOCYTES NFR BLD AUTO: 7.7 %
NEUTROPHILS # BLD AUTO: 4 10E9/L (ref 1.6–8.3)
NEUTROPHILS NFR BLD AUTO: 68.6 %
NRBC # BLD AUTO: 0 10*3/UL
NRBC BLD AUTO-RTO: 0 /100
PLATELET # BLD AUTO: 176 10E9/L (ref 150–450)
POTASSIUM SERPL-SCNC: 3.9 MMOL/L (ref 3.4–5.3)
RBC # BLD AUTO: 3.65 10E12/L (ref 4.4–5.9)
SODIUM SERPL-SCNC: 142 MMOL/L (ref 133–144)
WBC # BLD AUTO: 5.8 10E9/L (ref 4–11)

## 2019-07-21 PROCEDURE — 25000132 ZZH RX MED GY IP 250 OP 250 PS 637: Mod: GY | Performed by: STUDENT IN AN ORGANIZED HEALTH CARE EDUCATION/TRAINING PROGRAM

## 2019-07-21 PROCEDURE — 12800006 ZZH R&B REHAB

## 2019-07-21 PROCEDURE — 00000146 ZZHCL STATISTIC GLUCOSE BY METER IP

## 2019-07-21 PROCEDURE — 97163 PT EVAL HIGH COMPLEX 45 MIN: CPT | Mod: GP

## 2019-07-21 PROCEDURE — 85025 COMPLETE CBC W/AUTO DIFF WBC: CPT | Performed by: STUDENT IN AN ORGANIZED HEALTH CARE EDUCATION/TRAINING PROGRAM

## 2019-07-21 PROCEDURE — 92523 SPEECH SOUND LANG COMPREHEN: CPT | Mod: GN | Performed by: SPEECH-LANGUAGE PATHOLOGIST

## 2019-07-21 PROCEDURE — 36415 COLL VENOUS BLD VENIPUNCTURE: CPT | Performed by: STUDENT IN AN ORGANIZED HEALTH CARE EDUCATION/TRAINING PROGRAM

## 2019-07-21 PROCEDURE — 97535 SELF CARE MNGMENT TRAINING: CPT | Mod: GO

## 2019-07-21 PROCEDURE — 97530 THERAPEUTIC ACTIVITIES: CPT | Mod: GP

## 2019-07-21 PROCEDURE — 97166 OT EVAL MOD COMPLEX 45 MIN: CPT | Mod: GO

## 2019-07-21 PROCEDURE — 80048 BASIC METABOLIC PNL TOTAL CA: CPT | Performed by: STUDENT IN AN ORGANIZED HEALTH CARE EDUCATION/TRAINING PROGRAM

## 2019-07-21 PROCEDURE — 97116 GAIT TRAINING THERAPY: CPT | Mod: GP

## 2019-07-21 RX ADMIN — Medication 1 CAPSULE: at 21:19

## 2019-07-21 RX ADMIN — CYANOCOBALAMIN TAB 1000 MCG 1000 MCG: 1000 TAB at 09:12

## 2019-07-21 RX ADMIN — CLOPIDOGREL BISULFATE 75 MG: 75 TABLET, FILM COATED ORAL at 09:12

## 2019-07-21 RX ADMIN — CITALOPRAM HYDROBROMIDE 40 MG: 40 TABLET ORAL at 09:12

## 2019-07-21 RX ADMIN — Medication 1 CAPSULE: at 09:12

## 2019-07-21 RX ADMIN — ASPIRIN 81 MG: 81 TABLET, COATED ORAL at 09:12

## 2019-07-21 RX ADMIN — LOVASTATIN 40 MG: 20 TABLET ORAL at 21:19

## 2019-07-21 RX ADMIN — AMLODIPINE BESYLATE 5 MG: 5 TABLET ORAL at 09:12

## 2019-07-21 ASSESSMENT — ACTIVITIES OF DAILY LIVING (ADL): PREVIOUS_RESPONSIBILITIES: FINANCES

## 2019-07-21 NOTE — PROGRESS NOTES
"   07/21/19 1036   Quick Adds   Type of Visit Initial Occupational Therapy Evaluation   Living Environment   Lives With spouse   Living Arrangements house  (townhouse)   Home Accessibility stairs to enter home   Number of Stairs, Main Entrance 3   Stair Railings, Main Entrance railing on right side (ascending)   Transportation Anticipated family or friend will provide   Living Environment Comment OT: pt lives in a townhouse with wife with stairs to enter and stairs to the basement but pt does not need to go to the basement. Pt has a walk in shower with a 6 inch threshold. No other DME/AE at the house.    Self-Care   Usual Activity Tolerance good   Current Activity Tolerance fair   Regular Exercise No   Functional Level   Ambulation 0-->independent   Transferring 0-->independent   Toileting 0-->independent   Bathing 0-->independent   Dressing 0-->independent   Eating 0-->independent   Communication 0-->understands/communicates without difficulty   Swallowing 0-->swallows foods/liquids without difficulty   Cognition 0 - no cognition issues reported   Fall history within last six months yes   Number of times patient has fallen within last six months 2   Which of the above functional risks had a recent onset or change? ambulation;transferring;toileting;bathing;dressing;swallowing;cognition;communication/speech;fall history   Prior Functional Level Comment OT: pt was independent with adls/iadls prior. Pt stopped driving months ago. pt and wife does finacnes together and wife manages medications. Pt likes to watch television during the day and does not have any other hobbies or chores.    General Information   Onset of Illness/Injury or Date of Surgery - Date 07/13/19   Referring Physician Dr. Araiza   Patient/Family Goals Statement OT per pt, \" be able to walk. \"    Additional Occupational Profile Info/Pertinent History of Current Problem OT: per md chart, \"Tk Richmond is a 83 year old t male  with a past medical " "history significant for multiple strokes of unknown etiology who was admitted to the ARU on 7/20/2019  for functional deficits secondary to right body, left brain stroke; status post left MCA M2 distribution CVA. \"   Precautions/Limitations fall precautions;swallowing precautions;other (see comments)  (DD3/thin)   Weight-Bearing Status - RUE weight-bearing as tolerated  (hemiparesis)   Cognitive Status Examination   Orientation orientation to person, place and time   Visual Perception   Visual Perception Wears glasses   Sensory Examination   Sensory Comments OT; per pt, no numbness or tingling. D/t apashia unsure if this is accurate without formal testing.    Pain Assessment   Patient Currently in Pain No   Integumentary/Edema   Integumentary/Edema no deficits were identifed   Range of Motion (ROM)   ROM Comment OT: LUE WF, RUE- shoulder abduciton WFL-impaired distally in the hands.    Coordination   Gross Motor Coordination OT\" oppositon imparied on R, dysmeteria noted on R   ARC Assessment Only   Acute Rehab FIM See FIM scores for Mobility/ADL Assessment   Instrumental Activities of Daily Living (IADL)   Previous Responsibilities finances   Activities of Daily Living Analysis   Impairments Contributing to Impaired Activities of Daily Living balance impaired;cognition impaired;coordination impaired;ROM decreased;strength decreased   General Therapy Interventions   Planned Therapy Interventions ADL retraining;IADL retraining;balance training;bed mobility training;cognition;fine motor coordination training;groups;neuromuscular re-education;ROM;strengthening;stretching;transfer training;home program guidelines   Clinical Impression   Criteria for Skilled Therapeutic Interventions Met yes, treatment indicated   OT Diagnosis OT; generalized weakness, impaired adls/iadls   Influenced by the following impairments OT: pt currently demonstrates decreased ability to complete adls/iadls, impaired mobility/standing balance, " tolernace, endruance, decreased use of R hand/decreased strength in R hand, aphasia. SKilled    Assessment of Occupational Performance 3-5 Performance Deficits   Identified Performance Deficits OT: imparied social, home management, bathing   Clinical Decision Making (Complexity) Moderate complexity   Therapy Frequency Daily   Predicted Duration of Therapy Intervention (days/wks) 10-14 days   Anticipated Equipment Needs at Discharge shower chair   Anticipated Discharge Disposition Home;Home with Assist;Home with Outpatient Therapy   Risks and Benefits of Treatment have been explained. Yes   Patient, Family & other staff in agreement with plan of care Yes   Clinical Impression Comments OT: pt currently demonstrates decreased ability to complete adls/iadls, impaired mobility/standing balance, tolernace, endruance, decreased use of R hand/decreased strength in R hand, aphasia. SKilled OT needed to improve above mentioned deficits in order for a safe return home with wife and oversight/assistance as needed.    Total Evaluation Time   Total Evaluation Time (Minutes) 20

## 2019-07-21 NOTE — PLAN OF CARE
FOCUS/GOAL  Bladder management    ASSESSMENT, INTERVENTIONS AND CONTINUING PLAN FOR GOAL:  Pt alert and oriented x4, expressive aphasia. Incontinent of urine. Obtained UA/UC clean catch, UA appears WDL. Pt was offered urinal several times this shift but was incontinent x2. Last charted BM was prior to admission but patient stated he had one earlier today. PRN senokot given. Bowels active x4. Bruising throughout skin otherwise intact. Two coughing episodes with thin liquids this shift while sitting upright and recovered quickly. Able to feed self and had staff with to supervise, good appetite. Breakfast ordered. Alarm on.

## 2019-07-21 NOTE — PLAN OF CARE
FOCUS/GOAL  Bowel management, Bladder management and Pain management    ASSESSMENT, INTERVENTIONS AND CONTINUING PLAN FOR GOAL:    Patient slept well on the overnight, no c/o pain. Patient is using call light appropriately. He is  alert and oriented x4, expressive aphasia. Incontinent of urine, condom catheter in place during this shift, no BM. Bed alarm is on for safety, call light within reach.

## 2019-07-21 NOTE — PLAN OF CARE
FOCUS/GOAL  Medical management    ASSESSMENT, INTERVENTIONS AND CONTINUING PLAN FOR GOAL:  Pt denied pain during shift. Denied difficulty breathing and no appearance of this. Pt having difficulty expressing d/t expressive aphasia - yes/no questions. VSS. Pt had back to back therapy sessions - afternoon pt up for lunch and appeared to fall asleep - unwitnessed by writer. Pt wanted to lay down, VSS, BG WDL, no pain, no other symptoms noted. Regular heart rhythm, no neuro changes. After pt rested post therapy session, no complaints noted. Pt continued with therapy without difficulty. Pt's spouse at bedside. Alarms on for safety purposes. Condom catheter removed. Continue to offer toileting. Call light within reach. Continue with POC.

## 2019-07-21 NOTE — PLAN OF CARE
Speech Language Therapy Discharge Summary    Reason for therapy discharge:    Discharged to acute rehabilitation facility.    Progress towards therapy goal(s). See goals on Care Plan in [facility] electronic health record for goal details.  Goals partially met.  Barriers to achieving goals:   discharge from facility.    Therapy recommendation(s):    Continued therapy is recommended.  Rationale/Recommendations:  skilled ST to manage dysphagia and improve functional communication skills.    Speech/language tx provided 7/20/19. Pt able to accurately label 7/10 images with demonstration of perseveration on previous target word. Pt read CVC combinations with 70% accuracy given phonemic cues.   Pt able to follow 1-step command to identify cheri vs. Different in 3/3 opportunities. Pt benefited from written and phonemic cues to retrieve word. Given verbal models with segmentation, pt demonstrated imitation with improved speech intelligibility.      Swallow Tx provided. Pt tolerated po trials of thin by cup and regular solids. Improved tongue lateralization present to clear oral residue, however pt continues to demonstrate prolonged oral phase and transit, disorganized oral phase. Recommend continuation of DD3 Advanced solids with thin liquids by cup. NO STRAW. Pt sitting upright, small bites/sips, alternate liquids/solids, liquid wash.

## 2019-07-21 NOTE — PROGRESS NOTES
"   07/21/19 0918   Quick Adds   Type of Visit Initial PT Evaluation   Living Environment   Lives With spouse   Living Arrangements house   Home Accessibility stairs to enter home   Number of Stairs, Main Entrance 3   Stair Railings, Main Entrance railing on right side (ascending)   Transportation Anticipated family or friend will provide   Living Environment Comment Pt lives in [facility] with wife in [facility] with 3STE R rail, able to live on one level once inside. Pt has walk in shower for bathing.   Self-Care   Usual Activity Tolerance good   Current Activity Tolerance fair   Regular Exercise No   Equipment Currently Used at Home none   Activity/Exercise/Self-Care Comment Pt reports being fairly sedentary typically.   Functional Level Prior   Ambulation 0-->independent   Transferring 0-->independent   Toileting 0-->independent   Bathing 0-->independent   Communication 0-->understands/communicates without difficulty   Swallowing 0-->swallows foods/liquids without difficulty   Cognition 0 - no cognition issues reported   Fall history within last six months yes   Number of times patient has fallen within last six months 1   Which of the above functional risks had a recent onset or change? ambulation;transferring;toileting;bathing;dressing;communication/speech;fall history   Prior Functional Level Comment Pt previously IND in all cares and mobility prior to admission.    General Information   Onset of Illness/Injury or Date of Surgery - Date 07/13/19   Referring Physician Maureen Thomas MD   Patient/Family Goals Statement \"walk without walker again\"   Pertinent History of Current Problem (include personal factors and/or comorbidities that impact the POC) Tk Richmond is a 83 year old male with a history of recurrent CVAs who presents to the acute rehab unit for rehabilitation following left CVA of undetermined source   Precautions/Limitations fall precautions   Weight-Bearing Status - LUE full weight-bearing "   Weight-Bearing Status - RUE full weight-bearing   Weight-Bearing Status - LLE full weight-bearing   Weight-Bearing Status - RLE full weight-bearing   Heart Disease Risk Factors Gender;Age;Medical history;Overweight;Lack of physical activity;High blood pressure   General Observations pt very pleasant, frustrated with aphasia.   Cognitive Status Examination   Orientation orientation to person, place and time   Level of Consciousness alert   Follows Commands and Answers Questions 100% of the time   Personal Safety and Judgment intact   Memory intact   Pain Assessment   Patient Currently in Pain Yes, see Vital Sign flowsheet   Integumentary/Edema   Integumentary/Edema no deficits were identifed   Posture    Posture Forward head position;Protracted shoulders   Range of Motion (ROM)   ROM Comment limited hamstring length B consistent with age and PLOF   Strength   Strength Comments R LE 4+/5 hip flexion, knee ext, ankle DF/PF. L LE 5/5 for all motions.   ARC Assessment Only   Acute Rehab FIM See FIM scores for Mobility/ADL Assessment   Sensory Examination   Sensory Perception Comments B LE light touch and proprioception intact.   Coordination   Coordination Comments finger-nose-finger difficulty to assess 2/2 to weakness on R , no deficits on L. Mild dysmetria with shin slide on R, no deficits on L LE.   Muscle Tone   Muscle Tone Comments no deficits noted in B LEs   General Therapy Interventions   Planned Therapy Interventions balance training;bed mobility training;gait training;groups;neuromuscular re-education;ROM;strengthening;transfer training;risk factor education;home program guidelines;progressive activity/exercise   Clinical Impression   Criteria for Skilled Therapeutic Intervention yes, treatment indicated   PT Diagnosis impaired functional mobility   Influenced by the following impairments reduced activity tolerance, weakness, impaired balance, impaired coordination, aphasia/cognition.   Functional  limitations due to impairments impaired gait, bed mobility, transfers, self-cares/ADLs, stairs, falls risk   Clinical Presentation Stable/Uncomplicated   Clinical Presentation Rationale PMH, clincian impression   Clinical Decision Making (Complexity) High complexity   Therapy Frequency Daily   Predicted Duration of Therapy Intervention (days/wks) 14 days   Anticipated Equipment Needs at Discharge front wheeled walker;quad cane   Anticipated Discharge Disposition Home with Assist;Home with Home Therapy   Risk & Benefits of therapy have been explained Yes   Patient, Family & other staff in agreement with plan of care Yes   Clinical Impression Comments eval complete. Pt up with CGA with FWW, moving fairly well with greater UE and aphasia deficits than LE deficits. Based on current level of function, aniticipate home with wife assist as needed, Cedric with FWW vs cane TBD based on continued progress. Will need ongoing therapy to progress further toward PLOF.   Total Evaluation Time   Total Evaluation Time (Minutes) 20

## 2019-07-21 NOTE — PLAN OF CARE
Discharge Planner PT   Patient plan for discharge: home with spouse assist  Current status: Pt with R UE deficits > R LE deficits, but demos impaired coordination, strength, activity tolerance impairing previously IND mobility. Jeff bed mobility, CGA sit<>stand to FWW, ambulates 2x100' with FWW and SBA.  Barriers to return to prior living situation: medical needs, stairs, see deficits above  Recommendations for discharge: home with wife assist and HH PT  Rationale for recommendations: pt current level of function, anticipated good prognosis for Cedric with FWW vs cane.       Entered by: Pham Miranda 07/21/2019 10:59 AM       ELOS 14 days to achieve Cedric with FWW vs cane pending progress (pt will need DME for this at discharge) with anticipated ongoing HH PT to continue progressing toward IND mobility.

## 2019-07-21 NOTE — PLAN OF CARE
OT; intial eval completed and tx initiated. Pt is CGA to for mobility using RW with R hand splint and min A for sit to stand from lower surface. Estiamted lenght of stay 10-14 days.

## 2019-07-21 NOTE — PHARMACY-MEDICATION REGIMEN REVIEW
Pharmacy Medication Regimen Review  Tk Richmond is a 83 year old male who is currently in the Acute Rehab Unit.    Assessment:  All medications have an appropriate indications, durations and no unnecessary use was found    Plan:   Attending provider will be sent this note for review.  If there are any emergent issues noted above, pharmacist will contact provider directly by phone.      Pharmacy will periodically review the resident's medication regimen for any PRN medications not administered in > 72 hours and discontinue them. The pharmacist will discuss gradual dose reductions of psychopharmacologic medications with interdisciplinary team on a regular basis.    Please contact pharmacy if the above does not answer specific medication questions/concerns.    Background:  A pharmacist has reviewed all medications and pertinent medical history today.  Medications were reviewed for appropriate use and any irregularities found are listed with recommendations.      Current Facility-Administered Medications:      acetaminophen (TYLENOL) tablet 325-975 mg, 325-975 mg, Oral, Q6H PRN, Maureen Thomas MD     alum & mag hydroxide-simethicone (MYLANTA ES/MAALOX  ES) suspension 15 mL, 15 mL, Oral, Q4H PRN, Maureen Thomas MD     amLODIPine (NORVASC) tablet 5 mg, 5 mg, Oral, Daily, Maureen Thomas MD     aspirin EC tablet 81 mg, 81 mg, Oral, Daily, Maureen Thomas MD     brinzolamide-brimonidine (SIMBRINZA) 1-0.2 % ophthalmic suspension 1 drop, 1 drop, Both Eyes, BID, Maureen Thomas MD, 1 drop at 07/20/19 1959     calcium carbonate (TUMS) chewable tablet 500 mg, 500 mg, Oral, Daily PRN, Maureen Thomas MD     citalopram (celeXA) tablet 40 mg, 40 mg, Oral, Daily, Maureen Thomas MD     clopidogrel (PLAVIX) tablet 75 mg, 75 mg, Oral, Daily, Maureen Thomas MD     cyanocobalamin (VITAMIN B-12) tablet 1,000 mcg, 1,000 mcg, Oral, Daily, Maureen Thomas MD     glucosamine-chondroitin 500-400 MG per capsule 1  capsule, 1 capsule, Oral, BID, Maureen Thomas MD, 1 capsule at 07/20/19 2001     lovastatin (MEVACOR) tablet 40 mg, 40 mg, Oral, At Bedtime, Maureen Thomas MD, 40 mg at 07/20/19 2001     melatonin sublingual tablet 5 mg, 5 mg, Sublingual, At Bedtime PRN, Maureen Thomas MD     multivitamin  with lutein (OCUVITE WITH LTEIN) per capsule 1 capsule, 1 capsule, Oral, BID, Maureen Thomas MD, 1 capsule at 07/20/19 2001     ondansetron (ZOFRAN-ODT) ODT tab 4 mg, 4 mg, Oral, Q6H PRN, Maureen Thomas MD     Patient is already receiving anticoagulation with heparin, enoxaparin (LOVENOX), warfarin (COUMADIN)  or other anticoagulant medication, , Does not apply, Continuous PRN, Maureen Thomas MD     senna-docusate (SENOKOT-S/PERICOLACE) 8.6-50 MG per tablet 1-4 tablet, 1-4 tablet, Oral, BID PRN, Maureen Thomas MD, 1 tablet at 07/20/19 2131  No current outpatient prescriptions on file.   PMH:   Hypertension, previous CVA, carotid artery stenosis, dilated ascending aorta, glaucoma, depression, anxiety      Gisell ArmijoD,BCPS  July 21, 2019

## 2019-07-22 PROCEDURE — 97530 THERAPEUTIC ACTIVITIES: CPT | Mod: GP

## 2019-07-22 PROCEDURE — 97116 GAIT TRAINING THERAPY: CPT | Mod: GP

## 2019-07-22 PROCEDURE — 12800006 ZZH R&B REHAB

## 2019-07-22 PROCEDURE — 97535 SELF CARE MNGMENT TRAINING: CPT | Mod: GO

## 2019-07-22 PROCEDURE — 25000132 ZZH RX MED GY IP 250 OP 250 PS 637: Mod: GY | Performed by: STUDENT IN AN ORGANIZED HEALTH CARE EDUCATION/TRAINING PROGRAM

## 2019-07-22 PROCEDURE — 92507 TX SP LANG VOICE COMM INDIV: CPT | Mod: GN | Performed by: SPEECH-LANGUAGE PATHOLOGIST

## 2019-07-22 PROCEDURE — 92610 EVALUATE SWALLOWING FUNCTION: CPT | Mod: GN | Performed by: SPEECH-LANGUAGE PATHOLOGIST

## 2019-07-22 RX ORDER — LISINOPRIL 5 MG/1
5 TABLET ORAL DAILY
Status: DISCONTINUED | OUTPATIENT
Start: 2019-07-23 | End: 2019-07-24

## 2019-07-22 RX ADMIN — Medication 1 CAPSULE: at 08:04

## 2019-07-22 RX ADMIN — LOVASTATIN 40 MG: 20 TABLET ORAL at 22:03

## 2019-07-22 RX ADMIN — Medication 1 CAPSULE: at 22:02

## 2019-07-22 RX ADMIN — CYANOCOBALAMIN TAB 1000 MCG 1000 MCG: 1000 TAB at 08:04

## 2019-07-22 RX ADMIN — AMLODIPINE BESYLATE 5 MG: 5 TABLET ORAL at 08:04

## 2019-07-22 RX ADMIN — Medication 1 CAPSULE: at 22:03

## 2019-07-22 RX ADMIN — CLOPIDOGREL BISULFATE 75 MG: 75 TABLET, FILM COATED ORAL at 08:04

## 2019-07-22 RX ADMIN — CITALOPRAM HYDROBROMIDE 40 MG: 40 TABLET ORAL at 08:04

## 2019-07-22 RX ADMIN — ASPIRIN 81 MG: 81 TABLET, COATED ORAL at 08:04

## 2019-07-22 NOTE — PROGRESS NOTES
07/21/19 1200   General Information, SLP   Type of Evaluation Speech and Language;Cognitive-Linguistic;Dysarthria   Type of Visit Initial   Start of Care Date 07/21/19   Onset of Illness/Injury or Date of Surgery - Date 07/13/19   Referring Physician Susana Araiza MD   Patient/Family Goals Statement not able to fully state due to expressive aphasia and apraxia   Pertinent History of Current Problem Tk Richmond is a 83 year old male with a history of recurrent CVAs who presents to the acute rehab unit for rehabilitation following left CVA of undetermined source.  Patient initially presented to [facility] on 7/13 for stroke with  expressive aphasia.  He was improving at the time of discharge 7/15, but then developed an acute right sided facial drop and worsened aphasia on 7/17.  CTA showed ischemia in the left MCA M2 division territory which was felt to be an old embolized plaque and unchanged from 2012, it also demonstrated 53% stenosis of the right ICA and 54% stenosis of the left ICA.  Radiology felt that there is a degree of calcified plaque in the left ICA that could be causing emboli, multiple consults and IR cervicocerebral angiography were obtained but ultimately team did not feel this to be the cause of his stroke so no CEA or stent was not recommended.     Precautions/Limitations fall precautions;swallowing precautions   General Observations pleasant and cooperative   Oral Motor Sensory Function   Completed on Swallow Evaluation Completed Clinical Bedside Swallow Evaluation  (completed in hospital; will re-eval this stay)   Speech   Deficits in Articulation Apraxia of speech;Flaccid dysarthria   Apraxia Battery:  Sounds (out of 10 possible) 10   Apraxia Battery:  Word Repetition - single syllable (out of 10 possible) 8   Apraxia Battery:  Increasing word length (out of 10 possible) 9   Apraxia Battery:  Word Repetition - mulitple syllables (out of 10 possible) 8   Apraxia Battery:  Repeat  Sentences (out of 5 possible) 1   Speech Comments 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 with senence lenth material and multisyllabic words   Language: Auditory Comprehension (understanding of spoken language)   Tests were administered at the following levels Basic (rote activities);Moderate (routine daily activities);Complex (vocation/community/social activities)   One Step Commands (out of 10 total) 8   Y/N Simple Questions (out of 10 total) 10   Two Step Commands (out of 5 total) 1   Yes/No Sentence and Simple Paragraph; [place] Diagnostic Aphasia Exam 3 short (out of 6 total) 6   Paragraph; Discourse Comprehension Test (out of 8 total; less than 7 is below mean) 7   Functional Assessment Scale (Auditory Comprehension) Minimal Impairment   Comments (Auditory Comprehension) Comprehension is mostly intact- dififculty with command following appears to be due more to motor apraxia deficits vs comprehension deficits   Language: Verbal Expression (use of spoken language to express information)   Tests were administered at the following levels Basic (rote activities)   Automatized Sequences; [place] Diagnostic Aphasia Exam (out of 8 total) 7   Phrase/Sentence Completion (out of 10 total) 10   Responsive Naming; [place] Diagnostic Aphasia Exam 3 (out of 20 total) 13   Biographic Information (out of 3 total) 3   Functional Assessment Scale (Verbal Expression) Moderate to Severe Impairment   Comments (Verbal Expression) Pt with mod to severe expressive aphasia and moderate verbal apraxia with soudn sequencing for multisyllabic words and at sentence level; word retrieval difficulty is evident in open ended communicaiton attempts- noting perseverative errors as well as paraphasic errors that are combined with the apraxic errors   Reading Comprehension (understanding of written language)   Tests were administered at the following levels Moderate (routine daily activities)   Sentences and Paragraphs; [place] Diagnostic Aphasia Exam (out  of 10 total) 3  (only able to complete 1st 5: 3/5)   Comments (Reading Comprehension) Pt has difficulty with visual scanning some and also comprehension at short sentence level   Cognitive Status Examination   Cognitive Status Exam Comments Cognitive status assessment deferred due to aphasia - language impairments- will assess at a later time as language improves   General Therapy Interventions   Planned Therapy Interventions Language;Communication   Language Reading comprehension;Verbal expression   Communication Improve speech intelligibility   Clinical Impression, SLP Eval   Criteria for Skilled Therapeutic Interventions Met Yes;Treatment indicated   SLP Diagnosis Moderate to severe expressive aphasia; mild receptive aphasia for reading comprehension   Influenced by the following factors/impairments prior CVA's- prior impairments   Functional limitations due to impairments weak UE right side- impacting writing   Rehab Potential Good, to achieve stated therapy goals   Rehab potential affected by aphasia and apraxia   Therapy Frequency Daily   Predicted Duration of Therapy Intervention (days/wks) 2 weeks   Anticipated Discharge Disposition Home with Outpatient Therapy   Risks and Benefits of Treatment have been explained. Yes   Patient, Family & other staff in agreement with plan of care Yes   Clinical Impression Comments Completed formal speech and language eval per MD orders. Pt presents with minimal deficits in auditory comprehension, moderate impariments in reading comprehension and mod to severe expresive aphasia with moderate verbal apraxia and dysarthria. Comprehension is mostly intact- dififculty with command following appears to be due more to motor apraxia deficits vs comprehension deficits. Pt has difficulty with visual scanning some and also comprehension at short sentence level.. Pt with mod to severe expressive aphasia and moderate verbal apraxia with soudn sequencing for multisyllabic words and at  sentence level; word retrieval difficulty is evident in open ended communicaiton attempts- noting perseverative errors as well as paraphasic errors that are combined with the apraxic errors. Pt's current levle of function is below baseline and pt will benefit from skilled SLP intervention to improve functional communication and language  for improved safety and indpendence with ADL's and ability to communicate wants and needs   Total Evaluation Time      Total Evaluation Time (Minutes) 60

## 2019-07-22 NOTE — PROGRESS NOTES
"  [facility]   Acute Rehabilitation Unit  Daily progress note    INTERVAL HISTORY  Tk Richmond was seen and examined at bedside. He was doing well. Denied any pain or discomfort. Was aphasic but able to communicate his needs and answer questions appropriately. No issues with bowel and bladder.     BP in higher range. Stable anemia.     Eval has completed; ELOS is 10-14 days. Will continue current plan of care and discuss tomorrow at team rounds.         MEDICATIONS  Scheduled meds    amLODIPine  5 mg Oral Daily     aspirin  81 mg Oral Daily     brinzolamide-brimonidine  1 drop Both Eyes BID     citalopram  40 mg Oral Daily     clopidogrel  75 mg Oral Daily     cyanocobalamin  1,000 mcg Oral Daily     glucosamine-chondroitin  1 capsule Oral BID     [START ON 7/23/2019] lisinopril  5 mg Oral Daily     lovastatin  40 mg Oral At Bedtime     multivitamin  with lutein  1 capsule Oral BID       PRN meds:  acetaminophen, alum & mag hydroxide-simethicone, calcium carbonate, melatonin, ondansetron, - MEDICATION INSTRUCTIONS -, senna-docusate      PHYSICAL EXAM  /68 (BP Location: Left arm)   Pulse 64   Temp 98.6  F (37  C) (Oral)   Resp 15   Ht 1.778 m (5' 10\")   Wt 79.7 kg (175 lb 12.8 oz)   SpO2 99%   BMI 25.22 kg/m    Gen: NAD, sitting in chair   Cardio: RRR, + murmur   Pulm: clear breath sounds b/l   Abd: soft and non-tender   Ext: wwp, no edema in bilateral lower extremities, no tenderness at calves   Neuro/MSK: right facial droop, R hemiparesis with RUE 2-3/5 proximally but 0/5 at wrist and fingers. RLE with 4/5. Intact sensation. No sensory neglect. Dysarthric. Aphasic expressive > receptive     LABS  Reviewed as above     ASSESSMENT AND PLAN  Tk Richmond is a 83 year old male  with a past medical history significant for multiple strokes of unknown etiology, HTN, and MDD who was admitted to the hospital on 7/13 due to left MCA ischemic stroke. Admitted to the " ARU on 7/20/2019.    Rehabilitation - continue comprehensive acute inpatient rehabilitation program with multidisciplinary approach including therapies, rehab nursing, and physiatry following. See interval history for updates.     Completed stroke at [facility].     Medical Conditions  Left MCA ischemic stroke   Functional deficits include right facial palsy, dysarthria, aphasia, right hemiparesis and cognitive deficits.   -Continue aspirin plus Plavix for 3 months.   -Lipids well controlled total cholesterol 132, LDL 52, will continue lovastatin 40 mg daily.   -Hypertension: Holding PTA lisinopril for permissive hypertension, consider alternative agent due to elevated creatinine.  -A1c 5.2    Essential hypertension  - Holding PTA lisinopril for permissive hypertension; 07/22/19 restarted at 5mgm daily   - on amlodipine 5mg daily (was stared at the acute care); will try to control with one agent if possible      Acute kidney injury - resolving   -- creatinine 1.39 at admission and received contrast, improved with IVF  -- hold ace as above but restarted 07/22/19      Major recurrent depressive disorder with anxiety  - continue citalopram      Anemia: chronic and stable. No clear etiology. Will defer more work-up to outpatient.      Glaucoma  - Continue  on PTA eye drops    FEN: DD3 with thin upon admission to ARU  Bowel: Continent, PRN senna and Miralax available.  Bladder: Continent, PVRs slightly elevated. UA was negative on 7/20.  DVT Prophylaxis: mechanical   GI Prophylaxis: None  Code: DNR/DNI  Disposition: Home with family  ELOS: 10-14 days  Follow up Appointments on Discharge: PCP, neurology and PM&R          Janet Bell MD  Physical Medicine & Rehabilitation    I spent a total of 30 minutes face to face and coordinating care of Tk PHANI Shivamchris.  Over 50% of my time on the unit was spent counseling the patient and /or coordinating care.

## 2019-07-22 NOTE — PLAN OF CARE
Continued assessing reading comprehension as tx- basic level- 5/5 on phrase completion ( field of 4) but with sentence level yes/no questions: 3/5-- pt needs to re-read several times ot aid in processing of info. Attempted to have pt write his name with his left hand- very difficulty - then attempted to have pt copy his first name- still ahving difficulty but able to form some letter approxmations. Attempted a verbal expression task- simple fill in the balnk with pharse completion- 16/19. With naming to description tsk- 4/6-- noted more apraxic errors as well as paraphasic errors and perseverations.     Completed clinical bedside swallow eval per MD orders. Pt presents with min to mild oral pharygneal dysphagia characterized by the following: mildl slower and discoordinated oral prep with DD3 and regular solid foods with occasional min oral residue on the right. Pt however is using stratgy of lingual sweep on the right to check for oral residue and is successful with clearing any oral residue. There are no aspiration s/s with multiple trials of thin liquids or trials of DD3 and regular solids. Pt is also not reporting any sensation of pharyngeal retention. Recommending continue with current DD3 with thin liquids. Pt should be upright for all po, small bites/sips, alternate solids and liquid, pace self- eat slowly, use lingual sweep to check for right sided oral residue. SLP to plan trial again of regular solids and if tolerance maintained then will plan to upgrade to regular diet. Pt will benefit from skilled SLP interventionfor swallowing as current level of functin is below baseline.

## 2019-07-22 NOTE — PLAN OF CARE
FOCUS/GOAL  Bowel management, Bladder management and Medical management    ASSESSMENT, INTERVENTIONS AND CONTINUING PLAN FOR GOAL:  Patient has expressive aphasia, but is alert and oriented and follows directions.  Patient ambulates with CGA and a platform walker. He did utilize his call light tonight for needs. Writer did have patient participate in timed toileting and he was continent of bowel and bladder this shift. If patient did not feel the need to void, he still utilized the urinal and was able to void without difficulty. He had a large bowel movement this shift on the toilet. Skin intact with pink to bilateral buttocks. Patient was able to assist with pulling up and pulling down pants with toileting but needed full assist with hygiene following bowel movement. Writer provided patient with set up for washing up at night and brushing his teeth. He was able to brush his teeth independently with set up and was able to wash his body with washcloth, but did need assist with left side of his body, as his right upper extremity could not reach. Patient has some dyspnea with exertion, but lungs are clear bilaterally throughout. He denies dyspnea at rest. He denies any numbness/tingling. He also denies any difficulty with pain or discomfort. Appetite is good and patient took all medications orally without difficulty.

## 2019-07-22 NOTE — PROGRESS NOTES
"CLINICAL NUTRITION SERVICES - ASSESSMENT NOTE     Nutrition Prescription    RECOMMENDATIONS FOR MDs/PROVIDERS TO ORDER:  -None today    Malnutrition Status:    -Unable to determine due to incomplete nutrition status validation.      Recommendations already ordered by Registered Dietitian (RD):  -Will provide DD3/Thin Liquid list for food preferences to assist Nutrition Associate   with meal ordering. Please have pt's wife fill out when available.   -Ordered Magic Cup vanilla in the afternoon for a trial.       Future/Additional Recommendations:  -Assist with set up and feeding at meals as needed (right hemiparesis and right hemineglect per chart).    -Encourage intakes of adequate fluids and tid meals.  -Monitor po intakes and weight trends.  Follow up for supplement acceptance and adjust as indicated.       REASON FOR ASSESSMENT  Tk Richmond is a/an 83 year old male assessed by the dietitian for Admission Nutrition Risk Screen for reduced oral intake over the last month    NUTRITION HISTORY  Per chart review, during hospitalization, pt was initially NPO x 1 day and then diet was advanced to NDD3 with Thin Liquids.  PO intakes were % of recorded meals.      Unable to obtain nutrition hx from pt today due to expressive aphasia. Will try to follow up with pt's wife as able.      CURRENT NUTRITION ORDERS  Diet: Dysphagia Level 3:  Advanced with Thin Liquids  Room Service with Assist    Intake/Tolerance: PO intakes 100% x 2 recorded meals since admission.  Good appetite noted by RN last evening. Pt indicates he is getting enough to eat at his meals, but per RN seems hungry. She states he needs assist with set up and feeding due to right hemiparesis.      LABS  Labs reviewed  (7/21)  Na 142 (high end normal range   (7/19)  Na 144    MEDICATIONS  Medications reviewed  MVI with Lutein  Vitamin B12    ANTHROPOMETRICS  Height: 177.8 cm (5' 10\")  Most Recent Weight: 79.7 kg (175 lb 12.8 oz)    IBW: 166 lbs  BMI: " "Overweight BMI 25-29.9  Weight History: Per chart review, hospital admission weight (7/17)  79.9 kg/176 lbs 3.2 oz. which is stable with [facility] admission weight.  Per hx below, weight appears decreased ~ 14 lbs or 7% x 7 months including 6 lbs or 3% over ~ 2 weeks.  Pt unable to state UBW today.  Wt Readings from Last 10 Encounters:   07/20/19 79.7 kg (175 lb 12.8 oz)   07/20/19 85.3 kg (188 lb)   07/13/19 80.1 kg (176 lb 9.6 oz)   06/28/19 82.6 kg (182 lb 1.6 oz)   12/20/18 86 kg (189 lb 9.6 oz)   02/20/12 79.4 kg (175 lb 1 oz)       Dosing Weight: 80 kg (adjusted weight)    ASSESSED NUTRITION NEEDS  Estimated Energy Needs: 8446-6652 kcals/day (25 - 30 kcals/kg)  Justification: Maintenance  Estimated Protein Needs: 80-96 grams protein/day (1 - 1.2 grams of pro/kg)  Justification: Maintenance  Estimated Fluid Needs:  (1 mL/kcal)   Justification: Per provider pending fluid status    PHYSICAL FINDINGS  See malnutrition section below.  Per chart review: pt has dysphagia, also:   \"expressive aphasia, motor apraxia, right hemiparesis, right hemineglect, impaired cognition and impairments with mobility and ADLs.\"    MALNUTRITION  % Intake: Unable to assess  % Weight Loss: Up to 1-2% in 1 week (non-severe)  Subcutaneous Fat Loss: None observed  Muscle Loss: None observed  Fluid Accumulation/Edema: None noted  Malnutrition Diagnosis: Unable to determine due to incomplete nutrition status validation.      NUTRITION DIAGNOSIS  Predicated suboptimal nutrient intake (energy/protein)  related to swallowing difficulty on modified diet, requiring assist with set up and feeding at meals as evidenced by current diet order and RN report.      INTERVENTIONS  Implementation  Nutrition Education: Discussed pt with RN.  Will leave NDD3 food list in room for wife to specify food preferences.  Odered Magic Cup vanilla in the afternoon for a trial.     Goals  Patient to consume % of nutritionally adequate meal trays TID, or the " equivalent with supplements/snacks.     Monitoring/Evaluation  Progress toward goals will be monitored and evaluated per protocol.    Emily Hunt RD, LD

## 2019-07-22 NOTE — PLAN OF CARE
FOCUS/GOAL  Bowel management, Bladder management and Pain management    ASSESSMENT, INTERVENTIONS AND CONTINUING PLAN FOR GOAL:      Patient slept well on the overnight, no c/o pain. Patient is using call light appropriately. He is  alert and oriented x4, expressive aphasia. Patient was continent of urine,  no BM. Bed alarm is on for safety, call light within reach.

## 2019-07-22 NOTE — PLAN OF CARE
Expressive aphasia, word finding difficulty.  Occasionally able to express one word answers when asked question. Bed/Chair alarms on. Does not call for assist when needed, sets off alarms. He was able to show me how to call for assist on controller. Has been continent of urine this shift with scheduled voiding. Uses urinal and staff empties. Swallowed pills whole in applesauce. Needs set up for meal trays.

## 2019-07-22 NOTE — PROVIDER NOTIFICATION
Social Work: Initial Assessment with Discharge Plan    Patient Name: Tk Richmond  : 1935  Age: 83 year old  MRN: [id number]  Completed assessment with: Pt interviewed at bedside  Admitted to ARU: 2019    Presenting Information   Date of SW assessment: 2019  Health Care Directive: Health Care Directive Agent (if patient not able to make decisions)  Primary Health Care Agent: Pt spouse Ml Richmond PH: [phone], Cell: [phone]  Secondary Health Care Agent: Pt dtr Naomi [phone]   Living Situation: [facility] with wife, 3 JAY, pt can live on the main level.  Previous Functional Status: Independent, no use of assistive device per pt report   DME available: None reported  Patient and family understanding of hospitalization: Pt observed to have expressive aphasia. Pt appears to understand his hospitalization and prefers to discharge home when ready.    Cultural/Language/Spiritual Considerations: , Adventism ce       Physical Health  Reason for admission: Acute ischemic cerebrovascular accident (CVA) involving left middle cerebral artery territory (H).    Provider Information   Primary Care Physician:Shyam Mclean   : Unknown     Mental Health/Chemical Dependency:   Diagnosis: None reported, documented or observed  Alcohol/Tobacco/Narcotis: None reported   Support/Services in Place: None reported   Services Needed/Recommended: None recommended or identified at this time  Sexuality/Intimacy: N/A    Support System  Marital Status:    Family support: Two adult children that live locally, grandchildren. Pt reported enjoying and seeing his family often. Pt at home and can provide 24-7 assistance if needed. Will confirm with pt wife.   Other support available: Not discussed     Community Resources  Current in home services: None reported   Previous services: None reported     Financial/Employment/Education  Employment Status: Retired   Income Source:  Social Security   Education: N/A  Financial Concerns:  None reported at this time   Insurance: Medicare A&B and BCBS       Discharge Plan   Patient and family discharge goal: Home with assistance and outpt therapy   Provided Education on discharge plan: YES  Patient agreeable to discharge plan:  YES  Provided education and attained signature for Medicare IM and IRF Patient Rights and Privacy Information provided to patient : Yes  Provided patient with [place] [facility] [place] Resources: No  Barriers to discharge: No barriers identified at this time    Discharge Recommendations   Disposition: Home with assistance and outpt therapy    Transportation Needs: Wife can assist   Name of Transportation Company and Phone: N/A    Additional comments   Met with pt at bedside, no family present. Pt admitted on 7/20/19 for CVA and presents with expressive dysphasia. Pt lives at home with wife and has 3 JAY.Two adult children and grandchildren who live locally. Pt denied use of assistive devices PTA and was still driving. Pt denied any immediate concerns or questions at this time. Pt stated that his wife will be 'happy' to assist pt and home at time of discharge.     SWer will remain available to support and assist as needs arise.     LYDIA Mooney, [facility]  Acute Care Inpatient Clinical   6D-Observation Unit  Phone: [phone]  I   Pager: [phone]     07/22/19 1200   Living Arrangements   Lives With spouse   Living Arrangements house   Able to Return to Prior Arrangements yes   Living Arrangement Comments 3 JAY and can stay on the main level    Home Safety   Patient Feels Safe Living in Home? yes   Discharge Needs Assessment   Transportation Anticipated family or friend will provide   Final Resources   Equipment Currently Used at Home none

## 2019-07-22 NOTE — PROGRESS NOTES
07/22/19 1300   General Information   Onset Date 07/13/19   Start of Care Date 07/21/19   Referring Physician Susana Araiza MD   Patient/Family Goals Statement not able to state due to verbal apraxia and expressive aphasia   Swallowing Evaluation Bedside swallow evaluation   Behaviorial Observations WFL (within functional limits)   Mode of current nutrition Oral diet   Type of oral diet Dysphagia diet level 3;Thin liquid   Respiratory Status Room air   Comments Tk Richmond is a 83 year old male with a history of recurrent CVAs who presents to the acute rehab unit for rehabilitation following left CVA of undetermined source.  Patient initially presented to [facility] on 7/13 for stroke with  expressive aphasia.  He was improving at the time of discharge 7/15, but then developed an acute right sided facial drop and worsened aphasia on 7/17.  CTA showed ischemia in the left MCA M2 division territory which was felt to be an old embolized plaque and unchanged from 2012, it also demonstrated 53% stenosis of the right ICA and 54% stenosis of the left ICA.  Radiology felt that there is a degree of calcified plaque in the left ICA that could be causing emboli, multiple consults and IR cervicocerebral angiography were obtained but ultimately team did not feel this to be the cause of his stroke so no CEA or stent was not recommended.  Pt had been assessed previously while in the hospital and has been tolerating a DD3 diet with thin liquids without s/s of apiration but some occasional oral residue on the right- plan to trial regular diet to see if can further advance diet   Clinical Swallow Evaluation   Oral Musculature anomalies present  (right side labial droop)   Structural Abnormalities none present   Dentition present and adequate   Mandibular Strength and Mobility intact   Oral Labial Strength and Mobility impaired retraction;impaired seal;impaired coordination   Lingual Strength and Mobility impaired  coordination  (weak right side)   Buccal Strength and Mobility impaired  (right side)   Laryngeal Function Cough;Throat clear;Swallow;Voicing initiated;Dry swallow palpated   Additional Documentation Yes   Additional evaluation(s) completed today No   Swallow Eval   Feeding Assistance set up only required   Clinical Swallow Eval: Thin Liquid Texture Trial   Mode of Presentation, Thin Liquids cup   Volume of Liquid or Food Presented 4 oz   Oral Phase of Swallow WFL   Pharyngeal Phase of Swallow intact   Diagnostic Statement no s/s of aspiration with multiple trials of thin liquids; swallow timely   Clinical Swallow Eval: Solid Food Texture Trial   Mode of Presentation, Solid spoon;self-fed   Volume of Solid Food Presented small bites of DD3 and regular solids   Oral Phase, Solid Residue in oral cavity   Oral Residue, Solid right lip drooling;right anterior lateral sulci   Pharyngeal Phase, Solid intact   Successful Strategies Trialed During Procedure, Solid other (see comments)  (lingual sweep)   Diagnostic Statement Mildly slower oral prep with regular solids; occasional min oral residue on the right side- but pt usually uses lingual sweep to check for residue and to clear. No aspiration s/s and no sensation of  pharyngeal retention.    VFSS Evaluation   VFSS Additional Documentation No   FEES Evaluation   Additional Documentation No   Swallow Compensations   Swallow Compensations Alternate viscosity of consistencies;Effortful swallow;Pacing;Reduce amounts;Multiple swallow   Results Oral difficulties only   Esophageal Phase of Swallow   Patient reports or presents with symptoms of esophageal dysphagia No   General Therapy Interventions   Planned Therapy Interventions Dysphagia Treatment   Dysphagia treatment Oropharyngeal exercise training;Modified diet education;Instruction of safe swallow strategies   Swallow Eval: Clinical Impressions   Skilled Criteria for Therapy Intervention Skilled criteria met.  Treatment  indicated.   Functional Assessment Scale (FAS) 5   Dysphagia Outcome Severity Scale (NOHELIA) Level 5 - NOHELIA   Treatment Diagnosis Min to mild oral pharygneal dysphagia   Diet texture recommendations Dysphagia diet level 3;Thin liquids   Recommended Feeding/Eating Techniques alternate between small bites and sips of food/liquid;check mouth frequently for oral residue/pocketing;hard swallow w/ each bite or sip;maintain upright posture during/after eating for 30 mins;small sips/bites   Demonstrates Need for Referral to Another Service occupational therapy;physical therapy   Therapy Frequency Daily   Predicted Duration of Therapy Intervention (days/wks) 2 weeks   Anticipated Discharge Disposition home w/ outpatient services   Risks and Benefits of Treatment have been explained. Yes   Patient, family and/or staff in agreement with Plan of Care Yes   Clinical Impression Comments Completed clinical bedside swallow eval per MD orders. Pt presents with min to mild oral pharygneal dysphagia characterized by the following: mildl slower and discoordinated oral prep with DD3 and regular solid foods with occasional min oral residue on the right. Pt however is using stratgy of lingual sweep on the right to check for oral residue and is successful with clearing any oral residue. There are no aspiration s/s with multiple trials of thin liquids or trials of DD3 and regular solids. Pt is also not reporting any sensation of pharyngeal retention. Recommending continue with current DD3 with thin liquids. Pt should be upright for all po, small bites/sips, alternate solids and liquid, pace self- eat slowly, use lingual sweep to check for right sided oral residue. SLP to plan trial again of regular solids and if tolerance maintained then will plan to upgrade to regular diet. Pt will benefit from skilled SLP interventionfor swallowing as current level of functin is below baseline.    Total Evaluation Time   Total Evaluation Time (Minutes) 30

## 2019-07-22 NOTE — PLAN OF CARE
Completed formal speech and language eval per MD orders. Pt presents with minimal deficits in auditory comprehension, moderate impariments in reading comprehension and mod to severe expresive aphasia with moderate verbal apraxia and dysarthria. Comprehension is mostly intact- dififculty with command following appears to be due more to motor apraxia deficits vs comprehension deficits. Pt has difficulty with visual scanning some and also comprehension at short sentence level.. Pt with mod to severe expressive aphasia and moderate verbal apraxia with soudn sequencing for multisyllabic words and at sentence level; word retrieval difficulty is evident in open ended communicaiton attempts- noting perseverative errors as well as paraphasic errors that are combined with the apraxic errors. Pt's current levle of function is below baseline and pt will benefit from skilled SLP intervention to improve functional communication and language  for improved safety and indpendence with ADL's and ability to communicate wants and needs

## 2019-07-22 NOTE — PLAN OF CARE
PT: progressing well with functional mobility using walker with R hand attachment. Needs min A on stairs d/t R railing at home. Needs reinforcement of sequencing and safety.

## 2019-07-23 PROCEDURE — 92526 ORAL FUNCTION THERAPY: CPT | Mod: GN | Performed by: SPEECH-LANGUAGE PATHOLOGIST

## 2019-07-23 PROCEDURE — 25000132 ZZH RX MED GY IP 250 OP 250 PS 637: Mod: GY | Performed by: STUDENT IN AN ORGANIZED HEALTH CARE EDUCATION/TRAINING PROGRAM

## 2019-07-23 PROCEDURE — 92507 TX SP LANG VOICE COMM INDIV: CPT | Mod: GN | Performed by: SPEECH-LANGUAGE PATHOLOGIST

## 2019-07-23 PROCEDURE — 97112 NEUROMUSCULAR REEDUCATION: CPT | Mod: GO | Performed by: OCCUPATIONAL THERAPIST

## 2019-07-23 PROCEDURE — 12800006 ZZH R&B REHAB

## 2019-07-23 PROCEDURE — 97530 THERAPEUTIC ACTIVITIES: CPT | Mod: GP

## 2019-07-23 PROCEDURE — 97116 GAIT TRAINING THERAPY: CPT | Mod: GP

## 2019-07-23 PROCEDURE — 97535 SELF CARE MNGMENT TRAINING: CPT | Mod: GO | Performed by: OCCUPATIONAL THERAPIST

## 2019-07-23 PROCEDURE — 25000132 ZZH RX MED GY IP 250 OP 250 PS 637: Mod: GY | Performed by: PHYSICAL MEDICINE & REHABILITATION

## 2019-07-23 RX ADMIN — Medication 1 CAPSULE: at 21:02

## 2019-07-23 RX ADMIN — SENNOSIDES AND DOCUSATE SODIUM 2 TABLET: 8.6; 5 TABLET ORAL at 11:38

## 2019-07-23 RX ADMIN — Medication 1 CAPSULE: at 08:36

## 2019-07-23 RX ADMIN — CLOPIDOGREL BISULFATE 75 MG: 75 TABLET, FILM COATED ORAL at 08:36

## 2019-07-23 RX ADMIN — LISINOPRIL 5 MG: 5 TABLET ORAL at 08:37

## 2019-07-23 RX ADMIN — LOVASTATIN 40 MG: 20 TABLET ORAL at 21:01

## 2019-07-23 RX ADMIN — CITALOPRAM HYDROBROMIDE 40 MG: 40 TABLET ORAL at 08:37

## 2019-07-23 RX ADMIN — Medication 1 CAPSULE: at 08:38

## 2019-07-23 RX ADMIN — CYANOCOBALAMIN TAB 1000 MCG 1000 MCG: 1000 TAB at 08:37

## 2019-07-23 RX ADMIN — AMLODIPINE BESYLATE 5 MG: 5 TABLET ORAL at 08:37

## 2019-07-23 RX ADMIN — ASPIRIN 81 MG: 81 TABLET, COATED ORAL at 08:36

## 2019-07-23 RX ADMIN — Medication 1 CAPSULE: at 21:01

## 2019-07-23 NOTE — PLAN OF CARE
Acute Rehab Care Conference/Team Rounds      Type: Team Rounds    Present: Dr. Janet Bell, Abbey Wood RN, Michelle Stratton SW, Denise Mccollum OT, Reina Sandy PT, Irina Stein SLP, Christa Little , Yudelka Esparza, Virginia Hunt Dietician, Patt Voss [facility]      Discharge Barriers/Treatment/Education    Rehab Diagnosis: left MCA ischemic stroke, right frontal punctate stroke.    Active Medical Co-morbidities/Prognosis: Hypertension, previous CVA, carotid artery stenosis, dilated ascending aorta, glaucoma, depression, anxiety.    Safety: Pt is alert and oriented x4, Has expressive aphasia but is able to communicate his needs and using call light appropriately.      Pain: denies pain     Medications, Skin, Tubes/Lines: Skin intact , no tubes or lines.     Swallowing/Nutrition:Pt being  seen for swallow tx- test tray of regular diet with thin liquids. Pt initially was tolerating the pizza (regular solid texture) well and was independently clearing any oral residue and using lingual sweep to check for residue on the right. However near the end of the meal- pt noted to have some throat clearing on the pizza- suspect increased pharyngeal retention as the meal progressed. Further with trials of fresh, raw fruit ( grapes and melon)- pt consistently had increased difficulty with oral manipulation ( slower and discoordinated oral prep) as well as coughed 2x with the melon. Pt is not yet ready to upgrade diet- recommend continue with current DD3 with thin liquids.  Pt needs to take small bites/sips, continue to use lingual sweep to check for residue on the right, upright for all po, alternate consistencies. Will plant to trial regular diet again in 2 days.    Bowel/Bladder: Continent of bowel , incontinent of bladder , LBM 7/21    Psychosocial: PTA pt was living in a townhouse with his wife. No DME PTA and 3 JAY his home. Pt's wife available to assist at time of discharge. Pt has two adult  children and grandchildren that live locally and can assist as well. Pt wife able to provide transportation. Goal is to discharge home with family assistance and therapy, pending final recommendations.     ADLs/IADLs: Pt able to transfer/mobility with CGA/SBA, requires min A for dressing d/t pt unable to use R hand functionally. Continue skilled OT to improve functional use of R hand in order to gain increased independence for adls/iadls. Home with wife and oversight/supervision. DME/AE: shower chair, grab bar. Outpatient OT.     Mobility: Making good gains in PT. He is now SBA with ambulation using FWW with R hand attachment. Stairs require min A d/t motor planning deficit. Needs practice with basic mobility tasks, repetition for learning. Anticipate home around weekend with ongoing support from wife and OP PT.     Cognition/Language:Completed formal speech and language eval per MD orders. Pt presents with minimal deficits in auditory comprehension, moderate impairments in reading comprehension and mod to severe expresive aphasia with moderate verbal apraxia and dysarthria. Comprehension is mostly intact- dififculty with command following appears to be due more to motor apraxia deficits vs comprehension deficits. Pt has difficulty with visual scanning some and also comprehension at short sentence level.. Pt with mod to severe expressive aphasia and moderate verbal apraxia with sound sequencing for multisyllabic words and at sentence level; word retrieval difficulty is evident in open ended communicaiton attempts- noting perseverative errors as well as paraphasic errors that are combined with the apraxic errors. Also attempted to assess writing with pt's non- dominant hand- pt with significant difficulty with letter formation and copying his name. Pt will need supervision/ assist   following discharge due to severity of expressive aphasia and verbal apraxia with OP sptx      Community Re-Entry: wife is very  supportive     Transportation: Not a  - car transfer not a barrier.    Decision maker: self    Plan of Care and goals reviewed and updated.    Discharge Plan/Recommendations    Fall Precautions: continue    Overall plan for the patient: Tk is making good progress with therapies. Anticipate another week at ARU before discharge to home. Will have outpatient therapies (PT, OT and SLP). Given his significant expressive aphasia and apraxic speech, will recommend 24/7 supervision at least initially after discharge.       Utilization Review and Continued Stay Justification    Medical Necessity Criteria:    For any criteria that is not met, please document reason and plan for discharge, transfer, or modification of plan of care to address.    Requires intensive rehabilitation program to treat functional deficits?: Yes    Requires 3x per week or greater involvement of rehabilitation physician to oversee rehabilitation program?: Yes    Requires rehabilitation nursing interventions?: Yes    Patient is making functional progress?: Yes    There is a potential for additional functional progress? Yes    Patient is participating in therapy 3 hours per day a minimum of 5 days per week or 15 hours per week in 7 day period?:Yes    Has discharge needs that require coordinated discharge planning approach?:Yes        Final Physician Sign off    Statement of Approval: I agree with all the recommendations detailed in this document.      Patient Goals    OT Frequency: daily 10-14 days  OT goal: hygiene/grooming: independent  OT goal: upper body dressing: Independent  OT goal: lower body dressing: Independent  OT goal: upper body bathing: Independent  OT goal: lower body bathing: Independent  OT goal: toilet transfer/toileting: Modified independent  OT goal: meal preparation: Supervision/stand-by assist  OT goal: home management: Supervision/stand-by assist  OT goal 1: Pt will complete shower transfer with supervision for safety  DME/AE PRN        PT Frequency: 60 min daily  PT goal: bed mobility: Independent, Supine to/from sit  PT goal: transfers: Modified independent, Assistive device, Sit to/from stand  PT goal: gait: Modified independent, Assistive device, Greater than 200 feet  PT goal: stairs: Modified independent, 3 stairs, Rail on right  PT goal: perform aerobic activity with stable cardiovascular response: continuous activity, 20 minutes, NuStep  PT goal 1: Pt will attend falls class to reduce risk of falls upon discharge  PT Goal 2: Pt will complete car transfer Cedric to allow community access at discharge  PT Goal 3: Pt will demo ability to be IND with LE strengthening HEP to promote ongoing exercise at discharge        SLP Frequency: daily  SLP Swallow Goal:  Safely tolerate diet without signs/symptoms of aspiration: thin liquids, regular diet, with use of swallow precautions  SLP goal 1: Pt will utilize dysarthria strategies and participate in apraxia drill tasks ( phrase/ sentence level to improve speech intelligibility and motor sequencing for expressing basic needs with 90% accuracy  SLP goal 2: Pt will complete basic naming tasks with 90% accuracy  SLP goal 3: Pt will copy basic word and biographical info with using his non doinant hand with 90% accuracy        Nursing goals   Patient/Family Goal: Bladder: Pt will demonstrate ability to advocate for toileting to prevent incontinence prior to discharge.     Patient/Family Goal: Medication Management: Pt will participate in MAP to demonstrate ability to manage medication and comply with regime d/t hx, prior to discharge.     Goal: Safety Management: Pt will demonstrate appropriate call light use until promoted to independence in room during ARU stay, prior to discharge.

## 2019-07-23 NOTE — PROGRESS NOTES
"  [facility]   Acute Rehabilitation Unit  Daily progress note    INTERVAL HISTORY  Tk Richmond was seen and examined at bedside. Doing well. Didn't sleep last night but couldn't;t say why. Denied any pain or discomfort. Confirmed his code status being full.     Team rounds held today; please see separate document in Plan of Care tab for full details. Briefly,  Tk is making good progress with therapies. Anticipate another week at ARU before discharge to home. Will have outpatient therapies (PT, OT and SLP). Given his significant expressive aphasia and apraxic speech, will recommend 24/7 supervision at least initially after discharge.       MEDICATIONS  Scheduled meds    amLODIPine  5 mg Oral Daily     aspirin  81 mg Oral Daily     brinzolamide-brimonidine  1 drop Both Eyes BID     citalopram  40 mg Oral Daily     clopidogrel  75 mg Oral Daily     cyanocobalamin  1,000 mcg Oral Daily     glucosamine-chondroitin  1 capsule Oral BID     lisinopril  5 mg Oral Daily     lovastatin  40 mg Oral At Bedtime     multivitamin  with lutein  1 capsule Oral BID       PRN meds:  acetaminophen, alum & mag hydroxide-simethicone, calcium carbonate, melatonin, ondansetron, - MEDICATION INSTRUCTIONS -, senna-docusate      PHYSICAL EXAM  /58 (BP Location: Left arm)   Pulse 56   Temp 97.7  F (36.5  C) (Oral)   Resp 18   Ht 1.778 m (5' 10\")   Wt 79.7 kg (175 lb 12.8 oz)   SpO2 94%   BMI 25.22 kg/m      Gen: NAD, sitting in chair   Cardio: RRR, + murmur   Pulm: clear breath sounds b/l   Abd: soft and non-tender   Ext: wwp, no edema in bilateral lower extremities, no tenderness at calves   Neuro/MSK: unchanged - right facial droop, R hemiparesis with RUE 2-3/5 proximally but 0/5 at wrist and fingers. RLE with 4/5. Intact sensation. No sensory neglect. Dysarthric. Aphasic expressive > receptive       LABS  Reviewed as above       ASSESSMENT AND PLAN  Tk Richmond is a 83 year " old male  with a past medical history significant for multiple strokes of unknown etiology, HTN, and MDD who was admitted to the hospital on 7/13 due to left MCA ischemic stroke. Admitted to the ARU on 7/20/2019.    Rehabilitation - continue comprehensive acute inpatient rehabilitation program with multidisciplinary approach including therapies, rehab nursing, and physiatry following. See interval history for updates.     Completed stroke at [facility].     Medical Conditions  Left MCA ischemic stroke   Functional deficits include right facial palsy, dysarthria, aphasia, right hemiparesis and cognitive deficits.   -Continue aspirin plus Plavix for 3 months.   -Lipids well controlled total cholesterol 132, LDL 52, will continue lovastatin 40 mg daily.   -Hypertension: Holding PTA lisinopril for permissive hypertension, consider alternative agent due to elevated creatinine.  -A1c 5.2    Essential hypertension  - Holding PTA lisinopril for permissive hypertension; 07/22/19 restarted at 5mg daily. 07/23/19 repeat labs in am; if Cr in good range, will increase lisinopril and stop amlodipine.   - on amlodipine 5mg daily (was stared at the acute care); will try to control with one agent if possible      Acute kidney injury - resolving   -- creatinine 1.39 at admission and received contrast, improved with IVF  -- hold ace as above but restarted 07/22/19      Major recurrent depressive disorder with anxiety  - continue citalopram      Anemia: chronic and stable. No clear etiology. Will defer more work-up to outpatient.      Glaucoma  - Continue  on PTA eye drops    FEN: DD3 with thin upon admission to ARU  Bowel: Continent, PRN senna and Miralax available.  Bladder: Continent, PVRs slightly elevated. UA was negative on 7/20.  DVT Prophylaxis: mechanical   GI Prophylaxis: None  Code: DNR/DNI  Disposition: Home with family  ELOS: 10-14 days  Follow up Appointments on Discharge: PCP, neurology and PM&R          Janet  MD [facility]  Physical Medicine & Rehabilitation    I spent a total of 30 minutes face to face and coordinating care of kT Richmond.  Over 50% of my time on the unit was spent counseling the patient and /or coordinating care.

## 2019-07-23 NOTE — PLAN OF CARE
"Pt upright and dressed in chair this am. Ate breakfast and lunch well, was discouraged about not advancing to regular diet. Pt tends to laugh if he can't express what he wants to say. Often parrots back one word responses of what you say to him: RN - I bet that takes a lot of patience. Pt - Patience! Pt has PVR of 51 after voiding. Wife present most of the day. Pt reported BM yesterday and today, stated it was \"hard\". Senna given with education on how that will soften the stools.   "

## 2019-07-23 NOTE — PLAN OF CARE
Pt seen for swallow tx- test tray of regular diet with thin liquids. Pt initially was tolerating the pizza well and was independently clearing any oral residue and using lingual sweep to check for residue on the right. However near the end of the meal- pt noted to have some throat clearing on the pizza- suspect increased pharyngeal retention as the meal progressed. Further with trials of fresh, raw fruit ( grapes and melon)- pt consistently had increased difficulty with oral manipulation as well as coughed 2x with the melon. Pt is not yet ready to upgrade diet- recommend continue with current DD3 with thin liquids.  Pt needs to take small bites/sips, continue to use lingual sweep to check for residue on the right, upright for all po, alternate consistencies. Will plant to trial regular diet again in 2 days.     Worked on aming to description- pt able to name 2/10 independently but when provided with a phonemic cue then able to name 6/8 consistently. Wroked on apraxia drill- pt successful with 1 syllable words with inital consonant blends with 15/15 accuracy-- somtimes initally he would not be able to sequence the movement on 1st attempt with a 2nd attempt - was successful. With completing words of increasing length- 2 and 3 syllable words- pt having more difficulty- needing to attempt multiple repetitions but ultimately successful more than half of the time with repeated attempts. If pt not able to state wrds initialy then benefitted from clincian model. Completed basic level reading comprehension task- simple phrase completion- pt at  4/7 with this task- appears to be missing some of th words on the right- some right neglect likely- had him scan with his pen.

## 2019-07-23 NOTE — PLAN OF CARE
RN: Pt AA&O x3, severe aphasia but able to make needs known w/ patience and yes/no questions. Pt fed self and did some self cares as able w/ right sided weakness. Pt denies pain or SOB. Alarms activated. Con't POC.

## 2019-07-24 LAB
ANION GAP SERPL CALCULATED.3IONS-SCNC: 6 MMOL/L (ref 3–14)
BUN SERPL-MCNC: 29 MG/DL (ref 7–30)
CALCIUM SERPL-MCNC: 8 MG/DL (ref 8.5–10.1)
CHLORIDE SERPL-SCNC: 110 MMOL/L (ref 94–109)
CO2 SERPL-SCNC: 25 MMOL/L (ref 20–32)
CREAT SERPL-MCNC: 1.31 MG/DL (ref 0.66–1.25)
GFR SERPL CREATININE-BSD FRML MDRD: 50 ML/MIN/{1.73_M2}
GLUCOSE SERPL-MCNC: 94 MG/DL (ref 70–99)
POTASSIUM SERPL-SCNC: 4.1 MMOL/L (ref 3.4–5.3)
SODIUM SERPL-SCNC: 141 MMOL/L (ref 133–144)

## 2019-07-24 PROCEDURE — 97112 NEUROMUSCULAR REEDUCATION: CPT | Mod: GP

## 2019-07-24 PROCEDURE — 25000132 ZZH RX MED GY IP 250 OP 250 PS 637: Mod: GY | Performed by: PHYSICAL MEDICINE & REHABILITATION

## 2019-07-24 PROCEDURE — 36415 COLL VENOUS BLD VENIPUNCTURE: CPT | Performed by: PHYSICAL MEDICINE & REHABILITATION

## 2019-07-24 PROCEDURE — 97116 GAIT TRAINING THERAPY: CPT | Mod: GP

## 2019-07-24 PROCEDURE — 25000132 ZZH RX MED GY IP 250 OP 250 PS 637: Mod: GY | Performed by: STUDENT IN AN ORGANIZED HEALTH CARE EDUCATION/TRAINING PROGRAM

## 2019-07-24 PROCEDURE — 92507 TX SP LANG VOICE COMM INDIV: CPT | Mod: GN | Performed by: SPEECH-LANGUAGE PATHOLOGIST

## 2019-07-24 PROCEDURE — 97110 THERAPEUTIC EXERCISES: CPT | Mod: GP

## 2019-07-24 PROCEDURE — 80048 BASIC METABOLIC PNL TOTAL CA: CPT | Performed by: PHYSICAL MEDICINE & REHABILITATION

## 2019-07-24 PROCEDURE — 97530 THERAPEUTIC ACTIVITIES: CPT | Mod: GP

## 2019-07-24 PROCEDURE — 97535 SELF CARE MNGMENT TRAINING: CPT | Mod: GO

## 2019-07-24 PROCEDURE — 12800006 ZZH R&B REHAB

## 2019-07-24 PROCEDURE — 25000128 H RX IP 250 OP 636: Performed by: PHYSICAL MEDICINE & REHABILITATION

## 2019-07-24 RX ADMIN — ASPIRIN 81 MG: 81 TABLET, COATED ORAL at 09:10

## 2019-07-24 RX ADMIN — Medication 1 CAPSULE: at 20:48

## 2019-07-24 RX ADMIN — LISINOPRIL 5 MG: 5 TABLET ORAL at 09:10

## 2019-07-24 RX ADMIN — Medication 1 CAPSULE: at 09:11

## 2019-07-24 RX ADMIN — AMLODIPINE BESYLATE 5 MG: 5 TABLET ORAL at 09:10

## 2019-07-24 RX ADMIN — CITALOPRAM HYDROBROMIDE 40 MG: 40 TABLET ORAL at 09:10

## 2019-07-24 RX ADMIN — Medication 1 CAPSULE: at 09:10

## 2019-07-24 RX ADMIN — LOVASTATIN 40 MG: 20 TABLET ORAL at 20:48

## 2019-07-24 RX ADMIN — CYANOCOBALAMIN TAB 1000 MCG 1000 MCG: 1000 TAB at 09:10

## 2019-07-24 RX ADMIN — CLOPIDOGREL BISULFATE 75 MG: 75 TABLET, FILM COATED ORAL at 09:11

## 2019-07-24 RX ADMIN — SODIUM CHLORIDE 500 ML: 9 INJECTION, SOLUTION INTRAVENOUS at 18:19

## 2019-07-24 NOTE — PLAN OF CARE
Current status (PT):  Pt tolerated gait in the mascorro with FWW, improved step length and juan with cga to min facilitation; 12 steps, using R UE on rail, cga to min assist; Nustep 10 min; standing balance and re education;  SOB at times but vitals stable on RA, 96-97%, HR 71-75;      Barriers; motor control, fall risk, SOB, functional tolerance.     Recommendations: PT: gait with a focus on symmetry; sit to stand (L inhibition, R activation); stairs with rail on the L ascending, nustep, standing neuro re ed;    Arnie Duong, PT  7/24/2019

## 2019-07-24 NOTE — PLAN OF CARE
FOCUS/GOAL  Bowel management and Bladder management    ASSESSMENT, INTERVENTIONS AND CONTINUING PLAN FOR GOAL:  Pt is has expressive aphasia. Does better with yes/know questions. Needs additional time to express needs at times. Transfers A1 with walker. Alarms on for safety. Pt did not use call light tonight, asked if pt needed to void on interations, pt stated no. At bedtime pt was incontinent, had incontinent of small smear BM. Bladder scan at 2130 was 123. Takes pills whole with applesauce. Denies pain. Continue to anticipate needs. Continue POC.

## 2019-07-24 NOTE — PROGRESS NOTES
Outpatient Speech Language Pathology Discharge Note     Patient: Tk Richmond  : 1935    Beginning/End Dates of Reporting Period:  2019 to 2019    Referring Provider: Janet Bell MD    Therapy Diagnosis: L CVA    Client Self Report:   Pt admitted to hospital shortly after OP evaluation      Goals:  Goal Identifier Writing    Goal Description Patient will be able to copy full sentences with no mistakes in 3/4 opportunities.   Target Date 10/15/19   Date Met      Progress:     Goal Identifier Naming   Goal Description Patient will provide definitions or desciptions of words or objects with 80% accuracy.    Target Date 10/15/19   Date Met      Progress:     Goal Identifier generative naming   Goal Description Patient will complete generative naming tasks by naming atleast 10+ items per category.    Target Date 10/15/19   Date Met      Progress:     Goal Identifier dysarthria   Goal Description Patient will use dysarthria strategies such as (pacing, overarticulation, breath support etc) in order to increase his intelligibility to 95%.    Target Date 10/15/19   Date Met      Progress:       Progress Toward Goals:   Progress limited due to re-hospitalization     Plan:  Discharge from therapy.    Discharge:    Reason for Discharge: Change in medical status.

## 2019-07-24 NOTE — PROGRESS NOTES
SPIRITUAL HEALTH SERVICES  SPIRITUAL ASSESSMENT Progress Note  [facility] ([facility] [place]) 5R     REFERRAL SOURCE: LOS Visit    Tk said he was not interested in a visit today. I noticed he was frustrated by his speech and that he couldn't express himself clearly. He said I could follow up later this week.    PLAN: Follow up this week on ARU.    Tosha Arauz   Intern  Pager [phone]

## 2019-07-24 NOTE — PLAN OF CARE
SLP: further assessed reading comprehension, difficulty with more than one sentence.  Able to verbally complete sentence 90% accuracy.Needing cues for naming item described.

## 2019-07-24 NOTE — PLAN OF CARE
OT; pt was incontinent of bowel today and did not even realize he had a bowel movement. Required total A for leighann care d/t extend of the incontinence.

## 2019-07-24 NOTE — PLAN OF CARE
Denies pain, continent of bladder on the toilet, ate good at breakfast and lunch, will continue POC

## 2019-07-24 NOTE — PLAN OF CARE
Patient Discharged to [facility], transported by , initially declined to get on the stretcher to be transported. Patients wife came in and was able to convince patient to comply. discontinue summary provided to  transport staff. Patient declined all meds today, re-approach and he continue to refused

## 2019-07-25 LAB
ALBUMIN UR-MCNC: NEGATIVE MG/DL
ANION GAP SERPL CALCULATED.3IONS-SCNC: 7 MMOL/L (ref 3–14)
APPEARANCE UR: CLEAR
BACTERIA #/AREA URNS HPF: ABNORMAL /HPF
BILIRUB UR QL STRIP: NEGATIVE
BUN SERPL-MCNC: 38 MG/DL (ref 7–30)
CALCIUM SERPL-MCNC: 7.9 MG/DL (ref 8.5–10.1)
CHLORIDE SERPL-SCNC: 111 MMOL/L (ref 94–109)
CO2 SERPL-SCNC: 24 MMOL/L (ref 20–32)
COLOR UR AUTO: ABNORMAL
CREAT SERPL-MCNC: 1.35 MG/DL (ref 0.66–1.25)
CREAT UR-MCNC: 68 MG/DL
CREAT UR-MCNC: 69 MG/DL
FRACT EXCRET NA UR+SERPL-RTO: 1.7 %
GFR SERPL CREATININE-BSD FRML MDRD: 49 ML/MIN/{1.73_M2}
GLUCOSE SERPL-MCNC: 87 MG/DL (ref 70–99)
GLUCOSE UR STRIP-MCNC: NEGATIVE MG/DL
HGB UR QL STRIP: NEGATIVE
KETONES UR STRIP-MCNC: NEGATIVE MG/DL
LEUKOCYTE ESTERASE UR QL STRIP: NEGATIVE
NITRATE UR QL: NEGATIVE
PH UR STRIP: 6 PH (ref 5–7)
POTASSIUM SERPL-SCNC: 4.3 MMOL/L (ref 3.4–5.3)
RBC #/AREA URNS AUTO: 0 /HPF (ref 0–2)
SODIUM SERPL-SCNC: 142 MMOL/L (ref 133–144)
SODIUM UR-SCNC: 123 MMOL/L
SODIUM UR-SCNC: 123 MMOL/L
SOURCE: ABNORMAL
SP GR UR STRIP: 1.01 (ref 1–1.03)
UROBILINOGEN UR STRIP-MCNC: NORMAL MG/DL (ref 0–2)
WBC #/AREA URNS AUTO: <1 /HPF (ref 0–5)

## 2019-07-25 PROCEDURE — 84300 ASSAY OF URINE SODIUM: CPT | Performed by: NURSE PRACTITIONER

## 2019-07-25 PROCEDURE — 92526 ORAL FUNCTION THERAPY: CPT | Mod: GN | Performed by: SPEECH-LANGUAGE PATHOLOGIST

## 2019-07-25 PROCEDURE — 81001 URINALYSIS AUTO W/SCOPE: CPT | Performed by: NURSE PRACTITIONER

## 2019-07-25 PROCEDURE — 97535 SELF CARE MNGMENT TRAINING: CPT | Mod: GO

## 2019-07-25 PROCEDURE — 82570 ASSAY OF URINE CREATININE: CPT | Performed by: NURSE PRACTITIONER

## 2019-07-25 PROCEDURE — 80048 BASIC METABOLIC PNL TOTAL CA: CPT | Performed by: PHYSICAL MEDICINE & REHABILITATION

## 2019-07-25 PROCEDURE — 97112 NEUROMUSCULAR REEDUCATION: CPT | Mod: GO

## 2019-07-25 PROCEDURE — 97116 GAIT TRAINING THERAPY: CPT | Mod: GP

## 2019-07-25 PROCEDURE — 99222 1ST HOSP IP/OBS MODERATE 55: CPT | Performed by: NURSE PRACTITIONER

## 2019-07-25 PROCEDURE — 99207 ZZC CDG-CODE CATEGORY CHANGED: CPT | Performed by: NURSE PRACTITIONER

## 2019-07-25 PROCEDURE — 36415 COLL VENOUS BLD VENIPUNCTURE: CPT | Performed by: PHYSICAL MEDICINE & REHABILITATION

## 2019-07-25 PROCEDURE — 12800006 ZZH R&B REHAB

## 2019-07-25 PROCEDURE — 97530 THERAPEUTIC ACTIVITIES: CPT | Mod: GP

## 2019-07-25 PROCEDURE — 92507 TX SP LANG VOICE COMM INDIV: CPT | Mod: GN

## 2019-07-25 PROCEDURE — 97112 NEUROMUSCULAR REEDUCATION: CPT | Mod: GP

## 2019-07-25 PROCEDURE — 25000132 ZZH RX MED GY IP 250 OP 250 PS 637: Mod: GY | Performed by: STUDENT IN AN ORGANIZED HEALTH CARE EDUCATION/TRAINING PROGRAM

## 2019-07-25 PROCEDURE — 92507 TX SP LANG VOICE COMM INDIV: CPT | Mod: GN | Performed by: SPEECH-LANGUAGE PATHOLOGIST

## 2019-07-25 RX ADMIN — CITALOPRAM HYDROBROMIDE 40 MG: 40 TABLET ORAL at 07:41

## 2019-07-25 RX ADMIN — LOVASTATIN 40 MG: 20 TABLET ORAL at 20:18

## 2019-07-25 RX ADMIN — Medication 1 CAPSULE: at 07:43

## 2019-07-25 RX ADMIN — CYANOCOBALAMIN TAB 1000 MCG 1000 MCG: 1000 TAB at 07:41

## 2019-07-25 RX ADMIN — AMLODIPINE BESYLATE 5 MG: 5 TABLET ORAL at 07:41

## 2019-07-25 RX ADMIN — ASPIRIN 81 MG: 81 TABLET, COATED ORAL at 07:41

## 2019-07-25 RX ADMIN — Medication 1 CAPSULE: at 20:18

## 2019-07-25 RX ADMIN — CLOPIDOGREL BISULFATE 75 MG: 75 TABLET, FILM COATED ORAL at 07:42

## 2019-07-25 NOTE — PLAN OF CARE
Focus: Physio  Alert and appears orientated. Difficult to assess completely due to aphasia. Continues with right sided weakness. Up with assistance of one and walker. Creatinine is increased today despite bolus yesterday. Condom catheter removed this morning and pt was incontinent of urine times one.   Last bm 7-23-19. Will send urine as ordered. P: Continue to monitor.

## 2019-07-25 NOTE — PLAN OF CARE
"PT: Pt demonstrating good safety awareness in transfers. Able to traverse hallways distances w/ FWW w/ close SBA, cues at time in navigating obstacles on L side, increased with fatigue. Pt has difficulty with STS from lower surfaces, focus in session w/ good results, CGA from 19\" height, progresses to Joselyn w/ fatigue.   "

## 2019-07-25 NOTE — PLAN OF CARE
FOCUS/GOAL  Bowel management, Bladder management and Pain management    ASSESSMENT, INTERVENTIONS AND CONTINUING PLAN FOR GOAL:  Patient slept well on the overnight, no c/o pain or sob. He is alert and oriented x4, using call light appropriately. Incontinent of bladder, condom catheter in place on the overnights. Continue with plan of care.

## 2019-07-25 NOTE — PLAN OF CARE
"  Word finding difficulties very evident in casual conversation.  Patient knows the correct answers but unable to clearly state them on a consistent basis.  Patient does benefit from verbal cues such as carrier phrases or gestures in order to generate appropriate responses.  Introduced briefly to a potential communication board program that may be beneficial in \"go talk now\" and will continue to explore this as a means to help him communicate.  "

## 2019-07-25 NOTE — PLAN OF CARE
FOCUS/GOAL  Bladder management, Medical management and Cognition/Memory/Judgment/Problem solving    ASSESSMENT, INTERVENTIONS AND CONTINUING PLAN FOR GOAL:  Pt is alert, unable to confirm orientation because of aphasia. Given enough time pt can usually make needs known. MD placed orders for 500mL bolus of fluids for pt as his creatinine was increased this morning. Orders for follow up morning labs. Pt was incont of bladder x1 this shift. Fitted with condom catheter this shift at HS.

## 2019-07-25 NOTE — PLAN OF CARE
SLP pt seen for meal, doing well with NDD3 diet, thin fluids, with occasional cues to slow down, smaller bites. Also went through menu with pt/wife to send preferences to dietary.  Pt with limited verbal expression in conversation. Improved communication with yes/no questioning. Will plan trial regular diet next day.

## 2019-07-25 NOTE — CONSULTS
[facility], [place]    Internal Medicine Consult Note       Date of Admission: 7/20/2019  Consult Requested by: Susana Araiza MD  Reason for Consult: DAVID     Assessment & Plan   Tk Richmond is a 84 year old man with a history of recurrent CVAs, recent acute MCA ischemic stroke, left carotid artery stenosis, dilated ascending aorta, HTN, CKD stage III, glaucoma, and depression admitted to [facility] 7/17-7/20 for recurrent stroke.  He is admitted to ARU for ongoing physical rehabilitation.  Internal medicine is consulted due to concern for DVAID.      # Elevated creatinine concerning for DAVID   # CKD stage III   Cr 1.35, BUN 38 today 7/25.  Recent baseline since hospitalization 1.06-1.17, but noted to have Cr 1.2-1.3 since 2016.  S/p 500cc fluid bolus on 7/24 without improvement.  Urine lytes obtained today 7/25, FENa 1.7, c/w intrarenal injury likely 2/2 ATN d/t medications (received contrast for imaging during hospitalization, also restarted on Lisinopril in setting of recovering DAVID during hospitalization).  UA today 7/25 negative.    - Monitor I/Os   - Encourage PO hydration  - Trend BMP over next 2-3 days to ensure downtrend      - Will consider US renal in AM if worsening Cr     # Recent MCA ischemic stroke   # Recurrent strokes   # Left carotid artery stenosis   Per chart review, pt initially presented to [facility] with expressive aphasia on 7/13.  Improved and discharged 7/15.  On 7/17, developed acute R sided facial droop and aphasia.  CT head negative for acute CVA, but CTA head/neck did show ischemia in the left MCA superior M2 division territory felt to be an old embolized plaque and unchanged from 2012; it also showed 53% stenosis of R ICA and 54% stenosis of left ICA.  Reviewed with Radiology and determined that possible calcified plague in L ICA could be causing emboli.  Brain MRI confirmed L MCA M2 distribution CVA.  EEG neg for seizure activity.  US left carotid  on 7/18 with 50-69% diameter stenosis of left ICA, with moderate-to-severe irregular atherosclerotic mixed plaque in the proximal internal carotid artery extending into the mid segment of the internal carotid artery.Seen by Neurology and Vascular Surgery during admission, discharged on 30 day event monitor.    - Will need to follow up OP with Neurology.    - Continue daily ASA and Plavix x 3 months   - Continue daily statin   - Norvasc for BP management, ok to normalize BPs    # HTN  # Grade I diastolic dysfunction   # Severely dilated ascending aorta   Last echocardiogram on 7/14 with mild concentric LVH, EF 60-65%, grade I diastolic dysfunction, mild biatrial enlargement, mild to mod aortic regurg, mild tricuspid regurg, mild aortic root dilitation, and no WMAs.    - Continue Norvasc 5mg PO daily  - Hold Lisinopril for now d/t DAVID     # Depression - Continue PTA Celexa.            The patient's care was discussed with the Attending Physician, Dr. Timothy Aguirre.    Sita Dorman, [facility]  Hospitalist Service  [facility]  Pager: [phone]    ______________________________________________________________________    History is obtained from the patient    History of Present Illness   Tk Richmond is a 84 year old man with a history of recurrent CVAs, recent acute MCA ischemic stroke, left carotid artery stenosis, dilated ascending aorta, HTN, CKD stage III, glaucoma, and depression admitted to [facility] 7/17-7/20 for recurrent stroke.  He is admitted to ARU for ongoing physical rehabilitation.  Internal medicine is consulted due to concern for DAVID.     Tk is sitting in his bedside chair.  He continues to have expressive aphasia.  It is difficult for him to communicate answers to my questions, but he makes a strong effort.  He is able to tell me that his urination patterns have not changed, and he is not experiencing pain with urination.  He denies dyspnea, chest pain, abdominal pain, and  dizziness.  He reports that he did receive contrast dye during his hospitalization.      Review of Systems   10 point ROS performed and negative unless otherwise noted in HPI     Past Medical History    I have reviewed this patient's medical history and updated it with pertinent information if needed.   Past Medical History:   Diagnosis Date     Glaucoma      Hyperlipidemia LDL goal < 100      Hypertension      Stroke (H)     , speech deficit        Past Surgical History   I have reviewed this patient's surgical history and updated it with pertinent information if needed.  Past Surgical History:   Procedure Laterality Date     IR CAROTID CEREBRAL ANGIOGRAM LEFT  2019        Social History   Social History     Tobacco Use     Smoking status: Former Smoker     Years: 10.00     Types: Cigars     Last attempt to quit: 1972     Years since quittin.4     Smokeless tobacco: Never Used   Substance Use Topics     Alcohol use: Yes     Alcohol/week: 0.0 oz     Comment:  oxx     Drug use: No       Family History   I have reviewed this patient's family history and updated it with pertinent information if needed.   Family History   Problem Relation Age of Onset     Breast Cancer Mother      Diabetes No family hx of      Hypertension No family hx of      Cancer - colorectal No family hx of        Medications   Current Facility-Administered Medications   Medication     acetaminophen (TYLENOL) tablet 325-975 mg     alum & mag hydroxide-simethicone (MYLANTA ES/MAALOX  ES) suspension 15 mL     amLODIPine (NORVASC) tablet 5 mg     aspirin EC tablet 81 mg     brinzolamide-brimonidine (SIMBRINZA) 1-0.2 % ophthalmic suspension 1 drop     calcium carbonate (TUMS) chewable tablet 500 mg     citalopram (celeXA) tablet 40 mg     clopidogrel (PLAVIX) tablet 75 mg     cyanocobalamin (VITAMIN B-12) tablet 1,000 mcg     glucosamine-chondroitin 500-400 MG per capsule 1 capsule     lidocaine 1 % 0.1-1 mL     lovastatin (MEVACOR)  "tablet 40 mg     melatonin sublingual tablet 5 mg     multivitamin  with lutein (OCUVITE WITH LTEIN) per capsule 1 capsule     ondansetron (ZOFRAN-ODT) ODT tab 4 mg     Patient is already receiving anticoagulation with heparin, enoxaparin (LOVENOX), warfarin (COUMADIN)  or other anticoagulant medication     senna-docusate (SENOKOT-S/PERICOLACE) 8.6-50 MG per tablet 1-4 tablet     sodium chloride (PF) 0.9% PF flush 3 mL     sodium chloride (PF) 0.9% PF flush 3 mL       Allergies      Allergies   Allergen Reactions     Contrast Dye Rash         Physical Exam   /63   Pulse 61   Temp 96.7  F (35.9  C) (Oral)   Resp 18   Ht 1.778 m (5' 10\")   Wt 79.7 kg (175 lb 12.8 oz)   SpO2 96%   BMI 25.22 kg/m       GENERAL: Alert and oriented x 3. Well nourished, well developed.  No acute distress.    HEENT: Normocephalic, atraumatic. Anicteric sclera. Mucous membranes moist. Expressive aphasia.   CV: RRR. S1, S2. No murmurs appreciated.   RESPIRATORY: Effort normal on room air. Lungs CTAB with no wheezing, rales, or rhonchi.   GI: Abdomen soft and non distended, bowel sounds present x all 4 quadrants. No tenderness, rebound, or guarding.   NEUROLOGICAL: No focal deficits. Follows commands.  Strength 5/5 LUE, 3/5 RUE.    MUSCULOSKELETAL: No joint swelling or tenderness. Moves all extremities.   EXTREMITIES: No gross deformities. No peripheral edema.   SKIN: Grossly warm, dry, and intact. No jaundice. No rashes.     Data   Data reviewed today: I reviewed all medications, new labs and imaging results over the last 24 hours.    ROUTINE IP LABS (Last four results)  CMP   Recent Labs   Lab 07/25/19  0532 07/24/19  0610 07/21/19  0528 07/19/19  0756    141 142 144   POTASSIUM 4.3 4.1 3.9 3.9   CHLORIDE 111* 110* 111* 114*   CO2 24 25 26 23   ANIONGAP 7 6 5 7   GLC 87 94 97 89   BUN 38* 29 21 18   CR 1.35* 1.31* 1.06 1.16   WILLIAM 7.9* 8.0* 8.4* 8.1*     CBC   Recent Labs   Lab 07/21/19  0528 07/19/19  0756   WBC 5.8 5.3 "   RBC 3.65* 3.33*   HGB 11.2* 10.5*   HCT 34.4* 31.4*   MCV 94 94   MCH 30.7 31.5   MCHC 32.6 33.4   RDW 15.8* 16.8*    160     INR No lab results found in last 7 days.

## 2019-07-25 NOTE — PROGRESS NOTES
"  [facility]   Acute Rehabilitation Unit  Daily progress note    INTERVAL HISTORY  Tk Richmond was seen and examined at bedside. Doing well. Doesn't sleep well but can't tell why. No pain or discomfort. No other issues.     Cr in higher range today. Had 500ml bolus yesterday and also discontinued lisinopril. Placed a consult and discussed with hospitalist team.       SLP: Word finding difficulties very evident in casual conversation.  Patient knows the correct answers but unable to clearly state them on a consistent basis.  Patient does benefit from verbal cues such as carrier phrases or gestures in order to generate appropriate responses.  Introduced briefly to a potential communication board program that may be beneficial in \"go talk now\" and will continue to explore this as a means to help him communicate.    Family training this weekend; discharge possibly early next week. Might need longer pending his progress over the next few days.           MEDICATIONS  Scheduled meds    amLODIPine  5 mg Oral Daily     aspirin  81 mg Oral Daily     brinzolamide-brimonidine  1 drop Both Eyes BID     citalopram  40 mg Oral Daily     clopidogrel  75 mg Oral Daily     cyanocobalamin  1,000 mcg Oral Daily     glucosamine-chondroitin  1 capsule Oral BID     lovastatin  40 mg Oral At Bedtime     multivitamin  with lutein  1 capsule Oral BID     sodium chloride (PF)  3 mL Intracatheter Q8H       PRN meds:  acetaminophen, alum & mag hydroxide-simethicone, calcium carbonate, lidocaine (buffered or not buffered), melatonin, ondansetron, - MEDICATION INSTRUCTIONS -, senna-docusate, sodium chloride (PF)      PHYSICAL EXAM  /55 (BP Location: Left arm)   Pulse 61   Temp 96.7  F (35.9  C) (Oral)   Resp 18   Ht 1.778 m (5' 10\")   Wt 79.7 kg (175 lb 12.8 oz)   SpO2 96%   BMI 25.22 kg/m      Gen: NAD, pleasant   Cardio: regular pulse   Pulm: non-labored in room air   Abd: soft and " non-tender   Ext: wwp, no edema in bilateral lower extremities, no tenderness at calves   Neuro/MSK: aphasic, able to follow commands but has significant word finding difficulties       LABS  Reviewed as above       ASSESSMENT AND PLAN  Tk Richmond is a 83 year old male  with a past medical history significant for multiple strokes of unknown etiology, HTN, and MDD who was admitted to the hospital on 7/13 due to left MCA ischemic stroke. Admitted to the ARU on 7/20/2019.    Rehabilitation - continue comprehensive acute inpatient rehabilitation program with multidisciplinary approach including therapies, rehab nursing, and physiatry following. See interval history for updates.     Completed stroke at [facility].     Medical Conditions  Left MCA ischemic stroke   Functional deficits include right facial palsy, dysarthria, aphasia, right hemiparesis and cognitive deficits.   -Continue aspirin plus Plavix for 3 months.   -Lipids well controlled total cholesterol 132, LDL 52, will continue lovastatin 40 mg daily.   -Hypertension: Holding PTA lisinopril for permissive hypertension, consider alternative agent due to elevated creatinine.  -A1c 5.2    Essential hypertension  - Holding PTA lisinopril for permissive hypertension; 07/22/19 restarted at 5mg daily. 07/23/19 repeat labs in am; if Cr in good range, will increase lisinopril and stop amlodipine. 7/24 discontinue lisinopril due to Cr in higher range.   - on amlodipine 5mg daily (was stared at the acute care); will try to control with one agent if possible      Acute kidney injury   -- creatinine 1.39 at admission and received contrast, improved with IVF  -- hold ace as above but restarted 07/22/19.     7/24 Cr in higher range. Ordered 500ml bolus and stopped lisinopril. 07/25/19 Cr worsened; placed medicine consult and discussed with hospitalist team.          Major recurrent depressive disorder with anxiety  - continue citalopram      Anemia: chronic and  stable. No clear etiology. Will defer more work-up to outpatient.      Glaucoma  - Continue  on PTA eye drops    FEN: DD3 with thin upon admission to ARU  Bowel: Continent, PRN senna and Miralax available.  Bladder: Continent, PVRs slightly elevated. UA was negative on 7/20.  DVT Prophylaxis: mechanical   GI Prophylaxis: None  Code: DNR/DNI  Disposition: Home with family  ELOS: 10-14 days  Follow up Appointments on Discharge: PCP, neurology and PM&R          Janet Bell MD  Physical Medicine & Rehabilitation    I spent a total of 30 minutes face to face and coordinating care of Tk Richmond.  Over 50% of my time on the unit was spent counseling the patient and /or coordinating care.

## 2019-07-26 LAB
ANION GAP SERPL CALCULATED.3IONS-SCNC: 7 MMOL/L (ref 3–14)
ANION GAP SERPL CALCULATED.3IONS-SCNC: 8 MMOL/L (ref 3–14)
BUN SERPL-MCNC: 37 MG/DL (ref 7–30)
BUN SERPL-MCNC: 37 MG/DL (ref 7–30)
CALCIUM SERPL-MCNC: 7.9 MG/DL (ref 8.5–10.1)
CALCIUM SERPL-MCNC: 8.5 MG/DL (ref 8.5–10.1)
CHLORIDE SERPL-SCNC: 109 MMOL/L (ref 94–109)
CHLORIDE SERPL-SCNC: 110 MMOL/L (ref 94–109)
CO2 SERPL-SCNC: 23 MMOL/L (ref 20–32)
CO2 SERPL-SCNC: 24 MMOL/L (ref 20–32)
CREAT SERPL-MCNC: 1.25 MG/DL (ref 0.66–1.25)
CREAT SERPL-MCNC: 1.26 MG/DL (ref 0.66–1.25)
GFR SERPL CREATININE-BSD FRML MDRD: 53 ML/MIN/{1.73_M2}
GFR SERPL CREATININE-BSD FRML MDRD: 53 ML/MIN/{1.73_M2}
GLUCOSE SERPL-MCNC: 90 MG/DL (ref 70–99)
GLUCOSE SERPL-MCNC: 99 MG/DL (ref 70–99)
POTASSIUM SERPL-SCNC: 4.2 MMOL/L (ref 3.4–5.3)
POTASSIUM SERPL-SCNC: 4.4 MMOL/L (ref 3.4–5.3)
SODIUM SERPL-SCNC: 140 MMOL/L (ref 133–144)
SODIUM SERPL-SCNC: 141 MMOL/L (ref 133–144)

## 2019-07-26 PROCEDURE — 12800006 ZZH R&B REHAB

## 2019-07-26 PROCEDURE — 92507 TX SP LANG VOICE COMM INDIV: CPT | Mod: GN

## 2019-07-26 PROCEDURE — 97112 NEUROMUSCULAR REEDUCATION: CPT | Mod: GO

## 2019-07-26 PROCEDURE — 97116 GAIT TRAINING THERAPY: CPT | Mod: GP | Performed by: PHYSICAL THERAPIST

## 2019-07-26 PROCEDURE — 36415 COLL VENOUS BLD VENIPUNCTURE: CPT | Performed by: NURSE PRACTITIONER

## 2019-07-26 PROCEDURE — 25800030 ZZH RX IP 258 OP 636: Performed by: NURSE PRACTITIONER

## 2019-07-26 PROCEDURE — 92526 ORAL FUNCTION THERAPY: CPT | Mod: GN

## 2019-07-26 PROCEDURE — 99232 SBSQ HOSP IP/OBS MODERATE 35: CPT | Performed by: NURSE PRACTITIONER

## 2019-07-26 PROCEDURE — 80048 BASIC METABOLIC PNL TOTAL CA: CPT | Performed by: NURSE PRACTITIONER

## 2019-07-26 PROCEDURE — 97530 THERAPEUTIC ACTIVITIES: CPT | Mod: GP | Performed by: PHYSICAL THERAPIST

## 2019-07-26 PROCEDURE — 25000132 ZZH RX MED GY IP 250 OP 250 PS 637: Mod: GY | Performed by: STUDENT IN AN ORGANIZED HEALTH CARE EDUCATION/TRAINING PROGRAM

## 2019-07-26 RX ADMIN — Medication 1 CAPSULE: at 21:12

## 2019-07-26 RX ADMIN — CITALOPRAM HYDROBROMIDE 40 MG: 40 TABLET ORAL at 08:25

## 2019-07-26 RX ADMIN — CLOPIDOGREL BISULFATE 75 MG: 75 TABLET, FILM COATED ORAL at 08:25

## 2019-07-26 RX ADMIN — ASPIRIN 81 MG: 81 TABLET, COATED ORAL at 08:25

## 2019-07-26 RX ADMIN — Medication 1 CAPSULE: at 08:26

## 2019-07-26 RX ADMIN — SODIUM CHLORIDE, POTASSIUM CHLORIDE, SODIUM LACTATE AND CALCIUM CHLORIDE 500 ML: 600; 310; 30; 20 INJECTION, SOLUTION INTRAVENOUS at 11:05

## 2019-07-26 RX ADMIN — CYANOCOBALAMIN TAB 1000 MCG 1000 MCG: 1000 TAB at 08:25

## 2019-07-26 RX ADMIN — ACETAMINOPHEN 325 MG: 325 TABLET, FILM COATED ORAL at 10:41

## 2019-07-26 RX ADMIN — AMLODIPINE BESYLATE 5 MG: 5 TABLET ORAL at 08:31

## 2019-07-26 RX ADMIN — Medication 1 CAPSULE: at 21:11

## 2019-07-26 RX ADMIN — ACETAMINOPHEN 650 MG: 325 TABLET, FILM COATED ORAL at 09:14

## 2019-07-26 RX ADMIN — LOVASTATIN 40 MG: 20 TABLET ORAL at 21:12

## 2019-07-26 NOTE — PROGRESS NOTES
ARU Progress Note          Assessment & Plan:   Tk Richmond is a 84 year old man with a history of recurrent CVAs, recent acute MCA ischemic stroke, left carotid artery stenosis, dilated ascending aorta, HTN, CKD stage III, glaucoma, and depression admitted to [facility] 7/17-7/20 for recurrent stroke.  He is admitted to ARU for ongoing physical rehabilitation.  Internal medicine is consulted due to concern for DAVID.      # Elevated creatinine concerning for DAVID   # CKD stage III   Cr is improving today but remains above baseline.  Recent baseline since hospitalization 1.06-1.17, but noted to have Cr 1.2-1.3 since 2016.  S/p 500cc fluid bolus on 7/24 without improvement.  Urine lytes obtained today 7/25, FENa 1.7, c/w intrarenal injury likely 2/2 ATN d/t medications (received contrast for imaging during hospitalization, also restarted on Lisinopril in setting of recovering DAVID during hospitalization).  UA 7/25 negative.    - Ordered LR 500cc bolus x 1  - Renal US in AM   - Monitor I/Os  - Encourage PO hydration  - Trend BMP over next 2-3 days to ensure downtrend      - Will consider US renal in AM if worsening Cr      # Recent MCA ischemic stroke   # Recurrent strokes   # Left carotid artery stenosis   Per chart review, pt [facility] with expressive aphasia on 7/13.  Improved and discharged 7/15.  On 7/17, developed acute R sided facial droop and aphasia.  CT head negative for acute CVA, but CTA head/neck did show ischemia in the left MCA superior M2 division territory felt to be an old embolized plaque and unchanged from 2012; it also showed 53% stenosis of R ICA and 54% stenosis of left ICA.  Reviewed with Radiology and determined that possible calcified plague in L ICA could be causing emboli.  Brain MRI confirmed L MCA M2 distribution CVA.  EEG neg for seizure activity.  US left carotid on 7/18 with 50-69% diameter stenosis of left ICA, with moderate-to-severe irregular atherosclerotic  "mixed plaque in the proximal internal carotid artery extending into the mid segment of the internal carotid artery.Seen by Neurology and Vascular Surgery during admission, discharged on 30 day event monitor.    - Will need to follow up OP with Neurology   - Continue daily ASA and Plavix x 3 months   - Continue daily statin   - Norvasc for BP management, ok to normalize BPs    # HTN  # Grade I diastolic dysfunction   # Severely dilated ascending aorta   Last echocardiogram on 7/14 with mild concentric LVH, EF 60-65%, grade I diastolic dysfunction, mild biatrial enlargement, mild to mod aortic regurg, mild tricuspid regurg, mild aortic root dilitation, and no WMAs.    - Continue Norvasc 5mg PO daily  - Hold Lisinopril for now d/t DAVID      # Depression - Continue PTA Celexa.          Sita Dorman, CNP, APRN  Internal Medicine [facility]ERT Hospitalist  [facility]  Pager [phone]            Interval History:   Tk is resting in bed.  He feels okay today.  Difficult to articulate answers to questions but able to answer with yes or no.  Denies chest pain, dyspnea, and nausea.               Physical Exam:   Blood pressure 154/71, pulse 60, temperature 98.2  F (36.8  C), temperature source Oral, resp. rate 16, height 1.778 m (5' 10\"), weight 79.7 kg (175 lb 12.8 oz), SpO2 97 %.    GENERAL: Alert and awake. Well nourished, well developed.  No acute distress.    HEENT: Normocephalic, atraumatic. Anicteric sclera. Mucous membranes moist. Expressive aphaisa  CV: RRR. S1, S2. No murmurs appreciated.   RESPIRATORY: Effort normal on room air. Lungs CTAB with no wheezing, rales, or rhonchi.   GI: Abdomen soft and non distended, bowel sounds present x all 4 quadrants. No tenderness, rebound, or guarding.   NEUROLOGICAL: No new focal deficits. Follows commands.     MUSCULOSKELETAL: No joint swelling or tenderness. Moves all extremities.   EXTREMITIES: No gross deformities. No peripheral edema.   SKIN: Grossly " warm, dry, and intact. No jaundice. No rashes.       ROUTINE IP LABS (Last four results)  CMP   Recent Labs   Lab 07/26/19  1616 07/26/19  0614 07/25/19  0532 07/24/19  0610    141 142 141   POTASSIUM 4.2 4.4 4.3 4.1   CHLORIDE 109 110* 111* 110*   CO2 23 24 24 25   ANIONGAP 8 7 7 6   GLC 99 90 87 94   BUN 37* 37* 38* 29   CR 1.25 1.26* 1.35* 1.31*   WILLIAM 8.5 7.9* 7.9* 8.0*     CBC   Recent Labs   Lab 07/21/19  0528   WBC 5.8   RBC 3.65*   HGB 11.2*   HCT 34.4*   MCV 94   MCH 30.7   MCHC 32.6   RDW 15.8*        INR No lab results found in last 7 days.

## 2019-07-26 NOTE — PLAN OF CARE
PT:   Status: able to sit at edge of bed with SBA, maintain balance and don socks/shoes without contact.  Gait symmetry improving.  Barrier: expressive aphasia  Pain in shoulder(L).

## 2019-07-26 NOTE — PLAN OF CARE
FOCUS/GOAL  Medication management    ASSESSMENT, INTERVENTIONS AND CONTINUING PLAN FOR GOAL:    Patient slept well overnight. Unable to assess orientation due to aphasia. No pain or SOB. Condom catheter in place overnight. Up with an assist of one and walker. PIV (L) arm, patent and saline locked. Continue with plan of care.

## 2019-07-26 NOTE — PLAN OF CARE
FOCUS/GOAL  Bowel management, Bladder management, Pain management and Medical management    ASSESSMENT, INTERVENTIONS AND CONTINUING PLAN FOR GOAL:  Pt incontinent of bladder with timed tolieting offered q2-3 hrs. LBM 7/26. Wears condom catheter at night.   C/o right shoulder pain and given tylenol with relief. Offered ice or heat but pt declined.   PIV (L) arm patent and saline locked. Infused bolus at 1100 as ordered by MD.Continue to encourage fluids.

## 2019-07-26 NOTE — PLAN OF CARE
OT: Increased time spent to set up pt for R UE Xcite - will need further assessing of parameters. Scheduled for Sunday    Family training Sat w/wife     Will begin assessing for MOD I as appropriate Sunday/Monday.  On track for discharge 7/31

## 2019-07-26 NOTE — PLAN OF CARE
"Discharge Planner SLP   Patient plan for discharge: Home with ongoing therapy  Current status: Patient tolerated trial of regular texture meal this date without any difficulties noted.  We will continue to monitor tolerance over consecutive days before upgrading diet.  Patient was successful in being able to make meal choices by using a picture menu on the \"go talk now\" application on the iPad.  Patient improve this date as compared to yesterday and ability to name objects being described though still benefits from carrier phrases and verbal cues  Barriers to return to prior living situation: Language function and limited ability to make wants and needs known  Recommendations for discharge: Ongoing therapy  Rationale for recommendations: Continue maximizing language and swallowing function.       Entered by: Jasson Ledesma 07/26/2019 5:00 PM       "

## 2019-07-26 NOTE — PLAN OF CARE
Pt is alert. (R) sided weakness. Unable to assess orientation due to severe aphasia. Pt has problems with word finding. DD3 and thin liquids. Takes pills whole. Pt is incontinent of bladder. Wears condom catheter only at night. Assist of 1 with walker and tolerates it well. PIV (L) arm, patent and saline locked. Alarms on for pts safety. PT uses the call light appropriately. Will continue plan of care.

## 2019-07-27 LAB
ANION GAP SERPL CALCULATED.3IONS-SCNC: 6 MMOL/L (ref 3–14)
BUN SERPL-MCNC: 29 MG/DL (ref 7–30)
CALCIUM SERPL-MCNC: 8.5 MG/DL (ref 8.5–10.1)
CHLORIDE SERPL-SCNC: 109 MMOL/L (ref 94–109)
CO2 SERPL-SCNC: 26 MMOL/L (ref 20–32)
CREAT SERPL-MCNC: 1.19 MG/DL (ref 0.66–1.25)
GFR SERPL CREATININE-BSD FRML MDRD: 57 ML/MIN/{1.73_M2}
GLUCOSE SERPL-MCNC: 93 MG/DL (ref 70–99)
POTASSIUM SERPL-SCNC: 4.4 MMOL/L (ref 3.4–5.3)
SODIUM SERPL-SCNC: 141 MMOL/L (ref 133–144)

## 2019-07-27 PROCEDURE — 92507 TX SP LANG VOICE COMM INDIV: CPT | Mod: GN | Performed by: SPEECH-LANGUAGE PATHOLOGIST

## 2019-07-27 PROCEDURE — 80048 BASIC METABOLIC PNL TOTAL CA: CPT | Performed by: NURSE PRACTITIONER

## 2019-07-27 PROCEDURE — 97535 SELF CARE MNGMENT TRAINING: CPT | Mod: GO

## 2019-07-27 PROCEDURE — 97530 THERAPEUTIC ACTIVITIES: CPT | Mod: GP

## 2019-07-27 PROCEDURE — 12800006 ZZH R&B REHAB

## 2019-07-27 PROCEDURE — 25000132 ZZH RX MED GY IP 250 OP 250 PS 637: Mod: GY | Performed by: STUDENT IN AN ORGANIZED HEALTH CARE EDUCATION/TRAINING PROGRAM

## 2019-07-27 PROCEDURE — 36415 COLL VENOUS BLD VENIPUNCTURE: CPT | Performed by: NURSE PRACTITIONER

## 2019-07-27 PROCEDURE — 92526 ORAL FUNCTION THERAPY: CPT | Mod: GN | Performed by: REHABILITATION PRACTITIONER

## 2019-07-27 PROCEDURE — 97116 GAIT TRAINING THERAPY: CPT | Mod: GP

## 2019-07-27 RX ADMIN — ASPIRIN 81 MG: 81 TABLET, COATED ORAL at 07:59

## 2019-07-27 RX ADMIN — LOVASTATIN 40 MG: 20 TABLET ORAL at 20:55

## 2019-07-27 RX ADMIN — Medication 1 CAPSULE: at 07:59

## 2019-07-27 RX ADMIN — CYANOCOBALAMIN TAB 1000 MCG 1000 MCG: 1000 TAB at 07:59

## 2019-07-27 RX ADMIN — CITALOPRAM HYDROBROMIDE 40 MG: 40 TABLET ORAL at 07:58

## 2019-07-27 RX ADMIN — CLOPIDOGREL BISULFATE 75 MG: 75 TABLET, FILM COATED ORAL at 07:58

## 2019-07-27 RX ADMIN — AMLODIPINE BESYLATE 5 MG: 5 TABLET ORAL at 07:58

## 2019-07-27 RX ADMIN — ACETAMINOPHEN 325 MG: 325 TABLET, FILM COATED ORAL at 12:53

## 2019-07-27 RX ADMIN — Medication 1 CAPSULE: at 20:55

## 2019-07-27 RX ADMIN — Medication 1 CAPSULE: at 07:58

## 2019-07-27 NOTE — PLAN OF CARE
Discharge Planner PT   Patient plan for discharge: Home with wife  Current status: Focus on safety and IND with mobility, wife present for training with transfers, car transfer, ambulation and stairs.  Pt demonstrating improved safety with transfers with walker with repetition and cues.  Pt ambulating well with walker, some cues for R foot placement otherwise SBA.  Pt also able to complete 4 steps x 2 with 1 rail and CGA.   Barriers to return to prior living situation: Need for increased assist for safety with transfers, ambulation and stairs  Recommendations for discharge: Home with wife and home PT  Rationale for recommendations: Pending continued progress with therapies       Entered by: Ale Reagan 07/27/2019 12:31 PM

## 2019-07-27 NOTE — PLAN OF CARE
"SLP: -20 2/2 pt needing toileting.  Difficulty continues with verbal expression - does well with oral reading/imitation, cueing such as verbal fill in, giving phonemic cues.  Left list of \"common phrases/needs\" in room.  Yes/no responses not reliable at times - often self corrects with repetition, cueing.   "

## 2019-07-27 NOTE — PROGRESS NOTES
"  General acute hospital   Acute Rehabilitation Unit  Daily progress note    INTERVAL HISTORY  Tk Richmond was seen and examined at bedside. His wife was present as well. She had questions about his rehab course, overall recovery from stroke and follow ups which were all discussed. She seemed overwhelmed with the plan but felt better after we reviewed all the above.     Spoke with hospitalist team; Cr has normalized. Renal US today for screening.     Family training today and possibly discharge this week pending his progress and family support.       MEDICATIONS  Scheduled meds    amLODIPine  5 mg Oral Daily     aspirin  81 mg Oral Daily     brinzolamide-brimonidine  1 drop Both Eyes BID     citalopram  40 mg Oral Daily     clopidogrel  75 mg Oral Daily     cyanocobalamin  1,000 mcg Oral Daily     glucosamine-chondroitin  1 capsule Oral BID     lovastatin  40 mg Oral At Bedtime     multivitamin  with lutein  1 capsule Oral BID     sodium chloride (PF)  3 mL Intracatheter Q8H       PRN meds:  acetaminophen, alum & mag hydroxide-simethicone, calcium carbonate, lidocaine (buffered or not buffered), melatonin, ondansetron, - MEDICATION INSTRUCTIONS -, senna-docusate, sodium chloride (PF)      PHYSICAL EXAM  /59 (BP Location: Left arm)   Pulse 63   Temp 97.8  F (36.6  C) (Oral)   Resp 16   Ht 1.778 m (5' 10\")   Wt 79.7 kg (175 lb 12.8 oz)   SpO2 95%   BMI 25.22 kg/m      Gen: NAD, pleasant   Cardio: RRR, + murmur    Pulm: clear breath sounds b/l   Abd: soft and non-tender   Ext: wwp, no edema in bilateral lower extremities, no tenderness at calves   Neuro/MSK: aphasic, able to follow commands but has significant word finding difficulties, right hemiparesis with 4/5 at proximal RUE and 3/5 at wrist and . RLE 4/5. Difficult to assess sensation but seems to be intact.        LABS  Reviewed BMP      ASSESSMENT AND PLAN  Tk Richmond is a 83 year old male  with a past " "medical history significant for multiple strokes of unknown etiology, HTN, and MDD who was admitted to the hospital on 7/13 due to left MCA ischemic stroke. Admitted to the ARU on 7/20/2019.    Rehabilitation - continue comprehensive acute inpatient rehabilitation program with multidisciplinary approach including therapies, rehab nursing, and physiatry following. See interval history for updates.     Completed stroke at [facility].     Medical Conditions  Left MCA ischemic stroke   Functional deficits include right facial palsy, dysarthria, aphasia, right hemiparesis and cognitive deficits.   -Continue aspirin plus Plavix for 3 months.   -Lipids well controlled total cholesterol 132, LDL 52, will continue lovastatin 40 mg daily.   -Hypertension: Holding PTA lisinopril for permissive hypertension, consider alternative agent due to elevated creatinine.  -A1c 5.2    Essential hypertension  - Holding PTA lisinopril for permissive hypertension; 07/22/19 restarted at 5mg daily. 07/23/19 repeat labs in am; if Cr in good range, will increase lisinopril and stop amlodipine. 7/24 discontinue lisinopril due to Cr in higher range.   - on amlodipine 5mg daily (was stared at the acute care); will try to control with one agent if possible      Acute kidney injury   -- creatinine 1.39 at admission and received contrast, improved with IVF  -- hold ace as above but restarted 07/22/19.     7/24 Cr in higher range. Ordered 500ml bolus and stopped lisinopril. 07/25/19 Cr worsened; placed medicine consult and discussed with hospitalist team.     07/27/19 was seen by hospitalist team on 7/25. Per note, \"Urine lytes obtained today 7/25, FENa 1.7, c/w intrarenal injury likely 2/2 ATN d/t medications (received contrast for imaging during hospitalization, also restarted on Lisinopril in setting of recovering DAVID during hospitalization).  UA 7/25 negative.\"  Received another IVF bolus on 7/26. Renal US was ordered and is pending. "        Major recurrent depressive disorder with anxiety  - continue citalopram      Anemia: chronic and stable. No clear etiology. Will defer more work-up to outpatient.      Glaucoma  - Continue  on PTA eye drops    FEN: DD3 with thin upon admission to ARU  Bowel: Continent, PRN senna and Miralax available.  Bladder: Continent, PVRs slightly elevated. UA was negative on 7/20 and 7/25.  DVT Prophylaxis: mechanical   GI Prophylaxis: None  Code: DNR/DNI  Disposition: Home with family  ELOS: 10-14 days  Follow up Appointments on Discharge: PCP, neurology and PM&R          Janet Bell MD  Physical Medicine & Rehabilitation    I spent a total of 35 minutes face to face and coordinating care of Tk Richmond.  Over 50% of my time on the unit was spent counseling the patient and /or coordinating care.

## 2019-07-27 NOTE — PLAN OF CARE
FOCUS/GOAL  Bowel management, Bladder management, Pain management, Mobility    ASSESSMENT, INTERVENTIONS AND CONTINUING PLAN FOR GOAL:    LBM 7/26, incontinent of urine this shift, condom cath on at bedtime. L arm PIV saline locked. Ax1 WW, expressive aphasia, difficult to fully assess orientation status. Denies pain this shift, will continue to follow POC.

## 2019-07-27 NOTE — PLAN OF CARE
FOCUS/GOAL  Medical management    ASSESSMENT, INTERVENTIONS AND CONTINUING PLAN FOR GOAL:    Patient slept well overnight. Unable to assess orientation due to aphasia. No pain or SOB. Condom catheter in place overnight. Up with an assist of one and walker. PIV (L) arm, patent and saline locked. Continue with plan of care.

## 2019-07-27 NOTE — PLAN OF CARE
FOCUS/GOAL  Bowel management, Bladder management, Pain management and Medical management    ASSESSMENT, INTERVENTIONS AND CONTINUING PLAN FOR GOAL:  Remains incontinent of bladder; wears condom catheter at night. LBM 7/27. Continue with timed tolieting.  Reported pain at right shoulder and given prn tylenol at 1254 with some relief.  Participated in family training today with wife.  Will continue with POC.

## 2019-07-28 PROCEDURE — 92526 ORAL FUNCTION THERAPY: CPT | Mod: GN | Performed by: SPEECH-LANGUAGE PATHOLOGIST

## 2019-07-28 PROCEDURE — 97112 NEUROMUSCULAR REEDUCATION: CPT | Mod: GO | Performed by: OCCUPATIONAL THERAPIST

## 2019-07-28 PROCEDURE — 97530 THERAPEUTIC ACTIVITIES: CPT | Mod: GP

## 2019-07-28 PROCEDURE — 12800006 ZZH R&B REHAB

## 2019-07-28 PROCEDURE — 97110 THERAPEUTIC EXERCISES: CPT | Mod: GO | Performed by: OCCUPATIONAL THERAPIST

## 2019-07-28 PROCEDURE — 97530 THERAPEUTIC ACTIVITIES: CPT | Mod: GO | Performed by: OCCUPATIONAL THERAPIST

## 2019-07-28 PROCEDURE — 25000132 ZZH RX MED GY IP 250 OP 250 PS 637: Mod: GY | Performed by: STUDENT IN AN ORGANIZED HEALTH CARE EDUCATION/TRAINING PROGRAM

## 2019-07-28 PROCEDURE — 92507 TX SP LANG VOICE COMM INDIV: CPT | Mod: GN | Performed by: REHABILITATION PRACTITIONER

## 2019-07-28 RX ADMIN — ASPIRIN 81 MG: 81 TABLET, COATED ORAL at 09:33

## 2019-07-28 RX ADMIN — LOVASTATIN 40 MG: 20 TABLET ORAL at 20:05

## 2019-07-28 RX ADMIN — Medication 1 CAPSULE: at 09:32

## 2019-07-28 RX ADMIN — Medication 1 CAPSULE: at 20:05

## 2019-07-28 RX ADMIN — CLOPIDOGREL BISULFATE 75 MG: 75 TABLET, FILM COATED ORAL at 09:33

## 2019-07-28 RX ADMIN — CYANOCOBALAMIN TAB 1000 MCG 1000 MCG: 1000 TAB at 09:33

## 2019-07-28 RX ADMIN — CITALOPRAM HYDROBROMIDE 40 MG: 40 TABLET ORAL at 09:33

## 2019-07-28 RX ADMIN — Medication 1 CAPSULE: at 20:04

## 2019-07-28 RX ADMIN — Medication 1 CAPSULE: at 09:33

## 2019-07-28 RX ADMIN — AMLODIPINE BESYLATE 5 MG: 5 TABLET ORAL at 09:33

## 2019-07-28 NOTE — PLAN OF CARE
PT: making good gains toward PT goals. Demonstrating increased safety and durability with ambulation without walker. Working towards safety on stairs and continued family training with wife.

## 2019-07-28 NOTE — PLAN OF CARE
FOCUS/GOAL  Bowel management, Bladder management, Pain management and Medical management    ASSESSMENT, INTERVENTIONS AND CONTINUING PLAN FOR GOAL:  No reports of pain this shift. Incontinent and Continent of urine with timed tolieting offered q3 hours. Continues to wear condom catheter at night. LBM yesterday 7/27. PIV (L) arm patent and saline locked.   Will continue with POC.

## 2019-07-28 NOTE — PROGRESS NOTES
XCite stim unit for Activity Based Therapy. Skilled set up; determined appropriate FES parameters for each muscle group based off of strong tetanic response of muscle test.  Used the following activities from the software library: Hand library  Intervention and patient response: Pt completed 10 repetitions x 3 sets of grasp/release with writer guiding through transitional movements. Pt completed 30 repetitions x 2 sets with lumbrical grasp with writer guiding through transitional movements. Pt completed 15 repetitions x3 sets with opposition with RUE with writer guiding through transitional movements. Pt tolerated movements well with no adverse reactions.   Please see [email or web address] for further details on patient's stimulation parameters.   RTI Link ID and password: [id number] pnd [id number]

## 2019-07-28 NOTE — PLAN OF CARE
SLP: pt seen for meal, trialed regular diet again, noting takes large bites, at times still with some food (right cheeck) when takes another bite. Coughed several times on solids after swallow (pizza, fruit, salad) does not appear safe to advance. will plan to further assess/work on strategies, but with aphasia  and possible cognitive deficits, may not be fully reliable Continue NDD3 diet, thin fluids for now. WIll determine safest diet by discharge and educate family

## 2019-07-28 NOTE — PLAN OF CARE
FOCUS/GOAL  Bowel management, Bladder management and Pain management    ASSESSMENT, INTERVENTIONS AND CONTINUING PLAN FOR GOAL:    A&O, denied pain this shift. Urinary incontinence x1, LBM 7/27. Condom cath on at bedtime. Will continue to follow POC.

## 2019-07-28 NOTE — PLAN OF CARE
FOCUS/GOAL  Bowel management, Bladder management and Pain management    ASSESSMENT, INTERVENTIONS AND CONTINUING PLAN FOR GOAL:    Patient slept well overnight. Unable to assess orientation due to aphasia. No pain or SOB. Condom catheter in place overnight. Up with an assist of one and walker. PIV (L) arm, patent and saline locked. Continue with plan of care

## 2019-07-28 NOTE — PROGRESS NOTES
Brief Medicine Note, 7/27/19:     Attempted to see pt x 2 today, pt unavailable.  VS and labs reviewed, Cr has improved to recent BL.  Discussed with Primary Team, will sign off now, but please reconsult if change in clinical status.      Sita Dorman, CNP, APRN  Internal Medicine [facility]ERT Hospitalist  [facility]  Pager [phone]

## 2019-07-29 PROCEDURE — 97116 GAIT TRAINING THERAPY: CPT | Mod: GP

## 2019-07-29 PROCEDURE — 97112 NEUROMUSCULAR REEDUCATION: CPT | Mod: GO

## 2019-07-29 PROCEDURE — 97530 THERAPEUTIC ACTIVITIES: CPT | Mod: GO

## 2019-07-29 PROCEDURE — 92507 TX SP LANG VOICE COMM INDIV: CPT | Mod: GN | Performed by: SPEECH-LANGUAGE PATHOLOGIST

## 2019-07-29 PROCEDURE — 97530 THERAPEUTIC ACTIVITIES: CPT | Mod: GP

## 2019-07-29 PROCEDURE — 12800006 ZZH R&B REHAB

## 2019-07-29 PROCEDURE — 92526 ORAL FUNCTION THERAPY: CPT | Mod: GN | Performed by: SPEECH-LANGUAGE PATHOLOGIST

## 2019-07-29 PROCEDURE — 25000132 ZZH RX MED GY IP 250 OP 250 PS 637: Mod: GY | Performed by: STUDENT IN AN ORGANIZED HEALTH CARE EDUCATION/TRAINING PROGRAM

## 2019-07-29 RX ADMIN — Medication 1 CAPSULE: at 07:47

## 2019-07-29 RX ADMIN — LOVASTATIN 40 MG: 20 TABLET ORAL at 19:53

## 2019-07-29 RX ADMIN — CITALOPRAM HYDROBROMIDE 40 MG: 40 TABLET ORAL at 07:47

## 2019-07-29 RX ADMIN — ASPIRIN 81 MG: 81 TABLET, COATED ORAL at 07:47

## 2019-07-29 RX ADMIN — Medication 1 CAPSULE: at 19:52

## 2019-07-29 RX ADMIN — CYANOCOBALAMIN TAB 1000 MCG 1000 MCG: 1000 TAB at 07:47

## 2019-07-29 RX ADMIN — AMLODIPINE BESYLATE 5 MG: 5 TABLET ORAL at 07:47

## 2019-07-29 RX ADMIN — CLOPIDOGREL BISULFATE 75 MG: 75 TABLET, FILM COATED ORAL at 07:47

## 2019-07-29 NOTE — PLAN OF CARE
PT: pt engaged in stair climbing with one rail, CGA; ambulating with FWW ,CGA with cues for increasing R step length. Cont with POC.

## 2019-07-29 NOTE — PLAN OF CARE
ite stim unit for Activity Based Therapy. Skilled set up; muscle testing to assess for appropriate response.       Used the following activities from the software library: hand grasp and release, forward reach    patient response: Pt tolerated about 15 minutes of the hand grasp and release and some repetition of forward reach. Pt tolerated movements well with no adverse reactions. Pt able to translate the movement of grasp and release after the activity in the room. Educated pt on practicing in the room for repetition,.     Please see [email or web address] for further details on patient's stimulation parameters.   RTI Link ID and password: [id number] spy [id number]

## 2019-07-29 NOTE — PLAN OF CARE
Problem: Goal/Outcome  Goal: Goal Outcome Summary  Outcome: Improving  Note:   FOCUS/GOAL  Bladder management, Medical management and Mobility    ASSESSMENT, INTERVENTIONS AND CONTINUING PLAN FOR GOAL:  Pt's vitals stable. He has expressive aphasia but he can sometimes say some words or use gestures to communicate to staff. Pt doesn't initiate calling for assist so staff has to anticipate his needs. Condom cath last noc but this morning he was able to have continent void using the toilet with prompted toileting. No bm this shift. Pt denied any pain. Good appetite with meals.

## 2019-07-29 NOTE — PROVIDER NOTIFICATION
Prescriber Notification Note    The pharmacist has communicated with this patient's provider regarding a concern or therapy recommendation.    Notified Person: Dr. Bell  Date/Time of Notification: 07/29/19 1700  Interaction: phone  Concern/Recommendation:   Asked to clarify aspirin and clopidogrel dose recommendations based on discharge notes from 7/20 and 7/15. Prescriber stated she will continue with current dosing regimen and will consult neurology for recommendations.     Comments/Additional Details: N/A    Yeni Wood PharmD  Pharmacy Practice Resident in Behavioral Health    Update 7/30:  Per Dr. Bell, current regimen of aspirin 81mg daily and clopidogrel 75mg daily is accurate and will be continued.    Yeni Wood PharmD  Pharmacy Practice Resident in Behavioral Health

## 2019-07-29 NOTE — PLAN OF CARE
FOCUS/GOAL  Medical management    ASSESSMENT, INTERVENTIONS AND CONTINUING PLAN FOR GOAL:    Patient slept well overnight. Unable to assess orientation due to aphasia. No pain or SOB. Condom catheter in place overnight. Up with an assist of one and walker. PIV (L) arm removed on PM shift.  Continue with plan of care

## 2019-07-29 NOTE — PLAN OF CARE
Patient is Alert, has expressive aphasia. Able to communicate needs with extra time, and short phrases. Denied pain this shift. VSS. LBM 7/28. Incontinent of Urine, Condom cath at HS. PIV to L. Hand was removed because dressing fell off, and line was exposed. Continue POC.

## 2019-07-29 NOTE — PLAN OF CARE
SLP; Pt seen on tedt tray of a regular diet again today. Still demonstrating slwoer oral prep- but improved oral clearance of regular solids and with today's trial- no coughing on regualr solidds ( per notes from Sunday- pt had some intermittent coughing on reuglar solids). Pt improved also with taking smaller bites- 1 at at time; still needing cues to alternate with sips of liquids. Recommend continue with DD3 with thin liquds for now - but will plan to trial regualr solids again tomorrow- if pt remains free of aspiration s/s tomorrow then will plan on upgrading to regualr diet    Completed a basic level reading comprehensio task- phrase level and matching to deficinition - field of 3 words- pt having difficulty with reading and processing- 4/5 but needing increased time to complete and needing to re-read multiple times.Compelted namign task- naming picured items: pt at 18/22 accuracy with this- some paraphasic errors as well as apraxic errors with this task. Practiced having pt read out loud the sheet of sentences in his room with request on them- pt able to compelte this but needs to go very slowly with each word and at times needign multiple attempts in an attempt for correct motor sequencing of the sounds. Completed 8 mutysllabic words( 3 syllables) and also 3 word phrases for apraxia przacitce. With the 3 syllable words - pt 100% accurate with repeated attempts- with the 3 word phrases- again with multiple attempts- pt at 11/17 accuracy

## 2019-07-30 PROCEDURE — 97110 THERAPEUTIC EXERCISES: CPT | Mod: GP

## 2019-07-30 PROCEDURE — 92526 ORAL FUNCTION THERAPY: CPT | Mod: GN | Performed by: SPEECH-LANGUAGE PATHOLOGIST

## 2019-07-30 PROCEDURE — 97530 THERAPEUTIC ACTIVITIES: CPT | Mod: GP

## 2019-07-30 PROCEDURE — 97535 SELF CARE MNGMENT TRAINING: CPT | Mod: GO

## 2019-07-30 PROCEDURE — 12800006 ZZH R&B REHAB

## 2019-07-30 PROCEDURE — 25000132 ZZH RX MED GY IP 250 OP 250 PS 637: Mod: GY | Performed by: STUDENT IN AN ORGANIZED HEALTH CARE EDUCATION/TRAINING PROGRAM

## 2019-07-30 PROCEDURE — 92507 TX SP LANG VOICE COMM INDIV: CPT | Mod: GN | Performed by: SPEECH-LANGUAGE PATHOLOGIST

## 2019-07-30 RX ORDER — PANTOPRAZOLE SODIUM 40 MG/1
40 TABLET, DELAYED RELEASE ORAL
Status: DISCONTINUED | OUTPATIENT
Start: 2019-07-31 | End: 2019-07-31 | Stop reason: HOSPADM

## 2019-07-30 RX ORDER — ACETAMINOPHEN 325 MG/1
650 TABLET ORAL EVERY 4 HOURS PRN
Status: ON HOLD | COMMUNITY
Start: 2019-07-30 | End: 2020-07-13

## 2019-07-30 RX ORDER — PANTOPRAZOLE SODIUM 40 MG/1
40 TABLET, DELAYED RELEASE ORAL
Qty: 30 TABLET | Refills: 0 | Status: SHIPPED | OUTPATIENT
Start: 2019-07-31 | End: 2019-10-21

## 2019-07-30 RX ORDER — AMLODIPINE BESYLATE 5 MG/1
5 TABLET ORAL DAILY
Qty: 30 TABLET | Refills: 0 | Status: SHIPPED | OUTPATIENT
Start: 2019-07-30 | End: 2020-07-12

## 2019-07-30 RX ADMIN — CITALOPRAM HYDROBROMIDE 40 MG: 40 TABLET ORAL at 08:09

## 2019-07-30 RX ADMIN — CYANOCOBALAMIN TAB 1000 MCG 1000 MCG: 1000 TAB at 08:09

## 2019-07-30 RX ADMIN — Medication 1 CAPSULE: at 08:09

## 2019-07-30 RX ADMIN — Medication 1 CAPSULE: at 20:05

## 2019-07-30 RX ADMIN — SENNOSIDES AND DOCUSATE SODIUM 2 TABLET: 8.6; 5 TABLET ORAL at 08:09

## 2019-07-30 RX ADMIN — ASPIRIN 81 MG: 81 TABLET, COATED ORAL at 08:09

## 2019-07-30 RX ADMIN — LOVASTATIN 40 MG: 20 TABLET ORAL at 20:04

## 2019-07-30 RX ADMIN — Medication 1 CAPSULE: at 20:04

## 2019-07-30 RX ADMIN — CLOPIDOGREL BISULFATE 75 MG: 75 TABLET, FILM COATED ORAL at 08:09

## 2019-07-30 RX ADMIN — AMLODIPINE BESYLATE 5 MG: 5 TABLET ORAL at 08:09

## 2019-07-30 NOTE — PROGRESS NOTES
SWer left vm for pt spouse Ml [phone] to clarify that the discharge recommendations are for outpt therapy and not HHC. SWer provided brief explantation (medicare guidelines) and provided wife with SW direct number to contact if questions or concerns arise.     No further SW needs.     LYDIA Mooney, [facility] Acute Rehab Unit   Phone: [phone]  I   Pager: [phone]

## 2019-07-30 NOTE — PLAN OF CARE
Pt with mild difficulty at times comprehending more complex directions/ info- at 8/10 today with complex yes/ no questions at the sentence level ( comparative form). With reading comprehension- pt still has some difficulty with processing basic info- needs increased time to re-read  and to make correct choices at the phrase level.Pt continues with moderate expressive aphasia and moderate verbal apraxia but has been improving  daily with being able to untangle his words ( improved motor sequencing of sound sequences)- today pt able to read full sentences on his communication sheet in his room with 100% accuracy - 6/6- but a couple of times needed to restart again but then could say correctly the 2nd time. Pt at 100% accuracy with naming the items on his meal tray- 12/12 accuracy but again some occasional apraxic errors with the 1st attempt but then could self- corerect with trying again. Pt also at 17/17 accuracy with reading short 3 word phrases out loud with some restarts and repetitions to achieve the correct motor sequencing- this is much improved from yesterday. With naming task ( word retreival)- naming to description- pt able to name 9/10 correctly withonly 1 time needing a phonemic cue. Pt to have ongoing HH sptx initially following discharge tomorrow and then transitioning to OP sptx; pt will need 24/7 supervision due to the severity of his speech/language/communication impairments. Pt's wife present during the session for education and given tx handouts that she can work on at home with pt until his 1st sptx appointment     Pt seen at meal with wife present for education on test tray of regular diet with thin liquids. Pt had a cough on melon 1x again but yesterday did ok on this without coughing- this has been variable. Pt however with all other regular diet items did not have s/s of aspiration as long as he took smaller bites/sips. Pt still with milldy slower oral prep but is adequately clearing oral  cavity and uses lingual sweep to check for any right sided oral residue. Recommending upgrade to regualr diet with thin liquids; straw is OK ( reassessed this today and pt is doing well with it); further educated pt's wife to avoid raw vegetables for now as well as avoid melon ( ie cantalope) for now- until further diet tolerance is assessed via HH sptx- pt's wife was able to verbalize this as well as able to verbalize understanding with safe swallow strategies: upright for all po, small bites/sips, alternate solids and liquids, slow rate- pace self, 1 bite at a time, continue to use lingual sweep to check for right sided oral residue. Pt is d/cing to home tomorrow with 24/7 supervision from wife and with ongoing HH sptx for swallowing and speech-language goals    Speech Language Therapy Discharge Summary    Reason for therapy discharge:    Discharged to home with home therapy.    Progress towards therapy goal(s). See goals on Care Plan in [facility] health record for goal details.  Goals partially met.  Barriers to achieving goals:   discharge from facility.    Therapy recommendation(s):    Continued therapy is recommended.  Rationale/Recommendations:  See above summaries.

## 2019-07-30 NOTE — DISCHARGE INSTRUCTIONS
Your risk factors for stroke or TIA (transient ischemic attack):    Your Risk Factors Your Results Normal Ranges   High blood pressure BP Readings from Last 1 Encounters:   07/20/19 153/78    Less than 120/80   Cholesterol              Total Lab Results   Component Value Date    CHOL 132 07/14/2019      Less than 150    Triglycerides   Lab Results   Component Value Date    TRIG 45 07/14/2019    Less than 150   LDL Lab Results   Component Value Date    LDL 52 07/14/2019       Less than 70   HDL Lab Results   Component Value Date    HDL 71 07/14/2019            Greater than 40 (men)  Greater than 50 (women)   Diabetes Recent Labs   Lab 07/19/19  0756   GLC 89    Fasting blood glucose    Smoking/tobacco use  Quit smoking and tobacco   Overweight  Lose 1-2 pounds a week   Lack of exercise  30 minutes moderate activity each day   Other risk factors include carotid (neck) artery disease, atrial fibrillation and stress. You may be on new medicine to treat high blood pressure, cholesterol, diabetes or atrial fibrillation.    Understanding Stroke Booklet given to patient. Please refer to booklet for further information.    Stroke warning signs and symptoms - CALL 911 right away for:  - Sudden numbness or weakness in the face, arm or leg (often on one side of the body).  - Sudden confusion or trouble understanding what is going on.  - Sudden blurred or decreased vision in one or both eyes.  - Sudden trouble speaking, loss of balance, dizziness or problems with coordination.  - Sudden, severe headache for no reason.  - Fainting or seizures.  - Symptoms may go away then come back suddenly.    -------------------------------------------------------------    Follow ups;    --PCP in 1-2 weeks regarding hospitalization and for medication refills.   --Stroke neurology in 2-4 weeks for stroke prevention.   --PM&R with Dr. Truong in 6-8 weeks regarding rehab needs.

## 2019-07-30 NOTE — PROGRESS NOTES
FOCUS/GOAL  Bowel management, Bladder management, Mobility and Skin integrity    ASSESSMENT, INTERVENTIONS AND CONTINUING PLAN FOR GOAL:    Patient is Alert, has expressive aphasia. Able to communicate needs with extra time, and short phrases. Denied pain this shift. VSS. LBM 7/28. , Condom cath at HS. Continue POC.

## 2019-07-30 NOTE — PROGRESS NOTES
"  [facility]   Acute Rehabilitation Unit  Daily progress note    INTERVAL HISTORY  Tk Richmond was seen and examined at bedside. He was doing well. Denied any pain or discomfort. Excited about discharge to home tomorrow. Eating and drinking well. No issues with bowel and bladder function. We use condom cath at night time.     On track for discharge to home tomorrow. Will review the plan at team rounds this afternoon.    MEDICATIONS  Scheduled meds    amLODIPine  5 mg Oral Daily     aspirin  81 mg Oral Daily     brinzolamide-brimonidine  1 drop Both Eyes BID     citalopram  40 mg Oral Daily     clopidogrel  75 mg Oral Daily     cyanocobalamin  1,000 mcg Oral Daily     glucosamine-chondroitin  1 capsule Oral BID     lovastatin  40 mg Oral At Bedtime     multivitamin  with lutein  1 capsule Oral BID     [START ON 7/31/2019] pantoprazole  40 mg Oral QAM AC       PRN meds:  acetaminophen, alum & mag hydroxide-simethicone, calcium carbonate, lidocaine (buffered or not buffered), melatonin, ondansetron, - MEDICATION INSTRUCTIONS -, senna-docusate, sodium chloride (PF)      PHYSICAL EXAM  /68 (BP Location: Left arm)   Pulse 76   Temp 96.7  F (35.9  C) (Oral)   Resp 18   Ht 1.778 m (5' 10\")   Wt 79.7 kg (175 lb 12.8 oz)   SpO2 94%   BMI 25.22 kg/m      Gen: NAD, pleasant   Cardio: RRR, + murmur    Pulm: clear breath sounds b/l   Abd: soft and non-tender   Ext: wwp, no edema in bilateral lower extremities, no tenderness at calves   Neuro/MSK: unchanged - aphasic, able to follow commands but has significant word finding difficulties, right hemiparesis with 4/5 at proximal RUE and 3/5 at wrist and . RLE 4/5. Difficult to assess sensation but seems to be intact.        LABS  Reviewed BMP      ASSESSMENT AND PLAN  Tk Richmond is a 83 year old male  with a past medical history significant for multiple strokes of unknown etiology, HTN, and MDD who was admitted to the " "hospital on 7/13 due to left MCA ischemic stroke. Admitted to the ARU on 7/20/2019.    Rehabilitation - continue comprehensive acute inpatient rehabilitation program with multidisciplinary approach including therapies, rehab nursing, and physiatry following. See interval history for updates.     Completed stroke class at [facility].     Medical Conditions  Left MCA ischemic stroke   Functional deficits include right facial palsy, dysarthria, aphasia, right hemiparesis and cognitive deficits.   -Continue aspirin plus Plavix for 3 months.   -Lipids well controlled total cholesterol 132, LDL 52, will continue lovastatin 40 mg daily.   -Hypertension: Holding PTA lisinopril for permissive hypertension, consider alternative agent due to elevated creatinine.  -A1c 5.2    Essential hypertension  - Holding PTA lisinopril for permissive hypertension; 07/22/19 restarted at 5mg daily. 07/23/19 repeat labs in am; if Cr in good range, will increase lisinopril and stop amlodipine. 7/24 discontinue lisinopril due to Cr in higher range.   - on amlodipine 5mg daily (was stared at the acute care); will try to control with one agent if possible      Acute kidney injury   -- creatinine 1.39 at admission and received contrast, improved with IVF  -- hold ace as above but restarted 07/22/19.     7/24 Cr in higher range. Ordered 500ml bolus and stopped lisinopril. 07/25/19 Cr worsened; placed medicine consult and discussed with hospitalist team.     07/27/19 was seen by hospitalist team on 7/25. Per note, \"Urine lytes obtained today 7/25, FENa 1.7, c/w intrarenal injury likely 2/2 ATN d/t medications (received contrast for imaging during hospitalization, also restarted on Lisinopril in setting of recovering DAVID during hospitalization).  UA 7/25 negative.\"  Received another IVF bolus on 7/26. Renal US was ordered and is pending.        Major recurrent depressive disorder with anxiety  - continue citalopram      Anemia: chronic and " stable. No clear etiology. Will defer more work-up to outpatient.      Glaucoma  - Continue  on PTA eye drops    FEN: DD3 with thin upon admission to ARU  Bowel: Continent, PRN senna and Miralax available.  Bladder: Continent, PVRs slightly elevated. UA was negative on 7/20 and 7/25.  DVT Prophylaxis: mechanical   GI Prophylaxis: None  Code: full - confirmed on 7/23  Disposition: Home with family  ELOS: 10-14 days  Follow up Appointments on Discharge: PCP, neurology and PM&R          Janet Bell MD  Physical Medicine & Rehabilitation    I spent a total of 30 minutes face to face and coordinating care of Tk Richmond.  Over 50% of my time on the unit was spent counseling the patient and /or coordinating care.

## 2019-07-30 NOTE — DISCHARGE INSTRUCTIONS
Follow up appointments:    -- Follow up with primary care provider within 7 days.  You are scheduled to see Dr. Mclean on Thursday, August 1st at 11:15 AM. Please bring a copy of your discharge paperwork, insurance cards and photo id.    Address  [facility]                          [id number]                          [place] [address]  Phone   [phone]  Fax                  [phone]    -- Follow up with neurology in 6-8 weeks.  You are scheduled to see Dr. Randy Whalen on Friday, September 6th at 8:30 AM.    -- Follow up with PM&R Dr. Truong in 6-8 weeks.   You are scheduled to see Dr. Truong on Tuesday, September 24th at 1:40 PM.    ------------------------------------------------------------    To reduce the risk of subsequent stroke there are several important factors including optimal management of anticoagulants, blood pressure, cholesterol, diabetes and smoking abstinence.    Anticoagulation:  You are on Aspirin and Clopidogrel    Blood Pressure:  Keeping your blood pressures less than 130/80 has been shown to reduce risk of recurrent stroke. Recording your blood pressure and heart rate once daily in a log book can help you and your providers make decisions on optimal management. You are encouraged to bring your log book with you to your primary physician and/or cardiology doctor visits.    You are currently on amlodipine to help control your blood pressure. Several lifestyle modifications have been associated with blood pressure reduction and are an important part of a comprehensive plan. These include: weight loss (if over-weight); a diet low in salt and cholesterol and rich in fruits and vegetables; regular aerobic physical activity and limited alcohol consumption.    Diabetes:  You do not have diabetes though it is important to continue monitoring for this in the future with your primary provider.    Diet:  Currently  you're on DD3 with thins.  Diet can  "significantly affect your levels and should be part of your plan. Please see additional information from dietitian about this.    Cholesterol:  Traditional target levels for LDL cholesterol or \"bad cholesterol\" is less than 130 however once you have had a stroke, your target LDL level is now less than 70. Additional recommendations such as increasing your HDL or \"good\" cholesterol and lowering your triglyceride level can also be important.    Your most recent lipid panel was   Lab Results   Component Value Date    CHOL 132 07/14/2019     Lab Results   Component Value Date    HDL 71 07/14/2019     Lab Results   Component Value Date    LDL 52 07/14/2019     Lab Results   Component Value Date    TRIG 45 07/14/2019     Lab Results   Component Value Date    CHOLHDLRATIO 2.5 02/19/2012       This should be followed up in 2-3 months with your primary provider.    Smoking:  Finally one of the most important modifiable risk factors is to not smoke. Continue to remain tobacco free!    "

## 2019-07-30 NOTE — PLAN OF CARE
Problem: Goal/Outcome  Goal: Goal Outcome Summary  7/30/2019 1553 by Giancarlo Allison RN  Outcome: Improving  Note:   FOCUS/GOAL  Bowel management, Bladder management, Medical management, Discharge planning and Mobility    ASSESSMENT, INTERVENTIONS AND CONTINUING PLAN FOR GOAL:  Pt's vitals stable. Denied any pain. Condom cath already off when first rounds with nursing this morning. Pt had been continent this shift with timed toileting every 4 hours. Pt had also requested to be toileted. Hard stools this morning so prn senna-s given. Pt's wife here this morning and educated her about bladder program that pt was on in the unit -using condom cath at night and timed toileting during the day. She is interested about using the condom cath at home and wants to know where to get the supplies. She will be back tomorrow for pt's discharge.

## 2019-07-30 NOTE — PLAN OF CARE
Acute Rehab Care Conference/Team Rounds      Type: Team Rounds    Present: Dr Janet Bell, Yamile Allison RN, Reina Sandy PT, Denise Mccollum OT, Patt Voss SW, Irina Stein SLP, Casandra Viramontes , Virginia Hunt Dietician        Discharge Barriers/Treatment/Education    Rehab Diagnosis: left MCA ischemic stroke, right frontal punctate stroke.     Active Medical Co-morbidities/Prognosis: Hypertension, previous CVA, carotid artery stenosis, dilated ascending aorta, glaucoma, depression, anxiety.    Safety: Patient is alert.  Unable to assess orientation due to expressive aphasia.  He transfers with assist of one using a walker and his alarms are on for safety.    Pain: Patient denies pain.    Medications, Skin, Tubes/Lines: Patient takes medications whole.  Skin is intact and he has no tubes/lines.    Swallowing/Nutrition: Pt seen at meal with wife present for education on test tray of regular diet with thin liquids. Pt had a cough on melon 1x again but yesterday did ok on this without coughing- this has been variable. Pt however with all other regular diet items did not have s/s of aspiration as long as he took smaller bites/sips. Pt still with milldy slower oral prep but is adequately clearing oral cavity and uses lingual sweep to check for any right sided oral residue. Recommending upgrade to regualr diet with thin liquids; straw is OK ( reassessed this today and pt is doing well with it); further educated pt's wife to avoid raw vegetables for now as well as avoid melon ( ie cantalope) for now- until further diet tolerance is assessed via HH sptx- pt's wife was able to verbalize this as well as able to verbalize understanding with safe swallow strategies: upright for all po, small bites/sips, alternate solids and liquids, slow rate- pace self, 1 bite at a time, continue to use lingual sweep to check for right sided oral residue. Pt is d/cing to home tomorrow with 24/7 supervision from wife and with ongoing HH  sptx for swallowing and speech-language goals    Bowel/Bladder: Patient is incontinent of bladder and uses a condom cath at night.  He is continent of bowel and his last bowel movement was today 7/30.     Psychosocial: Pt lives in a townhouse with spouse who is available 24/7 to assist. Pt has one adult dtr. Still driving PTA and independent with ADLs. Pt retired and collects social security income. Plan to discharge home with assistance and outpt services.     ADLs/IADLs: Pt is supervision/set-up for basic adls and transfers/mobility using RW. Pt needs cues to complete all task and will need supervision at home for safety. Discharge home with wife and outpatient OT.     Mobility: Patient requires supervision for all basic mobility, but does not require physical assist. He is able to ambulate without assistive device, but has improved endurance with WW. Needs verbal cues for sequencing of complex tasks such as stairs with 1 rail or car transfer. Still with residual gait and balance deficits, R side weakness. Setup OP therapies at [facility] to improve functional IND and ambulation without device. No DME needed: pt has walker from home.    Cognition/Language: Pt with mild difficulty at times comprehending more complex directions/ info- at 8/10 today with complex yes/ no questions at the sentence level ( comparative form)- overall though is making steady progress with recpetive and expressive language skills. With reading comprehension- pt still has some difficulty with processing basic info- needs increased time to re-read  and to make correct choices at the phrase level.Pt continues with moderate expressive aphasia and moderate verbal apraxia but has been improving  daily with being able to untangle his words ( improved motor sequencing of sound sequences)- today pt able to read full sentences on his communication sheet in his room with 100% accuracy - 6/6- but a couple of times needed to restart again but then could  say correctly the 2nd time. Pt at 100% accuracy with naming the items on his meal tray- 12/12 accuracy but again some occasional apraxic errors with the 1st attempt but then could self- corerect with trying again. Pt also at 17/17 accuracy with reading short 3 word phrases out loud with some restarts and repetitions to achieve the correct motor sequencing- this is much improved from yesterday. With naming task ( word retreival)- naming to description- pt able to name 9/10 correctly withonly 1 time needing a phonemic cue. Pt to have ongoing HH sptx initially following discharge tomorrow and then transitioning to OP sptx; pt will need 24/7 supervision due to the severity of his speech/language/communication impairments. Pt's wife present during the session for education and given tx handouts that she can work on at home with pt until his 1st sptx appointment    Community Re-Entry: wife is supportive     Transportation: Car transfer not a barrier to discharge.    Decision maker: self    Plan of Care and goals reviewed and updated.    Discharge Plan/Recommendations    Fall Precautions: continue    Overall plan for the patient: ready for discharge to home tomorrow. Will have outpatient therapies. Has made excellent progress during his rehab stay and will continue to improve over the next few weeks. Recommended 24/7 supervision due to aphasia. Wife completed family training over the weekend. Will have close f/u with PCP and also f/u with PM&R and neurology.       Utilization Review and Continued Stay Justification    Medical Necessity Criteria:    For any criteria that is not met, please document reason and plan for discharge, transfer, or modification of plan of care to address.    Requires intensive rehabilitation program to treat functional deficits?: Yes    Requires 3x per week or greater involvement of rehabilitation physician to oversee rehabilitation program?: Yes    Requires rehabilitation nursing interventions?:  Yes    Patient is making functional progress?: Yes    There is a potential for additional functional progress? Yes    Patient is participating in therapy 3 hours per day a minimum of 5 days per week or 15 hours per week in 7 day period?:Yes    Has discharge needs that require coordinated discharge planning approach?:Yes          Final Physician Sign off    Statement of Approval: I agree with all the recommendations detailed in this document.      Patient Goals  Social Work Goals: Plan to discharge home with wife assistance and outpt therapy. No additional SW needs identified.        OT Frequency: daily 10-14 days  OT goal: hygiene/grooming: independent  OT goal: upper body dressing: Independent  OT goal: lower body dressing: Independent  OT goal: upper body bathing: Independent  OT goal: lower body bathing: Independent  OT goal: toilet transfer/toileting: Modified independent  OT goal: meal preparation: Supervision/stand-by assist  OT goal: home management: Supervision/stand-by assist  OT goal 1: Pt will complete shower transfer with supervision for safety DME/AE PRN     PT Frequency: 60 min daily  PT goal: bed mobility: Independent, Supine to/from sit  PT goal: transfers: Modified independent, Assistive device, Sit to/from stand  PT goal: gait: Modified independent, Assistive device, Greater than 200 feet  PT goal: stairs: Modified independent, 3 stairs, Rail on right  PT goal: perform aerobic activity with stable cardiovascular response: continuous activity, 20 minutes, NuStep  PT goal 1: Pt will attend falls class to reduce risk of falls upon discharge  PT Goal 2: Pt will complete car transfer Cedric to allow community access at discharge  PT Goal 3: Pt will demo ability to be IND with LE strengthening HEP to promote ongoing exercise at discharge         SLP Frequency: daily  SLP Swallow Goal:  Safely tolerate diet without signs/symptoms of aspiration: thin liquids, regular diet, with use of swallow  precautions  SLP goal 1: Pt will utilize dysarthria strategies and participate in apraxia drill tasks ( phrase/ sentence level to improve speech intelligibility and motor sequencing for expressing basic needs with 90% accuracy  SLP goal 2: Pt will complete basic naming tasks with 90% accuracy  SLP goal 3: Pt will copy basic word and biographical info with using his non doinant hand with 90% accuracy  SLP goal 4: PT to achieve 90% accuracy for moderately complex auditory comprehension tasks (yes/no questions, following directions.       Nursing goals   Patient/Family Goal: Bladder: Pt will demonstrate ability to advocate for toileting to prevent incontinence prior to discharge.  Patient/Family Goal: Medication Management: Pt will participate in MAP to demonstrate ability to manage medication and comply with regime d/t hx, prior to discharge.  Goal: Safety Management: Pt will demonstrate appropriate call light use until promoted to independence in room during ARU stay, prior to discharge.

## 2019-07-30 NOTE — PLAN OF CARE
Occupational Therapy Discharge Summary    Reason for therapy discharge:    Discharged to home with outpatient therapy.    Progress towards therapy goal(s). See goals on Care Plan in [facility] electronic health record for goal details.  Goals partially met.  Barriers to achieving goals:   discharge from facility.    Therapy recommendation(s):    Continued therapy is recommended.  Rationale/Recommendations:  Pt is min A /supervision for most adls, transfers. Pt does need cues to complete all task. Proximal shoulder strength is good but distally is weak. Been trying xcite for hand grasp but needs continued hand strengthening/fine motor activities. Pt will have 24/7 supervision from wife and does not do any iadls such as driving, finances/medication. Pt does have aphasia and will laugh when he is not able to answer but responds better to yes or no questions. Family training completed. Recommended shower chair, grab bars by toilet and shower.

## 2019-07-31 VITALS
BODY MASS INDEX: 25.17 KG/M2 | OXYGEN SATURATION: 95 % | HEIGHT: 70 IN | TEMPERATURE: 97.5 F | SYSTOLIC BLOOD PRESSURE: 135 MMHG | RESPIRATION RATE: 17 BRPM | DIASTOLIC BLOOD PRESSURE: 61 MMHG | HEART RATE: 68 BPM | WEIGHT: 175.8 LBS

## 2019-07-31 PROCEDURE — 25000132 ZZH RX MED GY IP 250 OP 250 PS 637: Mod: GY | Performed by: PHYSICAL MEDICINE & REHABILITATION

## 2019-07-31 PROCEDURE — 25000132 ZZH RX MED GY IP 250 OP 250 PS 637: Mod: GY | Performed by: STUDENT IN AN ORGANIZED HEALTH CARE EDUCATION/TRAINING PROGRAM

## 2019-07-31 RX ADMIN — CITALOPRAM HYDROBROMIDE 40 MG: 40 TABLET ORAL at 07:56

## 2019-07-31 RX ADMIN — Medication 1 CAPSULE: at 07:56

## 2019-07-31 RX ADMIN — PANTOPRAZOLE SODIUM 40 MG: 40 TABLET, DELAYED RELEASE ORAL at 06:20

## 2019-07-31 RX ADMIN — ASPIRIN 81 MG: 81 TABLET, COATED ORAL at 07:56

## 2019-07-31 RX ADMIN — CLOPIDOGREL BISULFATE 75 MG: 75 TABLET, FILM COATED ORAL at 07:56

## 2019-07-31 RX ADMIN — CYANOCOBALAMIN TAB 1000 MCG 1000 MCG: 1000 TAB at 07:56

## 2019-07-31 RX ADMIN — AMLODIPINE BESYLATE 5 MG: 5 TABLET ORAL at 07:56

## 2019-07-31 NOTE — PLAN OF CARE
FOCUS/GOAL  Bladder management and Discharge planning    ASSESSMENT, INTERVENTIONS AND CONTINUING PLAN FOR GOAL:  Patient is alert and oriented and using his call light appropriately for needs. He is aphasic, but if encouraged to speak slowly, is able to mostly say what he is intending. He transfers with SBA and a walker with dycem on the right side of the walker for his affected side. Patient attentive to his right side. He was continent of bladder with timed toileting this shift. He had two continent bowel movements. Fluids encouraged. Patient ate 75% of his dinner. He denied any difficulty with pain or discomfort. Skin intact without concern. Ecchymosis to right groin, but this is not new. Writer did apply condom catheter at . Writer ordered condom catheters from [employer] for patient in preparation for discharge and enrolled him in the [employer] Care Program. Sent chart notes, H&P, and facesheet with [facility]. Writer attempted to call patient's spouse to discuss this with her, but was unable to get a hold of her. Writer typed up directions for application and removal of condom catheter, instructions to monitor skin, how the care program works, contact numbers if she had questions, and information on timed toileting throughout the day. Also placed original order form for condom catheters with this information. Packed up a few days of supplies for patient to get him through until supplies are delivered. Patient aware. Writer informed noc charge RN to give this paperwork to patient's RN in the morning. Plan is for patient to discharge home tomorrow, 7/31.

## 2019-07-31 NOTE — DISCHARGE SUMMARY
[facility]   Acute Rehabilitation Unit  Discharge summary     Date of Admission: 7/20/2019  Date of Discharge: 07/31/19   Disposition: home   Primary Care Physician: Shyam Mclean  Attending physician: Janet Bell MD        discharge diagnosis    Left MCA ischemic stroke   Essential hypertension  Acute kidney injury   Major recurrent depressive disorder with anxiety  Anemia  Glaucoma        brief summary    kT Richmond is a 83 year old male  with a past medical history significant for multiple strokes of unknown etiology, HTN, and MDD who was admitted to the hospital on 7/13 due to left MCA ischemic stroke. Admitted to the ARU on 7/20/2019.      rehabilitaiton course    PT: Patient requires supervision with all mobility using WW. He is able to perform all tasks without assist, but needs oversight for safety, sequencing, and setup. CGA/close SBA on stairs, wife has been trained. Recommending use of WW for community ambulation, but does better without AD in small space. Will return to home today with OP PT followup and 24-7 assist from wife..    OT: Pt is supervision/set-up for basic adls and transfers/mobility using RW. Pt needs cues to complete all task and will need supervision at home for safety. Discharge home with wife and outpatient OT.     SLP:   Swallowing/Nutrition: Pt seen at meal with wife present for education on test tray of regular diet with thin liquids. Pt had a cough on melon 1x again but yesterday did ok on this without coughing- this has been variable. Pt however with all other regular diet items did not have s/s of aspiration as long as he took smaller bites/sips. Pt still with milldy slower oral prep but is adequately clearing oral cavity and uses lingual sweep to check for any right sided oral residue. Recommending upgrade to regualr diet with thin liquids; straw is OK ( reassessed this today and pt is doing well with it); further educated  pt's wife to avoid raw vegetables for now as well as avoid melon ( ie cantalope) for now- until further diet tolerance is assessed via HH sptx- pt's wife was able to verbalize this as well as able to verbalize understanding with safe swallow strategies: upright for all po, small bites/sips, alternate solids and liquids, slow rate- pace self, 1 bite at a time, continue to use lingual sweep to check for right sided oral residue. Pt is d/cing to home tomorrow with 24/7 supervision from wife and with ongoing HH sptx for swallowing and speech-language goals.    Cognition/Language: Pt with mild difficulty at times comprehending more complex directions/ info- at 8/10 today with complex yes/ no questions at the sentence level ( comparative form)- overall though is making steady progress with recpetive and expressive language skills. With reading comprehension- pt still has some difficulty with processing basic info- needs increased time to re-read  and to make correct choices at the phrase level.Pt continues with moderate expressive aphasia and moderate verbal apraxia but has been improving  daily with being able to untangle his words ( improved motor sequencing of sound sequences)- today pt able to read full sentences on his communication sheet in his room with 100% accuracy - 6/6- but a couple of times needed to restart again but then could say correctly the 2nd time. Pt at 100% accuracy with naming the items on his meal tray- 12/12 accuracy but again some occasional apraxic errors with the 1st attempt but then could self- corerect with trying again. Pt also at 17/17 accuracy with reading short 3 word phrases out loud with some restarts and repetitions to achieve the correct motor sequencing- this is much improved from yesterday. With naming task ( word retreival)- naming to description- pt able to name 9/10 correctly withonly 1 time needing a phonemic cue. Pt to have ongoing HH sptx initially following discharge tomorrow  "and then transitioning to OP sptx; pt will need 24/7 supervision due to the severity of his speech/language/communication impairments. Pt's wife present during the session for education and given tx handouts that she can work on at home with pt until his 1st sptx appointment        mEDICAL COURSE    Left MCA ischemic stroke   Functional deficits include right facial palsy, dysarthria, aphasia, right hemiparesis and cognitive deficits.   -Continue aspirin 81 plus Plavix for 3 months.   -Lipids well controlled total cholesterol 132, LDL 52, will continue lovastatin 40 mg daily.   -Hypertension: Holding PTA lisinopril for permissive hypertension, consider alternative agent due to elevated creatinine. See below.   -A1c 5.2     Essential hypertension  - Holding PTA lisinopril for permissive hypertension; 07/22/19 restarted at 5mg daily. 07/23/19 repeat labs in am; if Cr in good range, will increase lisinopril and stop amlodipine. 7/24 discontinue lisinopril due to Cr in higher range.   - on amlodipine 5mg daily (was stared at the acute care)       Acute kidney injury - resolved   -- creatinine 1.39 at admission and received contrast, improved with IVF  -- hold ace as above but restarted 07/22/19.      7/24 Cr in higher range. Ordered 500ml bolus and stopped lisinopril. 07/25/19 Cr worsened; placed medicine consult and discussed with hospitalist team.      07/27/19 was seen by hospitalist team on 7/25. Per note, \"Urine lytes obtained today 7/25, FENa 1.7, c/w intrarenal injury likely 2/2 ATN d/t medications (received contrast for imaging during hospitalization, also restarted on Lisinopril in setting of recovering DAVID during hospitalization).  UA 7/25 negative.\"  Received another IVF bolus on 7/26.         Major recurrent depressive disorder with anxiety  - continue citalopram       Anemia: chronic and stable. No clear etiology. Will defer more work-up to outpatient.      Glaucoma  - Continue  on PTA eye " drops     FEN: DD3 with thin upon admission to ARU. Was advanced to regular diet.   Bowel: Continent, PRN senna and Miralax available.  Bladder: Continent, PVRs slightly elevated. UA was negative on 7/20 and 7/25.  DVT Prophylaxis: mechanical   GI Prophylaxis: None  Code: full - confirmed on 7/23      dISCHARGE MEDICATIONS  Discharge Medication List as of 7/31/2019 10:32 AM      START taking these medications    Details   acetaminophen (TYLENOL) 325 MG tablet Take 2 tablets (650 mg) by mouth every 4 hours as needed for mild pain or fever (> 101 F), OTC      pantoprazole (PROTONIX) 40 MG EC tablet Take 1 tablet (40 mg) by mouth every morning (before breakfast), Disp-30 tablet, R-0, E-Prescribe         CONTINUE these medications which have CHANGED    Details   amLODIPine (NORVASC) 5 MG tablet Take 1 tablet (5 mg) by mouth daily, Disp-30 tablet, R-0, E-Prescribe         CONTINUE these medications which have NOT CHANGED    Details   aspirin (ASA) 81 MG EC tablet Take 1 tablet (81 mg) by mouth daily for 21 days, Disp-21 tablet, R-0, E-PrescribeTake aspirin 81 mg daily together with your Plavix 300 mg daily only for 21 days then switch to aspirin 325 mg daily after that      brinzolamide-brimonidine (SIMBRINZA) 1-0.2 % ophthalmic suspension Place 1 drop into both eyes 2 times daily , Historical      Calcium Carbonate-Vitamin D (CALCIUM 600+D PO) Take 1 tablet by mouth 2 times daily At noon and supper, Historical      citalopram (CELEXA) 40 MG tablet Take 40 mg by mouth daily, Historical      clopidogrel (PLAVIX) 75 MG tablet Take 1 tablet (75 mg) by mouth daily, Transitional      glucosamine-chondroitin 500-400 MG CAPS per capsule Take 1 capsule by mouth 2 times daily At noon and supper, Historical      lovastatin (MEVACOR) 40 MG tablet Take 40 mg by mouth At Bedtime, Historical      Multiple Vitamins-Minerals (CENTRUM SILVER ADULT 50+ PO) Take 1 tablet by mouth daily , Historical      vitamin B-12 (CYANOCOBALAMIN) 1000  MCG tablet Take 1,000 mcg by mouth daily , Historical         STOP taking these medications       Multiple Vitamins-Minerals (PRESERVISION AREDS 2+MULTI VIT PO) Comments:   Reason for Stopping:                 DISCHARGE INSTRUCTIONS AND FOLLOW UP  Discharge Procedure Orders   Physical Therapy Referral   Standing Status: Future   Referral Priority: Routine Referral Type: Rehab Therapy Physical Therapy   Number of Visits Requested: 1     Occupational Therapy Referral   Standing Status: Future   Referral Priority: Routine Referral Type: Occupational Therapy   Number of Visits Requested: 1     Speech Therapy Referral   Standing Status: Future   Referral Priority: Routine Referral Type: Therapeutic Services   Number of Visits Requested: 1     Reason for your hospital stay   Order Comments: Stroke     [facility] Follow-up and recommended labs and tests   Order Comments: -- Follow up with primary care provider within 7 days (Shyam Mclean)    -- Follow up with neurology in 6-8 weeks.    -- Follow up with PM&R Dr. Truong in 6-8 weeks.     Appointments on [place] and/or [facility] (with [facility] or [facility] provider or service). Call [phone] if you haven't heard regarding these appointments within 7 days of discharge.     Activity   Order Comments: Your activity upon discharge:  Use your walker at all times   No driving until cleared by OT driving evaluation and by your PM&R physician. No power tools.   A responsible adult should be present with you when cooking in the kitchen and should supervise/double check medications and finances.     Order Specific Question Answer Comments   Is discharge order? Yes      Monitor and record   Order Comments: blood pressure daily, make diary of readings to bring to the appointment with your PCP     Full Code     Order Specific Question Answer Comments   Code status determined by: Discussion with patient/legal decision maker      Diet   Order Comments: Follow this diet  upon discharge:   Regular diet; Thin Liquids (water, ice chips, juice, milk gelatin, ice cream, etc)     Order Specific Question Answer Comments   Is discharge order? Yes           physical examination    Most recent Vital Signs:   Vitals:    07/30/19 1555 07/30/19 1650 07/31/19 0739 07/31/19 1023   BP: (!) 147/73 132/69 (!) 156/67 135/61   BP Location: Left arm Right arm Left arm Left arm   Pulse: 66  61 68   Resp: 16  17    Temp: 97.2  F (36.2  C)  97.5  F (36.4  C)    TempSrc: Oral  Oral    SpO2:   95%    Weight:       Height:           Gen: NAD, pleasant   Cardio: RRR, + murmur    Pulm: clear breath sounds b/l   Abd: soft and non-tender   Ext: wwp, no edema in bilateral lower extremities, no tenderness at calves   Neuro/MSK: unchanged - aphasic, able to follow commands but has significant word finding difficulties, right hemiparesis with 4/5 at proximal RUE and 3/5 at wrist and . RLE 4/5. Difficult to assess sensation but seems to be intact.        Discharge summary was forwarded to Shyam Mclean (PCP) at the time of discharge, so as to bridge from hospital to outpatient care.     It was our pleasure to care for Tk Richmond during this hospitalization. Please do not hesitate to contact me should there be questions regarding the hospital course or discharge plan.        Janet Bell MD  Physical Medicine & Rehabilitation      I, Janet Bell, saw and evaluated this patient prior to discharge. 35 minutes spent in discharge, including >50% in counseling and coordination of care, medication review and plan of care recommended on follow up.      vital signs/old records/nurses notes

## 2019-07-31 NOTE — PLAN OF CARE
Problem: Goal/Outcome  Goal: Goal Outcome Summary  7/31/2019 1105 by Giancarlo Allison RN  Outcome: Adequate for Discharge  Note:   FOCUS/GOAL  Bladder management, Medical management, Discharge planning and Mobility    ASSESSMENT, INTERVENTIONS AND CONTINUING PLAN FOR GOAL:  Pt's BP elevated at start of shift before given morning norvasc. Better when rechecked later. Pt still with word finding difficulty but able to express basic needs and say a few words. He was able to request toileting 2 x this morning and was continent each time. Pt's wife given paperwork from nurse Silva last pm. Reviewed the instructions for condom cath application and removal, timed toileting and the application paper. Discharge instructions and meds given to pt and wife. Discharged from unit at 1050 per wheelchair, assisted by NA and accompanied by wife.

## 2019-07-31 NOTE — PROGRESS NOTES
Physical Therapy Discharge Summary    Reason for therapy discharge:    Discharged to home with outpatient therapy.    Progress towards therapy goal(s). See goals on Care Plan in [facility] electronic health record for goal details.  Goals met    Therapy recommendation(s):    Continued therapy is recommended.  Rationale/Recommendations:  Patient requires supervision with all mobility using WW. He is able to perform all tasks without assist, but needs oversight for safety, sequencing, and setup. CGA/close SBA on stairs, wife has been trained. Recommending use of WW for community ambulation, but does better without AD in small space. Will return to home today with OP PT followup and 24-7 assist from wife..

## 2019-07-31 NOTE — PROGRESS NOTES
Charlee met with pt this morning to provide IMM letter. Libertyr explained the letter and information to pt. Pt requested to sign the paperwork when his wife arrived. [facility]r notified that pt D/C'd and IMM letter was not signed. Pt appeared to understand the information explained to him and expressed that he was ready and excited to discharge home. No concerns.     Patt Voss, LYDIA, [facility]   Phone: [phone]  I   Pager: [phone]

## 2019-08-01 ENCOUNTER — HOSPITAL ENCOUNTER (OUTPATIENT)
Dept: PHYSICAL THERAPY | Facility: CLINIC | Age: 84
Setting detail: THERAPIES SERIES
End: 2019-08-01
Attending: PHYSICAL MEDICINE & REHABILITATION
Payer: MEDICARE

## 2019-08-01 ENCOUNTER — PATIENT OUTREACH (OUTPATIENT)
Dept: CARE COORDINATION | Facility: CLINIC | Age: 84
End: 2019-08-01

## 2019-08-01 DIAGNOSIS — N17.9 ACUTE KIDNEY INJURY (H): ICD-10-CM

## 2019-08-01 DIAGNOSIS — I10 ESSENTIAL HYPERTENSION: ICD-10-CM

## 2019-08-01 DIAGNOSIS — I63.512 ACUTE ISCHEMIC CEREBROVASCULAR ACCIDENT (CVA) INVOLVING LEFT MIDDLE CEREBRAL ARTERY TERRITORY (H): Primary | ICD-10-CM

## 2019-08-01 DIAGNOSIS — I63.512 ACUTE ISCHEMIC CEREBROVASCULAR ACCIDENT (CVA) INVOLVING LEFT MIDDLE CEREBRAL ARTERY TERRITORY (H): ICD-10-CM

## 2019-08-01 PROCEDURE — 97161 PT EVAL LOW COMPLEX 20 MIN: CPT | Mod: GP | Performed by: PHYSICAL THERAPIST

## 2019-08-01 PROCEDURE — 97112 NEUROMUSCULAR REEDUCATION: CPT | Mod: GP | Performed by: PHYSICAL THERAPIST

## 2019-08-01 ASSESSMENT — ACTIVITIES OF DAILY LIVING (ADL): DEPENDENT_IADLS:: CLEANING;COOKING;LAUNDRY;SHOPPING;MEAL PREPARATION;TRANSPORTATION

## 2019-08-01 NOTE — LETTER
CLINIC CARE COORDINATION  [facility]   [id number]  [facility] [address]    August 5, 2019    Tk Richmond  [address] [place] [address]      Dear Tk,    I am a clinic care coordinator who works with Shyam Mclean DO at [facility]. I wanted to introduce myself and provide you with our contact information. Below is a description of clinic care coordination and how we can further assist you.     The goal of clinic care coordination is to help you manage your health and improve access to the health system in the most efficient manner. The clinic care coordinator is a registered nurse or a  who understand the health care system. The registered nurse can assist you in meeting your health care goals by providing education, coordinating services, and strengthening the communication among your providers. The  can assist you with financial, behavioral, psychosocial, chemical dependency, counseling, and/or psychiatric resources.    Please feel free to contact [facility], at [phone], with any questions or concerns. Our focus is providing you with the highest-quality healthcare experience possible and that all starts with you.     Sincerely,           Kandy Stafford RN Care Coordinator  Cell: [phone]

## 2019-08-01 NOTE — PROGRESS NOTES
"   08/01/19 1200   Quick Adds   Quick Adds Certification   Type of Visit Initial OP PT Evaluation   General Information   Start of Care Date 08/01/19   Referring Physician Janet Bell   Orders Evaluate and Treat as Indicated   Order Date 07/31/19   Medical Diagnosis L MCA CVA   Onset of illness/injury or Date of Surgery 07/12/19   Precautions/Limitations fall precautions   Surgical/Medical history reviewed Yes   Pertinent history of current problem (include personal factors and/or comorbidities that impact the POC) Tk is a very pleasant man who presents with recent h// CVA on 7/13/19, was hospitalized for 2 days and d/c'd to home.  He then suffere a 2nd CVA on 7/17/2019.  He was hospitalized until 7/20 and then d/c to acute rehab for further SLP, OT and PT services.  He was discharged to home yesterday and presents to OP PT to continue his recovery.     Pertinent Visual History  wears glasses   Prior level of function comment Pt was independent, ambulatory without AD.  [place].  Mostly sedentary, enjoyed \"relaxing\" and watching TV.  In years past he used to perform all of the caretaking and maintenance of [place], [place] stairs and repairs,etc but has not done this now for a number of years.     Patient role/Employment history Retired  ()   [facility]   Home/Community Accessibility Comments 3 steps to enter home with no rail.  Stairs to basement where TV is but not going down there right now.  [place] can stay on main level.  12-13 steps outside to dock with railing.     Current Assistive Devices Front Wheeled Walker;Four Wheeled Walker;Standard Cane   ADL Devices Raised Toilet Seat   Assistive Devices Comments using walker at all times.  has 4 ww in home. uses fww out of home.    Patient/Family Goals Statement get back to walking without AD, be independent in all activities, improve " function of right hand, improve speech.     Fall Risk Screen   Fall screen completed by PT   Have you fallen 2 or more times in the past year? Yes   Have you fallen and had an injury in the past year? Yes  (fell and hit face, tripped on display at [facility].)   Timed Up and Go score (seconds) 18.4 sec with no AD   Is patient a fall risk? Yes   Abuse Screen (yes response referral indicated)   Physical Signs of Abuse Present no   Pain   Patient currently in pain No   Cognitive Status Examination   Orientation person;place  (time NT)   Level of Consciousness alert   Follows Commands and Answers Questions 100% of the time;able to follow single-step instructions;other (see comments)  (limited by expressive aphasia)   Personal Safety and Judgment intact   Memory intact  (appear intact)   Cognitive Comment appears intact.  See OT /SLP eval   Integumentary   Integumentary No deficits were identified   Posture   Posture Forward head position   Range of Motion (ROM)   ROM Comment decreased length right knee flexors, ankle PF, right finger flexors.   Strength   Strength Comments right hip flex 4-/5, left 3+/5,  bilat knee flex/ext 4/5, bilat ankle DF 4/5.  UE screen (see OT for greater detail) - right shoulder Flex, abd 4-/5,  right elbow flex 3+/5, right elbow ext 4-/5,  right wrist ext 3+/5,  finger flex/ext decreased , decreased right  strength.  left UE grossly WFL.     Bed Mobility   Bed Mobility Comments denies any issues getting in / out of bed.    Transfer Skills   Transfer Comments able to rise to stand from 18 inch chair with UE support.  Reports difficulty from soft , low couch at home.    Gait   Gait Comments Pt ambulates with walker with symmetric step length and stance time, downward gaze, appropriate clearance in swing, slight decreased heelstrik bilaerally at initiatl contact.  wthout AD - maintains symmetric steps but step length decreased bilaterally, guarded posture    Gait Special Tests   Gait Special  Tests 25 FOOT TIMED WALK   Gait Special Tests 25 Foot Timed Walk   Seconds 13.6  (with walker)   Steps 21 Steps  (with walker)   Comments without AD - 11.3 sec, 23 steps.    Balance   Balance Comments able to stand statically with feet apart x 1 minute, tends to keep wt posterior on heels.  Standing feet together x 25 sec with increased posterior shift.     Balance Special Tests   Balance Special Tests Timed up and go   Balance Special Tests Timed Up and Go   Seconds 18.4 Seconds   Comments no AD    Sensory Examination   Sensory Perception Comments decreased touch sensation right LE- can feel touch but diminished from left side.     Coordination   Coordination Comments decreased coordination noted in right hip/knee,  bilateral ankles and right hand/fingers.     Muscle Tone   Muscle Tone Comments mild increased tone right hand   Modality Interventions   Planned Modality Interventions Comments per PT   Planned Therapy Interventions   Planned Therapy Interventions balance training;gait training;motor coordination training;neuromuscular re-education;strengthening;stretching;transfer training;manual therapy   Clinical Impression   Criteria for Skilled Therapeutic Interventions Met yes, treatment indicated   PT Diagnosis decreased independence with gait and ADLs    Influenced by the following impairments right UE weakness, bilat hip weakness, mild right knee /ankle weakness, decreased grading/timing of bilateral ankles, right hip/knee and right hand/fingers, deconditioning.     Functional limitations due to impairments dependent on walker for gait, limited community mobility, inability to safely access all areas of his home, dependence with ADLs.     Clinical Presentation Evolving/Changing   Clinical Presentation Rationale wife Norma very supportive, expressive aphasia limits communication, sedentary at baseline, 2 falls prior to strokes,  multiple strokes of unknown origin, enlarged aorta resulting in dyspnea with  minimal activity (premorbid).     Clinical Decision Making (Complexity) Moderate complexity   Therapy Frequency 2 times/Week   Predicted Duration of Therapy Intervention (days/wks) 90 days   Risk & Benefits of therapy have been explained Yes   Patient, Family & other staff in agreement with plan of care Yes   GOALS   PT Eval Goals 1;2;3;4   Goal 1   Goal Identifier 1 TUG   Goal Description Tk will perform TUG in less tan 13.5 seconds without an AD to demonstrate decreased fall risk and improved gait abilities.     Target Date 10/30/19   Goal 2   Goal Identifier 2 Stairs   Goal Description Tk will be able to negotiate a flight of stairs in a reciprocal pattern with support of one rail with supervision in order to access his lower level of his home.     Target Date 10/30/19   Goal 3   Goal Identifier 3  Household mobility   Goal Description Tk will be able to ambulate household distances without an assistive device in order to return to independent mobility within his home.     Target Date 10/30/19   Goal 4   Goal Identifier 4 Community mobility   Goal Description Tk will be able to ambulate distances of 300 feet or greater with least restrictive assistive device and navigate up/down curbs, variable surfaces and inclines without difficulty in order to access areas in his community safely.     Target Date 10/30/19   Total Evaluation Time   PT Yolanda Low Complexity Minutes (27545) 47   Therapy Certification   Certification date from 08/01/19   Certification date to 10/30/19   Medical Diagnosis L MCA CVA

## 2019-08-01 NOTE — PROGRESS NOTES
Clinic Care Coordination Contact  Unable To Contact  Referral Source: IP Report    Clinical Data:   Care Coordinator Outreach  Patient was admitted at [facility], from 7/17/19 to 7/20/19, for right-sided weakness and aphasia, secondary to acute left MCA (middle cerebral artery territory); ischemic stroke in right frontal punctate. 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     After discharge, patient had transition of care at [facility], Acute Rehabilitation Unit, from 7/20/19 to 7/31/19.    Patient returned home with PT follow-up, as an outpatient, and 24-7 assistance from wife. Medicare guidelines and IMM (Important Message from Medicare) letter were explained to patient and his wife by ARU SW.    Outreach attempted x 1.      Plan: RN Care Coordinator will try to reach patient again in 1-2 business days.    Kandy Stafford RN Care Coordinator  [facility] - [phone]  [facility] - [phone]  Cell: [phone]

## 2019-08-01 NOTE — PROGRESS NOTES
[facility]        OUTPATIENT PHYSICAL THERAPY FUNCTIONAL EVALUATION  PLAN OF TREATMENT FOR OUTPATIENT REHABILITATION  (COMPLETE FOR INITIAL CLAIMS ONLY)  Patient's Last Name, First Name, M.I.  YOB: 1935  Tk Richmond     Provider's Name   [facility]   Medical Record No.  [id number]     Start of Care Date:  08/01/19   Onset Date:  07/12/19   Type:     _X__PT   ____OT  ____SLP Medical Diagnosis:  L MCA CVA     PT Diagnosis:  decreased independence with gait and ADLs  Visits from SOC:  1                              __________________________________________________________________________________  Plan of Treatment/Functional Goals:  balance training, gait training, motor coordination training, neuromuscular re-education, strengthening, stretching, transfer training, manual therapy           GOALS  1 TUG  Tk will perform TUG in less tan 13.5 seconds without an AD to demonstrate decreased fall risk and improved gait abilities.    10/30/19    2 Stairs  Tk will be able to negotiate a flight of stairs in a reciprocal pattern with support of one rail with supervision in order to access his lower level of his home.    10/30/19    3  Household mobility  Tk will be able to ambulate household distances without an assistive device in order to return to independent mobility within his home.    10/30/19    4 Community mobility  Tk will be able to ambulate distances of 300 feet or greater with least restrictive assistive device and navigate up/down curbs, variable surfaces and inclines without difficulty in order to access areas in his community safely.    10/30/19             Therapy Frequency:  2 times/Week   Predicted Duration of Therapy Intervention:  90 days    Janna Albright, PT                                    I CERTIFY THE NEED FOR THESE  SERVICES FURNISHED UNDER        THIS PLAN OF TREATMENT AND WHILE UNDER MY CARE     (Physician co-signature of this document indicates review and certification of the therapy plan).                Certification Date From:  08/01/19   Certification Date To:  10/30/19    Referring Provider:  Janet Bell    Initial Assessment  See Epic Evaluation- Start of Care Date: 08/01/19

## 2019-08-02 ENCOUNTER — HOSPITAL ENCOUNTER (OUTPATIENT)
Dept: PHYSICAL THERAPY | Facility: CLINIC | Age: 84
Setting detail: THERAPIES SERIES
End: 2019-08-02
Attending: PHYSICAL MEDICINE & REHABILITATION
Payer: MEDICARE

## 2019-08-02 PROCEDURE — 97112 NEUROMUSCULAR REEDUCATION: CPT | Mod: GP | Performed by: PHYSICAL THERAPIST

## 2019-08-02 PROCEDURE — 97110 THERAPEUTIC EXERCISES: CPT | Mod: GP | Performed by: PHYSICAL THERAPIST

## 2019-08-05 NOTE — PROGRESS NOTES
Clinic Care Coordination Contact  [facility]/Voicemail    Referral Source: IP Report    Clinical Data: Care Coordinator Outreach  Patient received referral for PT/OT/Speech Therapy as an outpatient.  Follow-up instructions to patient were to:    See PCP within 7 days    See Neurology in 6-8 weeks    See PM&R Dr. Truong in 6-8 weeks    Use walker at all times    Not drive until cleared by OT driving evaluation and PM&R physician.    Not handle any power tools    Have an adult present when cooking    Have an adult double check medications and finances.    Outreach attempted x 2.  Left message on voicemail with call back information and requested return call.    Plan: RN Care Coordinator will mail out care coordination introduction letter with new clinic SW CC contact information and explanation of care coordination services.   RN Care Coordinator will do no further outreaches by phone at this time, but will attempt to meet with patient and his wife when they .    Kandy Stafford RN Care Coordinator  [facility] - [phone]  [facility] - [phone]  Cell: [phone]

## 2019-08-09 ENCOUNTER — HOSPITAL ENCOUNTER (OUTPATIENT)
Dept: SPEECH THERAPY | Facility: CLINIC | Age: 84
Setting detail: THERAPIES SERIES
End: 2019-08-09
Attending: PHYSICAL MEDICINE & REHABILITATION
Payer: MEDICARE

## 2019-08-09 DIAGNOSIS — I63.512 ACUTE ISCHEMIC CEREBROVASCULAR ACCIDENT (CVA) INVOLVING LEFT MIDDLE CEREBRAL ARTERY TERRITORY (H): ICD-10-CM

## 2019-08-09 PROCEDURE — 92523 SPEECH SOUND LANG COMPREHEN: CPT | Mod: GN | Performed by: SPEECH-LANGUAGE PATHOLOGIST

## 2019-08-09 PROCEDURE — 92507 TX SP LANG VOICE COMM INDIV: CPT | Mod: GN | Performed by: SPEECH-LANGUAGE PATHOLOGIST

## 2019-08-13 NOTE — PROGRESS NOTES
"     Speech-Language Pathology Department-Evaluation  [facility]  [phone]    08/09/19 1400   General Information   Type of Evaluation Speech and Language;Dysarthria   Type Of Visit Initial   Start Of Care Date 08/09/19   Referring Physician Dr. Janet Bell   Orders Evaluate And Treat   Medical Diagnosis Aphasia, L MCA CVA   Onset Of Illness/injury Or Date Of Surgery 07/13/19   Hearing No hearing aids   Surgical/Medical history reviewed Yes   Pertinent History Of Current Problem Mr. Tk Richmond is an 84 year old male with a past medical history of multiple strokes dating back to 2012.  His most recent stroke occurred on 7/13/19 in which he was hospitalized for 2 days and discharged home with orders for outpatient therapy for aphasia. He then suffered a 2nd CVA on 7/17/2019, was hospitalized until 7/20, and then discharged to acute rehab for further SLP, OT and PT services on 7/20/19.  He was discharged home on 7/31/2019 with orders for continued outpatient therapy. Patient and spouse report difficulty with word retrieval, writing (UE weakness), and reading.  The patient presents to SLP today for an evaluation of his expressive and receptive language abilities.   Prior Level Of Function Comment Mr. Richmond has a history of CVA in 2012, without aphasia at that time.  Spouse reports he likely had multiple mini strokes and then last month had 2 larger strokes.  He was writing minimally prior to this CVA and able to read and express himself without difficulty.  He does not drive or complete personal finances.   Current Community Support  Family/friend caregiver   Patient Role/employment History Retired   Living environment [facility]   General Observations Patient is pleasant and cooperative and able to participate in today's evaluation   Patient/family Goals \"Get better.\"   Fall Risk Screen   Fall screen completed by PT   Abuse Screen (yes response referral indicated) "   Physical Signs of Abuse Present no   Oral Motor Sensory Function   Comments Pt tolerating a regular diet with thin liquids.    Speech   Apraxia Battery:  Sounds (out of 10 possible) 10   Apraxia Battery:  Word Repetition - single syllable (out of 10 possible) 9   Apraxia Battery:  Increasing word length (out of 10 possible) 10   Apraxia Battery:  Word Repetition - mulitple syllables (out of 10 possible) 9   Apraxia Battery:  Repeat Sentences (out of 5 possible) 3   Speech Comments Impairments with production of multi-syllabic words and phrases which impacts intelligibility of speech.   Language: Auditory Comprehension (understanding of spoken language)   Tests were administered at the following levels Complex (vocation/community/social activities);Moderate (routine daily activities)   Yes/No Sentence and Simple Paragraph; [place] Diagnostic Aphasia Exam 3 short (out of 6 total) 6   Commands; [place] Diagnostic Aphasia Exam 3 (out of 15 total) 6   Functional Assessment Scale (Auditory Comprehension) Minimal Impairment   Comments (Auditory Comprehension) Misses details and multi-step directions   Language: Verbal Expression (use of spoken language to express information)   Tests were administered at the following levels Basic (rote activities);Moderate (routine daily activities)   Automatized Sequences; [place] Diagnostic Aphasia Exam (out of 8 total) 7   Responsive Naming; [place] Diagnostic Aphasia Exam 3 (out of 20 total) 19   Bern Naming Test (out of 60 total) 54   Generate Sentences; [place] Test for Differential Diagnosis Of Aphasia (out of 6 total) 4   Picture Description; [place] Diagnostic Aphasia Exam rating/[place] Test for Differential Diagnosis Of Aphasia Picture (out of 5 total) 2   Conversation; [place] Diagnostic Aphasia Exam rating (out of 5 total) 2   Functional Assessment Scale (Verbal Expression) Moderate Impairment   Comments (Verbal Expression) Phonemic paraphasia, slower processing, apraxic  errors also impact speech production and verbal expression.   Reading Comprehension (understanding of written language)   Tests were administered at the following levels Basic (rote activities);Moderate (routine daily activities)   Match Letters/Match Words (out of 8 total) 8   Simple Phrase/Sentence (out of 15 total) 9  (9/10)   Sentences and Paragraphs; [place] Diagnostic Aphasia Exam (out of 10 total) 3   Functional Assessment Scale (Reading Comprehension) Mild to Moderate Impairment   Comments (Reading Comprehension) Increased time required to read, reduction in comprehension at more complex level.   Written Expression (use of writing to express information)   Comments (Written Expression) Did not assess due to right hand weakness at this time and patient reports of not doing much writing prior to CVA.    Pragmatics (the social or functional use of a language)   Functional Assessment Scale  (Pragmatics) No Impairment   Education Assessment   Barriers to Learning No barriers   Preferred Learning Style Listening;Reading;Demonstration;Pictures/video   General Therapy Interventions   Planned Therapy Interventions Language;Communication   Language Auditory comprehension;Reading comprehension;Verbal expression;Written expression   Communication Improve speech intelligibility   Intervention Comments Patient would benefit from skilled SLP services in order to address his dysarthria and aphasia in order to be able to communicate more effectively with those in his immediate environment.    Clinical Impression, SLP Eval   Criteria for Skilled Therapeutic Interventions Met (SLP Eval) skilled criteria for speech language intervention met   SLP Diagnosis Moderate aphasia and apraxia of speech   Therapy Frequency 2 times;per week;other (see comments)   Predicted Duration of Therapy Intervention (days/wks) 2x/week x 12 weeks   Risks and Benefits of Treatment have been explained. Yes   Patient, Family & other staff in agreement  "with plan of care Yes   Clinical Impression Comments Patient presents with minimal deficits in auditory comprehension, moderate impairments in reading comprehension and mod to severe expressive aphasia with moderate verbal apraxia and dysarthria. Patient demonstrates mild impairments with comprehension for multi-step directions and details of written information with slower processing required.  His verbal expression is moderately impaired for word retrieval and organization of sentences when explaining a picture scene or \"open-ended\" topics.  His object naming, responsive naming and automatic sequences are within normal limits. His verbal expression is also impacted by his apraxia of speech which is characterized by sound sequencing errors for multisyllabic words and sentences. He will benefit from skilled OP SLP intervention to improve functional communication and language for improved safety and independence with daily communication needs.   Language/Cognition Goal 1   Goal Description Patient will improve his functional expressive and receptive language to communicate in daily living and emergency situations with at least 90% and use of strategies prn.    Target Date 02/09/20   Goal Identifier LTG   Language/Cognition Goal 2   Goal Description Patient will follow 2 step directions and listen to short paragraphs and answer questions with 85% and minimal cues for improved comprehension needed for daily living communication needs.    Target Date 11/06/19   Goal Identifier STG 1: Language/Auditory Comprehension   Language/Cognition Goal 3   Goal Description Patient will complete moderate level verbal expression tasks including naming, describing and sentence completion with use of trained word-retrieval strategies to improve verbal expression needed for daily living communication needs with 85% and moderate cues.   Target Date 11/06/19   Goal Identifier STG 2: Language/Verbal Expression   Language/Cognition Goal 4 "   Goal Description Patient will complete practical reading comprehension tasks including reading menus, signs, labels, to improve reading comprehension needed for daily living and safety needs.    Target Date 11/06/19   Goal Identifier STG 3: Language/Reading comprehension   Language/Cognition Goal 5   Goal Identifier STG 4: Speech-intellgibility   Goal Description Patient will implement trained speech production/intelligibility strategies to improve speech intelligibility to at least 95% at the word, sentence and conversational level.    Target Date 11/06/19   Total Session Time   Sound production with lang comprehension and expression minutes (63781) 50   Total Evaluation Time 50   Therapy Certification   Certification date from 08/09/19   Certification date to 11/06/19   Medical Diagnosis L MCA, CVA, Aphasia   Thank you for the referral of this patient.  If you have any questions regarding the information in this report, please feel free to contact me at [phone].     Mili Lockhart MA, CCC-SLP  Speech-Language Pathologist  [address] [address]  Fax:  [phone]

## 2019-08-13 NOTE — PROGRESS NOTES
[facility]          OUTPATIENT SPEECH LANGUAGE PATHOLOGY LANGUAGE-COGNITION  EVALUATION  PLAN OF TREATMENT FOR OUTPATIENT REHABILITATION  (COMPLETE FOR INITIAL CLAIMS ONLY)  Patient's Last Name, First Name, M.I.  YOB: 1935  Tk Richmond  PHANI                        Provider s Name: [facility] Medical Record No.  [id number]     Onset Date:  07/13/19   Start of Care Date: 08/09/19   Type:     ___PT  __OT   _X_SLP    Medical Diagnosis:  L MCA, CVA, Aphasia   Speech Language Pathology Diagnosis:  Moderate aphasia and apraxia of speech    Visits from SOC: 1                                        ________________________________________________________________________________  Plan of Treatment/Functional Goals:   Planned Therapy Interventions: Language, Communication   Intervention Comments: Patient would benefit from skilled SLP services in order to address his dysarthria and aphasia in order to be able to communicate more effectively with those in his immediate environment.              Language / Cognition Goals  1. Goal Identifier: LTG       Goal Description: Patient will improve his functional expressive and receptive language to communicate in daily living and emergency situations with at least 90% and use of strategies prn.        Target Date: 02/09/20   2. Goal Identifier: STG 1: Language/Auditory Comprehension       Goal Description: Patient will follow 2 step directions and listen to short paragraphs and answer questions with 85% and minimal cues for improved comprehension needed for daily living communication needs.        Target Date: 11/06/19   3. Goal Identifier: STG 2: Language/Verbal Expression       Goal Description: Patient will complete moderate level verbal expression tasks including naming, describing and sentence completion with use of trained word-retrieval  strategies to improve verbal expression needed for daily living communication needs with 85% and moderate cues.       Target Date: 11/06/19   4. Goal Identifier: STG 3: Language/Reading comprehension       Goal Description: Patient will complete practical reading comprehension tasks including reading menus, signs, labels, to improve reading comprehension needed for daily living and safety needs.        Target Date: 11/06/19   5. Goal Identifier: STG 4: Speech-intellgibility       Goal Description: Patient will implement trained speech production/intelligibility strategies to improve speech intelligibility to at least 95% at the word, sentence and conversational level.        Target Date: 11/06/19                   Predicted Duration of Therapy Intervention (days/wks): 2x/week x 12 weeks    Mili Lockhart, SLP       I CERTIFY THE NEED FOR THESE SERVICES FURNISHED UNDER        THIS PLAN OF TREATMENT AND WHILE UNDER MY CARE     (Physician co-signature of this document indicates review and certification of the therapy plan).                  Certification Date From:  08/09/19  Certification Date To:   11/06/19          Referring Physician:  Dr. Janet Bell    Initial Assessment        See Epic Evaluation Start Of Care Date: 08/09/19

## 2019-08-13 NOTE — ADDENDUM NOTE
Encounter addended by: Mili Lockhart, SLP on: 8/13/2019 4:06 PM   Actions taken: Flowsheet accepted, Document created, Document edited, Sign clinical note

## 2019-08-14 ENCOUNTER — HOSPITAL ENCOUNTER (OUTPATIENT)
Dept: SPEECH THERAPY | Facility: CLINIC | Age: 84
Setting detail: THERAPIES SERIES
End: 2019-08-14
Attending: PHYSICAL MEDICINE & REHABILITATION
Payer: MEDICARE

## 2019-08-14 PROCEDURE — 40000211 ZZHC STATISTIC SLP  DEPARTMENT VISIT: Performed by: SPEECH-LANGUAGE PATHOLOGIST

## 2019-08-14 PROCEDURE — 92507 TX SP LANG VOICE COMM INDIV: CPT | Mod: GN | Performed by: SPEECH-LANGUAGE PATHOLOGIST

## 2019-08-15 ENCOUNTER — HOSPITAL ENCOUNTER (OUTPATIENT)
Dept: PHYSICAL THERAPY | Facility: CLINIC | Age: 84
Setting detail: THERAPIES SERIES
End: 2019-08-15
Attending: PHYSICAL MEDICINE & REHABILITATION
Payer: MEDICARE

## 2019-08-15 PROCEDURE — 97112 NEUROMUSCULAR REEDUCATION: CPT | Mod: GP | Performed by: PHYSICAL THERAPIST

## 2019-08-15 PROCEDURE — 97110 THERAPEUTIC EXERCISES: CPT | Mod: GP | Performed by: PHYSICAL THERAPIST

## 2019-08-16 ENCOUNTER — HOSPITAL ENCOUNTER (OUTPATIENT)
Dept: SPEECH THERAPY | Facility: CLINIC | Age: 84
Setting detail: THERAPIES SERIES
End: 2019-08-16
Attending: PHYSICAL MEDICINE & REHABILITATION
Payer: MEDICARE

## 2019-08-16 PROCEDURE — 92507 TX SP LANG VOICE COMM INDIV: CPT | Mod: GN | Performed by: SPEECH-LANGUAGE PATHOLOGIST

## 2019-08-22 ENCOUNTER — HOSPITAL ENCOUNTER (OUTPATIENT)
Dept: OCCUPATIONAL THERAPY | Facility: CLINIC | Age: 84
Setting detail: THERAPIES SERIES
End: 2019-08-22
Attending: PHYSICAL MEDICINE & REHABILITATION
Payer: MEDICARE

## 2019-08-22 ENCOUNTER — HOSPITAL ENCOUNTER (OUTPATIENT)
Dept: PHYSICAL THERAPY | Facility: CLINIC | Age: 84
Setting detail: THERAPIES SERIES
End: 2019-08-22
Attending: PHYSICAL MEDICINE & REHABILITATION
Payer: MEDICARE

## 2019-08-22 DIAGNOSIS — I63.512 ACUTE ISCHEMIC CEREBROVASCULAR ACCIDENT (CVA) INVOLVING LEFT MIDDLE CEREBRAL ARTERY TERRITORY (H): ICD-10-CM

## 2019-08-22 PROCEDURE — 97110 THERAPEUTIC EXERCISES: CPT | Mod: GO | Performed by: OCCUPATIONAL THERAPIST

## 2019-08-22 PROCEDURE — 97166 OT EVAL MOD COMPLEX 45 MIN: CPT | Mod: GO | Performed by: OCCUPATIONAL THERAPIST

## 2019-08-22 PROCEDURE — 97110 THERAPEUTIC EXERCISES: CPT | Mod: GP | Performed by: PHYSICAL THERAPIST

## 2019-08-22 PROCEDURE — 97112 NEUROMUSCULAR REEDUCATION: CPT | Mod: GP | Performed by: PHYSICAL THERAPIST

## 2019-08-26 NOTE — PROGRESS NOTES
08/22/19 1500   Quick Adds   Quick Adds Certification   Type of Visit Initial Outpatient Occupational Therapy Evaluation   General Information   Start Of Care Date 08/22/19   Referring Physician Janet Bell MD   Orders Evaluate and treat as indicated   Orders Date 07/30/19   Medical Diagnosis Left MCA Ischemic stroke etc..   Onset of Illness/Injury or Date of Surgery 07/13/19   Surgical/Medical History Reviewed Yes   Additional Occupational Profile Info/Pertinent History of Current Problem Pt is 83 yo male with past medical history significant for multiple strokes of unknown etiology, HTN, nad MDD who was admitted to the hospital on 7/13 due to left MCA ischemic stroke and then admitted to the ARU on 7/20/2019.  Pt is accompanied by his supportive wife, Tamika,  to his OT evaluation.     Comments/Observations Pt is a retired  who worked for [employer] and [employer].    Role/Living Environment   Current Community Support Family/friend caregiver   Patient role/Employment history Retired   Current Living Environment [facility]   Prior Level - ADLS Independent   Prior Responsibilities - IADL Laundry;Shopping;Yardwork;Finances;Driving   Prior Level Comments Pt drove minimally prior to the stroke   Role/Living Environment Comments Pt requires help with showers and some lower body dressing tasks   Patient/family Goals Statement would like to use  his right hand again   Pain   Patient currently in pain No   Fall Risk Screen   Fall screen completed by PT   Fall screen comments Please see PT evaluation for further information   Abuse Screen (yes response referral indicated)   Feels Unsafe at Home or Work/School no   Feels Threatened by Someone no   Does Anyone Try to Keep You From Having Contact with Others or Doing Things Outside Your Home? no   Physical Signs of Abuse Present no   Cognitive Status Examination   Orientation Orientation to person, place and time   Level of Consciousness Alert    Cognitive Comment Pt will benefit from cognitive screen at a [facility] appointment   Range of Motion (ROM)   ROM Quick Adds No deficits identified   Strength   Strength Comments Left is stronger than the Right   Hand Strength   Hand Dominance Right   Left Hand  (pounds) 30 pounds   Right Hand  (pounds) 14 pounds   Left Lateral Pinch (pounds) 14 pounds   Right Lateral Pinch (pounds) 6 pounds   Left Three Point Pinch (pounds) 8 pounds   Right Three Point Pinch (pounds) 4 pounds   Hand Strength Comments  and pinch are impaired bilaterally based on norms for age and gender.    Coordination   Left Hand, Nine Hole Peg Test (seconds) 37   Right Hand, Nine Hole Peg Test (seconds) 1 peg picked up, but not placed after 60 sec of attempts   Left Hand, Box and Blocks Test (cubes transferred in 1 minute) 36   Right Hand, Box and Blocks Test (cubes transferred in 1 minute) 8   Coordination Comments B fm/gm coordination impaired based on norms for age and gender   Bathing   Level of Spink - Bathing maximum assist (25% patients effort)   Physical Assist/Nonphysical Assist - Bathing 1 person assist   Upper Body Dressing   Level of Spink: Dress Upper Body independent   Upper Body Dressing Comments Takes longer and requires increased effort for self cares   Lower Body Dressing   Level of Spink: Dress Lower Body moderate assist (50% patients effort)   Physical Assist/Nonphysical Assist: Dress Lower Body 1 person assist   Lower Body Dressing Comments Takes longer and requires increased effort for self cares, assist with fasteners/shoes/socks   Toileting   Level of Spink: Toilet independent   Toileting Comments Takes longer and requires increased effort for self cares   Grooming   Level of Spink: Grooming independent   Grooming Comments Takes longer and requires increased effort for self cares   Eating/Self-Feeding   Level of Spink: Eating independent   Eating/Self Feeding Comments  Eats with non dominant Left hand   Activity Tolerance   Activity Tolerance goes to be earlier due to fatigue   Planned Therapy Interventions   Planned Therapy Interventions ADL training;IADL training;Cognitive performance testing;Coordination training;ROM;Self care/Home management;Strengthening;Stretching    OT Goal 1   Goal Identifier HEP   Goal Description Pt will demonstrate good follow through at home of a B UE HEP for strength  and  coordination with SBA and min cues to increase functional strength and coordination while maximizing  independence and performance of ADL/IADLs. Pt will demonstrate I with HEP of 6-8 activities for weight bearing, stretching, AROM and progress to resistance exercises   Target Date 11/20/19    OT Goal 2   Goal Identifier R UE use   Goal Description Patient to demonstrate functional tasks with 60% use of R UE as functional assist for clothing fasteners, opening food packages, and increased ADL/IADL independence for closer return to prior level of function   Target Date 11/20/19    OT Goal 3   Goal Identifier Safety with reach/simple meal prep task    Goal Description Pt will demonstrate kitchen safety while completing a simple to moderately complex meal prep task with electric range independently and while reaching at all levels using safe techniques to access items   Target Date 11/20/19   OT Goal 4   Goal Identifier Memory compensation/problem solving strategies   Goal Description Patient to verbalize an understanding of memory compensation strategies and utilize memory tools (planner, calendar, etc effectively and independently for increased ability to manage time, appointments, daily schedule etc.     Target Date 11/20/19   OT Goal 5   Goal Identifier AE for increased ease of ADL/IADL completion   Goal Description Patient to verbalize and utilize 3 strategies and/or AE to compensate for decreased UE function and promote and demonstrate increased independence with ADL/IADLs, such  as, dressing,    Target Date 11/20/19   Clinical Impression   Criteria for Skilled Therapeutic Interventions Met Yes, treatment indicated   OT Diagnosis decrased ADLs/IADLs   Influenced by the following impairments impaired gmc, impaired fmc, generalized weakness, impaired  strength, decreased memory, impaired hand function on R, fatigue, impaired speech   Assessment of Occupational Performance 3-5 Performance Deficits   Identified Performance Deficits decreased ability to shower, eat, communicate verbally, dress/fastners, increased effort for grooming, requires assist for showers, inability to write, inability to drive   Clinical Decision Making (Complexity) Moderate complexity   Therapy Frequency 2x/week   Predicted Duration of Therapy Intervention (days/wks) 12 weeks   Risks and Benefits of Treatment have been explained. Yes   Patient, Family & other staff in agreement with plan of care Yes   Clinical Impression Comments Pt will benefit from OT services to address the above goals   Education Assessment   Barriers To Learning No Barriers   Preferred Learning Style Listening;Reading;Demonstration;Pictures/video   Therapy Certification   Certification date from 08/22/19   Certification date to 11/20/19   Certification I certify the need for these services furnished under this plan of treatment and while under my care.  (Physician co-signature of this document indicates review and certification of the therapy plan)   Total Evaluation Time   OT Yolanda Moderate Complexity Minutes (19759) 79

## 2019-08-26 NOTE — PROGRESS NOTES
[facility]          OUTPATIENT OCCUPATIONAL THERAPY  EVALUATION  PLAN OF TREATMENT FOR OUTPATIENT REHABILITATION  (COMPLETE FOR INITIAL CLAIMS ONLY)  Patient's Last Name, First Name, M.I.  YOB: 1935  Tk Richmond                        Provider's Name  [facility] Medical Record No.  [id number]                               Onset Date:     07/13/19   Start of Care Date:     08/22/19   Type:     ___PT   _X_OT   ___SLP Medical Diagnosis:     Left MCA Ischemic stroke etc..                          OT Diagnosis:     decrased ADLs/IADLs Visits from SOC:  1   _________________________________________________________________________________  Plan of Treatment/Functional Goals:  ADL training, IADL training, Cognitive performance testing, Coordination training, ROM, Self care/Home management, Strengthening, Stretching                    Goals  Goal Identifier: HEP  Goal Description: Pt will demonstrate good follow through at home of a B UE HEP for strength  and  coordination with SBA and min cues to increase functional strength and coordination while maximizing  independence and performance of ADL/IADLs. Pt will demonstrate I with HEP of 6-8 activities for weight bearing, stretching, AROM and progress to resistance exercises  Target Date: 11/20/19     Goal Identifier: R UE use  Goal Description: Patient to demonstrate functional tasks with 60% use of R UE as functional assist for clothing fasteners, opening food packages, and increased ADL/IADL independence for closer return to prior level of function  Target Date: 11/20/19     Goal Identifier: Safety with reach/simple meal prep task   Goal Description: Pt will demonstrate kitchen safety while completing a simple to moderately complex meal prep task with electric range independently and while reaching at all levels using safe  techniques to access items  Target Date: 11/20/19     Goal Identifier: Memory compensation/problem solving strategies  Goal Description: Patient to verbalize an understanding of memory compensation strategies and utilize memory tools (planner, calendar, etc effectively and independently for increased ability to manage time, appointments, daily schedule etc.    Target Date: 11/20/19     Goal Identifier: AE for increased ease of ADL/IADL completion  Goal Description: Patient to verbalize and utilize 3 strategies and/or AE to compensate for decreased UE function and promote and demonstrate increased independence with ADL/IADLs, such as, dressing,   Target Date: 11/20/19                                              Therapy Frequency: 2x/week     Predicted Duration of Therapy Intervention (days/wks): 12 weeks  Suellen Chang OT          I CERTIFY THE NEED FOR THESE SERVICES FURNISHED UNDER        THIS PLAN OF TREATMENT AND WHILE UNDER MY CARE     (Physician co-signature of this document indicates review and certification of the therapy plan).                 ,    Certification date from: 08/22/19, Certification date to: 11/20/19               Referring Physician: Janet Bell MD     Initial Assessment        See Epic Evaluation      Start Of Care Date: 08/22/19

## 2019-08-28 ENCOUNTER — HOSPITAL ENCOUNTER (OUTPATIENT)
Dept: SPEECH THERAPY | Facility: CLINIC | Age: 84
Setting detail: THERAPIES SERIES
End: 2019-08-28
Attending: PHYSICAL MEDICINE & REHABILITATION
Payer: MEDICARE

## 2019-08-28 PROCEDURE — 92507 TX SP LANG VOICE COMM INDIV: CPT | Mod: GN | Performed by: SPEECH-LANGUAGE PATHOLOGIST

## 2019-08-30 ENCOUNTER — HOSPITAL ENCOUNTER (OUTPATIENT)
Dept: SPEECH THERAPY | Facility: CLINIC | Age: 84
Setting detail: THERAPIES SERIES
End: 2019-08-30
Attending: PHYSICAL MEDICINE & REHABILITATION
Payer: MEDICARE

## 2019-08-30 PROCEDURE — 92507 TX SP LANG VOICE COMM INDIV: CPT | Mod: GN | Performed by: SPEECH-LANGUAGE PATHOLOGIST

## 2019-09-06 ENCOUNTER — HOSPITAL ENCOUNTER (OUTPATIENT)
Dept: SPEECH THERAPY | Facility: CLINIC | Age: 84
Setting detail: THERAPIES SERIES
End: 2019-09-06
Attending: PHYSICAL MEDICINE & REHABILITATION
Payer: MEDICARE

## 2019-09-06 PROCEDURE — 92507 TX SP LANG VOICE COMM INDIV: CPT | Mod: GN,KX | Performed by: SPEECH-LANGUAGE PATHOLOGIST

## 2019-09-09 ENCOUNTER — HOSPITAL ENCOUNTER (OUTPATIENT)
Dept: PHYSICAL THERAPY | Facility: CLINIC | Age: 84
Setting detail: THERAPIES SERIES
End: 2019-09-09
Attending: PHYSICAL MEDICINE & REHABILITATION
Payer: MEDICARE

## 2019-09-09 PROCEDURE — 97110 THERAPEUTIC EXERCISES: CPT | Mod: GP | Performed by: PHYSICAL THERAPIST

## 2019-09-09 PROCEDURE — 97530 THERAPEUTIC ACTIVITIES: CPT | Mod: GP,KX | Performed by: PHYSICAL THERAPIST

## 2019-09-10 ENCOUNTER — HOSPITAL ENCOUNTER (OUTPATIENT)
Dept: OCCUPATIONAL THERAPY | Facility: CLINIC | Age: 84
Setting detail: THERAPIES SERIES
End: 2019-09-10
Attending: PHYSICAL MEDICINE & REHABILITATION
Payer: MEDICARE

## 2019-09-10 PROCEDURE — 97110 THERAPEUTIC EXERCISES: CPT | Mod: GO | Performed by: OCCUPATIONAL THERAPIST

## 2019-09-11 ENCOUNTER — HOSPITAL ENCOUNTER (OUTPATIENT)
Dept: SPEECH THERAPY | Facility: CLINIC | Age: 84
Setting detail: THERAPIES SERIES
End: 2019-09-11
Attending: PHYSICAL MEDICINE & REHABILITATION
Payer: MEDICARE

## 2019-09-11 PROCEDURE — 92507 TX SP LANG VOICE COMM INDIV: CPT | Mod: GN | Performed by: SPEECH-LANGUAGE PATHOLOGIST

## 2019-09-12 ENCOUNTER — HOSPITAL ENCOUNTER (OUTPATIENT)
Dept: PHYSICAL THERAPY | Facility: CLINIC | Age: 84
Setting detail: THERAPIES SERIES
End: 2019-09-12
Attending: PHYSICAL MEDICINE & REHABILITATION
Payer: MEDICARE

## 2019-09-12 PROCEDURE — 97530 THERAPEUTIC ACTIVITIES: CPT | Mod: GP | Performed by: PHYSICAL THERAPIST

## 2019-09-13 ENCOUNTER — HOSPITAL ENCOUNTER (OUTPATIENT)
Dept: OCCUPATIONAL THERAPY | Facility: CLINIC | Age: 84
Setting detail: THERAPIES SERIES
End: 2019-09-13
Attending: PHYSICAL MEDICINE & REHABILITATION
Payer: MEDICARE

## 2019-09-13 ENCOUNTER — HOSPITAL ENCOUNTER (OUTPATIENT)
Dept: SPEECH THERAPY | Facility: CLINIC | Age: 84
Setting detail: THERAPIES SERIES
End: 2019-09-13
Attending: PHYSICAL MEDICINE & REHABILITATION
Payer: MEDICARE

## 2019-09-13 PROCEDURE — 97110 THERAPEUTIC EXERCISES: CPT | Mod: GO | Performed by: OCCUPATIONAL THERAPIST

## 2019-09-13 PROCEDURE — 92507 TX SP LANG VOICE COMM INDIV: CPT | Mod: GN,KX | Performed by: SPEECH-LANGUAGE PATHOLOGIST

## 2019-09-16 ENCOUNTER — HOSPITAL ENCOUNTER (OUTPATIENT)
Dept: PHYSICAL THERAPY | Facility: CLINIC | Age: 84
Setting detail: THERAPIES SERIES
End: 2019-09-16
Attending: PHYSICAL MEDICINE & REHABILITATION
Payer: MEDICARE

## 2019-09-16 PROCEDURE — 97530 THERAPEUTIC ACTIVITIES: CPT | Mod: GP,KX | Performed by: PHYSICAL THERAPIST

## 2019-09-17 ENCOUNTER — HOSPITAL ENCOUNTER (OUTPATIENT)
Dept: OCCUPATIONAL THERAPY | Facility: CLINIC | Age: 84
Setting detail: THERAPIES SERIES
End: 2019-09-17
Attending: PHYSICAL MEDICINE & REHABILITATION
Payer: MEDICARE

## 2019-09-17 PROCEDURE — 97110 THERAPEUTIC EXERCISES: CPT | Mod: GO | Performed by: OCCUPATIONAL THERAPIST

## 2019-09-18 ENCOUNTER — HOSPITAL ENCOUNTER (OUTPATIENT)
Dept: SPEECH THERAPY | Facility: CLINIC | Age: 84
Setting detail: THERAPIES SERIES
End: 2019-09-18
Attending: PHYSICAL MEDICINE & REHABILITATION
Payer: MEDICARE

## 2019-09-18 PROCEDURE — 92507 TX SP LANG VOICE COMM INDIV: CPT | Mod: GN,KX | Performed by: SPEECH-LANGUAGE PATHOLOGIST

## 2019-09-19 ENCOUNTER — HOSPITAL ENCOUNTER (OUTPATIENT)
Dept: PHYSICAL THERAPY | Facility: CLINIC | Age: 84
Setting detail: THERAPIES SERIES
End: 2019-09-19
Attending: PHYSICAL MEDICINE & REHABILITATION
Payer: MEDICARE

## 2019-09-19 ENCOUNTER — HOSPITAL ENCOUNTER (OUTPATIENT)
Dept: OCCUPATIONAL THERAPY | Facility: CLINIC | Age: 84
Setting detail: THERAPIES SERIES
End: 2019-09-19
Attending: PHYSICAL MEDICINE & REHABILITATION
Payer: MEDICARE

## 2019-09-19 PROCEDURE — 97116 GAIT TRAINING THERAPY: CPT | Mod: GP,KX | Performed by: PHYSICAL THERAPIST

## 2019-09-19 PROCEDURE — 97530 THERAPEUTIC ACTIVITIES: CPT | Mod: GO,KX | Performed by: OCCUPATIONAL THERAPIST

## 2019-09-19 PROCEDURE — 97110 THERAPEUTIC EXERCISES: CPT | Mod: GO,KX | Performed by: OCCUPATIONAL THERAPIST

## 2019-09-19 PROCEDURE — 97112 NEUROMUSCULAR REEDUCATION: CPT | Mod: GP | Performed by: PHYSICAL THERAPIST

## 2019-09-20 ENCOUNTER — HOSPITAL ENCOUNTER (OUTPATIENT)
Dept: SPEECH THERAPY | Facility: CLINIC | Age: 84
Setting detail: THERAPIES SERIES
End: 2019-09-20
Attending: PHYSICAL MEDICINE & REHABILITATION
Payer: MEDICARE

## 2019-09-20 PROCEDURE — 92507 TX SP LANG VOICE COMM INDIV: CPT | Mod: GN,KX | Performed by: SPEECH-LANGUAGE PATHOLOGIST

## 2019-09-23 ENCOUNTER — HOSPITAL ENCOUNTER (OUTPATIENT)
Dept: PHYSICAL THERAPY | Facility: CLINIC | Age: 84
Setting detail: THERAPIES SERIES
End: 2019-09-23
Attending: PHYSICAL MEDICINE & REHABILITATION
Payer: MEDICARE

## 2019-09-23 PROCEDURE — 97112 NEUROMUSCULAR REEDUCATION: CPT | Mod: GP | Performed by: PHYSICAL THERAPIST

## 2019-09-23 PROCEDURE — 97110 THERAPEUTIC EXERCISES: CPT | Mod: GP,KX | Performed by: PHYSICAL THERAPIST

## 2019-09-24 NOTE — PROGRESS NOTES
Outpatient Speech Language Pathology Progress Note & Medicare 10th Visit Note     Patient: Tk Richmond  : 1935    Beginning/End Dates of Reporting Period:  2019 to 2019    Referring Provider: Dr. Janet Bell    Therapy Diagnosis: Aphasia, L MCA CVA    Client Self Report: Tk Richmond is a 84 year old y.o.male s/p L MCA CVA in 2019.  Please refer to the initial outpatient speech-language pathology evaluation dated 2019 for further background information.  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  He has been motivated and cooperative throughout treatment and is demonstrating steady progress towards all goals.  Currently, Mr. Richmond reports improvements with his speech.    Objective Measurements:   Goals:  Goal Identifier LTG   Goal Description Patient will improve his functional expressive and receptive language to communicate in daily living and emergency situations with at least 90% and use of strategies prn.    Target Date 20   Date Met      Progress: Continue Goal.  See below for progress towards long-term goal.     Goal Identifier STG 1: Language/Auditory Comprehension   Goal Description  Patient will follow 2 step directions and listen to short paragraphs and answer questions with 85% and minimal cues for improved comprehension needed for daily living communication needs.    Target Date 19   Date Met   19   Progress: Goal met     Goal Identifier STG 2: Language/Verbal Expression   Goal Description  Patient will complete moderate level verbal expression tasks including naming, describing and sentence completion with use of trained word-retrieval strategies to improve verbal expression needed for daily living communication needs with 85% and moderate cues.   Target Date 19   Date Met   19   Progress: Goal met.      Goal Identifier STG 3: Language/Reading comprehension   Goal  Description  Patient will complete practical reading comprehension tasks including reading menus, signs, labels, to improve reading comprehension needed for daily living and  safety needs with 80% accuracy and moderate cues   Target Date 11/06/19   Date Met   9/20/19   Progress: Goal met.      Goal Identifier STG 4: Speech-intellgibility   Goal Description  Patient will implement trained speech production/intelligibility strategies to improve speech intelligibility to at least 95% at the word, sentence and conversational level.    Target Date 11/06/19   Date Met      Progress: Goal partially met-for words and 3-5 word phrase repetition.  Will continue for sentence level and conversational speech level.      Progress Toward Goals:    Progress this reporting period: Mr. Richmond presents with moderate aphasia and apraxia of speech.  He has met 4 of his short-term goals this progress reporting periods and continues to improve with his speech production and intelligibility.  It is recommended that outpatient speech-language therapy continue on a 2x/week basis for the next 8 weeks with re-evaluation at the end of that time to further determine a plan of care.  Prognosis continues to be good and patient will continue to benefit from skilled speech-language services for improved communication needed for daily living and safety needs.     Plan:  Continue therapy per current plan of care.  Changes to goals: To be met in 8 weeks:  1. Patient will follow 2 step directions and listen to short paragraphs and answer questions with 90% and minimal cues for improved comprehension needed for daily living communication needs.    2.  Patient will complete moderate level verbal expression tasks including naming, describing and sentence completion with use of trained word-retrieval strategies to improve verbal expression needed for daily living communication needs with 80% and minimal cues.    3. Patient will complete practical reading  comprehension tasks including reading menus, signs, labels, to improve reading comprehension needed for daily living and  safety needs with 90 % accuracy and minimal cues.    4.  Patient will implement trained speech production/intelligibility strategies to improve speech intelligibility to at least 90% at the sentence and conversational level.    Discharge:  No

## 2019-09-25 ENCOUNTER — HOSPITAL ENCOUNTER (OUTPATIENT)
Dept: OCCUPATIONAL THERAPY | Facility: CLINIC | Age: 84
Setting detail: THERAPIES SERIES
End: 2019-09-25
Attending: PHYSICAL MEDICINE & REHABILITATION
Payer: MEDICARE

## 2019-09-25 ENCOUNTER — HOSPITAL ENCOUNTER (OUTPATIENT)
Dept: SPEECH THERAPY | Facility: CLINIC | Age: 84
Setting detail: THERAPIES SERIES
End: 2019-09-25
Attending: PHYSICAL MEDICINE & REHABILITATION
Payer: MEDICARE

## 2019-09-25 PROCEDURE — 92507 TX SP LANG VOICE COMM INDIV: CPT | Mod: GN,KX | Performed by: SPEECH-LANGUAGE PATHOLOGIST

## 2019-09-25 PROCEDURE — 97110 THERAPEUTIC EXERCISES: CPT | Mod: GO,KX | Performed by: OCCUPATIONAL THERAPIST

## 2019-09-26 ENCOUNTER — HOSPITAL ENCOUNTER (OUTPATIENT)
Dept: OCCUPATIONAL THERAPY | Facility: CLINIC | Age: 84
Setting detail: THERAPIES SERIES
End: 2019-09-26
Attending: PHYSICAL MEDICINE & REHABILITATION
Payer: MEDICARE

## 2019-09-26 PROCEDURE — 97110 THERAPEUTIC EXERCISES: CPT | Mod: GO,KX | Performed by: OCCUPATIONAL THERAPIST

## 2019-09-27 ENCOUNTER — HOSPITAL ENCOUNTER (OUTPATIENT)
Dept: SPEECH THERAPY | Facility: CLINIC | Age: 84
Setting detail: THERAPIES SERIES
End: 2019-09-27
Attending: PHYSICAL MEDICINE & REHABILITATION
Payer: MEDICARE

## 2019-09-27 ENCOUNTER — HOSPITAL ENCOUNTER (OUTPATIENT)
Dept: PHYSICAL THERAPY | Facility: CLINIC | Age: 84
Setting detail: THERAPIES SERIES
End: 2019-09-27
Attending: PHYSICAL MEDICINE & REHABILITATION
Payer: MEDICARE

## 2019-09-27 PROCEDURE — 97112 NEUROMUSCULAR REEDUCATION: CPT | Mod: GP,KX | Performed by: PHYSICAL THERAPIST

## 2019-09-27 PROCEDURE — 97750 PHYSICAL PERFORMANCE TEST: CPT | Mod: GP,KX | Performed by: PHYSICAL THERAPIST

## 2019-09-27 PROCEDURE — 97116 GAIT TRAINING THERAPY: CPT | Mod: GP,KX | Performed by: PHYSICAL THERAPIST

## 2019-09-27 PROCEDURE — 92507 TX SP LANG VOICE COMM INDIV: CPT | Mod: GN,KX | Performed by: SPEECH-LANGUAGE PATHOLOGIST

## 2019-09-28 NOTE — PROGRESS NOTES
09/27/19 1500   Signing Clinician's Name / Credentials   Signing clinician's name / credentials Terri Gonzalez PT, DPT   Session Number   Session Number PROGRESS NOTE: Episode of care 8/1/19-9/27/19. Pt has participated in 10 PT sessions including evaluation and today's reassessment to address decreased independence with gait and ADLs s/p L MCA CVA. See below for details of reassessment and 10th visit.   Progress Note/Recertification   Progress Note Completed Date 09/27/19   Recertification Due Date 10/30/19   Recertification Completed Date  09/27/19   Adult Goals   PT Eval Goals 1;3;2;4;5;6   Goal 1   Goal Identifier TUG (baseline: 18.4 sec no AD)   Goal Description Tk will perform TUG in less tan 13.5 seconds without an AD to demonstrate decreased fall risk and improved gait abilities.     Target Date 10/30/19   Date Met 09/27/19  (9/27/19:GOAL MET, 12.06 sec without AD)   Goal 2   Goal Identifier Stairs (baseline: 3 JAY home without railing; has beend avoiding stairs to basement in home where there [place] TV[place])   Goal Description Tk will be able to negotiate a flight of stairs in a reciprocal pattern with support of one rail with supervision in order to access his lower level of his home.     Target Date 11/22/19   Date Met   (9/27:progress made, CTG  for safety w/ reciprocal descent)   Goal 3   Goal Identifier Household mobility (baseline: using walker at all times 4ww in home)   Goal Description Tk will be able to ambulate household distances without an assistive device in order to return to independent mobility within his home.     Target Date 10/30/19   Date Met 09/27/19  (9/27: GOAL MET, pt walks 2x12 min w/o AD in home w/o LOB)   Goal 4   Goal Identifier Community mobility (baseline: using walker at all times, 2ww outside of home)  (GOAL PROGRESSED to w/o AD on 9/27)   Goal Description Tk will be able to ambulate distances of 300  feet or greater without AD and navigate up/down curbs, variable surfaces and inclines without difficulty in order to access areas in his community safely.     Target Date 19   Date Met   (:progress made, safe with SPC and close supervision)   Goal 5   Goal Identifier PISANO/56 (6-8 pt increase considered MDC s/p CVA; 53/56 = WNL for age/gender matched norms)  (NEW GOAL established 19)   Goal Description Pt will score >50/56 on the pisano balance assessment to indicate MDC of improved postural stability and low fall risk.    Target Date 19   Goal 6   Goal Identifier DGI (baseline: 1624; </= 19 = inc risk for falls; MDC = 4 pts s/p CVA)  (NEW GOAL  established 19)   Goal Description The pt will improve score on DGI from 16 to >/= 20/24 on the dynamic gait index to indicate low fall risk with community ambulation or use a cane consistently    Target Date 19   Subjective Report   Subjective Report Arrives to session accompanied by spouse and using 2ww. Reports walking 2x12 minutes walks in house without cane daily for exercise without instability. Uses 2ww outside of house at all times. Walked [facility] the stairs at [facility] 1x which went well but hasn't been going downstairs in home yet.    Objective Measures   Objective Measures Objective Measure 1;Objective Measure 2;Objective Measure 3;Objective Measure 4;Objective Measure 5;Objective Measure 6   Objective Measure 1   Objective Measure TUG   Details 12.06 sec without AD   Objective Measure 2   Objective Measure 20' walk/Gait   Details .85 m/s without AD; gait with shortened step length on the right side and limited arm swing on the right side    Objective Measure 3   Objective Measure Stairs   Details L UE support ascending/descending, descending with reciprocal gait, maintains wide KALANI/bilat LE ER/posterior lean, no LOB thepraist CTG  for safety   Objective Measure 4   Objective Measure Community mobility   Details 1) ambulation  outdoors without AD x7 minutes: therapist contact-guard to close supervision negotiating sidewalks stepping onto grass and negotiating ramps of sidewalk with mild instability without near loss of balance/maintains good foot clearance despite absent heelstrike and shortened step length R LE; 2) outdoors with SPC x7 minutes: cues for increased step length - pt able to increase step length with attention and effort, maintains some decreased heel strike right lower extremity compared to left; cues for armswing; able to walk safely with close supervision outdoors maintaining improved stability/gait with SPC compaired to w/o AD   Objective Measure 5   Objective Measure DGI   Details 16/24   Objective Measure 6   Objective Measure PISANO   Details 44/56   Treatment Interventions   Interventions Physical Performance Test/Measures;Gait Training;Neuromuscular Re-education   Neuromuscular Re-education   Neuromuscular re-ed of mvmt, balance, coord, kinesthetic sense, posture, proprioception minutes (29838) 5   Skilled Intervention instructions, SBA, cues for stepping response to LOB   Patient Response (see treatment detail below)   Treatment Detail Postural stability: Romberg EC:  x6 sec, R sided LOB ith delayed stepping response; SLS 1 sec repeated ea LE, effective stepping response to mild LOB   Progress (+) romberg with delayed stepping response/poor recovery from R sided LOB   Gait Training   Gait Training Minutes, includes stair climbing (47861) 20   Skilled Intervention instructions and feedback on reassessments relative to baseline/goals; cues/CTG/education    Patient Response (see objectives for details of reassessments)   Treatment Detail Reassessments: TUG, Stairs; Outdoors Ambulation; Gait training indoors without AD, outdoors without AD therapist CTG for safety, outdoors with SPC therapist SBA for safety negotiating sidewalks/grass/slopes in sidewalks; gait training on stairs with cues for narrowing KALANI to hips width  apart and neutral LE alignment with pt's tendency towards wide KALANI/bilat LE ER and posterior lean - able to partially correct but continued CTG/cues and L UE support from railing for safety   Progress STAIRS: progress made, continued L UE support/CTG/cues for safety ith tendency for posterior trunk lean/exagerated KALANI descending; pt reports walking downstairs at [facility] 1x which went well - CONTINUE GOAL; TUG: GOAL MET; pt savely demonstrates walking short distances indoors without AD, reports he has continued 2x12 min walks in home daily for HEP without instability; continues to demonstrate impaired gait. COMMUNITY AMBULATION: progress made; able to safely uce SPC and therapist SBA for safety compaired to baseline use of 2ww or 4ww at all times. PROGRESS GOAL towards improved safety outdoors without AD   Physical Performance Test/measures   Physical Performance Test/Measurement, Minutes (51207) 28   Skilled Intervention instructions and feedback on assessments of postural and gait stability, education on appropraite AD and goals established towards decreased fall risk   Patient Response (see objectives and additional documentation for details)   Treatment Detail Assessments: DGI, PISANO   Progress PISANO: considered increased risk for falls with static and dynamic postural stability challenges; NEW GOAL; DGI: considered increased risk for falls, completed assessment without AD, advised pt to use SPC for community ambulation for safety with progress made from 2ww. NEW GOAL established towards improved safety community ambulation without AD   Education   Learner Patient;Significant Other   Readiness Acceptance   Method Explanation;Demonstration   Response Verbalizes Understanding;Demonstrates Understanding   Education Comments attention to only one component of walking at a time - inc step length vs armswing   Plan   Homework indoor ambulation: continue without AD; outdoor ambulation: progress to SPC   Home program  continue as provided - daily activity list in 2 hr blocks: repeated sit < >stand, 2 walks (12 min), step ups from garage, folding/putting away clothes, making bed   Updates to plan of care Extended POC 2x/week x8 weeks through 11/22/19 for continued progress towards goals as stated above   Plan for next session stepper or walking indoors/outdoors for conditioning and gait training,  coordination/balance/grading, general strength, marches with SLS, robert negotiation vs four square   Total Session Time   Timed Code Treatment Minutes 53   Total Treatment Time (sum of timed and untimed services) 53

## 2019-09-28 NOTE — PROGRESS NOTES
[facility]      OUTPATIENT PHYSICAL THERAPY  PLAN OF TREATMENT FOR OUTPATIENT REHABILITATION    Patient's Last Name, First Name, M.I.                YOB: 1935  Tk Richmond                        Provider's Name  [facility] Medical Record No.  [id number]                               Onset Date: 07/12/19   Start of Care Date: 08/01/19   Type:     _X_PT   ___OT   ___SLP Medical Diagnosis: L MCA CVA                       PT Diagnosis: decreased independence with gait and ADLs       _________________________________________________________________________________  Plan of Treatment:    Frequency/Duration: 2x/week x8 weeks     Goals:  Goal Identifier TUG (baseline: 18.4 sec no AD)   Goal Description Tk will perform TUG in less tan 13.5 seconds without an AD to demonstrate decreased fall risk and improved gait abilities.     Target Date 10/30/19   Date Met  09/27/19(9/27/19:GOAL MET, 12.06 sec without AD)   Progress:     Goal Identifier Stairs (baseline: 3 [place] home without railing; has beend avoiding stairs to basement in home where there is [place] TV[place] with railing)   Goal Description Tk will be able to negotiate a flight of stairs in a reciprocal pattern with support of one rail with supervision in order to access his lower level of his home.     Target Date 11/22/19   Date Met  (9/27:progress made, CTG  for safety w/ reciprocal descent)   Progress:     Goal Identifier Household mobility (baseline: using walker at all times 4ww in home)   Goal Description Tk will be able to ambulate household distances without an assistive device in order to return to independent mobility within his home.     Target Date 10/30/19   Date Met  09/27/19(9/27: GOAL MET, pt walks 2x12 min w/o AD in home w/o LOB)   Progress:     Goal Identifier  Community mobility (baseline: using walker at all times, 2ww outside of home)(GOAL PROGRESSED to w/o AD on )   Goal Description kT will be able to ambulate distances of 300 feet or greater without AD and navigate up/down curbs, variable surfaces and inclines without difficulty in order to access areas in his community safely.     Target Date 19   Date Met  (:progress made, safe with SPC and close supervision)   Progress:     Goal Identifier PISANO/56 (6-8 pt increase considered MDC s/p CVA; 53/56 = WNL for age/gender matched norms)(NEW GOAL established 19)   Goal Description Pt will score >50/56 on the pisano balance assessment to indicate MDC of improved postural stability and low fall risk.    Target Date 19   Date Met      Progress:     Goal Identifier DGI (baseline: 16/24; </= 19 = inc risk for falls; MDC = 4 pts s/p CVA)(NEW GOAL  established 19)   Goal Description The pt will improve score on DGI from 16 to >/= 20/24 on the dynamic gait index to indicate low fall risk with community ambulation or use a cane consistently    Target Date 19   Date Met      Progress:     Progress Toward Goals:   See progress note dated 19 for details.    Certification date from 19 to 19.    Terri Gonzalez, PT          I CERTIFY THE NEED FOR THESE SERVICES FURNISHED UNDER        THIS PLAN OF TREATMENT AND WHILE UNDER MY CARE     (Physician co-signature of this document indicates review and certification of the therapy plan).                Referring Provider: Janet Bell

## 2019-09-30 ENCOUNTER — APPOINTMENT (OUTPATIENT)
Dept: GENERAL RADIOLOGY | Facility: CLINIC | Age: 84
End: 2019-09-30
Attending: EMERGENCY MEDICINE
Payer: MEDICARE

## 2019-09-30 ENCOUNTER — HOSPITAL ENCOUNTER (EMERGENCY)
Facility: CLINIC | Age: 84
Discharge: HOME | End: 2019-09-30
Attending: EMERGENCY MEDICINE | Admitting: EMERGENCY MEDICINE
Payer: MEDICARE

## 2019-09-30 VITALS
DIASTOLIC BLOOD PRESSURE: 72 MMHG | SYSTOLIC BLOOD PRESSURE: 156 MMHG | OXYGEN SATURATION: 98 % | HEART RATE: 62 BPM | RESPIRATION RATE: 20 BRPM | TEMPERATURE: 98.2 F

## 2019-09-30 DIAGNOSIS — S52.572A OTHER CLOSED INTRA-ARTICULAR FRACTURE OF DISTAL END OF LEFT RADIUS, INITIAL ENCOUNTER: ICD-10-CM

## 2019-09-30 PROCEDURE — 99284 EMERGENCY DEPT VISIT MOD MDM: CPT | Mod: 25

## 2019-09-30 PROCEDURE — 25600 CLTX DST RDL FX/EPHYS SEP WO: CPT | Mod: LT

## 2019-09-30 PROCEDURE — 73110 X-RAY EXAM OF WRIST: CPT | Mod: LT

## 2019-09-30 PROCEDURE — 25000132 ZZH RX MED GY IP 250 OP 250 PS 637: Mod: GY | Performed by: EMERGENCY MEDICINE

## 2019-09-30 RX ORDER — SENNOSIDES A AND B 8.6 MG/1
1 TABLET, FILM COATED ORAL 2 TIMES DAILY PRN
Qty: 30 TABLET | Refills: 0 | Status: SHIPPED | OUTPATIENT
Start: 2019-09-30 | End: 2020-07-12

## 2019-09-30 RX ORDER — HYDROCODONE BITARTRATE AND ACETAMINOPHEN 5; 325 MG/1; MG/1
2 TABLET ORAL ONCE
Status: COMPLETED | OUTPATIENT
Start: 2019-09-30 | End: 2019-09-30

## 2019-09-30 RX ORDER — HYDROCODONE BITARTRATE AND ACETAMINOPHEN 5; 325 MG/1; MG/1
1 TABLET ORAL EVERY 4 HOURS PRN
Qty: 15 TABLET | Refills: 0 | Status: ON HOLD | OUTPATIENT
Start: 2019-09-30 | End: 2019-10-07

## 2019-09-30 RX ADMIN — HYDROCODONE BITARTRATE AND ACETAMINOPHEN 2 TABLET: 5; 325 TABLET ORAL at 18:51

## 2019-09-30 ASSESSMENT — ENCOUNTER SYMPTOMS
MYALGIAS: 1
VOMITING: 0
SHORTNESS OF BREATH: 0

## 2019-09-30 NOTE — ED AVS SNAPSHOT
[facility] Emergency Department  [address]  [facility] [address]  Phone:  [phone]  Fax:  [phone]                                    Tk Richmond   MRN: [id number]    Department:  [facility] Emergency Department   Date of Visit:  9/30/2019           After Visit Summary Signature Page    I have received my discharge instructions, and my questions have been answered. I have discussed any challenges I see with this plan with the nurse or doctor.    ..........................................................................................................................................  Patient/Patient Representative Signature      ..........................................................................................................................................  Patient Representative Print Name and Relationship to Patient    ..................................................               ................................................  Date                                   Time    ..........................................................................................................................................  Reviewed by Signature/Title    ...................................................              ..............................................  Date                                               Time          22EPIC Rev 08/18

## 2019-09-30 NOTE — ED PROVIDER NOTES
History     Chief Complaint:    Right Wrist Injury    The history is provided by the patient.      Tk Richmond is a 84 year old male who presents with left wrist pain.  Patient's wife is the primary historian as the patient has a history of stroke and expressive aphasia.  Wife states that they were rotating the mattress.  As she pushed the mattress, it struck him and caused him to fall backwards.  He landed onto a outstretched left hand.  There was no loss of consciousness or subsequent headache.  His only area of trauma was to the left wrist.  He had immediate onset of pain at the wrist which radiates to the mid forearm.  He has not taken any for the pain.  He denies any associated numbness or weakness.  He has no other complaints or concerns.    Allergies:  Contrast Dye     Medications:    Tylenol  Norvasc  Simbrinza  Calcium 600+D  Celexa  Plavix  Glucosamine-chondroitin   Mevacor  Centrum silver adult 50 +  Cyanocobalamin  Altoprev  Aspirin 325 mg  Theragran    Past Medical History:    Anxiety  Knee osteoarthritis  Sinus bradycardia on ECG  Skin lesion  Glaucoma  Hyperlipidemia  Hypertension  Non-rheumatic aortic regurgitation  Lower GI bleed  Acute embolic stroke  Acute ischemic cerebrovascular accident (CVA) involving left middle cerebral artery territory    Past Surgical History:    Total knee arthroplasty - right  Appendectomy  Cataract removal, left  Hernia repair  Back surgery  Surgery, left pinky finger  Re-excision of scalp lesion  IR Carotid cerebral angiogram left    Family History:    Breast cancer - mother    Social History:  Negative for tobacco use - former smoker, quit in 1972.  Positive for alcohol use.   Negative for drug use.  Patient accompanied to the ER by wife.  Marital Status:   [2]     Review of Systems   Respiratory: Negative for shortness of breath.    Gastrointestinal: Negative for vomiting.   Musculoskeletal: Positive for myalgias.   Skin: Negative for rash.   All other  systems reviewed and are negative.      Physical Exam     Patient Vitals for the past 24 hrs:   BP Temp Temp src Pulse Resp SpO2   09/30/19 1756 (!) 162/86 98.2  F (36.8  C) Oral 65 20 98 %     Physical Exam    GENERAL:  Pleasant, age appropriate.   HEENT:   No scalp hematoma or defect to the bony calvarium.      Vora's and Racoon's sign negative.      Midface is stable.     Oropharynx is moist  EYES:  Conjunctiva normal, PERRL  NECK:   C-spine non-tender with full ROM.      No bony step-off to cervical spine.   CV:    Regular rate and rhythm.     No murmurs, rubs or gallops.      2+ radial pulses bilateral  PULM:  Clear to auscultation bilateral.      No respiratory distress.      No subcutaneous emphysema or crepitus.  ABD:   Soft, non-tender, non-distended.      No pulsatile masses.  No rebound or guarding.  MSK:    Left wrist:     Soft tissue swelling with palpable crepitus     Focal tenderness with palpation and limited ROM      No overlying skin defect     No tenderness to forearm or hand  LYMPH:  No cervical lymphadenopathy.  NEURO:  Alert    Expressive aphasia    LUE:      Median, radial and ulnar nerve intact to motor and sensation function  SKIN:   Warm, dry and intact.    PSYCH:   Mood is good and affect is appropriate.      Emergency Department Course     Imaging:  Radiology findings were communicated with the patient who voiced understanding of the findings.    XR  Wrist Left G/E 3 Views:   Soft tissue swelling. Impacted intra-articular fracture of the distal radius. There is widening of the scapholunate interval; injury cannot be excluded to the ligaments between the scaphoid and lunate. The ulna styloid is intact, as per radiology.     Procedures:      Narrative: Orthoglass Sugar tong splint     Splint was applied and after placement I checked and adjusted the fit to ensure proper positioning. Patient was more comfortable with splint in place. Sensation and circulation are intact after splint  placement.    Interventions:  1851: Norco 2 tablet PO     Emergency Department Course:  Past medical records, nursing notes, and vitals reviewed.    180: I performed an exam of the patient as documented above.     The patient was sent for a left wrist x-ray while in the emergency department, results above.     184: Patient rechecked and updated.  Split placed.    I personally reviewed the imaging results with the Patient and answered all related questions prior to discharge.     Findings and plan explained to the Patient. Patient discharged home with instructions regarding supportive care, medications, and reasons to return. The importance of close follow-up was reviewed.     Impression & Plan     Medical Decision Makin-year-old male seen the ED with mechanical fall and subsequent left wrist pain.  Patient has an impacted, intra-articular, nondisplaced left distal radius fracture.  Patient was placed in an Ortho-Glass sugar tong splint.  He has no evidence of neurovascular injury.  Patient safe for discharge home with analgesics and close follow-up with orthopedic surgery for definitive management.    Discharge Diagnosis:    ICD-10-CM   1. Other closed intra-articular fracture of distal end of left radius, initial encounter S52.572A     Disposition:  Discharged to home.    Scribe Disclosure:  I, Pippa Nelson, am serving as a scribe at 6:07 PM on 2019 to document services personally performed by Oscar Key MD based on my observations and the provider's statements to me.     Pippa Nelson  2019   [facility] EMERGENCY DEPARTMENT       Oscar Key MD  19 [phone]

## 2019-09-30 NOTE — DISCHARGE INSTRUCTIONS
Please make an appointment to follow up with [facility] [phone] in 3-5 days even if entirely better.    Discharge Instructions  Splint Care    You had a splint put on today to help protect your injury and help it heal.  Splints are used to treat things like strains, sprains, large cuts, and fractures (broken bones). Splints are temporary and are designed to allow for swelling.    Be sure your splint is not too tight!  If you splint is too tight, it may cause loss of blood supply.  Signs of your splint being too tight include: your arm or leg hurting a lot more; your fingers or toes getting numb, cold, pale or blue; or your child is crying, fussing or seeming restless.    Generally, every Emergency Department visit should have a follow-up clinic visit with either a primary or a specialty clinic/provider. Please follow-up as instructed by your emergency provider today.  Return to the Emergency Department right away if:  You have increased pain or pressure around the injury.  You have numbness, tingling, or cool, pale, or blue toes or fingers past the injury.  Your child is more fussy than normal, crying a lot, or restless.  Your splint becomes soft, breaks, or is wet.  Your splint begins to smell bad.  Your splint is cutting into your skin.    Home care:  Keep the injured area above the level of your heart while laying or sitting down.  This will help decrease the swelling and the pain.  Keep the splint dry.  Do not put objects down or inside the splint.  If there is an elastic bandage (Ace  wrap) holding the splint on this may be loosened slightly to relieve pressure or pain.  If pain continues return to the Emergency Department right away.  Do not remove your splint by yourself unless told to by your provider.    Follow-up:  Sometimes the splint put on in the Emergency Department needs to be changed once the swelling has gone down and a more permanent cast needs to be placed.  This is usually done by a  bone specialist provider (Orthopedist).  Follow the instructions given to you by your provider today.    If you were given a prescription for medicine here today, be sure to read all of the information (including the package insert) that comes with your prescription.  This will include important information about the medicine, its side effects, and any warnings that you need to know about.  The pharmacist who fills the prescription can provide more information and answer questions you may have about the medicine.  If you have questions or concerns that the pharmacist cannot address, please call or return to the Emergency Department.     Remember that you can always come back to the Emergency Department if you are not able to see your regular provider in the amount of time listed above, if you get any new symptoms, or if there is anything that worries you.

## 2019-10-01 ENCOUNTER — HOSPITAL ENCOUNTER (OUTPATIENT)
Dept: PHYSICAL THERAPY | Facility: CLINIC | Age: 84
Setting detail: THERAPIES SERIES
End: 2019-10-01
Attending: PHYSICAL MEDICINE & REHABILITATION
Payer: MEDICARE

## 2019-10-01 ENCOUNTER — HOSPITAL ENCOUNTER (OUTPATIENT)
Dept: OCCUPATIONAL THERAPY | Facility: CLINIC | Age: 84
Setting detail: THERAPIES SERIES
End: 2019-10-01
Attending: PHYSICAL MEDICINE & REHABILITATION
Payer: MEDICARE

## 2019-10-01 PROCEDURE — 97110 THERAPEUTIC EXERCISES: CPT | Mod: GO,KX | Performed by: OCCUPATIONAL THERAPIST

## 2019-10-01 PROCEDURE — 97116 GAIT TRAINING THERAPY: CPT | Mod: GP,KX | Performed by: PHYSICAL THERAPIST

## 2019-10-01 PROCEDURE — 97112 NEUROMUSCULAR REEDUCATION: CPT | Mod: GP | Performed by: PHYSICAL THERAPIST

## 2019-10-01 PROCEDURE — 97535 SELF CARE MNGMENT TRAINING: CPT | Mod: GO,KX | Performed by: OCCUPATIONAL THERAPIST

## 2019-10-01 NOTE — ED NOTES
Patient ambulated approximately 40 meters with a cane and stand-by assist. Gait was relatively steady and pt denied light- headedness or dizziness.

## 2019-10-03 ENCOUNTER — TRANSFERRED RECORDS (OUTPATIENT)
Dept: HEALTH INFORMATION MANAGEMENT | Facility: CLINIC | Age: 84
End: 2019-10-03

## 2019-10-03 ENCOUNTER — HOSPITAL ENCOUNTER (OUTPATIENT)
Dept: OCCUPATIONAL THERAPY | Facility: CLINIC | Age: 84
Setting detail: THERAPIES SERIES
End: 2019-10-03
Attending: PHYSICAL MEDICINE & REHABILITATION
Payer: MEDICARE

## 2019-10-03 LAB
CREAT SERPL-MCNC: 1.18 MG/DL (ref 0.6–1.3)
GLUCOSE SERPL-MCNC: 86 MG/DL (ref 70–99)
POTASSIUM SERPL-SCNC: 4.2 MMOL/L (ref 3.5–5.1)

## 2019-10-03 PROCEDURE — 97110 THERAPEUTIC EXERCISES: CPT | Mod: GO,KX | Performed by: OCCUPATIONAL THERAPIST

## 2019-10-03 PROCEDURE — 97535 SELF CARE MNGMENT TRAINING: CPT | Mod: GO,KX | Performed by: OCCUPATIONAL THERAPIST

## 2019-10-03 NOTE — PLAN OF CARE
Have been unable to reach patient. Message left about arrival time and eating and drinking times on bot Patient and spouse's phones.

## 2019-10-04 ENCOUNTER — ANESTHESIA EVENT (OUTPATIENT)
Dept: SURGERY | Facility: CLINIC | Age: 84
DRG: 982 | End: 2019-10-04
Payer: MEDICARE

## 2019-10-04 ENCOUNTER — ANESTHESIA (OUTPATIENT)
Dept: SURGERY | Facility: CLINIC | Age: 84
DRG: 982 | End: 2019-10-04
Payer: MEDICARE

## 2019-10-04 ENCOUNTER — HOSPITAL ENCOUNTER (INPATIENT)
Facility: CLINIC | Age: 84
LOS: 3 days | Discharge: HOME | DRG: 982 | End: 2019-10-07
Attending: ORTHOPAEDIC SURGERY | Admitting: INTERNAL MEDICINE
Payer: MEDICARE

## 2019-10-04 ENCOUNTER — APPOINTMENT (OUTPATIENT)
Dept: CARDIOLOGY | Facility: CLINIC | Age: 84
DRG: 982 | End: 2019-10-04
Attending: PHYSICIAN ASSISTANT
Payer: MEDICARE

## 2019-10-04 DIAGNOSIS — S52.502A CLOSED FRACTURE OF DISTAL END OF LEFT RADIUS, UNSPECIFIED FRACTURE MORPHOLOGY, INITIAL ENCOUNTER: Primary | ICD-10-CM

## 2019-10-04 DIAGNOSIS — I48.91 ATRIAL FIBRILLATION, UNSPECIFIED TYPE (H): ICD-10-CM

## 2019-10-04 DIAGNOSIS — Z86.73 H/O: CVA (CEREBROVASCULAR ACCIDENT): ICD-10-CM

## 2019-10-04 LAB
ANION GAP SERPL CALCULATED.3IONS-SCNC: 5 MMOL/L (ref 3–14)
BUN SERPL-MCNC: 16 MG/DL (ref 7–30)
CALCIUM SERPL-MCNC: 8.5 MG/DL (ref 8.5–10.1)
CHLORIDE SERPL-SCNC: 110 MMOL/L (ref 94–109)
CO2 SERPL-SCNC: 26 MMOL/L (ref 20–32)
CREAT SERPL-MCNC: 1.12 MG/DL (ref 0.66–1.25)
ERYTHROCYTE [DISTWIDTH] IN BLOOD BY AUTOMATED COUNT: 13.8 % (ref 10–15)
GFR SERPL CREATININE-BSD FRML MDRD: 61 ML/MIN/{1.73_M2}
GLUCOSE SERPL-MCNC: 89 MG/DL (ref 70–99)
HCT VFR BLD AUTO: 38.8 % (ref 40–53)
HGB BLD-MCNC: 12.9 G/DL (ref 13.3–17.7)
LMWH PPP CHRO-ACNC: 0.3 IU/ML
MAGNESIUM SERPL-MCNC: 1.9 MG/DL (ref 1.6–2.3)
MCH RBC QN AUTO: 31.4 PG (ref 26.5–33)
MCHC RBC AUTO-ENTMCNC: 33.2 G/DL (ref 31.5–36.5)
MCV RBC AUTO: 94 FL (ref 78–100)
PLATELET # BLD AUTO: 179 10E9/L (ref 150–450)
POTASSIUM SERPL-SCNC: 4 MMOL/L (ref 3.4–5.3)
RBC # BLD AUTO: 4.11 10E12/L (ref 4.4–5.9)
SODIUM SERPL-SCNC: 141 MMOL/L (ref 133–144)
T4 FREE SERPL-MCNC: 1.06 NG/DL (ref 0.76–1.46)
TSH SERPL DL<=0.005 MIU/L-ACNC: 5.62 MU/L (ref 0.4–4)
WBC # BLD AUTO: 5.2 10E9/L (ref 4–11)

## 2019-10-04 PROCEDURE — 25000128 H RX IP 250 OP 636: Performed by: NURSE ANESTHETIST, CERTIFIED REGISTERED

## 2019-10-04 PROCEDURE — 84439 ASSAY OF FREE THYROXINE: CPT | Performed by: PHYSICIAN ASSISTANT

## 2019-10-04 PROCEDURE — 25800030 ZZH RX IP 258 OP 636: Performed by: ANESTHESIOLOGY

## 2019-10-04 PROCEDURE — 83735 ASSAY OF MAGNESIUM: CPT | Performed by: PHYSICIAN ASSISTANT

## 2019-10-04 PROCEDURE — 93321 DOPPLER ECHO F-UP/LMTD STD: CPT | Mod: 26 | Performed by: INTERNAL MEDICINE

## 2019-10-04 PROCEDURE — 25000125 ZZHC RX 250: Performed by: ANESTHESIOLOGY

## 2019-10-04 PROCEDURE — 36415 COLL VENOUS BLD VENIPUNCTURE: CPT | Performed by: PHYSICIAN ASSISTANT

## 2019-10-04 PROCEDURE — 93010 ELECTROCARDIOGRAM REPORT: CPT | Performed by: INTERNAL MEDICINE

## 2019-10-04 PROCEDURE — 84443 ASSAY THYROID STIM HORMONE: CPT | Performed by: PHYSICIAN ASSISTANT

## 2019-10-04 PROCEDURE — 25000128 H RX IP 250 OP 636

## 2019-10-04 PROCEDURE — 99223 1ST HOSP IP/OBS HIGH 75: CPT | Mod: 25 | Performed by: INTERNAL MEDICINE

## 2019-10-04 PROCEDURE — 36000060 ZZH SURGERY LEVEL 3 W FLUORO 1ST 30 MIN: Performed by: ORTHOPAEDIC SURGERY

## 2019-10-04 PROCEDURE — 37000008 ZZH ANESTHESIA TECHNICAL FEE, 1ST 30 MIN: Performed by: ORTHOPAEDIC SURGERY

## 2019-10-04 PROCEDURE — 99222 1ST HOSP IP/OBS MODERATE 55: CPT | Mod: AI | Performed by: INTERNAL MEDICINE

## 2019-10-04 PROCEDURE — 93308 TTE F-UP OR LMTD: CPT

## 2019-10-04 PROCEDURE — 93325 DOPPLER ECHO COLOR FLOW MAPG: CPT | Mod: 26 | Performed by: INTERNAL MEDICINE

## 2019-10-04 PROCEDURE — 71000027 ZZH RECOVERY PHASE 2 EACH 15 MINS: Performed by: ORTHOPAEDIC SURGERY

## 2019-10-04 PROCEDURE — 12000000 ZZH R&B MED SURG/OB

## 2019-10-04 PROCEDURE — 85520 HEPARIN ASSAY: CPT | Performed by: ORTHOPAEDIC SURGERY

## 2019-10-04 PROCEDURE — 93308 TTE F-UP OR LMTD: CPT | Mod: 26 | Performed by: INTERNAL MEDICINE

## 2019-10-04 PROCEDURE — 27210794 ZZH OR GENERAL SUPPLY STERILE: Performed by: ORTHOPAEDIC SURGERY

## 2019-10-04 PROCEDURE — 80048 BASIC METABOLIC PNL TOTAL CA: CPT | Performed by: PHYSICIAN ASSISTANT

## 2019-10-04 PROCEDURE — 25000128 H RX IP 250 OP 636: Performed by: INTERNAL MEDICINE

## 2019-10-04 PROCEDURE — 25000132 ZZH RX MED GY IP 250 OP 250 PS 637: Mod: GY | Performed by: ANESTHESIOLOGY

## 2019-10-04 PROCEDURE — 25000132 ZZH RX MED GY IP 250 OP 250 PS 637: Mod: GY | Performed by: PHYSICIAN ASSISTANT

## 2019-10-04 PROCEDURE — 40000306 ZZH STATISTIC PRE PROC ASSESS II: Performed by: ORTHOPAEDIC SURGERY

## 2019-10-04 PROCEDURE — 25800030 ZZH RX IP 258 OP 636: Performed by: PHYSICIAN ASSISTANT

## 2019-10-04 PROCEDURE — 25000125 ZZHC RX 250: Performed by: PHYSICIAN ASSISTANT

## 2019-10-04 PROCEDURE — 85027 COMPLETE CBC AUTOMATED: CPT | Performed by: PHYSICIAN ASSISTANT

## 2019-10-04 PROCEDURE — 36415 COLL VENOUS BLD VENIPUNCTURE: CPT | Performed by: ORTHOPAEDIC SURGERY

## 2019-10-04 RX ORDER — LOVASTATIN 20 MG
40 TABLET ORAL AT BEDTIME
Status: DISCONTINUED | OUTPATIENT
Start: 2019-10-04 | End: 2019-10-07 | Stop reason: HOSPADM

## 2019-10-04 RX ORDER — ONDANSETRON 2 MG/ML
4 INJECTION INTRAMUSCULAR; INTRAVENOUS EVERY 6 HOURS PRN
Status: DISCONTINUED | OUTPATIENT
Start: 2019-10-04 | End: 2019-10-07 | Stop reason: HOSPADM

## 2019-10-04 RX ORDER — SODIUM CHLORIDE, SODIUM LACTATE, POTASSIUM CHLORIDE, CALCIUM CHLORIDE 600; 310; 30; 20 MG/100ML; MG/100ML; MG/100ML; MG/100ML
INJECTION, SOLUTION INTRAVENOUS CONTINUOUS
Status: DISCONTINUED | OUTPATIENT
Start: 2019-10-04 | End: 2019-10-04 | Stop reason: HOSPADM

## 2019-10-04 RX ORDER — POTASSIUM CHLORIDE 7.45 MG/ML
10 INJECTION INTRAVENOUS
Status: DISCONTINUED | OUTPATIENT
Start: 2019-10-04 | End: 2019-10-07 | Stop reason: HOSPADM

## 2019-10-04 RX ORDER — POTASSIUM CHLORIDE 1.5 G/1.58G
20-40 POWDER, FOR SOLUTION ORAL
Status: DISCONTINUED | OUTPATIENT
Start: 2019-10-04 | End: 2019-10-07 | Stop reason: HOSPADM

## 2019-10-04 RX ORDER — HYDROCODONE BITARTRATE AND ACETAMINOPHEN 5; 325 MG/1; MG/1
1 TABLET ORAL EVERY 4 HOURS PRN
Status: DISCONTINUED | OUTPATIENT
Start: 2019-10-04 | End: 2019-10-07 | Stop reason: HOSPADM

## 2019-10-04 RX ORDER — LIDOCAINE 40 MG/G
CREAM TOPICAL
Status: DISCONTINUED | OUTPATIENT
Start: 2019-10-04 | End: 2019-10-04 | Stop reason: HOSPADM

## 2019-10-04 RX ORDER — AMOXICILLIN 250 MG
2 CAPSULE ORAL 2 TIMES DAILY PRN
Status: DISCONTINUED | OUTPATIENT
Start: 2019-10-04 | End: 2019-10-07 | Stop reason: HOSPADM

## 2019-10-04 RX ORDER — AMLODIPINE BESYLATE 5 MG/1
5 TABLET ORAL DAILY
Status: CANCELLED | OUTPATIENT
Start: 2019-10-04

## 2019-10-04 RX ORDER — DIGOXIN 0.25 MG/ML
250 INJECTION INTRAMUSCULAR; INTRAVENOUS ONCE
Status: COMPLETED | OUTPATIENT
Start: 2019-10-04 | End: 2019-10-04

## 2019-10-04 RX ORDER — ACETAMINOPHEN 325 MG/1
650 TABLET ORAL ONCE
Status: COMPLETED | OUTPATIENT
Start: 2019-10-04 | End: 2019-10-04

## 2019-10-04 RX ORDER — POTASSIUM CL/LIDO/0.9 % NACL 10MEQ/0.1L
10 INTRAVENOUS SOLUTION, PIGGYBACK (ML) INTRAVENOUS
Status: DISCONTINUED | OUTPATIENT
Start: 2019-10-04 | End: 2019-10-07 | Stop reason: HOSPADM

## 2019-10-04 RX ORDER — POLYETHYLENE GLYCOL 3350 17 G/17G
17 POWDER, FOR SOLUTION ORAL DAILY PRN
Status: DISCONTINUED | OUTPATIENT
Start: 2019-10-04 | End: 2019-10-07 | Stop reason: HOSPADM

## 2019-10-04 RX ORDER — METOPROLOL TARTRATE 1 MG/ML
2 INJECTION, SOLUTION INTRAVENOUS ONCE
Status: COMPLETED | OUTPATIENT
Start: 2019-10-04 | End: 2019-10-04

## 2019-10-04 RX ORDER — ONDANSETRON 4 MG/1
4 TABLET, ORALLY DISINTEGRATING ORAL EVERY 6 HOURS PRN
Status: DISCONTINUED | OUTPATIENT
Start: 2019-10-04 | End: 2019-10-07 | Stop reason: HOSPADM

## 2019-10-04 RX ORDER — POTASSIUM CHLORIDE 29.8 MG/ML
20 INJECTION INTRAVENOUS
Status: DISCONTINUED | OUTPATIENT
Start: 2019-10-04 | End: 2019-10-07 | Stop reason: HOSPADM

## 2019-10-04 RX ORDER — PANTOPRAZOLE SODIUM 40 MG/1
40 TABLET, DELAYED RELEASE ORAL
Status: DISCONTINUED | OUTPATIENT
Start: 2019-10-05 | End: 2019-10-07 | Stop reason: HOSPADM

## 2019-10-04 RX ORDER — HYDROXYZINE HYDROCHLORIDE 10 MG/1
10 TABLET, FILM COATED ORAL 3 TIMES DAILY PRN
Qty: 20 TABLET | Refills: 0 | Status: SHIPPED | OUTPATIENT
Start: 2019-10-04 | End: 2020-07-12

## 2019-10-04 RX ORDER — FENTANYL CITRATE 50 UG/ML
INJECTION, SOLUTION INTRAMUSCULAR; INTRAVENOUS PRN
Status: DISCONTINUED | OUTPATIENT
Start: 2019-10-04 | End: 2019-10-04

## 2019-10-04 RX ORDER — ACETAMINOPHEN 325 MG/1
650 TABLET ORAL EVERY 4 HOURS PRN
Status: DISCONTINUED | OUTPATIENT
Start: 2019-10-04 | End: 2019-10-07 | Stop reason: HOSPADM

## 2019-10-04 RX ORDER — OMEGA-3 FATTY ACIDS/FISH OIL 300-1000MG
1200 CAPSULE ORAL
Status: ON HOLD | COMMUNITY
End: 2019-10-07

## 2019-10-04 RX ORDER — POTASSIUM CHLORIDE 1500 MG/1
20-40 TABLET, EXTENDED RELEASE ORAL
Status: DISCONTINUED | OUTPATIENT
Start: 2019-10-04 | End: 2019-10-07 | Stop reason: HOSPADM

## 2019-10-04 RX ORDER — CEFAZOLIN SODIUM 2 G/100ML
2 INJECTION, SOLUTION INTRAVENOUS
Status: DISCONTINUED | OUTPATIENT
Start: 2019-10-04 | End: 2019-10-04 | Stop reason: HOSPADM

## 2019-10-04 RX ORDER — NALOXONE HYDROCHLORIDE 0.4 MG/ML
.1-.4 INJECTION, SOLUTION INTRAMUSCULAR; INTRAVENOUS; SUBCUTANEOUS
Status: DISCONTINUED | OUTPATIENT
Start: 2019-10-04 | End: 2019-10-07 | Stop reason: HOSPADM

## 2019-10-04 RX ORDER — LIDOCAINE 40 MG/G
CREAM TOPICAL
Status: DISCONTINUED | OUTPATIENT
Start: 2019-10-04 | End: 2019-10-07 | Stop reason: HOSPADM

## 2019-10-04 RX ORDER — AMLODIPINE BESYLATE 5 MG/1
5 TABLET ORAL EVERY MORNING
Status: DISCONTINUED | OUTPATIENT
Start: 2019-10-05 | End: 2019-10-05

## 2019-10-04 RX ORDER — OXYCODONE HYDROCHLORIDE 5 MG/1
5-10 TABLET ORAL EVERY 4 HOURS PRN
Qty: 10 TABLET | Refills: 0 | Status: SHIPPED | OUTPATIENT
Start: 2019-10-04 | End: 2019-10-07

## 2019-10-04 RX ORDER — AMOXICILLIN 250 MG
1 CAPSULE ORAL 2 TIMES DAILY PRN
Status: DISCONTINUED | OUTPATIENT
Start: 2019-10-04 | End: 2019-10-07 | Stop reason: HOSPADM

## 2019-10-04 RX ORDER — DIGOXIN 125 MCG
125 TABLET ORAL DAILY
Status: DISCONTINUED | OUTPATIENT
Start: 2019-10-05 | End: 2019-10-06

## 2019-10-04 RX ORDER — MAGNESIUM SULFATE HEPTAHYDRATE 40 MG/ML
4 INJECTION, SOLUTION INTRAVENOUS EVERY 4 HOURS PRN
Status: DISCONTINUED | OUTPATIENT
Start: 2019-10-04 | End: 2019-10-07 | Stop reason: HOSPADM

## 2019-10-04 RX ORDER — BISACODYL 10 MG
10 SUPPOSITORY, RECTAL RECTAL DAILY PRN
Status: DISCONTINUED | OUTPATIENT
Start: 2019-10-04 | End: 2019-10-07 | Stop reason: HOSPADM

## 2019-10-04 RX ORDER — CITALOPRAM HYDROBROMIDE 20 MG/1
40 TABLET ORAL DAILY
Status: DISCONTINUED | OUTPATIENT
Start: 2019-10-04 | End: 2019-10-07 | Stop reason: HOSPADM

## 2019-10-04 RX ADMIN — LOVASTATIN 40 MG: 20 TABLET ORAL at 21:00

## 2019-10-04 RX ADMIN — DILTIAZEM HYDROCHLORIDE 5 MG/HR: 5 INJECTION INTRAVENOUS at 18:29

## 2019-10-04 RX ADMIN — ACETAMINOPHEN 650 MG: 325 TABLET, FILM COATED ORAL at 13:08

## 2019-10-04 RX ADMIN — DIGOXIN 250 MCG: 0.25 INJECTION INTRAMUSCULAR; INTRAVENOUS at 23:37

## 2019-10-04 RX ADMIN — FENTANYL CITRATE 100 MCG: 50 INJECTION, SOLUTION INTRAMUSCULAR; INTRAVENOUS at 10:00

## 2019-10-04 RX ADMIN — DILTIAZEM HYDROCHLORIDE 15 MG/HR: 5 INJECTION INTRAVENOUS at 17:59

## 2019-10-04 RX ADMIN — SODIUM CHLORIDE, POTASSIUM CHLORIDE, SODIUM LACTATE AND CALCIUM CHLORIDE: 600; 310; 30; 20 INJECTION, SOLUTION INTRAVENOUS at 09:51

## 2019-10-04 RX ADMIN — HEPARIN SODIUM 900 UNITS/HR: 10000 INJECTION, SOLUTION INTRAVENOUS at 16:00

## 2019-10-04 RX ADMIN — DILTIAZEM HYDROCHLORIDE 5 MG/HR: 5 INJECTION INTRAVENOUS at 16:18

## 2019-10-04 RX ADMIN — BRINZOLAMIDE/BRIMONIDINE TARTRATE 1 DROP: 10; 2 SUSPENSION/ DROPS OPHTHALMIC at 16:24

## 2019-10-04 RX ADMIN — DILTIAZEM HYDROCHLORIDE 10 MG/HR: 5 INJECTION INTRAVENOUS at 17:25

## 2019-10-04 RX ADMIN — CITALOPRAM HYDROBROMIDE 40 MG: 20 TABLET ORAL at 15:21

## 2019-10-04 RX ADMIN — METOPROLOL TARTRATE 2 MG: 5 INJECTION, SOLUTION INTRAVENOUS at 11:13

## 2019-10-04 RX ADMIN — DIGOXIN 250 MCG: 0.25 INJECTION INTRAMUSCULAR; INTRAVENOUS at 19:34

## 2019-10-04 RX ADMIN — LIDOCAINE HYDROCHLORIDE 40 MG: 10 INJECTION, SOLUTION EPIDURAL; INFILTRATION; INTRACAUDAL; PERINEURAL at 10:00

## 2019-10-04 ASSESSMENT — ACTIVITIES OF DAILY LIVING (ADL)
ADLS_ACUITY_SCORE: 32
ADLS_ACUITY_SCORE: 32

## 2019-10-04 ASSESSMENT — LIFESTYLE VARIABLES: TOBACCO_USE: 1

## 2019-10-04 NOTE — PHARMACY
Anticoagulation coverage check.  Patient has Medicare D through [employer].    Xarelto/Eliquis  Oct:  Upon receipt of RX, [facility] can provide 1 month free.  Nov-Dec: $117/mo ($76/mo at [facility], [facility], [facility], and [facility])    Jantoven (warfarin)  $11/mo ($3/mo at [facility], [facility], [facility], and [facility])      -SIMRAN Neal, Pharmacy Technician/Liaison, Discharge [facility] [phone]

## 2019-10-04 NOTE — PHARMACY-ADMISSION MEDICATION HISTORY
Pharmacy reviewed prior to admission med list from pre-admitting rn, ELLYN Schaefer.        Prior to Admission medications    Medication Sig Last Dose Taking? Auth Provider   acetaminophen (TYLENOL) 325 MG tablet Take 2 tablets (650 mg) by mouth every 4 hours as needed for mild pain or fever (> 101 F) 10/3/2019 at Unknown time Yes Janet Bell MD   amLODIPine (NORVASC) 5 MG tablet Take 1 tablet (5 mg) by mouth daily 10/3/2019 at Unknown time Yes Janet Bell MD   brinzolamide-brimonidine (SIMBRINZA) 1-0.2 % ophthalmic suspension Place 1 drop into both eyes 2 times daily  10/4/2019 at Unknown time Yes Reported, Patient   Calcium Carbonate-Vitamin D (CALCIUM 600+D PO) Take 1 tablet by mouth 2 times daily At noon and supper 10/3/2019 at Unknown time Yes Reported, Patient   citalopram (CELEXA) 40 MG tablet Take 40 mg by mouth daily 10/3/2019 at Unknown time Yes Reported, Patient   clopidogrel (PLAVIX) 75 MG tablet Take 1 tablet (75 mg) by mouth daily 10/3/2019 at 0830 Yes Paris Moore MD   glucosamine-chondroitin 500-400 MG CAPS per capsule Take 1 capsule by mouth 2 times daily At noon and supper 10/3/2019 at Unknown time Yes Unknown, Entered By History   lovastatin (MEVACOR) 40 MG tablet Take 40 mg by mouth At Bedtime 10/3/2019 at Unknown time Yes Unknown, Entered By History   Multiple Vitamins-Minerals (CENTRUM SILVER ADULT 50+ PO) Take 1 tablet by mouth daily  10/3/2019 at Unknown time Yes Reported, Patient   omega 3 1000 MG CAPS Take 1,200 mg by mouth 10/3/2019 at Unknown time Yes Reported, Patient   pantoprazole (PROTONIX) 40 MG EC tablet Take 1 tablet (40 mg) by mouth every morning (before breakfast) 10/3/2019 at Unknown time Yes Janet Bell MD   senna (SENOKOT) 8.6 MG tablet Take 1 tablet by mouth 2 times daily as needed for constipation 10/3/2019 at Unknown time Yes Oscar Key MD   vitamin B-12 (CYANOCOBALAMIN) 1000 MCG tablet Take 1,000 mcg by mouth daily  10/3/2019 at Unknown time Yes  Reported, Patient

## 2019-10-04 NOTE — PROGRESS NOTES
Pt found to be in new onset afib with rvr in OR prior to induction. Decision made with surgeon to delay surgery and have cardiology evaluate further.    CAROL Haskins MD

## 2019-10-04 NOTE — PLAN OF CARE
Patient received from or, not having had procedure due to new onset at fib, vss, alert oriented, left arm / wrist wrapped in a soft cast, left hand and fingers warm, edema 1+, dusky color, patient denies any numbness nor tingling, denies pain. Left arm elevated on pillows, lungs clear, patient aphasic, admission completed with spouse Ml / javier in attendance,    Plan: to start heparin drip, diltiazem drip,, cardiac echo, treat pain as needed, keep left extremity elevated, perhaps cold pack, consider PT, OT, ST as soon as able as patient's previous cva was 9/19, patient has rt side effects and aphasia, was using cane in left hand, now unable to do so.     Oriented to room, questions answered

## 2019-10-04 NOTE — LETTER
Transition Communication Hand-off for Care Transitions to Next Level of Care Provider    Name: Tk Richmond  : 1935  MRN #: [id number]  Primary Care Provider: Shyam Mclean  Primary Care MD Name: Shyam Mclean  Primary Clinic: [facility] [address]  [facility] [address]  Primary Care Clinic Name: [facility]  Reason for Hospitalization:  Oth intartic fracture of lower end of left radius, init [S52.572A]  Admit Date/Time: 10/4/2019  7:54 AM  Discharge Date: 10/7/19  Payor Source: Payor: MEDICARE / Plan: MEDICARE / Product Type: Medicare /     Readmission Assessment Measure (NORMA) Risk Score/category: Elevated    Plan of Care Goals/Milestone Events:          Reason for Communication Hand-off Referral: Fragility    Discharge Plan: home       Concern for non-adherence with plan of care:    no  Discharge Needs Assessment:  Needs      Most Recent Value   Anticipated Changes Related to Illness  other (see comments) [unsure]   Equipment Currently Used at Home  cane, straight, walker, rolling, grab bar, tub/shower   # of Referrals Placed by [facility]  Community Resources          Already enrolled in Tele-monitoring program and name of program:  none  Follow-up specialty is recommended: Yes    Follow-up plan:    Future Appointments   Date Time Provider Department Center   10/9/2019  1:15 PM Mili Lockhart SLP [facility]   10/10/2019 10:30 AM Suellen Chang OT [facility]   10/10/2019 11:15 AM Janna Albright, PT [facility]   10/11/2019  1:15 PM Mili Lockhart SLP [facility]   10/14/2019 11:15 AM Terri Gonzalez, PT [facility]   10/15/2019  1:00 PM Mili Lockhart SLP RHSL[facility]   10/15/2019  1:45 PM Henrietta Lora OTR [facility]   10/16/2019  1:15 PM Mili Lockhart SLP [facility]   10/17/2019 10:30 AM Suellen Chang OT [facility]   10/17/2019 11:15 AM Terri Gonzalez, PT [facility]    10/21/2019  9:45 AM Uriel Anderson MD [facility]   10/21/2019 11:15 AM Terri Gonzalez, PT [facility]   10/22/2019 11:15 AM NeMarcie wonga, OTR [facility]   10/23/2019  1:15 PM Mili Lockhart, SLP [facility]SL[facility]   10/24/2019 11:15 AM Jay Gonzalezin, PT [facility]   10/25/2019  1:15 PM Mili Lockhart, SLP [facility]   10/28/2019 11:15 AM Jay Gonzalezin, PT [facility]   10/29/2019 11:15 AM Geno, Henrietta, OTR [facility]   10/30/2019  1:15 PM Mili Lockhart, SLP [facility]   10/31/2019 10:30 AM Suellen Chang, OT [facility]   10/31/2019 11:15 AM Terri Gonzalez, PT [facility]   11/1/2019  1:15 PM Mili Lockhart, SLP [facility]   11/4/2019 11:15 AM Terri Gonzalez, PT [facility]   11/5/2019 11:15 AM Marcie Loraa, OTR [facility]   11/6/2019  1:15 PM Mili Lockhart, SLP [facility]   11/6/2019  2:15 PM Terri Gonzalez, PT [facility]   11/8/2019  1:15 PM Mili Lockhart, SLP [facility]   11/8/2019  2:00 PM NeMarcie wonga, OTR [facility]   11/11/2019 10:30 AM Katrina Sotelo, OT [facility]   11/11/2019 11:15 AM Terri Gonzalez, PT [facility]   11/13/2019  1:15 PM Mili Lockhart, SLP [facility]   11/13/2019  2:15 PM Terri Gonzalez, PT [facility]   11/15/2019  1:15 PM Mili Lockhart, SLP RHS [facility]   11/15/2019  2:30 PM NeHenrietta wong, OTR [facility]   11/19/2019 11:15 AM NeHenrietta wong, OTR [facility]   11/21/2019 11:15 AM Suellen Chang, OT [facility]   11/25/2019  1:00 PM NeHenrietta wong, OTR [facility]   11/27/2019  1:00 PM Henrietta Lora, OTR [facility]       Any outstanding tests or procedures:              Key Recommendations:  Patient identifies Mod risk with elevated NORMA. Patient has x3 prev inpatient and x1 ED visit in 6 mo.  Patient has history of multiple stroke, acute ischemic CVA to LMCA 7/2019, DAVID, HTN, depression, expressive  aphasia and right sided weakness at baseline.  Patient admitted 10/4 with new onset Afib. He had a mechanical fall 9/30 with left distal radius fracture. Patient s/p 10/6 ORIF . Met with patient and wife for care coordination and discharge planning. Patient living at home with wife. Wife reports being his primary care giver. She provides support with bathing, grooming, dressing, medications and meals. Patient ambulates with a walker. He has out patient therapy for OT/PT and speech. His wife provides transportation. Patient has established care with PCP, Shyam Mclean, at [facility]. Provided family resources for [facility] senior services. Encouraged follow up with providers. Patient will discharge with anticoagulant. Noted Pharmacy Discharge Education consulted for discharge medication teaching.  Physical therapy to see patient today for further discharge recommendations. Discussed possible home care if indicated by MD. Wife open to home care.       Handoff sent to PCP care coordination as patient and family can benefit from additional support with disease management and resources.     Recommendation follow up with PCP in 7 days    Hawk Borjas RN    AVS/Discharge Summary is the source of truth; this is a helpful guide for improved communication of patient story

## 2019-10-04 NOTE — ANESTHESIA PREPROCEDURE EVALUATION
Anesthesia Pre-Procedure Evaluation    Patient: Tk Richmond   MRN: [id number] : 1935          Preoperative Diagnosis: Oth intartic fracture of lower end of left radius, init [S52.572A]    Procedure(s):  OPEN REDUCTION INTERNAL FIXATION, LEFT DISTAL RADUS FRACTURE (MAC WITH AXILLARY BLOCK)    Past Medical History:   Diagnosis Date     Glaucoma      Hyperlipidemia LDL goal < 100      Hypertension      Stroke (H)     , speech deficit     Past Surgical History:   Procedure Laterality Date     IR CAROTID CEREBRAL ANGIOGRAM LEFT  2019     ORTHOPEDIC SURGERY       Anesthesia Evaluation     . Pt has had prior anesthetic.            ROS/MED HX    ENT/Pulmonary:     (+)tobacco use, Past use , . .   (-) sleep apnea   Neurologic:     (+)CVA (speech deficit)     Cardiovascular: Comment: Severely dilated ascending aorta- 5.2 cm    (+) Dyslipidemia, hypertension----. : . . . :. .       METS/Exercise Tolerance:     Hematologic:         Musculoskeletal:   (+) arthritis, fracture upper extremity: Radius,  -       GI/Hepatic:        (-) GERD   Renal/Genitourinary:         Endo:         Psychiatric:         Infectious Disease:         Malignancy:         Other:                          Physical Exam      Airway   Mallampati: II  TM distance: >3 FB  Neck ROM: limited    Dental     Cardiovascular   Rhythm and rate: regular and normal      Pulmonary    breath sounds clear to auscultation            Lab Results   Component Value Date    WBC 5.8 2019    HGB 11.2 (L) 2019    HCT 34.4 (L) 2019     2019     2019    POTASSIUM 4.4 2019    CHLORIDE 109 2019    CO2 26 2019    BUN 29 2019    CR 1.19 2019    GLC 93 2019    WILLIAM 8.5 2019    MAG 2.0 2019    PTT 27 2019    INR 1.06 2019       Preop Vitals  BP Readings from Last 3 Encounters:   19 (!) 156/72   19 135/61   19 127/61    Pulse Readings from Last 3  "Encounters:   09/30/19 62   07/31/19 68   07/18/19 52      Resp Readings from Last 3 Encounters:   09/30/19 20   07/31/19 17   07/20/19 16    SpO2 Readings from Last 3 Encounters:   09/30/19 98%   07/31/19 95%   07/20/19 96%      Temp Readings from Last 1 Encounters:   09/30/19 98.2  F (36.8  C) (Oral)    Ht Readings from Last 1 Encounters:   07/20/19 1.778 m (5' 10\")      Wt Readings from Last 1 Encounters:   07/20/19 79.7 kg (175 lb 12.8 oz)    Estimated body mass index is 25.22 kg/m  as calculated from the following:    Height as of 7/20/19: 1.778 m (5' 10\").    Weight as of 7/20/19: 79.7 kg (175 lb 12.8 oz).       Anesthesia Plan      History & Physical Review  History and physical reviewed and following examination; no interval change.    ASA Status:  4 .    NPO Status:  > 8 hours    Plan for General and LMA with Intravenous and Propofol induction. Maintenance will be Balanced.    PONV prophylaxis:  Ondansetron (or other 5HT-3) and Dexamethasone or Solumedrol       Postoperative Care  Postoperative pain management:  IV analgesics, Oral pain medications and Multi-modal analgesia.      Consents  Anesthetic plan, risks, benefits and alternatives discussed with:  Patient..                 Kranthi Haskins MD                    .  "

## 2019-10-04 NOTE — CONSULTS
[facility]    Cardiology Consultation     Date of Admission:  10/4/2019    Primary Care Physician   Shyam Mclean     Consult Date:  10/04/2019      HISTORY OF PRESENT ILLNESS:  I am asked to see this pleasant gentleman on an urgent basis by Dr. Haskins from Anesthesia.  He is in atrial fibrillation with rapid ventricular response.  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  The history is obtained from his wife.      Review of his external medical records show that he is under followup by my colleague, Dr. Uriel Anderson for aortic aneurysm.  This involves his ascending aorta with measurement of 4.8 cm by echo.  He has preserved left ventricular systolic function.  There is moderate aortic regurgitation as well.  There is no history of congestive heart failure or coronary artery disease.      Several days ago, he fell on his outstretched left arm and fractured his wrist.  He was scheduled for elective repair today.  He had a preoperative EKG yesterday, which apparently was unremarkable.  He was found to be in atrial fibrillation with rapid ventricular response.  He does not appear distressed.  His wife says that he has been comfortable.  To the best of her knowledge, he does not have chest pains or shortness of breath.  This rhythm has not been documented before.  He is on a combination of aspirin and Plavix.        Past Medical History   I have reviewed this patient's medical history and updated it with pertinent information if needed.   Past Medical History:   Diagnosis Date     Glaucoma      Hyperlipidemia LDL goal < 100      Hypertension      Stroke (H)     2004, speech deficit       Past Surgical History   I have reviewed this patient's surgical history and updated it with pertinent information if needed.  Past Surgical History:   Procedure Laterality Date     IR CAROTID CEREBRAL ANGIOGRAM LEFT  7/17/2019     ORTHOPEDIC SURGERY         Prior to Admission Medications   Prior to  Admission Medications   Prescriptions Last Dose Informant Patient Reported? Taking?   Calcium Carbonate-Vitamin D (CALCIUM 600+D PO) 10/3/2019 at Unknown time  Yes Yes   Sig: Take 1 tablet by mouth 2 times daily At noon and supper   HYDROcodone-acetaminophen (NORCO) 5-325 MG tablet   No No   Sig: Take 1 tablet by mouth every 4 hours as needed for severe pain   Multiple Vitamins-Minerals (CENTRUM SILVER ADULT 50+ PO) 10/3/2019 at Unknown time  Yes Yes   Sig: Take 1 tablet by mouth daily    acetaminophen (TYLENOL) 325 MG tablet 10/3/2019 at Unknown time  No Yes   Sig: Take 2 tablets (650 mg) by mouth every 4 hours as needed for mild pain or fever (> 101 F)   amLODIPine (NORVASC) 5 MG tablet 10/3/2019 at Unknown time  No Yes   Sig: Take 1 tablet (5 mg) by mouth daily   aspirin (ASA) 81 MG EC tablet   No No   Sig: Take 1 tablet (81 mg) by mouth daily for 21 days   brinzolamide-brimonidine (SIMBRINZA) 1-0.2 % ophthalmic suspension 10/4/2019 at Unknown time  Yes Yes   Sig: Place 1 drop into both eyes 2 times daily    citalopram (CELEXA) 40 MG tablet 10/3/2019 at Unknown time  Yes Yes   Sig: Take 40 mg by mouth daily   clopidogrel (PLAVIX) 75 MG tablet 10/3/2019 at 0830  No Yes   Sig: Take 1 tablet (75 mg) by mouth daily   glucosamine-chondroitin 500-400 MG CAPS per capsule 10/3/2019 at Unknown time  Yes Yes   Sig: Take 1 capsule by mouth 2 times daily At noon and supper   lovastatin (MEVACOR) 40 MG tablet 10/3/2019 at Unknown time  Yes Yes   Sig: Take 40 mg by mouth At Bedtime   omega 3 1000 MG CAPS 10/3/2019 at Unknown time  Yes Yes   Sig: Take 1,200 mg by mouth   pantoprazole (PROTONIX) 40 MG EC tablet 10/3/2019 at Unknown time  No Yes   Sig: Take 1 tablet (40 mg) by mouth every morning (before breakfast)   senna (SENOKOT) 8.6 MG tablet 10/3/2019 at Unknown time  No Yes   Sig: Take 1 tablet by mouth 2 times daily as needed for constipation   vitamin B-12 (CYANOCOBALAMIN) 1000 MCG tablet 10/3/2019 at Unknown time  Yes  Yes   Sig: Take 1,000 mcg by mouth daily       Facility-Administered Medications: None     Allergies   Allergies   Allergen Reactions     Contrast Dye Rash       Social History   I have reviewed this patient's social history and updated it with pertinent information if needed. Tk Richmond  reports that he quit smoking about 47 years ago. His smoking use included cigars. He smoked 0.00 packs per day for 10.00 years. He has never used smokeless tobacco. He reports current alcohol use. He reports that he does not use drugs.    Family History   I have reviewed this patient's family history and updated it with pertinent information if needed.   Family History   Problem Relation Age of Onset     Breast Cancer Mother      Diabetes No family hx of      Hypertension No family hx of      Cancer - colorectal No family hx of        Review of Systems   The 10 point Review of Systems is negative other than noted in the HPI or here.     Physical Exam   Temp: 98.7  F (37.1  C) Temp src: Oral BP: (!) 133/98 Pulse: 134 Heart Rate: 133 Resp: 12 SpO2: 97 % O2 Device: None (Room air) Oxygen Delivery: 2 LPM  Vital Signs with Ranges  Temp:  [98.7  F (37.1  C)] 98.7  F (37.1  C)  Pulse:  [134-149] 134  Heart Rate:  [125-144] 133  Resp:  [10-18] 12  BP: ()/() 133/98  SpO2:  [97 %-100 %] 97 %  0 lbs 0 oz  PHYSICAL EXAMINATION:   GENERAL:  He appears comfortable.   VITAL SIGNS:  Blood pressure is 130/80.  Heart rate is in the 120s to 130s.    NECK:  Veins are not distended.  No thyromegaly.  He appears comfortable.  He has no clubbing, no peripheral central cyanosis.   CARDIAC:  Auscultation reveals normal heart sounds.   CHEST:  Clear.     ABDOMEN:  He has no abdominal tenderness or distention.   EXTREMITIES:  Trace to 1+ bilateral ankle edema.      LABORATORY DATA:  I reviewed his EKG.  It suggests that the rhythm may be atrial flutter.  There are nonspecific ST segment changes.  No other laboratory numbers are available  at this time.      Even though this gentleman appears to be asymptomatic, and the rhythm is well tolerated, I think we should defer elective repair of his left wrist for now.  I have recommended admission with a diltiazem drip to control his heart rate.  Perhaps his rhythm is paroxysmal, and he may convert.  If not, I think a rate control strategy would be quite acceptable for this gentleman.  He does have a very high CHADS2-VASc score.  I would definitely advise for oral anticoagulation with cessation of Plavix in the long-term.  In the mean time, he should be iv heparin.      It will be our pleasure to follow his in-hospital course with you as necessary.      Critical care time was 40 minutes.     Results for orders placed or performed during the hospital encounter of 10/04/19 (from the past 24 hour(s))   EKG 12-lead, tracing only   Result Value Ref Range    Interpretation ECG Click View Image link to view waveform and result                    OBINNA FOSTER MD, [facility]             D: 10/04/2019   T: 10/04/2019   MT: SEBAS      Name:     AVIS GOODE   MRN:      [id number]        Account:       [id number]   :      1935           Consult Date:  10/04/2019      Document: [id number]       cc: Kranthi Haskins MD

## 2019-10-04 NOTE — ANESTHESIA POSTPROCEDURE EVALUATION
Patient: Tk Richmond    Procedure(s):  OPEN REDUCTION INTERNAL FIXATION, LEFT DISTAL RADUS FRACTURE  CASE ABORTED    Diagnosis:Oth intartic fracture of lower end of left radius, init [S52.572A]  Diagnosis Additional Information: No value filed.    Anesthesia Type:  General, LMA    Note:  Anesthesia Post Evaluation    Patient location during evaluation: PACU  Patient participation: Able to fully participate in evaluation  Level of consciousness: awake  Pain management: adequate  Airway patency: patent  Cardiovascular status: stable  Respiratory status: acceptable  Hydration status: acceptable  PONV: controlled     Anesthetic complications: None          Last vitals:  Vitals:    10/04/19 1045 10/04/19 1100 10/04/19 1115   BP: (!) 137/97 (!) 120/94 (!) 128/106   Resp: 18 18 12   SpO2: 97% 100% 100%         Electronically Signed By: Kranthi Haskins MD  October 4, 2019  12:00 PM

## 2019-10-04 NOTE — H&P
[facility] Internal Medicine  History and Physical      Patient Name: Tk Richmond MRN# [id number]   Age: 84 year old YOB: 1935     Date of Admission:10/4/2019    Primary care provider: Shyam Mclean  Date of Service: 10/4/2019         Assessment and Plan:   Tk Richmond is a 84 year old male with a history of CVA with expressive aphasia, HTN, HLD, with recent mechanical fall on 9/30 resulting in an impacted, intra-articular, nondisplaced left distal radius fracture.  Patient returned today for elective ORIF with Dr. Interiano.  As patient was being evaluated prior to surgery, he was found to be in new onset Atrial Fibrillation with RVR and directly admitted from the pre-op surgical area.     New Onset Atrial Fibrillation with RVR - pt presented with asymptomatic new onset afib with rates up to 140.  No chest pain or shortness of breath.  BP stable.  S/p IV Metoprolol in the pre-op area.  Cardiology consulted by Anesthesia who recommends starting a Diltiazem gtt.  - Diltiazem gtt  - check bmp, cbc, tsh  - monitor on telemetry  - echocardiogram  - Cardiology consulted for assistance with medical management and evaluation for anticoagulation.  PT currently on Plavix for recent CVA.  I discussed with Dr. FOSTER who recommends heparin gtt at this time and hold Plavix    Hx CVA - hx of first CVA in 2004 with multiple TIA per family.  Most recently hospitalized 7/2019 with a Left MCA ischemic stroke.  Pt with expressive aphasia and right sided weakness at baseline. Currently on ASA and Plavix.   - continue pta Lovastatin  - hold Plavix for now as above    HTN - continue pta Amlodipine    Left Distal Radial Fracture - s/p mechanical fall 9/30.  ORIF currently on hold until afib stable.  - Orthopedic Surgery consult  - continue pta Norco prn.      Depression/Anxiety - continue pta Celexa    Anemia - baseline ~11.2.  Recheck cbc now    Glaucoma - continue pta eye gtts    GERD - continue pta  Protonix    CODE: DNR/DNI confirmed with patient and wife at the bedside  Diet/IVF: regular  GI ppx:  protonix  DVT ppx: scd    Patient discussed with Dr. Alexei Humphreys MS PA-C  Physician Assistant   Hospitalist Service  Pager: [phone]           Chief Complaint:   Rapid heart rate         HPI:   84 year old male with a history of CVA with expressive aphasia, HTN, HLD, with recent mechanical fall on 9/30 resulting in an impacted, intra-articular, nondisplaced left distal radius fracture.  He was seen in the ED on 9/30 and a splint was placed.  Patient returned today for elective ORIF with Dr. Interiano.  As patient was being evaluated prior to surgery, he was found to be in Atrial Fibrillation with rates as high as 140s reported.  Cardiology was consulted and recommended admission and a Diltiazem gtt.  Direct admission from the pre-op area requested by Anesthesia.    Patient has baseline expressive aphasia.  He denies chest pain, shortness of breath, palpitations, recent URI symptoms, cough, abdominal pain, nausea, emesis, fevers, chills.  Wife states he has been at his recent baseline.  He has baseline RUE/RLE weakness.    In pre-op, patient's bp stable 120-130/90 with -140s.  No labs were obtained.  EKG revealed afib with RVR with rate of 130.  Patient received Lopressor 2mg IV, Cardiology was consulted and admitted for further management.         Past Medical History:     Past Medical History:   Diagnosis Date     Glaucoma      Hyperlipidemia LDL goal < 100      Hypertension      Stroke (H)     2004, speech deficit          Past Surgical History:     Past Surgical History:   Procedure Laterality Date     IR CAROTID CEREBRAL ANGIOGRAM LEFT  7/17/2019     ORTHOPEDIC SURGERY            Social History:     Social History     Socioeconomic History     Marital status:      Spouse name: Not on file     Number of children: Not on file     Years of education: Not on file     Highest education  level: Not on file   Occupational History     Not on file   Social Needs     Financial resource strain: Not on file     Food insecurity:     Worry: Not on file     Inability: Not on file     Transportation needs:     Medical: Not on file     Non-medical: Not on file   Tobacco Use     Smoking status: Former Smoker     Packs/day: 0.00     Years: 10.00     Pack years: 0.00     Types: Cigars     Last attempt to quit: 1972     Years since quittin.6     Smokeless tobacco: Never Used   Substance and Sexual Activity     Alcohol use: Yes     Alcohol/week: 0.0 standard drinks     Comment: OCC     Drug use: No     Sexual activity: Not Currently     Partners: Female   Lifestyle     Physical activity:     Days per week: Not on file     Minutes per session: Not on file     Stress: Not on file   Relationships     Social connections:     Talks on phone: Not on file     Gets together: Not on file     Attends [employer] service: Not on file     Active member of club or organization: Not on file     Attends meetings of clubs or organizations: Not on file     Relationship status: Not on file     Intimate partner violence:     Fear of current or ex partner: Not on file     Emotionally abused: Not on file     Physically abused: Not on file     Forced sexual activity: Not on file   Other Topics Concern     Parent/sibling w/ CABG, MI or angioplasty before 65F 55M? Not Asked   Social History Narrative     Not on file          Family History:     Family History   Problem Relation Age of Onset     Breast Cancer Mother      Diabetes No family hx of      Hypertension No family hx of      Cancer - colorectal No family hx of           Allergies:      Allergies   Allergen Reactions     Contrast Dye Rash          Medications:     Prior to Admission medications    Medication Sig Last Dose Taking? Auth Provider   acetaminophen (TYLENOL) 325 MG tablet Take 2 tablets (650 mg) by mouth every 4 hours as needed for mild pain or fever (> 101 F)  10/3/2019 at Unknown time Yes aJnet Bell MD   amLODIPine (NORVASC) 5 MG tablet Take 1 tablet (5 mg) by mouth daily 10/3/2019 at Unknown time Yes Janet Bell MD   brinzolamide-brimonidine (SIMBRINZA) 1-0.2 % ophthalmic suspension Place 1 drop into both eyes 2 times daily  10/4/2019 at Unknown time Yes Reported, Patient   Calcium Carbonate-Vitamin D (CALCIUM 600+D PO) Take 1 tablet by mouth 2 times daily At noon and supper 10/3/2019 at Unknown time Yes Reported, Patient   citalopram (CELEXA) 40 MG tablet Take 40 mg by mouth daily 10/3/2019 at Unknown time Yes Reported, Patient   clopidogrel (PLAVIX) 75 MG tablet Take 1 tablet (75 mg) by mouth daily 10/3/2019 at 0830 Yes Paris Moore MD   glucosamine-chondroitin 500-400 MG CAPS per capsule Take 1 capsule by mouth 2 times daily At noon and supper 10/3/2019 at Unknown time Yes Unknown, Entered By History   hydrOXYzine (ATARAX) 10 MG tablet Take 1 tablet (10 mg) by mouth 3 times daily as needed (muscle spasm, pain, itch)  Yes Krystal Interiano MD   lovastatin (MEVACOR) 40 MG tablet Take 40 mg by mouth At Bedtime 10/3/2019 at Unknown time Yes Unknown, Entered By History   Multiple Vitamins-Minerals (CENTRUM SILVER ADULT 50+ PO) Take 1 tablet by mouth daily  10/3/2019 at Unknown time Yes Reported, Patient   omega 3 1000 MG CAPS Take 1,200 mg by mouth 10/3/2019 at Unknown time Yes Reported, Patient   oxyCODONE (ROXICODONE) 5 MG tablet Take 1-2 tablets (5-10 mg) by mouth every 4 hours as needed for moderate to severe pain  Yes Krystal Interiano MD   pantoprazole (PROTONIX) 40 MG EC tablet Take 1 tablet (40 mg) by mouth every morning (before breakfast) 10/3/2019 at Unknown time Yes Janet Bell MD   senna (SENOKOT) 8.6 MG tablet Take 1 tablet by mouth 2 times daily as needed for constipation 10/3/2019 at Unknown time Yes Oscar Key MD   vitamin B-12 (CYANOCOBALAMIN) 1000 MCG tablet Take 1,000 mcg by mouth daily  10/3/2019 at Unknown time Yes Reported, Patient           Review of Systems:   A complete ROS was performed and is negative other than what is stated in the HPI.       Physical Exam:   Blood pressure (!) 128/106, resp. rate 12, SpO2 100 %.  General: Alert, interactive, NAD, lying in bed with family at the bedside  HEENT: AT/NC, sclera anicteric, PERRL, EOMI  Chest/Resp: clear to auscultation bilaterally, no crackles or wheezes  Heart/CV: irregular rate and rhythm, no murmur  Abdomen/GI: Soft, nontender, nondistended. +BS.  No rebound or guarding.  Extremities/MSK: No LE edema.  RUE/RLE 4/5 muscle strength.  Left arm in a splint.  Fingers with mild swelling.  LLE with 5/5 strength.    Skin: Warm and dry  Neuro: Alert & oriented x 3, has expressive aphasia and right sided weakness at baseline.       Labs:   ROUTINE ICU LABS (Last four results)  CMPNo lab results found in last 7 days.  CBCNo lab results found in last 7 days.  INRNo lab results found in last 7 days.  Arterial Blood GasNo lab results found in last 7 days.       Imaging/Procedures:     Results for orders placed or performed during the hospital encounter of 09/30/19   XR Wrist Left G/E 3 Views    Narrative    EXAM: XR WRIST LEFT G/E 3 VIEWS  LOCATION: [facility]  DATE/TIME: 9/30/2019 6:27 PM    INDICATION: Fall on outstretched hand with pain  COMPARISON: None.      Impression    IMPRESSION: Soft tissue swelling. Impacted intra-articular fracture of the distal radius. There is widening of the scapholunate interval; injury cannot be excluded to the ligaments between the scaphoid and lunate. The ulna styloid is intact.

## 2019-10-04 NOTE — ANESTHESIA CARE TRANSFER NOTE
Patient: Tk Richmond    Procedure(s):  OPEN REDUCTION INTERNAL FIXATION, LEFT DISTAL RADUS FRACTURE    Diagnosis: Oth intartic fracture of lower end of left radius, init [S52.572A]  Diagnosis Additional Information: No value filed.    Anesthesia Type:   General, LMA     Note:  Airway :Nasal Cannula  Patient transferred to:Phase II  Comments: Case cancelled, patient found to be in new onset atrial fibrillation.  Cardiology consult orderedHandoff Report: Identifed the Patient, Identified the Reponsible Provider, Reviewed the pertinent medical history, Discussed the surgical course, Reviewed Intra-OP anesthesia mangement and issues during anesthesia, Set expectations for post-procedure period and Allowed opportunity for questions and acknowledgement of understanding      Vitals: (Last set prior to Anesthesia Care Transfer)    CRNA VITALS  10/4/2019 0950 - 10/4/2019 1027      10/4/2019             NIBP:  (!) 144/92    NIBP Mean:  107                Electronically Signed By: JULIO Royal CRNA  October 4, 2019  10:27 AM

## 2019-10-05 LAB
LMWH PPP CHRO-ACNC: 0.34 IU/ML
LMWH PPP CHRO-ACNC: 0.45 IU/ML

## 2019-10-05 PROCEDURE — 25000128 H RX IP 250 OP 636: Performed by: ORTHOPAEDIC SURGERY

## 2019-10-05 PROCEDURE — 85520 HEPARIN ASSAY: CPT | Performed by: PHYSICIAN ASSISTANT

## 2019-10-05 PROCEDURE — 85520 HEPARIN ASSAY: CPT | Performed by: ORTHOPAEDIC SURGERY

## 2019-10-05 PROCEDURE — 36415 COLL VENOUS BLD VENIPUNCTURE: CPT | Performed by: ORTHOPAEDIC SURGERY

## 2019-10-05 PROCEDURE — 12000000 ZZH R&B MED SURG/OB

## 2019-10-05 PROCEDURE — 25000132 ZZH RX MED GY IP 250 OP 250 PS 637: Mod: GY | Performed by: PHYSICIAN ASSISTANT

## 2019-10-05 PROCEDURE — 99232 SBSQ HOSP IP/OBS MODERATE 35: CPT | Performed by: INTERNAL MEDICINE

## 2019-10-05 PROCEDURE — 36415 COLL VENOUS BLD VENIPUNCTURE: CPT | Performed by: PHYSICIAN ASSISTANT

## 2019-10-05 PROCEDURE — 25000132 ZZH RX MED GY IP 250 OP 250 PS 637: Mod: GY | Performed by: INTERNAL MEDICINE

## 2019-10-05 RX ORDER — METOPROLOL TARTRATE 25 MG/1
25 TABLET, FILM COATED ORAL 2 TIMES DAILY
Status: DISCONTINUED | OUTPATIENT
Start: 2019-10-05 | End: 2019-10-07

## 2019-10-05 RX ADMIN — BRINZOLAMIDE/BRIMONIDINE TARTRATE 1 DROP: 10; 2 SUSPENSION/ DROPS OPHTHALMIC at 16:19

## 2019-10-05 RX ADMIN — BRINZOLAMIDE/BRIMONIDINE TARTRATE 1 DROP: 10; 2 SUSPENSION/ DROPS OPHTHALMIC at 08:52

## 2019-10-05 RX ADMIN — PANTOPRAZOLE SODIUM 40 MG: 40 TABLET, DELAYED RELEASE ORAL at 06:36

## 2019-10-05 RX ADMIN — DIGOXIN 125 MCG: 125 TABLET ORAL at 08:51

## 2019-10-05 RX ADMIN — ACETAMINOPHEN 650 MG: 325 TABLET, FILM COATED ORAL at 08:51

## 2019-10-05 RX ADMIN — LOVASTATIN 40 MG: 20 TABLET ORAL at 21:11

## 2019-10-05 RX ADMIN — HEPARIN SODIUM 750 UNITS/HR: 10000 INJECTION, SOLUTION INTRAVENOUS at 20:08

## 2019-10-05 RX ADMIN — CITALOPRAM HYDROBROMIDE 40 MG: 20 TABLET ORAL at 08:51

## 2019-10-05 ASSESSMENT — ACTIVITIES OF DAILY LIVING (ADL)
ADLS_ACUITY_SCORE: 31
ADLS_ACUITY_SCORE: 27

## 2019-10-05 ASSESSMENT — MIFFLIN-ST. JEOR: SCORE: 1457.86

## 2019-10-05 NOTE — PLAN OF CARE
Pt came in on 10/4 to have surgery on left wrist fracture. Pt was found to be in Afib RVR in surgery. Cards was consulted and pt was admitted to [facility]. Pt was on dilt drip and BP was dropping and HR was still high. Dig IV was started. Around 0130 pt HR came down to the 70's. Pt  has a Hx of CVA, has right sided weakness and trouble with word finding. Pt is up with A2. Uses urinal at the bed. No complaints of pain this shift. Has hep @ 9. On RA stating around 95%. Pt has bruising due to fall at home. Soft cast on left wrist that goes up to the elbow. On continue pulse ox. Tele was Afib RVR.

## 2019-10-05 NOTE — PROGRESS NOTES
SPIRITUAL HEALTH SERVICES  [facility] Floor 3  ON-CALL VISIT     DATA:     Pt requested an SH visit. Admitted for a surgery/a-fib     INTERVENTION:    Introduced self and SHS. Reflective conversation, life review, prayer and emotional support       ASSESSMENT:    Pt has a difficult time communicating, but expressed positive attitude, no acute  emotional or spiritual distress. Strong family support, spouse comes everyday,  nearly 60 years.         PLAN:    No f/u necessary at this time unless there is a request/referral  Diego Watters  Chaplain Resident

## 2019-10-05 NOTE — PLAN OF CARE
A/O, expressive aphasia d/t hx of CVA, allowed additional time for response. VSS, ex bradycardia. HR mid 50s, metroprolol held per parameters. Afebrile, 96% on RA, denies pain at this time. Tele - SR/SB. Has LUE soft cast in place for fx of L wrist/hand. Will have ORIF tomorrow AM at 0830. Hep gtt @ 7.5mL/hr. Per Dr. Fontenot (cardiology) heparin gtt is to be turned off 1hr prior to OR, and then restarted 2hr post procedure w/o bolus. NPO at midnight. Sign and held orders to be released this evening. Up Ax2 with GB/pivot to chair. Has baseline R sided weakness r/t CVA hx. Reg diet tolerating well, needs assist. No BM this shift. Uses urinal at BS. Pt wife current at bedside. Will continue to monitor.

## 2019-10-05 NOTE — PROGRESS NOTES
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, presents with fall, fractured L wrist and new onset AFib  EF normal on echo    TELE- converted to NSR  Start low dose BB, metoprolol 25mg BID  Patient may proceed with ORIF of left wrist with bridging heparin gtt  Discussed long term anticoagulation with patient and wife, will check on cost of Eliquis with pharmacy (recommend Eliquis due to lower bleeding risk)        Interval History:   no complaints                Medications:   I have reviewed this patient's current medications         Physical Exam:         Vital Sign Ranges  Temperature Temp  Av.1  F (36.2  C)  Min: 96  F (35.6  C)  Max: 98.7  F (37.1  C)   Blood pressure Systolic (24hrs), Av , Min:74 , Max:156        Diastolic (24hrs), Av, Min:54, Max:141      Pulse Pulse  Av  Min: 53  Max: 149   Respirations Resp  Av.4  Min: 16  Max: 20   Pulse oximetry SpO2  Av.9 %  Min: 93 %  Max: 100 %         Intake/Output Summary (Last 24 hours) at 10/5/2019 1121  Last data filed at 10/5/2019 1021  Gross per 24 hour   Intake 1197 ml   Output 1300 ml   Net -103 ml       Constitutional:   pale     Skin:   no jaundice     Neck:   supple, symmetrical, trachea midline     Chest:   Normal Symmetry and no tenderness     Lungs:   normal     Cardiovascular:   normal S1 and S2     Extremities and Back:   no cyanosis or clubbing     Neurological:   Aphasic, R hemiparesis              Data:     Results for orders placed or performed during the hospital encounter of 10/04/19 (from the past 24 hour(s))   Basic metabolic panel   Result Value Ref Range    Sodium 141 133 - 144 mmol/L    Potassium 4.0 3.4 - 5.3 mmol/L    Chloride 110 (H) 94 - 109 mmol/L    Carbon Dioxide 26 20 - 32 mmol/L    Anion Gap 5 3 - 14 mmol/L    Glucose 89 70 - 99 mg/dL    Urea Nitrogen 16 7 - 30 mg/dL    Creatinine 1.12 0.66 - 1.25 mg/dL    GFR Estimate 61 >60 mL/min/[1.73_m2]     GFR Estimate If Black 70 >60 mL/min/[1.73_m2]    Calcium 8.5 8.5 - 10.1 mg/dL   CBC with platelets   Result Value Ref Range    WBC 5.2 4.0 - 11.0 10e9/L    RBC Count 4.11 (L) 4.4 - 5.9 10e12/L    Hemoglobin 12.9 (L) 13.3 - 17.7 g/dL    Hematocrit 38.8 (L) 40.0 - 53.0 %    MCV 94 78 - 100 fl    MCH 31.4 26.5 - 33.0 pg    MCHC 33.2 31.5 - 36.5 g/dL    RDW 13.8 10.0 - 15.0 %    Platelet Count 179 150 - 450 10e9/L   Magnesium   Result Value Ref Range    Magnesium 1.9 1.6 - 2.3 mg/dL   TSH with free T4 reflex   Result Value Ref Range    TSH 5.62 (H) 0.40 - 4.00 mU/L   T4 free   Result Value Ref Range    T4 Free 1.06 0.76 - 1.46 ng/dL   Echocardiogram Limited    Narrative    [id number]  [id number]  [id number]  [id number]^LUCIAN^KARINE^S           [facility]  Echocardiography Laboratory  [address] [place] [address]        Name: AVIS GOODE  MRN: [id number]  : 1935  Study Date: 10/04/2019 03:32 PM  Age: 84 yrs  Gender: Male  Patient Location: [facility]  Reason For Study: Afib  Ordering Physician: KARINE EPSTEIN  Referring Physician: Shyam Mclean  Performed By: Brittney Abad     BSA: 1.9 m2  Height: 70 in  Weight: 161 lb  BP: 133/98 mmHg  _____________________________________________________________________________  __        Procedure  Limited Portable Echo Adult.  _____________________________________________________________________________  __        Interpretation Summary     The left ventricle is normal in size. Left ventricular systolic function is  normal. The visual ejection fraction is estimated at 55-60%. Diastolic  function not assessed due to atrial fibrillation. No regional wall motion  abnormalities noted.  The right ventricle is normal size. The right ventricular systolic function is  normal.  Trace to mild mitral and tricuspid regurgitation.  Moderate dilatation of the ascending thoracic aorta.  No pericardial effusion.  In comparison to the previous report dated  2019, the patient is now in  rapid atrial fibrillation.  _____________________________________________________________________________  __        Left Ventricle  The left ventricle is normal in size. Left ventricular systolic function is  normal. The visual ejection fraction is estimated at 55-60%. Diastolic  function not assessed due to atrial fibrillation. No regional wall motion  abnormalities noted.     Right Ventricle  The right ventricle is normal size. The right ventricular systolic function is  normal.     Mitral Valve  There is trace mitral regurgitation.     Tricuspid Valve  There is mild (1+) tricuspid regurgitation. The right ventricular systolic  pressure is approximated at 23.0 mmHg plus the right atrial pressure.        Aortic Valve  There is mild trileaflet aortic sclerosis. There is mild to moderate (1-2+)  aortic regurgitation. No aortic stenosis is present.     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 The inferior vena cava is normal.     Pericardium  There is no pericardial effusion.     Rhythm  The rhythm was rapid atrial fibrillation.     _____________________________________________________________________________  __  MMode/2D Measurements & Calculations  IVSd: 1.1 cm  LVIDd: 4.1 cm  LVIDs: 2.8 cm  LVPWd: 0.85 cm  FS: 31.1 %  LV mass(C)d: 126.7 grams  LV mass(C)dI: 66.6 grams/m2  asc Aorta Diam: 5.0 cm  RWT: 0.41           Doppler Measurements & Calculations  TR max noe: 239.8 cm/sec  TR max P.0 mmHg           _____________________________________________________________________________  __           Report approved by: Monae Love 10/04/2019 04:53 PM      Heparin 10a Level   Result Value Ref Range    Heparin 10A Level 0.30 IU/mL   Heparin Xa (10a) Level   Result Value Ref Range    Heparin 10A Level 0.45 IU/mL

## 2019-10-05 NOTE — PROGRESS NOTES
"Tk Richmond  10/5/2019  Patient with left distal radius fracture  Admit Date:  10/4/2019      Doing well.  Objective: patient is in some pain at the left wrist. He and his wife are wondering when the surgery will be completed.   Blood pressure (!) 145/72, pulse 64, temperature 97.3  F (36.3  C), temperature source Oral, resp. rate 18, height 1.778 m (5' 10\"), SpO2 97 %.    Temperatures:  Current - Temp: 97.3  F (36.3  C); Max - Temp  Av.1  F (36.2  C)  Min: 96  F (35.6  C)  Max: 98.7  F (37.1  C)  Pulse range: Pulse  Av  Min: 53  Max: 149  Blood pressure range: Systolic (24hrs), Av , Min:74 , Max:156   ; Diastolic (24hrs), Av, Min:54, Max:141    Exam:  CMS: intact  alert, stable, wound ok  M/u/r nerve distribution intact.   Able to move all digits in the LUE  Communicates clearly with examiner    Labs:  Recent Labs   Lab Test 10/04/19  1450 19  0535 19  1616   POTASSIUM 4.0 4.4 4.2     Recent Labs   Lab Test 10/04/19  1450 19  0528 19  0756   HGB 12.9* 11.2* 10.5*     Recent Labs   Lab Test 19  1226 19  1223 19  0454   INR 1.06 1.04 1.05     Recent Labs   Lab Test 10/04/19  1450 19  0528 19  0756    176 160       PLAN: Patient being followed by medical team and cardiology. Once stable surgery will be performed by Dr. Interiano. Will continue to monitor.     "

## 2019-10-05 NOTE — CONSULTS
"CLINICAL NUTRITION SERVICES  -  ASSESSMENT NOTE      MALNUTRITION:  % Weight Loss:  Weight loss does not meet criteria for malnutrition --> 6% loss in >2 months  % Intake:  No decreased intake noted --> denied PTA  Subcutaneous Fat Loss:  Upper arm region mild to moderate depletion --> not using as indicator with only 1 region present  Muscle Loss:  Temporal region mild depletion, Clavicle bone region mild to moderate depletion, Acromion bone region moderate depletion, Patellar region mild depletion, Anterior thigh region mild depletion and Posterior calf region mild depletion  Fluid Retention:  None noted    Malnutrition Diagnosis: Patient does not meet two of the above criteria necessary for diagnosing malnutrition        REASON FOR ASSESSMENT  Tk Richmond is a 84 year old male seen by Registered Dietitian for Admission Nutrition Risk Screen for reduced oral intake over the last month.      NUTRITION HISTORY  - Information obtained from patient, wife in room, chart.  - Patient with a h/o CVA (2004) with expressive aphasia, recent fall.  Was to have L wrist ORIF yesterday, though found to be in new a fib, therefore admitted.    - Per review of chart, appears had been following with SLP as outpatient for aphasia.    - Regular diet at home.  Denies chewing/swallowing issues.  - Meals TID is normal.  Denies decrease in PO intakes PTA including skipping of meals or decrease in portions.   - Wife does note that self-feeding has been impacted by L hand, trying to focus on finger foods as a result, otherwise no changes.  - No use of oral supplements PTA.  - Does mention that he had a large weight loss after his stroke in \"June of this year\".    - NKFA.      CURRENT NUTRITION ORDERS  Diet Order:     Regular    Current Intake/Tolerance:  NPO currently given need for surgery, though not reflected in current orders.        NUTRITION FOCUSED PHYSICAL ASSESSMENT FOR DIAGNOSING MALNUTRITION)  Completed:  Yes Full " "assessment         Observed:    Muscle wasting (refer to documentation in Malnutrition section)    Obtained from Chart/Interdisciplinary Team:  No pertinent other than already noted    ANTHROPOMETRICS  Height: 5' 10\"  Weight: 75.2 kg (165#)  Body mass index is 25.22 kg/m .  Weight Status:  Overweight BMI 25-29.9  IBW: 75.5 kg (166#)  % IBW: 100%  Weight History:  Wt Readings from Last 10 Encounters:   07/20/19 79.7 kg (175 lb 12.8 oz)   07/20/19 85.3 kg (188 lb)   07/13/19 80.1 kg (176 lb 9.6 oz)   06/28/19 82.6 kg (182 lb 1.6 oz)   12/20/18 86 kg (189 lb 9.6 oz)   02/20/12 79.4 kg (175 lb 1 oz)   - Patient/wife reporting UBW as 170#.  Has been at this weight since 7/2019 of this year, though had a large weight loss throughout his previous hospital/rehab stay when stroke occurred.    - Potential for 6% wt loss since 7/2019 (>2 months, not of nutritional significance).     LABS  Labs reviewed    MEDICATIONS  Medications reviewed      ASSESSED NUTRITION NEEDS PER APPROVED PRACTICE GUIDELINES:    Dosing Weight 75.2 kg - actual body weight  Estimated Energy Needs: >/=1880 kcals (>/=25 Kcal/Kg)  Justification: maintenance  Estimated Protein Needs:  grams protein (1.2-1.5 g pro/Kg)  Justification: Repletion and preservation of lean body mass  Estimated Fluid Needs: per MD    MALNUTRITION:  % Weight Loss:  Weight loss does not meet criteria for malnutrition --> 6% loss in >2 months  % Intake:  No decreased intake noted --> denied PTA  Subcutaneous Fat Loss:  Upper arm region mild to moderate depletion --> not using as indicator with only 1 region present  Muscle Loss:  Temporal region mild depletion, Clavicle bone region mild to moderate depletion, Acromion bone region moderate depletion, Patellar region mild depletion, Anterior thigh region mild depletion and Posterior calf region mild depletion  Fluid Retention:  None noted    Malnutrition Diagnosis: Patient does not meet two of the above criteria necessary for " diagnosing malnutrition    NUTRITION DIAGNOSIS:  Predicted suboptimal nutrient intake (energy/protein) related to denied decreased PO intakes PTA, previous wt loss with stroke, potential for decline in PO intakes throughout admit.      NUTRITION INTERVENTIONS  Recommendations / Nutrition Prescription  Diet post-op per MD.  Protein focus.  Patient would like food focus, no plans for supplements at this time.    Please obtain admit wt as able (ordered).       Implementation  Nutrition education: No education needs assessed at this time.    Collaboration and Referral of Nutrition care: Discussed POC with RN.      Nutrition Goals  Patient to consume at least 75% of meals TID post-op.      MONITORING AND EVALUATION:  Progress towards goals will be monitored and evaluated per protocol and Practice Guidelines              Jacqueline Patino RD, LD  Clinical Dietitian  3rd floor/ICU: [phone]  All other floors: [phone]  Weekend/holiday: [phone]

## 2019-10-05 NOTE — PROVIDER NOTIFICATION
Dilt gtt stopped dt  BP < 100.  Last BP 74/57 with gtt infusing at 5ml/hr.  HR elevated from 130s-150s. Order received for Digoxin, see orders

## 2019-10-05 NOTE — PROVIDER NOTIFICATION
This writer spoke with Dr. Fontenot (Cardiology), No one hour prior to surgery Heparin is to be turn off and should be resumed 2 hours after surgery complete, no bolus should be given. Surgery scheduled for 10/6/2019 at  8:30 am.

## 2019-10-05 NOTE — PLAN OF CARE
A/O. Occasional word finding difficulties and garbled speech dt CVA.  R sided weakness.  LUE elevated, bruised, swollen. Tele A-Fib RVR.  Dilt gtt started this shift. Dt low BP (74/57)stopped, HR remained in 130-150 range.   MD notified Order to give digoxin, see orders.  HR remains elevated, see flow sheet. Hep gtt running at 9ml/hr.  TC needs assist with feeding.

## 2019-10-06 ENCOUNTER — ANESTHESIA EVENT (OUTPATIENT)
Dept: SURGERY | Facility: CLINIC | Age: 84
DRG: 982 | End: 2019-10-06
Payer: MEDICARE

## 2019-10-06 ENCOUNTER — APPOINTMENT (OUTPATIENT)
Dept: GENERAL RADIOLOGY | Facility: CLINIC | Age: 84
DRG: 982 | End: 2019-10-06
Attending: ORTHOPAEDIC SURGERY
Payer: MEDICARE

## 2019-10-06 ENCOUNTER — ANESTHESIA (OUTPATIENT)
Dept: SURGERY | Facility: CLINIC | Age: 84
DRG: 982 | End: 2019-10-06
Payer: MEDICARE

## 2019-10-06 LAB
ANION GAP SERPL CALCULATED.3IONS-SCNC: 5 MMOL/L (ref 3–14)
BUN SERPL-MCNC: 24 MG/DL (ref 7–30)
CALCIUM SERPL-MCNC: 8.5 MG/DL (ref 8.5–10.1)
CHLORIDE SERPL-SCNC: 111 MMOL/L (ref 94–109)
CO2 SERPL-SCNC: 25 MMOL/L (ref 20–32)
CREAT SERPL-MCNC: 1.27 MG/DL (ref 0.66–1.25)
GFR SERPL CREATININE-BSD FRML MDRD: 52 ML/MIN/{1.73_M2}
GLUCOSE SERPL-MCNC: 104 MG/DL (ref 70–99)
LMWH PPP CHRO-ACNC: 0.23 IU/ML
POTASSIUM SERPL-SCNC: 3.9 MMOL/L (ref 3.4–5.3)
SODIUM SERPL-SCNC: 141 MMOL/L (ref 133–144)

## 2019-10-06 PROCEDURE — 40000306 ZZH STATISTIC PRE PROC ASSESS II: Performed by: ORTHOPAEDIC SURGERY

## 2019-10-06 PROCEDURE — 25000132 ZZH RX MED GY IP 250 OP 250 PS 637: Mod: GY | Performed by: INTERNAL MEDICINE

## 2019-10-06 PROCEDURE — 25000128 H RX IP 250 OP 636: Performed by: ANESTHESIOLOGY

## 2019-10-06 PROCEDURE — 27210794 ZZH OR GENERAL SUPPLY STERILE: Performed by: ORTHOPAEDIC SURGERY

## 2019-10-06 PROCEDURE — 25800030 ZZH RX IP 258 OP 636: Performed by: ANESTHESIOLOGY

## 2019-10-06 PROCEDURE — 25000132 ZZH RX MED GY IP 250 OP 250 PS 637: Mod: GY | Performed by: ORTHOPAEDIC SURGERY

## 2019-10-06 PROCEDURE — 85520 HEPARIN ASSAY: CPT | Performed by: PHYSICIAN ASSISTANT

## 2019-10-06 PROCEDURE — 71000012 ZZH RECOVERY PHASE 1 LEVEL 1 FIRST HR: Performed by: ORTHOPAEDIC SURGERY

## 2019-10-06 PROCEDURE — 37000008 ZZH ANESTHESIA TECHNICAL FEE, 1ST 30 MIN: Performed by: ORTHOPAEDIC SURGERY

## 2019-10-06 PROCEDURE — 0PSJ04Z REPOSITION LEFT RADIUS WITH INTERNAL FIXATION DEVICE, OPEN APPROACH: ICD-10-PCS | Performed by: ORTHOPAEDIC SURGERY

## 2019-10-06 PROCEDURE — 36415 COLL VENOUS BLD VENIPUNCTURE: CPT | Performed by: PHYSICIAN ASSISTANT

## 2019-10-06 PROCEDURE — 25000128 H RX IP 250 OP 636: Performed by: NURSE ANESTHETIST, CERTIFIED REGISTERED

## 2019-10-06 PROCEDURE — 12000000 ZZH R&B MED SURG/OB

## 2019-10-06 PROCEDURE — 36000060 ZZH SURGERY LEVEL 3 W FLUORO 1ST 30 MIN: Performed by: ORTHOPAEDIC SURGERY

## 2019-10-06 PROCEDURE — 36000058 ZZH SURGERY LEVEL 3 EA 15 ADDTL MIN: Performed by: ORTHOPAEDIC SURGERY

## 2019-10-06 PROCEDURE — C1713 ANCHOR/SCREW BN/BN,TIS/BN: HCPCS | Performed by: ORTHOPAEDIC SURGERY

## 2019-10-06 PROCEDURE — 71000013 ZZH RECOVERY PHASE 1 LEVEL 1 EA ADDTL HR: Performed by: ORTHOPAEDIC SURGERY

## 2019-10-06 PROCEDURE — 99232 SBSQ HOSP IP/OBS MODERATE 35: CPT | Performed by: INTERNAL MEDICINE

## 2019-10-06 PROCEDURE — 25800025 ZZH RX 258: Performed by: ORTHOPAEDIC SURGERY

## 2019-10-06 PROCEDURE — 80048 BASIC METABOLIC PNL TOTAL CA: CPT | Performed by: PHYSICIAN ASSISTANT

## 2019-10-06 PROCEDURE — 40000277 XR SURGERY CARM FLUORO LESS THAN 5 MIN W STILLS: Mod: TC

## 2019-10-06 PROCEDURE — 25000128 H RX IP 250 OP 636: Performed by: PHYSICIAN ASSISTANT

## 2019-10-06 PROCEDURE — 25000132 ZZH RX MED GY IP 250 OP 250 PS 637: Mod: GY | Performed by: PHYSICIAN ASSISTANT

## 2019-10-06 PROCEDURE — 25000125 ZZHC RX 250: Performed by: NURSE ANESTHETIST, CERTIFIED REGISTERED

## 2019-10-06 PROCEDURE — 37000009 ZZH ANESTHESIA TECHNICAL FEE, EACH ADDTL 15 MIN: Performed by: ORTHOPAEDIC SURGERY

## 2019-10-06 PROCEDURE — 25000128 H RX IP 250 OP 636: Performed by: ORTHOPAEDIC SURGERY

## 2019-10-06 DEVICE — IMP SCR CORTICAL HANDINN 3.5X13MM CS13000: Type: IMPLANTABLE DEVICE | Site: RADIUS | Status: FUNCTIONAL

## 2019-10-06 DEVICE — IMP PLATE HANDINN VOLAR SHORT LT DVRASL: Type: IMPLANTABLE DEVICE | Site: RADIUS | Status: FUNCTIONAL

## 2019-10-06 DEVICE — IMP PEG HANDINN SUBCHONDRAL 2.0X22MM P22000: Type: IMPLANTABLE DEVICE | Site: RADIUS | Status: FUNCTIONAL

## 2019-10-06 DEVICE — IMP PEG HANDINN SUBCHONDRAL 2.0X20MM P20000: Type: IMPLANTABLE DEVICE | Site: RADIUS | Status: FUNCTIONAL

## 2019-10-06 DEVICE — IMP SCR CORTICAL HANDINN 3.5X14MM CS14000: Type: IMPLANTABLE DEVICE | Site: RADIUS | Status: FUNCTIONAL

## 2019-10-06 DEVICE — IMP WIRE KIRSCHNER 0.045X4" 78.2020: Type: IMPLANTABLE DEVICE | Site: RADIUS | Status: FUNCTIONAL

## 2019-10-06 RX ORDER — FENTANYL CITRATE 50 UG/ML
25-50 INJECTION, SOLUTION INTRAMUSCULAR; INTRAVENOUS
Status: DISCONTINUED | OUTPATIENT
Start: 2019-10-06 | End: 2019-10-06 | Stop reason: HOSPADM

## 2019-10-06 RX ORDER — HYDRALAZINE HYDROCHLORIDE 20 MG/ML
2.5-5 INJECTION INTRAMUSCULAR; INTRAVENOUS EVERY 10 MIN PRN
Status: DISCONTINUED | OUTPATIENT
Start: 2019-10-06 | End: 2019-10-06 | Stop reason: HOSPADM

## 2019-10-06 RX ORDER — SODIUM CHLORIDE, SODIUM LACTATE, POTASSIUM CHLORIDE, CALCIUM CHLORIDE 600; 310; 30; 20 MG/100ML; MG/100ML; MG/100ML; MG/100ML
INJECTION, SOLUTION INTRAVENOUS CONTINUOUS
Status: DISCONTINUED | OUTPATIENT
Start: 2019-10-06 | End: 2019-10-06 | Stop reason: HOSPADM

## 2019-10-06 RX ORDER — METOPROLOL TARTRATE 1 MG/ML
1-2 INJECTION, SOLUTION INTRAVENOUS EVERY 5 MIN PRN
Status: DISCONTINUED | OUTPATIENT
Start: 2019-10-06 | End: 2019-10-06 | Stop reason: HOSPADM

## 2019-10-06 RX ORDER — HYDROCODONE BITARTRATE AND ACETAMINOPHEN 5; 325 MG/1; MG/1
1 TABLET ORAL
Status: DISCONTINUED | OUTPATIENT
Start: 2019-10-06 | End: 2019-10-06 | Stop reason: CLARIF

## 2019-10-06 RX ORDER — NEOSTIGMINE METHYLSULFATE 1 MG/ML
VIAL (ML) INJECTION PRN
Status: DISCONTINUED | OUTPATIENT
Start: 2019-10-06 | End: 2019-10-06

## 2019-10-06 RX ORDER — METOCLOPRAMIDE 5 MG/1
5 TABLET ORAL EVERY 6 HOURS PRN
Status: DISCONTINUED | OUTPATIENT
Start: 2019-10-06 | End: 2019-10-07 | Stop reason: HOSPADM

## 2019-10-06 RX ORDER — NALOXONE HYDROCHLORIDE 0.4 MG/ML
.1-.4 INJECTION, SOLUTION INTRAMUSCULAR; INTRAVENOUS; SUBCUTANEOUS
Status: DISCONTINUED | OUTPATIENT
Start: 2019-10-06 | End: 2019-10-06 | Stop reason: HOSPADM

## 2019-10-06 RX ORDER — PROPOFOL 10 MG/ML
INJECTION, EMULSION INTRAVENOUS PRN
Status: DISCONTINUED | OUTPATIENT
Start: 2019-10-06 | End: 2019-10-06

## 2019-10-06 RX ORDER — OXYCODONE HYDROCHLORIDE 5 MG/1
5 TABLET ORAL EVERY 4 HOURS PRN
Status: DISCONTINUED | OUTPATIENT
Start: 2019-10-06 | End: 2019-10-06 | Stop reason: CLARIF

## 2019-10-06 RX ORDER — MEPERIDINE HYDROCHLORIDE 50 MG/ML
12.5 INJECTION INTRAMUSCULAR; INTRAVENOUS; SUBCUTANEOUS EVERY 5 MIN PRN
Status: DISCONTINUED | OUTPATIENT
Start: 2019-10-06 | End: 2019-10-06 | Stop reason: HOSPADM

## 2019-10-06 RX ORDER — NALOXONE HYDROCHLORIDE 0.4 MG/ML
.1-.4 INJECTION, SOLUTION INTRAMUSCULAR; INTRAVENOUS; SUBCUTANEOUS
Status: ACTIVE | OUTPATIENT
Start: 2019-10-06 | End: 2019-10-07

## 2019-10-06 RX ORDER — ONDANSETRON 4 MG/1
4 TABLET, ORALLY DISINTEGRATING ORAL EVERY 30 MIN PRN
Status: DISCONTINUED | OUTPATIENT
Start: 2019-10-06 | End: 2019-10-06 | Stop reason: HOSPADM

## 2019-10-06 RX ORDER — DIMENHYDRINATE 50 MG/ML
25 INJECTION, SOLUTION INTRAMUSCULAR; INTRAVENOUS
Status: DISCONTINUED | OUTPATIENT
Start: 2019-10-06 | End: 2019-10-06 | Stop reason: HOSPADM

## 2019-10-06 RX ORDER — GLYCOPYRROLATE 0.2 MG/ML
INJECTION, SOLUTION INTRAMUSCULAR; INTRAVENOUS PRN
Status: DISCONTINUED | OUTPATIENT
Start: 2019-10-06 | End: 2019-10-06

## 2019-10-06 RX ORDER — HYDROMORPHONE HYDROCHLORIDE 1 MG/ML
.3-.5 INJECTION, SOLUTION INTRAMUSCULAR; INTRAVENOUS; SUBCUTANEOUS EVERY 5 MIN PRN
Status: DISCONTINUED | OUTPATIENT
Start: 2019-10-06 | End: 2019-10-06 | Stop reason: HOSPADM

## 2019-10-06 RX ORDER — ONDANSETRON 2 MG/ML
4 INJECTION INTRAMUSCULAR; INTRAVENOUS EVERY 30 MIN PRN
Status: DISCONTINUED | OUTPATIENT
Start: 2019-10-06 | End: 2019-10-06 | Stop reason: HOSPADM

## 2019-10-06 RX ORDER — DEXAMETHASONE SODIUM PHOSPHATE 4 MG/ML
4 INJECTION, SOLUTION INTRA-ARTICULAR; INTRALESIONAL; INTRAMUSCULAR; INTRAVENOUS; SOFT TISSUE
Status: DISCONTINUED | OUTPATIENT
Start: 2019-10-06 | End: 2019-10-06 | Stop reason: HOSPADM

## 2019-10-06 RX ORDER — HYDROMORPHONE HYDROCHLORIDE 1 MG/ML
.3-.5 INJECTION, SOLUTION INTRAMUSCULAR; INTRAVENOUS; SUBCUTANEOUS EVERY 10 MIN PRN
Status: DISCONTINUED | OUTPATIENT
Start: 2019-10-06 | End: 2019-10-06 | Stop reason: HOSPADM

## 2019-10-06 RX ORDER — HYDROMORPHONE HYDROCHLORIDE 1 MG/ML
0.2 INJECTION, SOLUTION INTRAMUSCULAR; INTRAVENOUS; SUBCUTANEOUS
Status: DISCONTINUED | OUTPATIENT
Start: 2019-10-06 | End: 2019-10-07 | Stop reason: HOSPADM

## 2019-10-06 RX ORDER — ALBUTEROL SULFATE 0.83 MG/ML
2.5 SOLUTION RESPIRATORY (INHALATION) EVERY 4 HOURS PRN
Status: DISCONTINUED | OUTPATIENT
Start: 2019-10-06 | End: 2019-10-06 | Stop reason: HOSPADM

## 2019-10-06 RX ORDER — FENTANYL CITRATE 50 UG/ML
INJECTION, SOLUTION INTRAMUSCULAR; INTRAVENOUS PRN
Status: DISCONTINUED | OUTPATIENT
Start: 2019-10-06 | End: 2019-10-06

## 2019-10-06 RX ORDER — CEFAZOLIN SODIUM 2 G/100ML
2 INJECTION, SOLUTION INTRAVENOUS
Status: COMPLETED | OUTPATIENT
Start: 2019-10-06 | End: 2019-10-06

## 2019-10-06 RX ORDER — FENTANYL CITRATE 50 UG/ML
25-50 INJECTION, SOLUTION INTRAMUSCULAR; INTRAVENOUS EVERY 5 MIN PRN
Status: DISCONTINUED | OUTPATIENT
Start: 2019-10-06 | End: 2019-10-06 | Stop reason: HOSPADM

## 2019-10-06 RX ORDER — LIDOCAINE HYDROCHLORIDE 10 MG/ML
INJECTION, SOLUTION INFILTRATION; PERINEURAL PRN
Status: DISCONTINUED | OUTPATIENT
Start: 2019-10-06 | End: 2019-10-06

## 2019-10-06 RX ORDER — METOCLOPRAMIDE HYDROCHLORIDE 5 MG/ML
5 INJECTION INTRAMUSCULAR; INTRAVENOUS EVERY 6 HOURS PRN
Status: DISCONTINUED | OUTPATIENT
Start: 2019-10-06 | End: 2019-10-07 | Stop reason: HOSPADM

## 2019-10-06 RX ORDER — LABETALOL 20 MG/4 ML (5 MG/ML) INTRAVENOUS SYRINGE
5 ONCE
Status: DISCONTINUED | OUTPATIENT
Start: 2019-10-06 | End: 2019-10-06 | Stop reason: HOSPADM

## 2019-10-06 RX ORDER — MEPERIDINE HYDROCHLORIDE 50 MG/ML
12.5 INJECTION INTRAMUSCULAR; INTRAVENOUS; SUBCUTANEOUS
Status: DISCONTINUED | OUTPATIENT
Start: 2019-10-06 | End: 2019-10-06 | Stop reason: HOSPADM

## 2019-10-06 RX ORDER — HYDROXYZINE HYDROCHLORIDE 10 MG/1
10 TABLET, FILM COATED ORAL
Status: COMPLETED | OUTPATIENT
Start: 2019-10-06 | End: 2019-10-07

## 2019-10-06 RX ORDER — BUPIVACAINE HYDROCHLORIDE 2.5 MG/ML
INJECTION, SOLUTION EPIDURAL; INFILTRATION; INTRACAUDAL PRN
Status: DISCONTINUED | OUTPATIENT
Start: 2019-10-06 | End: 2019-10-06 | Stop reason: HOSPADM

## 2019-10-06 RX ADMIN — ROCURONIUM BROMIDE 30 MG: 10 INJECTION INTRAVENOUS at 08:39

## 2019-10-06 RX ADMIN — Medication 4 MG: at 09:20

## 2019-10-06 RX ADMIN — PROPOFOL 200 MG: 10 INJECTION, EMULSION INTRAVENOUS at 08:39

## 2019-10-06 RX ADMIN — APIXABAN 5 MG: 5 TABLET, FILM COATED ORAL at 22:33

## 2019-10-06 RX ADMIN — HYDRALAZINE HYDROCHLORIDE 5 MG: 20 INJECTION INTRAMUSCULAR; INTRAVENOUS at 10:31

## 2019-10-06 RX ADMIN — PANTOPRAZOLE SODIUM 40 MG: 40 TABLET, DELAYED RELEASE ORAL at 06:36

## 2019-10-06 RX ADMIN — CEFAZOLIN SODIUM 2 G: 2 INJECTION, SOLUTION INTRAVENOUS at 08:40

## 2019-10-06 RX ADMIN — LOVASTATIN 40 MG: 20 TABLET ORAL at 22:33

## 2019-10-06 RX ADMIN — METOPROLOL TARTRATE 25 MG: 25 TABLET ORAL at 13:17

## 2019-10-06 RX ADMIN — FENTANYL CITRATE 25 MCG: 50 INJECTION INTRAMUSCULAR; INTRAVENOUS at 11:00

## 2019-10-06 RX ADMIN — CITALOPRAM HYDROBROMIDE 40 MG: 20 TABLET ORAL at 13:16

## 2019-10-06 RX ADMIN — FENTANYL CITRATE 25 MCG: 50 INJECTION INTRAMUSCULAR; INTRAVENOUS at 10:42

## 2019-10-06 RX ADMIN — FENTANYL CITRATE 100 MCG: 50 INJECTION, SOLUTION INTRAMUSCULAR; INTRAVENOUS at 08:39

## 2019-10-06 RX ADMIN — METOPROLOL TARTRATE 25 MG: 25 TABLET ORAL at 22:33

## 2019-10-06 RX ADMIN — SODIUM CHLORIDE, POTASSIUM CHLORIDE, SODIUM LACTATE AND CALCIUM CHLORIDE: 600; 310; 30; 20 INJECTION, SOLUTION INTRAVENOUS at 08:29

## 2019-10-06 RX ADMIN — ONDANSETRON 4 MG: 2 INJECTION INTRAMUSCULAR; INTRAVENOUS at 08:57

## 2019-10-06 RX ADMIN — LIDOCAINE HYDROCHLORIDE 50 MG: 10 INJECTION, SOLUTION INFILTRATION; PERINEURAL at 08:39

## 2019-10-06 RX ADMIN — GLYCOPYRROLATE 0.6 MG: 0.2 INJECTION, SOLUTION INTRAMUSCULAR; INTRAVENOUS at 09:20

## 2019-10-06 RX ADMIN — GLYCOPYRROLATE 0.2 MG: 0.2 INJECTION, SOLUTION INTRAMUSCULAR; INTRAVENOUS at 08:39

## 2019-10-06 RX ADMIN — BRINZOLAMIDE/BRIMONIDINE TARTRATE 1 DROP: 10; 2 SUSPENSION/ DROPS OPHTHALMIC at 18:19

## 2019-10-06 ASSESSMENT — MIFFLIN-ST. JEOR: SCORE: 1430.64

## 2019-10-06 ASSESSMENT — ENCOUNTER SYMPTOMS: DYSRHYTHMIAS: 1

## 2019-10-06 ASSESSMENT — ACTIVITIES OF DAILY LIVING (ADL)
ADLS_ACUITY_SCORE: 30
ADLS_ACUITY_SCORE: 27
ADLS_ACUITY_SCORE: 29
ADLS_ACUITY_SCORE: 29
ADLS_ACUITY_SCORE: 27

## 2019-10-06 NOTE — PROGRESS NOTES
"Tk Richmond  10/5/2019  Patient with left distal radius fracture  Admit Date:  10/4/2019      Doing well.  Objective: patient is in some pain at the left wrist. He and his wife are wondering when the surgery will be completed. States he feels okay otherwise  Blood pressure (!) 148/73, pulse 55, temperature 96  F (35.6  C), temperature source Oral, resp. rate 16, height 1.778 m (5' 10\"), weight 75.2 kg (165 lb 11.2 oz), SpO2 96 %.    Temperatures:  Current - Temp: 97.3  F (36.3  C); Max - Temp  Av.1  F (36.2  C)  Min: 96  F (35.6  C)  Max: 98.7  F (37.1  C)  Pulse range: Pulse  Av  Min: 53  Max: 149  Blood pressure range: Systolic (24hrs), Av , Min:74 , Max:156   ; Diastolic (24hrs), Av, Min:54, Max:141    Exam:  CMS: intact  alert, stable, wound ok  M/u/r nerve distribution intact.   Able to move all digits in the LUE  Communicates clearly with examiner    Labs:  Recent Labs   Lab Test 10/04/19  1450 19  0535 19  1616   POTASSIUM 4.0 4.4 4.2     Recent Labs   Lab Test 10/04/19  1450 19  0528 19  0756   HGB 12.9* 11.2* 10.5*     Recent Labs   Lab Test 19  1226 19  1223 19  0454   INR 1.06 1.04 1.05     Recent Labs   Lab Test 10/04/19  1450 19  0528 19  0756    176 160       PLAN: Patient being followed by medical team and cardiology. Once stable surgery will be performed by Dr. Interiano. Will continue to monitor. Patient will be having surgery tomorrow morning at 8:30 am. He has been cleared for surgery according to Dr. Joy note.     "

## 2019-10-06 NOTE — BRIEF OP NOTE
[facility]    Brief Operative Note    Pre-operative diagnosis: Left intraarticular distal radius fracture   Post-operative diagnosis same  Procedure: Procedure(s):  OPEN REDUCTION INTERNAL FIXATION, Left distal radius  fracture  Surgeon: Surgeon(s) and Role:     * Krystal Interiano MD - Primary  Anesthesia: General   Estimated blood loss: Less than 10 ml  Drains: None  Specimens: * No specimens in log *  Findings:   as expected.  Complications: None.  Implants:    Implant Name Type Inv. Item Serial No.  Lot No. LRB No. Used   IMP PLATE HANDINN VOLAR SHORT LT DVRASL [employer] IMP PLATE HANDINN VOLAR SHORT LT DVRASL  [employer] 8004 LOAD 04 OCT2019 Left 1   IMP SCR PEG HANDINN 2.5X26MM [id number] [employer] [place] IMP SCR PEG HANDINN 2.5X26MM [id number]  [employer] 8004 LOAD 047 OCT2019 Left 1   IMP PEG HANDINN SUBCHONDRAL 2.0X20MM [id number] [employer] [place] IMP PEG HANDINN SUBCHONDRAL 2.0X20MM [id number]  [employer] 8004 LOAD 04OCT2019 Left 3   IMP PEG HANDINN SUBCHONDRAL 2.0X22MM [id number] [employer] [place] IMP PEG HANDINN SUBCHONDRAL 2.0X22MM [id number]  [employer] 8004 LOAD [id number] Left 4   IMP SCR CORTICAL HANDINN 3.5X13MM [id number] [employer] [place] IMP SCR CORTICAL HANDINN 3.5X13MM [id number]  [employer] 8004 LOAD [id number] Left 2   IMP SCR CORTICAL HANDINN 3.5X14MM [id number] [employer]ware[employer] [place] IMP SCR CORTICAL HANDINN 3.5X14MM [id number]  [employer] 8004 LOAD [id number] Left 1   IMP WIRE CHRISTOPHER 0.045X4&quot; 78.2020 Wire IMP WIRE CHRISTOPHER 0.045X4&quot; 78.2020  G SOURCE  Left 1

## 2019-10-06 NOTE — ANESTHESIA CARE TRANSFER NOTE
Patient: Tk Richmond    Procedure(s):  OPEN REDUCTION INTERNAL FIXATION, Left distal radius  fracture    Diagnosis: * No pre-op diagnosis entered *  Diagnosis Additional Information: No value filed.    Anesthesia Type:   General, ETT     Note:  Airway :Face Mask  Patient transferred to:PACU  Comments: VSS.  Spontaneously breathing O2 per open face mask.  Report given to RN.Handoff Report: Identifed the Patient, Identified the Reponsible Provider, Reviewed the pertinent medical history, Discussed the surgical course, Reviewed Intra-OP anesthesia mangement and issues during anesthesia, Set expectations for post-procedure period and Allowed opportunity for questions and acknowledgement of understanding      Vitals: (Last set prior to Anesthesia Care Transfer)    CRNA VITALS  10/6/2019 0911 - 10/6/2019 0947      10/6/2019             SpO2:  99 %                Electronically Signed By: JULIO Rossi CRNA  October 6, 2019  9:47 AM

## 2019-10-06 NOTE — PLAN OF CARE
Pt arrived to floor at 1145. A&Ox4. Expressive aphasia. Ace wrap CDI, cms intact. Hand elevated, ice applied. Denied pain.  Voiding. Bowels active, tolerating regular diet. Saline locked. Tele. Afib 60-70's. BP improved with scheduled metoprolol. noe PATEL RA.

## 2019-10-06 NOTE — PROGRESS NOTES
Ortho Hand    Uncomplicated surgery  Local block/hematoma block for post op pain  OK for discharge from ortho perspective    PO pain meds (Norco/vistaril) written for pharmacy on 10/4    Follow up 10/17 [place] office as scheduled    [place] Interiano

## 2019-10-06 NOTE — PLAN OF CARE
A&Ox4. BP elevated this morning @ 148/73 otherwise VSS on RA. Denies pain. Left arm in soft cast; CMS intact but bruising and edema persist. Continues to have right sided weakness and some word-finding difficulty otherwise neuro status is intact. Continues on hep gtt @ 7.5 mL/hr. Scheduled to have surgery at 0830 this AM. Per order, hep gtt to be turned off one hour prior to surgery and turned back on two hours after. Will pass this on to oncoming shift. Tele SB. NPO since midnight. Will continue to monitor.

## 2019-10-06 NOTE — PLAN OF CARE
A/O.  Expressive aphasia with word finding difficulty.  LS clear, diminished.  Appetite good.  Tele SR.  VSS afebrile.  NPO at 0000, Surgery scheduled for 08:30  AM.  See Heparin orders on MAR.  LUE edematous, elevated.   Hep gtt continues at 7.5ml/hr.

## 2019-10-06 NOTE — ANESTHESIA POSTPROCEDURE EVALUATION
Patient: Tk Richmond    Procedure(s):  OPEN REDUCTION INTERNAL FIXATION, Left distal radius  fracture    Diagnosis:* No pre-op diagnosis entered *  Diagnosis Additional Information: No value filed.    Anesthesia Type:  General, ETT    Note:  Anesthesia Post Evaluation    Patient location during evaluation: PACU  Patient participation: Able to fully participate in evaluation  Level of consciousness: awake and alert  Pain management: adequate  Airway patency: patent  Cardiovascular status: acceptable  Respiratory status: acceptable  Hydration status: acceptable  PONV: controlled     Anesthetic complications: None          Last vitals:  Vitals:    10/06/19 1300 10/06/19 1449 10/06/19 1512   BP: (!) 158/80 (!) 145/70 139/64   Pulse:   69   Resp: 14 18 18   Temp:   98.1  F (36.7  C)   SpO2: 93% 95% 93%         Electronically Signed By: Arnie Muller MD  October 6, 2019  4:02 PM

## 2019-10-06 NOTE — PROGRESS NOTES
Chart review- post op day 1 no complications, remains in NSR on TELE  Anticoagulation discussed with family by hospitalist, started on Eliquis  Would recommend continued low dose BB as tolerates.  Please call with further questions.

## 2019-10-06 NOTE — PROGRESS NOTES
[facility]    Medicine Progress Note - Hospitalist Service       Date of Admission:  10/4/2019  Date of Service: 10/06/2019    Assessment & Plan      84 year old male with a history of CVA with expressive aphasia, HTN, HLD, with recent mechanical fall on 9/30 resulting in an impacted, intra-articular, nondisplaced left distal radius fracture.  Patient returned to the hospital as an outpatient for elective ORIF with Dr. Interiano.  As patient was being evaluated prior to surgery, he was found to be in new onset Atrial Fibrillation with RVR and directly admitted from the pre-op surgical area.      New Onset Atrial Fibrillation with RVR - pt presented with asymptomatic new onset afib with rates up to 140.  No chest pain or shortness of breath.  BP stable.  S/p IV Metoprolol in the pre-op area.  Patient was started on a diltiazem drip.  However his blood pressure became soft and he was given a dose of digoxin, with improvement.  - Converted to sinus rhythm.  Started on metoprolol 25 mg twice daily  -- Stop digoxin   - Appreciate cardiology consultation.    -- Was on a heparin drip for anticoagulation, transition to Eliquis after his surgery.  Plavix has been stopped. Discussed pros and cons of different anti-coagulants and the risk of bleeding with pt and his wife, they are in agreement with using eliquis   - Echo showing preserved EF, no regional wall motion abnormalities     Left Distal Radial Fracture - s/p mechanical fall 9/30.    - Orthopedic Surgery consult. Underwent ORIF in the OR earlier today, doing well     Hx CVA - hx of first CVA in 2004 with multiple TIA per family.  Most recently hospitalized 7/2019 with a Left MCA ischemic stroke.  Pt with expressive aphasia and right sided weakness at baseline. on ASA and Plavix.   - continue PTA Lovastatin  - Stop Plavix, since being started on Eliquis. Continue asa for now, will defer to his outpatient providers if the aspirin could also be stopped     HTN -  Given his soft BP on diltiazem, we stopped the PTA Amlodipine and started on metoprolol. However, BP is elevated today. If the BP stays elevated after receiving his metoprolol, then may need to add back the amlodipine       Depression/Anxiety - continue pta Celexa  Glaucoma - continue pta eye gtts  GERD - continue pta Protonix    DVT Prophylaxis: Heparin gtt / Eliquis   Pena Catheter: not present  Code Status: DNR/DNI      Disposition Plan   Expected discharge: Tomorrow, recommended to prior living arrangement pending recovery from surgery and control of a.fib   Entered: Jon Elkins MD 10/06/2019, 12:57 PM       The patient's care was discussed with the Bedside Nurse, Patient and Patient's Family.    Jon Elkins MD  Hospitalist Service  [facility]    ______________________________________________________________________    Interval History   Chart reviewed and patient seen. Case discussed with nursing staff.     Patient feels well, has expressive aphasia at baseline, seen after surgery, doing well, Denies any chest pain, shortness of breath. No reports of abdominal pain or vomiting. No new complaints voiced otherwise.       Data reviewed today: I reviewed all medications, new labs and imaging results over the last 24 hours. I personally reviewed    Physical Exam   Vital Signs: Temp: 97.3  F (36.3  C) Temp src: Oral BP: (!) 157/73 Pulse: 52 Heart Rate: 66 Resp: 15 SpO2: 93 % O2 Device: None (Room air) Oxygen Delivery: 10 LPM  Weight: 159 lbs 11.2 oz    GENERAL:  Awake and alert, No acute distress.  PSYCH: appropriate affect, no acute agitation   HEENT:  Neck is Supple, trachea is midline, EOMI, conjunctiva clear  CARDIOVASCULAR: Regular rate and rhythm, Normal S1, S2, no loud murmurs, no rubs or gallops.   PULMONARY:  Clear to auscultation bilaterally. Good air entry on both sides  GI: Abdomen is soft, non tender, non-distended, bowel sounds present. No rebound or guarding   SKIN:  No  cyanosis or clubbing, no obvious exanthems on exposed areas   MSK: Extremities are warm and well perfused. No pitting edema, left arm is in a dressing   Neuro: Awake and oriented, expressive aphasia, chronic right sided weakness     Data   Recent Labs   Lab 10/06/19  0729 10/04/19  1450   WBC  --  5.2   HGB  --  12.9*   MCV  --  94   PLT  --  179    141   POTASSIUM 3.9 4.0   CHLORIDE 111* 110*   CO2 25 26   BUN 24 16   CR 1.27* 1.12   ANIONGAP 5 5   WILLIAM 8.5 8.5   * 89       Recent Results (from the past 24 hour(s))   XR Surgery MICHELLE Fluoro L/T 5 Min w Stills    Narrative    This exam was marked as non-reportable because it will not be read by a   radiologist or a [place] non-radiologist provider.               Medications     HEParin Stopped (10/06/19 0715)     - MEDICATION INSTRUCTIONS -         apixaban ANTICOAGULANT  5 mg Oral BID     brinzolamide-brimonidine  1 drop Both Eyes BID     citalopram  40 mg Oral Daily     lovastatin  40 mg Oral At Bedtime     metoprolol tartrate  25 mg Oral BID     pantoprazole  40 mg Oral QAM AC     sodium chloride (PF)  3 mL Intracatheter Q8H

## 2019-10-06 NOTE — ANESTHESIA PREPROCEDURE EVALUATION
Anesthesia Pre-Procedure Evaluation    Patient: Tk Richmond   MRN: [id number] : 1935          Preoperative Diagnosis: * No pre-op diagnosis entered *    Procedure(s):  OPEN REDUCTION INTERNAL FIXATION, FRACTURE, WRIST    Past Medical History:   Diagnosis Date     Glaucoma      Hyperlipidemia LDL goal < 100      Hypertension      Stroke (H)     , speech deficit     Past Surgical History:   Procedure Laterality Date     IR CAROTID CEREBRAL ANGIOGRAM LEFT  2019     ORTHOPEDIC SURGERY       Anesthesia Evaluation     . Pt has had prior anesthetic. Type: General           ROS/MED HX    ENT/Pulmonary:  - neg pulmonary ROS     Neurologic:     (+)CVA date:  with deficits- aphasia, TIA date:  features: aphasia,     Cardiovascular: Comment: Severely dilated ascending aorta- 5.2 cm    (+) Dyslipidemia, hypertension----. Taking blood thinners Pt has received instructions: Instructions Given to patient: convert to hep drip. . . :. dysrhythmias a-fib, .       METS/Exercise Tolerance:     Hematologic:  - neg hematologic  ROS       Musculoskeletal:   (+) fracture upper extremity,  -       GI/Hepatic:  - neg GI/hepatic ROS       Renal/Genitourinary:  - ROS Renal section negative       Endo:  - neg endo ROS       Psychiatric:  - neg psychiatric ROS       Infectious Disease:  - neg infectious disease ROS       Malignancy:      - no malignancy   Other: Comment: Glaucoma   (+) No chance of pregnancy C-spine cleared: N/A, no H/O Chronic Pain,no other significant disability   - neg other ROS                      Physical Exam  Normal systems: cardiovascular, pulmonary and dental    Airway   Mallampati: II  TM distance: >3 FB  Neck ROM: full    Dental     Cardiovascular       Pulmonary             Lab Results   Component Value Date    WBC 5.2 10/04/2019    HGB 12.9 (L) 10/04/2019    HCT 38.8 (L) 10/04/2019     10/04/2019     10/04/2019    POTASSIUM 4.0 10/04/2019    CHLORIDE 110 (H) 10/04/2019     "CO2 26 10/04/2019    BUN 16 10/04/2019    CR 1.12 10/04/2019    GLC 89 10/04/2019    WILLIAM 8.5 10/04/2019    MAG 1.9 10/04/2019    PTT 27 07/17/2019    INR 1.06 07/17/2019    TSH 5.62 (H) 10/04/2019    T4 1.06 10/04/2019       Preop Vitals  BP Readings from Last 3 Encounters:   10/06/19 (!) 148/73   09/30/19 (!) 156/72   07/31/19 135/61    Pulse Readings from Last 3 Encounters:   10/05/19 55   09/30/19 62   07/31/19 68      Resp Readings from Last 3 Encounters:   10/06/19 16   09/30/19 20   07/31/19 17    SpO2 Readings from Last 3 Encounters:   10/06/19 96%   09/30/19 98%   07/31/19 95%      Temp Readings from Last 1 Encounters:   10/06/19 96  F (35.6  C) (Oral)    Ht Readings from Last 1 Encounters:   10/04/19 1.778 m (5' 10\")      Wt Readings from Last 1 Encounters:   10/06/19 72.4 kg (159 lb 11.2 oz)    Estimated body mass index is 22.91 kg/m  as calculated from the following:    Height as of this encounter: 1.778 m (5' 10\").    Weight as of this encounter: 72.4 kg (159 lb 11.2 oz).       Anesthesia Plan      History & Physical Review  History and physical reviewed and following examination; no interval change.    ASA Status:  3 .    NPO Status:  > 8 hours    Plan for General and ETT with Intravenous induction. Maintenance will be Balanced.    PONV prophylaxis:  Ondansetron (or other 5HT-3) and Dexamethasone or Solumedrol       Postoperative Care  Postoperative pain management:  IV analgesics.      Consents  Anesthetic plan, risks, benefits and alternatives discussed with:  Patient.  Use of blood products discussed: Yes.   Use of blood products discussed with Patient.  Consented to blood products.  .                 Arnie Muller MD                    .  "

## 2019-10-07 ENCOUNTER — APPOINTMENT (OUTPATIENT)
Dept: PHYSICAL THERAPY | Facility: CLINIC | Age: 84
DRG: 982 | End: 2019-10-07
Attending: INTERNAL MEDICINE
Payer: MEDICARE

## 2019-10-07 VITALS
OXYGEN SATURATION: 97 % | BODY MASS INDEX: 22.86 KG/M2 | TEMPERATURE: 97.6 F | WEIGHT: 159.7 LBS | HEART RATE: 51 BPM | RESPIRATION RATE: 16 BRPM | SYSTOLIC BLOOD PRESSURE: 145 MMHG | DIASTOLIC BLOOD PRESSURE: 65 MMHG | HEIGHT: 70 IN

## 2019-10-07 LAB
ANION GAP SERPL CALCULATED.3IONS-SCNC: 6 MMOL/L (ref 3–14)
BUN SERPL-MCNC: 26 MG/DL (ref 7–30)
CALCIUM SERPL-MCNC: 8.5 MG/DL (ref 8.5–10.1)
CHLORIDE SERPL-SCNC: 111 MMOL/L (ref 94–109)
CO2 SERPL-SCNC: 25 MMOL/L (ref 20–32)
CREAT SERPL-MCNC: 1.15 MG/DL (ref 0.66–1.25)
ERYTHROCYTE [DISTWIDTH] IN BLOOD BY AUTOMATED COUNT: 13.9 % (ref 10–15)
GFR SERPL CREATININE-BSD FRML MDRD: 59 ML/MIN/{1.73_M2}
GLUCOSE SERPL-MCNC: 106 MG/DL (ref 70–99)
HCT VFR BLD AUTO: 38.1 % (ref 40–53)
HGB BLD-MCNC: 12.2 G/DL (ref 13.3–17.7)
INTERPRETATION ECG - MUSE: NORMAL
MCH RBC QN AUTO: 30.5 PG (ref 26.5–33)
MCHC RBC AUTO-ENTMCNC: 32 G/DL (ref 31.5–36.5)
MCV RBC AUTO: 95 FL (ref 78–100)
PLATELET # BLD AUTO: 179 10E9/L (ref 150–450)
POTASSIUM SERPL-SCNC: 3.9 MMOL/L (ref 3.4–5.3)
RBC # BLD AUTO: 4 10E12/L (ref 4.4–5.9)
SODIUM SERPL-SCNC: 142 MMOL/L (ref 133–144)
WBC # BLD AUTO: 7.8 10E9/L (ref 4–11)

## 2019-10-07 PROCEDURE — 25000132 ZZH RX MED GY IP 250 OP 250 PS 637: Mod: GY | Performed by: ORTHOPAEDIC SURGERY

## 2019-10-07 PROCEDURE — 85027 COMPLETE CBC AUTOMATED: CPT | Performed by: ORTHOPAEDIC SURGERY

## 2019-10-07 PROCEDURE — 99232 SBSQ HOSP IP/OBS MODERATE 35: CPT | Performed by: INTERNAL MEDICINE

## 2019-10-07 PROCEDURE — 80048 BASIC METABOLIC PNL TOTAL CA: CPT | Performed by: ORTHOPAEDIC SURGERY

## 2019-10-07 PROCEDURE — 97530 THERAPEUTIC ACTIVITIES: CPT | Mod: GP | Performed by: PHYSICAL THERAPIST

## 2019-10-07 PROCEDURE — 36415 COLL VENOUS BLD VENIPUNCTURE: CPT | Performed by: ORTHOPAEDIC SURGERY

## 2019-10-07 PROCEDURE — 97161 PT EVAL LOW COMPLEX 20 MIN: CPT | Mod: GP | Performed by: PHYSICAL THERAPIST

## 2019-10-07 PROCEDURE — 25000132 ZZH RX MED GY IP 250 OP 250 PS 637: Mod: GY | Performed by: INTERNAL MEDICINE

## 2019-10-07 RX ORDER — METOPROLOL SUCCINATE 25 MG/1
25 TABLET, EXTENDED RELEASE ORAL DAILY
Qty: 30 TABLET | Refills: 0 | Status: SHIPPED | OUTPATIENT
Start: 2019-10-07 | End: 2021-05-24

## 2019-10-07 RX ORDER — AMLODIPINE BESYLATE 5 MG/1
5 TABLET ORAL EVERY MORNING
Status: DISCONTINUED | OUTPATIENT
Start: 2019-10-07 | End: 2019-10-07 | Stop reason: HOSPADM

## 2019-10-07 RX ORDER — TRAMADOL HYDROCHLORIDE 50 MG/1
50 TABLET ORAL EVERY 6 HOURS PRN
Qty: 20 TABLET | Refills: 0 | Status: SHIPPED | OUTPATIENT
Start: 2019-10-07 | End: 2019-10-10

## 2019-10-07 RX ORDER — HYDROCODONE BITARTRATE AND ACETAMINOPHEN 5; 325 MG/1; MG/1
1 TABLET ORAL EVERY 4 HOURS PRN
Qty: 15 TABLET | Refills: 0 | Status: SHIPPED | OUTPATIENT
Start: 2019-10-07 | End: 2019-10-07

## 2019-10-07 RX ADMIN — APIXABAN 5 MG: 5 TABLET, FILM COATED ORAL at 08:48

## 2019-10-07 RX ADMIN — PANTOPRAZOLE SODIUM 40 MG: 40 TABLET, DELAYED RELEASE ORAL at 08:48

## 2019-10-07 RX ADMIN — CITALOPRAM HYDROBROMIDE 40 MG: 20 TABLET ORAL at 08:48

## 2019-10-07 RX ADMIN — BRINZOLAMIDE/BRIMONIDINE TARTRATE 1 DROP: 10; 2 SUSPENSION/ DROPS OPHTHALMIC at 08:49

## 2019-10-07 RX ADMIN — ACETAMINOPHEN 650 MG: 325 TABLET, FILM COATED ORAL at 08:49

## 2019-10-07 RX ADMIN — AMLODIPINE BESYLATE 5 MG: 5 TABLET ORAL at 11:20

## 2019-10-07 RX ADMIN — HYDROXYZINE HYDROCHLORIDE 10 MG: 10 TABLET ORAL at 08:49

## 2019-10-07 ASSESSMENT — ACTIVITIES OF DAILY LIVING (ADL)
ADLS_ACUITY_SCORE: 29
ADLS_ACUITY_SCORE: 28
ADLS_ACUITY_SCORE: 26

## 2019-10-07 NOTE — PLAN OF CARE
Pt A&O, vitals monitored. On RA with capnography in place.  Up with Ax1, gb.  Ambulated in mascorro.  Voiding in urinal in bed, adequate amounts.  Denies pain.  LUE with cast padding/ace wrap CDI. CMS intact, fingers on LUE bruised and edematous.  Tolerating regular diet.  Will continue to monitor.

## 2019-10-07 NOTE — OP NOTE
Procedure Date: 10/06/2019      PREOPERATIVE DIAGNOSIS:  Left intraarticular distal radius fracture.      POSTOPERATIVE DIAGNOSIS:  Left intraarticular distal radius fracture.      PROCEDURE:  Open reduction internal fixation, left distal radius fracture.      SURGEON:  Krystal Interiano MD      ANESTHESIA:  General.      INDICATIONS:  The patient had been scheduled for surgery for his left wrist fracture.  He is a right-hand dominant retired  who sustained an injury in a fall in his home.  He has right upper extremity weakness from a stroke earlier this year and relies on his left hand for strength and use of a cane.  With an unstable fracture pattern, surgery has been recommended.  He had been brought to the operating room on Friday, and in the OR, a rhythm change was noted.  Surgery was canceled and he was seen by Cardiology for preoperative evaluation.  He reverted to normal sinus rhythm and has been stable in the hospital.  He has been cleared for surgery today.  His wife was present.  All questions were answered.  He is ready to proceed.      DESCRIPTION OF PROCEDURE:  The patient was brought to the operating room.  General anesthesia was induced without difficulty.  His left upper extremity was prepped and draped below an upper arm tourniquet.  A pause was performed to confirm the site and the procedure planned.  X-rays were also verified.      Finger trap traction was applied to the index and long fingers and 10 pounds of traction was used to aid in the reduction during the procedure.  The arm was exsanguinated with an Esmarch and the tourniquet inflated to 250 mmHg.      A longitudinal incision was made in the distal forearm.  Skin and subcutaneous tissues were divided.  The pronator was elevated in [id number] fashion and the fracture site was exposed.  The fracture site was cleared of hematoma and interposed muscle.  A fracture tenaculum and dental hook were used to obtain a reduction, which was held  provisionally with a 0.045 K-wire advanced from the radial styloid to metaphyseal bone.      Next, a [employer] plate was selected to fit the dimensions of his distal radius.  It was secured proximally with a 3.5 mm screw, and distal screw pegs were placed to engage the distal fragment.  The proximal screws were then placed without difficulty, all achieving adequate purchase.  The provisional traction and pin fixation was removed and the wrist was taken through a range of motion.  The distal radioulnar joint was relatively stable in pronation and supination.  Carpal kinematics were reviewed fluoroscopically, and no unusual findings or additional injury was identified.  The wound was then irrigated thoroughly.  Final x-rays demonstrated satisfactory plate position and screw length.  The pronator was repaired over the plate with 3-0 Vicryl suture.  The tourniquet was deflated and hemostasis was achieved with pressure and bipolar cautery.  The skin was closed with Monocryl and Prolene suture and a bulky sterile dressing was applied, incorporating a sugar-tong splint.  At followup short arm immobilization will be adequate.  He tolerated the procedure well and was taken to the recovery room in satisfactory condition.  He will be discharged per Medicine.         FELICIA MATHEW MD             D: 10/07/2019   T: 10/07/2019   MT: CHRISTOPHER      Name:     AVIS GOODE   MRN:      [id number]        Account:        [id number]   :      1935           Procedure Date: 10/06/2019      Document: [id number]

## 2019-10-07 NOTE — PLAN OF CARE
Day RN  VS monitored, bradycardia, Metoprolol held per parameters and after discussion with Dr Schaeffer, Amlodipine restarted for HTN, remote tele, DNR/DNI, Tylenol/Atarax and ice for L UE pain, drsjami C/D/I, denies numbness/tingling, able to wiggle fingers, good cap refill, 2+ edema L fingers, elevated on pillows, voiding, up w/SBA and GB, NWB L UE, R sided residual from h/o prior CVA, wife updated at b/s, pt hoping to discharge home tonight after PT eval at 1500, will cont to monitor.

## 2019-10-07 NOTE — PROGRESS NOTES
10/07/19 1517   Quick Adds   Type of Visit Initial PT Evaluation   Living Environment   Lives With spouse   Living Arrangements house   Home Accessibility stairs to enter home   Number of Stairs, Main Entrance 2   Stair Railings, Main Entrance railings safe and in good condition   Transportation Anticipated family or friend will provide   Living Environment Comment Spouse is home all the time except to go grocery shopping.    Self-Care   Equipment Currently Used at Home cane, straight;walker, rolling;grab bar, tub/shower   Activity/Exercise/Self-Care Comment Pt has been going to PT/OT/SP outpatient almost daily. Spouse drives.   Functional Level Prior   Ambulation 1-->assistive equipment   Transferring 3-->assistive equipment and person   Toileting 3-->assistive equipment and person   Bathing 1-->assistive equipment   Communication 2-->difficulty speaking (not related to language barrier)   Fall history within last six months yes   Number of times patient has fallen within last six months 2   Prior Functional Level Comment Pt previously use FWW and recently using SEC on L until fracture, but spouse reports pt mostly carries the SEC during ambulation. Spouse will assist pt with transfers and mobility as needed. Spouse provides all IADLs for patient.    General Information   Onset of Illness/Injury or Date of Surgery - Date 10/04/19   Referring Physician Dr. Meza   Patient/Family Goals Statement Return home   Pertinent History of Current Problem (include personal factors and/or comorbidities that impact the POC) 84 year old male with a history of CVA with expressive aphasia, HTN, HLD, with recent mechanical fall on 9/30 resulting in an impacted, intra-articular, nondisplaced left distal radius fracture.  Patient returned to the hospital as an outpatient for elective ORIF with Dr. Interiano.  As patient was being evaluated prior to surgery, he was found to be in new onset Atrial Fibrillation with RVR and directly  admitted from the pre-op surgical area. Now status post ORIF 10/6.    Weight-Bearing Status - LUE nonweight-bearing  (Able to WB through elbow but not wrist)   Cognitive Status Examination   Level of Consciousness alert   Follows Commands and Answers Questions able to follow single-step instructions   Personal Safety and Judgment intact   Cognitive Comment Pt able to follow commands appropriately. Pt has word finding difficulties and mostly responds with 1 word phrases.    Pain Assessment   Patient Currently in Pain No   Integumentary/Edema   Integumentary/Edema Comments L UE in splint.    Range of Motion (ROM)   ROM Comment All joints WFLs except L UE not observed due splint.    Strength   Strength Comments All joints WFLs, some generalized weakness noted with functional mobility. DF/PF 4/5 in B LE.    Bed Mobility   Bed Mobility Comments Pt moved supine<>sit, CGA/SBA.    Transfer Skills   Transfer Comments Pt moved sit<>stand, SBA.   Gait   Gait Comments Pt ambulated 140' x100; with no AD, SBA. Trialed SEC, but pt demonstrated reduce coordination and balance.    General Therapy Interventions   Planned Therapy Interventions bed mobility training;gait training;transfer training   Clinical Impression   Criteria for Skilled Therapeutic Intervention yes, treatment indicated   PT Diagnosis Impaired mobility   Influenced by the following impairments Splinted L UE, strength deficits    Functional limitations due to impairments Impaired bed mobility, gait, transfers   Clinical Presentation Stable/Uncomplicated   Clinical Presentation Rationale Medically stable   Clinical Decision Making (Complexity) Low complexity   Therapy Frequency Daily   Predicted Duration of Therapy Intervention (days/wks) 1 day   Anticipated Discharge Disposition Home with Assist;Home with Outpatient Therapy   Risk & Benefits of therapy have been explained Yes   Patient, Family & other staff in agreement with plan of care Yes   [facility]  "AM-PAC  \"6 Clicks\" V.2 Basic Mobility Inpatient Short Form   1. Turning from your back to your side while in a flat bed without using bedrails? 3 - A Little   2. Moving from lying on your back to sitting on the side of a flat bed without using bedrails? 3 - A Little   3. Moving to and from a bed to a chair (including a wheelchair)? 4 - None   4. Standing up from a chair using your arms (e.g., wheelchair, or bedside chair)? 4 - None   5. To walk in hospital room? 4 - None   6. Climbing 3-5 steps with a railing? 3 - A Little   Basic Mobility Raw Score (Score out of 24.Lower scores equate to lower levels of function) 21   Total Evaluation Time   Total Evaluation Time (Minutes) 6     "

## 2019-10-07 NOTE — PROGRESS NOTES
Orthopedic Surgery  [facility] [place]  10/7/2019  Admit Date:  10/4/2019  POD 1 Day Post-Op  S/P Procedure(s):  Open reduction internal fixation, left distal radius fracture.      Patient resting comfortably in bed.    Pain controlled.  Tolerating oral intake.    Denies nausea or vomiting  Denies chest pain or shortness of breath  No events overnight.     Alert and orient to person, place, and time.  Vital Sign Ranges  Temperature Temp  Av.2  F (36.2  C)  Min: 96.1  F (35.6  C)  Max: 98.1  F (36.7  C)   Blood pressure Systolic (24hrs), Av , Min:118 , Max:173        Diastolic (24hrs), Av, Min:53, Max:80      Pulse Pulse  Av.3  Min: 51  Max: 69   Respirations Resp  Avg: 15.7  Min: 14  Max: 18   Pulse oximetry SpO2  Av.1 %  Min: 93 %  Max: 97 %       Splint in place  Sensation intact in upper arm over biceps as well as in hand r/m/u nerve distribution  Able to abduct and oppose right thumb  Fingers mild ecchymosis and swelling  Able to ROM fingers/ strength intact (baseline weakness from CVA)  +Radial pulse  +cap refill     Labs:  Recent Labs   Lab Test 10/07/19  0704 10/06/19  0729 10/04/19  1450   POTASSIUM 3.9 3.9 4.0     Recent Labs   Lab Test 10/07/19  0704 10/04/19  1450 19  0528   HGB 12.2* 12.9* 11.2*     Recent Labs   Lab Test 19  1226 19  1223 19  0454   INR 1.06 1.04 1.05     Recent Labs   Lab Test 10/07/19  0704 10/04/19  1450 19  0528    179 176       A/P  1. S/p right distal radius fracture ORIF   Continue Eliquis (per cardiology) and ASA for DVT prophylaxis.     Mobilize with PT/OT    Non-WB right UE.     Continue current pain regiment.   Leave splint intact   Ice and elevate right UE    2. Disposition   Anticipate d/c to home vs TCU based on PT recommendations, if wife can take care of patient may go home today.    Thania Horner PA-C

## 2019-10-07 NOTE — PLAN OF CARE
Reviewed discharge instructions and medications with patient and spouse. Questions answered. Patient discharged to home via spouse with, discharge instructions, filled medications (vistaril, eliquis, aspirin, ultram, and metoprolol), and belongings at this time.

## 2019-10-07 NOTE — PROGRESS NOTES
[facility]    Medicine Progress Note - Hospitalist Service       Date of Admission:  10/4/2019  Date of Service: 10/07/2019    Assessment & Plan      84 year old male with a history of CVA with expressive aphasia, HTN, HLD, with recent mechanical fall on 9/30 resulting in an impacted, intra-articular, nondisplaced left distal radius fracture.  Patient returned to the hospital as an outpatient for elective ORIF with Dr. Interiano.  As patient was being evaluated prior to surgery, he was found to be in new onset Atrial Fibrillation with RVR and directly admitted from the pre-op surgical area.      1. New Onset Atrial Fibrillation with RVR - pt presented with asymptomatic new onset afib with rates up to 140.  No chest pain or shortness of breath.  BP stable.  S/p IV Metoprolol in the pre-op area.  Patient was started on a diltiazem drip.  However his blood pressure became soft and he was given a dose of digoxin, with improvement.  - Converted to sinus rhythm.  Started on metoprolol 25 mg twice daily.  - Patient is sinus bradycardia, changed beta-blocker to Toprol-XL upon discharge 25 mg daily.  -Advised to follow-up with primary care provider and cardiologist as an outpatient   -Cardiology recommended to continue beta-blocker at lower dose, given bradycardia decreased to Toprol-XL 25 mg p.o. daily.    -- Was on a heparin drip for anticoagulation, transition to Eliquis after his surgery.  Plavix has been stopped. Discussed pros and cons of different anti-coagulants and the risk of bleeding with pt and his wife, they are in agreement with using eliquis   - Echo showing preserved EF, no regional wall motion abnormalities     2. Left Distal Radial Fracture - s/p mechanical fall 9/30.    -Orthopedic Surgery consult. Underwent ORIF in the OR.  -So far has most postoperative course   -Working with therapy and did well.     3. Hx CVA - hx of first CVA in 2004 with multiple TIA per family.  Most recently hospitalized  7/2019 with a Left MCA ischemic stroke.  Pt with expressive aphasia and right sided weakness at baseline. on ASA and Plavix.   - Continue lovastatin upon discharge.  -Plavix is stopped, as patient is started on Eliquis.  -She will be on aspirin 81 mg p.o. daily for 21 days    4. HTN - Given his soft BP on diltiazem, we stopped the PTA Amlodipine and started on metoprolol. However, BP is elevated today. If the BP stays elevated after receiving his metoprolol, then may need to add back the amlodipine       DVT Prophylaxis: Heparin gtt / Eliquis   Pena Catheter: not present  Code Status: DNR/DNI      Disposition Plan   Expected discharge: Patient will be discharged today per orthopedic service.   Entered: Maxi Meza MD 10/07/2019, 4:01 PM       The patient's care was discussed with the Bedside Nurse, Patient and Patient's Family.  I discussed with patient, his wife at bedside all their question concerns addressed showed understanding.  Medication reconciled, patient will be discharged this evening per primary team    Maxi Meza MD  Hospitalist Service  [facility]    ______________________________________________________________________    Interval History   Chart reviewed and patient seen. Case discussed with nursing staff.     Patient feels well, has expressive aphasia at baseline, feels better, no new overnight issues, his wife at bedside, denied nausea or vomiting, denied any fever or chills, no urine or bowel habit change.      Data reviewed today: I reviewed all medications, new labs and imaging results over the last 24 hours. I personally reviewed    Physical Exam   Vital Signs: Temp: 97.6  F (36.4  C) Temp src: Oral BP: (!) 145/65 Pulse: 51 Heart Rate: 59 Resp: 16 SpO2: 97 % O2 Device: None (Room air)    Weight: 159 lbs 11.2 oz    GENERAL:  Awake and alert, No acute distress.  Expressive aphasia  PSYCH: appropriate affect, no acute agitation   HEENT:  Neck is Supple,  trachea is midline, EOMI, conjunctiva clear  CARDIOVASCULAR: Regular rate and rhythm, Normal S1, S2, no loud murmurs, no rubs or gallops.   PULMONARY:  Clear to auscultation bilaterally. Good air entry on both sides  GI: Abdomen is soft, non tender, non-distended, bowel sounds present. No rebound or guarding   SKIN:  No cyanosis or clubbing, no obvious exanthems on exposed areas   MSK: Extremities are warm and well perfused. No pitting edema, left arm is in a dressing   Neuro: Awake and oriented, expressive aphasia, chronic right sided weakness.    Data   Recent Labs   Lab 10/07/19  0704 10/06/19  0729 10/04/19  1450   WBC 7.8  --  5.2   HGB 12.2*  --  12.9*   MCV 95  --  94     --  179    141 141   POTASSIUM 3.9 3.9 4.0   CHLORIDE 111* 111* 110*   CO2 25 25 26   BUN 26 24 16   CR 1.15 1.27* 1.12   ANIONGAP 6 5 5   WILLIAM 8.5 8.5 8.5   * 104* 89       No results found for this or any previous visit (from the past 24 hour(s)).  Medications     - MEDICATION INSTRUCTIONS -         amLODIPine  5 mg Oral QAM     apixaban ANTICOAGULANT  5 mg Oral BID     brinzolamide-brimonidine  1 drop Both Eyes BID     citalopram  40 mg Oral Daily     lovastatin  40 mg Oral At Bedtime     pantoprazole  40 mg Oral QAM AC     sodium chloride (PF)  3 mL Intracatheter Q8H

## 2019-10-07 NOTE — PLAN OF CARE
A&O. VSS.Denied pain. Dressing is intact. Fingers swollen on the surgical hand. Elevated with a pillow. Voiding using urinal. Incontinent at times. Assist of 1 with a gait belt.

## 2019-10-07 NOTE — PLAN OF CARE
PT: orders received and eval complete. 84 year old male with a history of CVA with expressive aphasia, HTN, HLD, with recent mechanical fall on 9/30 resulting in an impacted, intra-articular, nondisplaced left distal radius fracture.  Patient returned to the hospital as an outpatient for elective ORIF with Dr. Interiano.  As patient was being evaluated prior to surgery, he was found to be in new onset Atrial Fibrillation with RVR and directly admitted from the pre-op surgical area. Now status post ORIF 10/6. Prior to admission pt had SEC for ambulation, but per spouse pt mostly carried the cane during ambulation rather than using it as an assistive device. Pts spouse reports assisting pt with ADLs as needed including transfers.     Discharge Planner PT   Patient plan for discharge: Home  Current status: Pt performed supine<>sit, SBA. Pt and spouse educated on weightbearing status through elbow of L UE to assist with bed mobility. Pt performed sit<>stand, CGA/SBA. PT cues for safe hand placement. Pt ambulated 140' and 100' with no AD, SBA. Trialed SEC, but pt demonstrated reduced coordination and balance. Pt HR 60-70 bpm with activity and SA02>90%.    Barriers to return to prior living situation: none  Recommendations for discharge: Home with assist from spouse for mobility such as transfers, walking, and all ADLs.   Rationale for recommendations: Pt is presenting close to baseline for mobility. Pts spouse is comfortable providing assist for mobility as needed. Pt will continue OP PT for balance and functional strength.        Entered by: Joe Larsen 10/07/2019 4:35 PM     Physical Therapy Discharge Summary    Reason for therapy discharge:    All goals and outcomes met, no further needs identified.    Progress towards therapy goal(s). See goals on Care Plan in [facility] electronic health record for goal details.  Goals met    Therapy recommendation(s):    Continued therapy is recommended.  Rationale/Recommendations:   see above.

## 2019-10-07 NOTE — CONSULTS
Care Transition Initial Assessment - RN        Met with: Patient and wife, Ml RICHTER   Active Problems:    A-fib (H)       Cognitive Status: [place], awake, alert, oriented with expressive aphasia.  Primary Care Clinic Name: [facility]  Primary Care MD Name: Shyam Mclean  Contact information and PCP information verified: Yes  Lives With: spouse      Quality of Family Relationships: involved, supportive  Description of Support System: Involved, Supportive   Who is your support system?: Wife       Insurance concerns: No Insurance issues identified  ASSESSMENT  Patient currently receives the following services:    house keeping support. Patient has out patient therapy for OT/PT and speech.        Identified issues/concerns regarding health management: Patient identifies Mod risk with elevated NORMA. Patient has x3 prev inpatient and x1 ED visit in 6 mo.  Patient has history of multiple stroke, acute ischemic CVA to LMCA 7/2019, DAVID, HTN, depression, expressive aphasia and right sided weakness at baseline.  Patient admitted 10/4 with new onset Afib. He had a mechanical fall 9/30 with left distal radius fracture. Patient s/p 10/6 ORIF . Met with patient and wife for care coordination and discharge planning. Patient living at home with wife. Wife reports being his primary care giver. She provides support with bathing, grooming, dressing, medications and meals. Patient ambulates with a walker. He has out patient therapy for OT/PT and speech. His wife provides transportation. Patient has established care with PCP, Shyam Mclean, at [facility]. Provided family resources for community senior services. Encouraged follow up with providers. Patient will discharge with anticoagulant. Noted Pharmacy Discharge Education consulted.  Physical therapy to see patient today for further discharge recommendations. Discussed possible home care if indicated by MD. Wife open to home care.      A handoff  will be sent to PCP care coordination at discharge as patient and family can benefit from additional support with disease management and resources.     PLAN  Financial costs for the patient include none .  Patient given options and choices for discharge yes .    Patient anticipates discharging to home with wife.        Patient anticipates needs for home equipment: No  Transportation/person available to transport on day of discharge is his wife, Ml.         Appointments: Patient wife will call to schedule as she would like to coordinate appointment time.     Care  (CTS) will continue to follow as needed.    Hawk Borjas RN BSN CTS  Care Management Team  [facility]

## 2019-10-08 NOTE — PROGRESS NOTES
Transition Communication Hand-off for Care Transitions to Next Level of Care Provider    Name: Tk Richmond  : 1935  MRN #: [id number]  Primary Care Provider: Shyam Mclean  Primary Care MD Name: Shyam Mclean  Primary Clinic: [facility] [address]  [facility] [address]  Primary Care Clinic Name: [facility]  Reason for Hospitalization:  Oth intartic fracture of lower end of left radius, init [S52.572A]  Admit Date/Time: 10/4/2019  7:54 AM  Discharge Date: 10/7/19  Payor Source: Payor: MEDICARE / Plan: MEDICARE / Product Type: Medicare /     Readmission Assessment Measure (NORMA) Risk Score/category: Elevated    Plan of Care Goals/Milestone Events:          Reason for Communication Hand-off Referral: Fragility    Discharge Plan: home       Concern for non-adherence with plan of care:    no  Discharge Needs Assessment:  Needs      Most Recent Value   Anticipated Changes Related to Illness  other (see comments) [unsure]   Equipment Currently Used at Home  cane, straight, walker, rolling, grab bar, tub/shower   # of Referrals Placed by [facility]  Community Resources          Already enrolled in Tele-monitoring program and name of program:  none  Follow-up specialty is recommended: Yes    Follow-up plan:    Future Appointments   Date Time Provider Department Center   10/9/2019  1:15 PM Mili Lockhart SLP SLP [facility]   10/10/2019 10:30 AM Suellen Chang OT [facility]OT [facility]   10/10/2019 11:15 AM Janna Albright, PT [facility]   10/11/2019  1:15 PM Mili Lockhart SLP [facility]   10/14/2019 11:15 AM Terri Gonzalez, PT [facility]   10/15/2019  1:00 PM Mili Lockhart SLP RHSLP [facility]   10/15/2019  1:45 PM Henrietta Lora OTR [facility]   10/16/2019  1:15 PM Mili Lockhart SLP [facility]   10/17/2019 10:30 AM Suellen Chang OT [facility]   10/17/2019 11:15 AM Terri Gonzalez, PT [facility]    10/21/2019  9:45 AM Uriel Anderson MD [facility]   10/21/2019 11:15 AM Terri Gonzalez, PT [facility]   10/22/2019 11:15 AM NeMarcie wonga, OTR [facility]   10/23/2019  1:15 PM Mili Lockhart, SLP [facility]SL[facility]   10/24/2019 11:15 AM Jay Gonzalezin, PT [facility]   10/25/2019  1:15 PM Mili Lockhart, SLP [facility]   10/28/2019 11:15 AM Jay Gonzalezin, PT [facility]   10/29/2019 11:15 AM Geno, Henrietta, OTR [facility]   10/30/2019  1:15 PM Mili Lockhart, SLP [facility]   10/31/2019 10:30 AM Suellen Chang, OT [facility]   10/31/2019 11:15 AM Terri Gonzalez, PT [facility]   11/1/2019  1:15 PM Mili Lockhart, SLP [facility]   11/4/2019 11:15 AM Terri Gonzalez, PT [facility]   11/5/2019 11:15 AM Marcie Loraa, OTR [facility]   11/6/2019  1:15 PM Mili Lockhart, SLP [facility]   11/6/2019  2:15 PM Terri Gonzalez, PT [facility]   11/8/2019  1:15 PM Mili Lockhart, SLP [facility]   11/8/2019  2:00 PM NeMarcie wonga, OTR [facility]   11/11/2019 10:30 AM Katrina Sotelo, OT [facility]   11/11/2019 11:15 AM Terri Gonzalez, PT [facility]   11/13/2019  1:15 PM Mili Lockhart, SLP [facility]   11/13/2019  2:15 PM Terri Gonzalez, PT [facility]   11/15/2019  1:15 PM Mili Lockhart, SLP RHS [facility]   11/15/2019  2:30 PM NeHenrietta wong, OTR [facility]   11/19/2019 11:15 AM NeHenrietta wong, OTR [facility]   11/21/2019 11:15 AM Suellen Chang, OT [facility]   11/25/2019  1:00 PM NeHenrietta wong, OTR [facility]   11/27/2019  1:00 PM Henrietta Lora, OTR [facility]       Any outstanding tests or procedures:              Key Recommendations:  Patient identifies Mod risk with elevated NORMA. Patient has x3 prev inpatient and x1 ED visit in 6 mo.  Patient has history of multiple stroke, acute ischemic CVA to LMCA 7/2019, DAVID, HTN, depression, expressive  aphasia and right sided weakness at baseline.  Patient admitted 10/4 with new onset Afib. He had a mechanical fall 9/30 with left distal radius fracture. Patient s/p 10/6 ORIF . Met with patient and wife for care coordination and discharge planning. Patient living at home with wife. Wife reports being his primary care giver. She provides support with bathing, grooming, dressing, medications and meals. Patient ambulates with a walker. He has out patient therapy for OT/PT and speech. His wife provides transportation. Patient has established care with PCP, Shyam Mclean, at [facility]. Provided family resources for [facility] senior services. Encouraged follow up with providers. Patient will discharge with anticoagulant. Noted Pharmacy Discharge Education consulted for discharge medication teaching.  Physical therapy to see patient today for further discharge recommendations. Discussed possible home care if indicated by MD. Wife open to home care.       Handoff sent to PCP care coordination as patient and family can benefit from additional support with disease management and resources.     Recommendation follow up with PCP in 7 days    Hawk Borjas RN    AVS/Discharge Summary is the source of truth; this is a helpful guide for improved communication of patient story

## 2019-10-09 ENCOUNTER — HOSPITAL ENCOUNTER (OUTPATIENT)
Dept: SPEECH THERAPY | Facility: CLINIC | Age: 84
Setting detail: THERAPIES SERIES
End: 2019-10-09
Attending: PHYSICAL MEDICINE & REHABILITATION
Payer: MEDICARE

## 2019-10-09 ENCOUNTER — TELEPHONE (OUTPATIENT)
Dept: MEDSURG UNIT | Facility: CLINIC | Age: 84
End: 2019-10-09

## 2019-10-09 PROCEDURE — 92507 TX SP LANG VOICE COMM INDIV: CPT | Mod: GN,KX | Performed by: SPEECH-LANGUAGE PATHOLOGIST

## 2019-10-10 ENCOUNTER — HOSPITAL ENCOUNTER (OUTPATIENT)
Dept: OCCUPATIONAL THERAPY | Facility: CLINIC | Age: 84
Setting detail: THERAPIES SERIES
End: 2019-10-10
Attending: PHYSICAL MEDICINE & REHABILITATION
Payer: MEDICARE

## 2019-10-10 ENCOUNTER — HOSPITAL ENCOUNTER (OUTPATIENT)
Dept: PHYSICAL THERAPY | Facility: CLINIC | Age: 84
Setting detail: THERAPIES SERIES
End: 2019-10-10
Attending: PHYSICAL MEDICINE & REHABILITATION
Payer: MEDICARE

## 2019-10-10 PROCEDURE — 97535 SELF CARE MNGMENT TRAINING: CPT | Mod: GO | Performed by: OCCUPATIONAL THERAPIST

## 2019-10-10 PROCEDURE — 97110 THERAPEUTIC EXERCISES: CPT | Mod: GO | Performed by: OCCUPATIONAL THERAPIST

## 2019-10-10 PROCEDURE — 97110 THERAPEUTIC EXERCISES: CPT | Mod: GP | Performed by: PHYSICAL THERAPIST

## 2019-10-10 PROCEDURE — 97116 GAIT TRAINING THERAPY: CPT | Mod: GP,KX | Performed by: PHYSICAL THERAPIST

## 2019-10-11 ENCOUNTER — HOSPITAL ENCOUNTER (OUTPATIENT)
Dept: SPEECH THERAPY | Facility: CLINIC | Age: 84
Setting detail: THERAPIES SERIES
End: 2019-10-11
Attending: PHYSICAL MEDICINE & REHABILITATION
Payer: MEDICARE

## 2019-10-11 PROCEDURE — 92507 TX SP LANG VOICE COMM INDIV: CPT | Mod: GN,KX | Performed by: SPEECH-LANGUAGE PATHOLOGIST

## 2019-10-14 ENCOUNTER — HOSPITAL ENCOUNTER (OUTPATIENT)
Dept: PHYSICAL THERAPY | Facility: CLINIC | Age: 84
Setting detail: THERAPIES SERIES
End: 2019-10-14
Attending: PHYSICAL MEDICINE & REHABILITATION
Payer: MEDICARE

## 2019-10-14 PROCEDURE — 97112 NEUROMUSCULAR REEDUCATION: CPT | Mod: GP,KX | Performed by: PHYSICAL THERAPIST

## 2019-10-14 PROCEDURE — 97530 THERAPEUTIC ACTIVITIES: CPT | Mod: GP,KX | Performed by: PHYSICAL THERAPIST

## 2019-10-14 NOTE — PROGRESS NOTES
Outpatient Occupational Therapy Progress Note     Patient: Tk Richmond  : 1935    Beginning/End Dates of Reporting Period:  2019  to 10/14/2019    Referring Provider: Janet Bell MD    Therapy Diagnosis: Decreased ADL/IADL    Client Self Report: Pt reports he used the handgripper this week.  Pts wife said disposable coffee cup was a little too hot for patient. Pts scoopy plate was ordered. Pt reports that all suggested equipment has worked well.      Objective Measurements:     Objective Measure:    Details: R 20#, increase of 6 # since last measurement(Left not tested due to recent L UE injury due to a fall)   Objective Measure: Pinch   Details: Key R 8#, 2# increase Pickering R 5 #, 1# increase   Objective Measure: 9HPT   Details: R 25 Sec for first peg, three pegs in one minute.  Improved since last testing where patient was unable to  a peg with R hand.    Objective Measure: box and block   Details: R 13 blcks, increase of five blocks since last testing.    Objective Measure: CAM   Details: from -moderate deficits noted with single digit math, foresight and planning safety and judgement .  Pt showed no deficits iwth motor recall.  Simple problem solving may have mild deficits.  He completed the problem correctly, but knocked the blocks over with his hand right as he was completing the problem and had to begin again.                  Goals:     Goal Identifier HEP   Goal Description Pt will demonstrate good follow through at home of a B UE HEP for strength  and  coordination with SBA and min cues to increase functional strength and coordination while maximizing  independence and performance of ADL/IADLs. Pt will demonstrate I with HEP of 6-8 activities for weight bearing, stretching, AROM and progress to resistance exercises   Target Date 19   Date Met      Progress: In progress.  Pt using putty and gripper tool at home for R UE strength and coordination     Goal Identifier  R UE use   Goal Description Patient to demonstrate functional tasks with 60% use of R UE as functional assist for clothing fasteners, opening food packages, and increased ADL/IADL independence for closer return to prior level of function   Target Date 11/20/19   Date Met      Progress: Pt using R UE more out of necessity due to L UE injury 2 weeks ago. Pt demosntrating R UE involvment with eating, grooming, and handwriting.     Goal Identifier Safety with reach/simple meal prep task    Goal Description Pt will demonstrate kitchen safety while completing a simple to moderately complex meal prep task with electric range independently and while reaching at all levels using safe techniques to access items   Target Date 11/20/19   Date Met      Progress: Not yet addressed due to focus on other goals     Goal Identifier Memory compensation/problem solving strategies   Goal Description Patient to verbalize an understanding of memory compensation strategies and utilize memory tools (planner, calendar, etc effectively and independently for increased ability to manage time, appointments, daily schedule etc.     Target Date 11/20/19   Date Met      Progress:  Goal addressed, still making progress     Goal Identifier AE for increased ease of ADL/IADL completion   Goal Description Patient to verbalize and utilize 3 strategies and/or AE to compensate for decreased UE function and promote and demonstrate increased independence with ADL/IADLs, such as, dressing,    Target Date 11/20/19   Date Met      Progress: In progress:Pt recently obtained scoopy plate, and trialed adapted cups and pens with success.        Progress this reporting period: Pt had a set back a couple of weeks ago when he fell and injured his Left uninvolved arm, however this has placed him in a position to use his R UE for more tasks.  Pt demosntrating good gains with strength and coordination of R UE.  See details in objective measures above. Pt also making  gains with pre writing worksheets and use of adapted pens, increasing his control of pen and legibility in writing.     Plan:  Continue therapy per current plan of care.    Discharge:  No

## 2019-10-15 ENCOUNTER — HOSPITAL ENCOUNTER (OUTPATIENT)
Dept: SPEECH THERAPY | Facility: CLINIC | Age: 84
Setting detail: THERAPIES SERIES
End: 2019-10-15
Attending: PHYSICAL MEDICINE & REHABILITATION
Payer: MEDICARE

## 2019-10-15 ENCOUNTER — HOSPITAL ENCOUNTER (OUTPATIENT)
Dept: OCCUPATIONAL THERAPY | Facility: CLINIC | Age: 84
Setting detail: THERAPIES SERIES
End: 2019-10-15
Attending: PHYSICAL MEDICINE & REHABILITATION
Payer: MEDICARE

## 2019-10-15 PROCEDURE — 97110 THERAPEUTIC EXERCISES: CPT | Mod: GO,KX,XU | Performed by: OCCUPATIONAL THERAPIST

## 2019-10-15 PROCEDURE — 92507 TX SP LANG VOICE COMM INDIV: CPT | Mod: GN,KX | Performed by: SPEECH-LANGUAGE PATHOLOGIST

## 2019-10-15 PROCEDURE — 97535 SELF CARE MNGMENT TRAINING: CPT | Mod: GO,KX | Performed by: OCCUPATIONAL THERAPIST

## 2019-10-16 ENCOUNTER — HOSPITAL ENCOUNTER (OUTPATIENT)
Dept: SPEECH THERAPY | Facility: CLINIC | Age: 84
Setting detail: THERAPIES SERIES
End: 2019-10-16
Attending: PHYSICAL MEDICINE & REHABILITATION
Payer: MEDICARE

## 2019-10-16 PROCEDURE — 92507 TX SP LANG VOICE COMM INDIV: CPT | Mod: GN,KX | Performed by: SPEECH-LANGUAGE PATHOLOGIST

## 2019-10-17 ENCOUNTER — HOSPITAL ENCOUNTER (OUTPATIENT)
Dept: OCCUPATIONAL THERAPY | Facility: CLINIC | Age: 84
Setting detail: THERAPIES SERIES
End: 2019-10-17
Attending: PHYSICAL MEDICINE & REHABILITATION
Payer: MEDICARE

## 2019-10-17 ENCOUNTER — HOSPITAL ENCOUNTER (OUTPATIENT)
Dept: PHYSICAL THERAPY | Facility: CLINIC | Age: 84
Setting detail: THERAPIES SERIES
End: 2019-10-17
Attending: PHYSICAL MEDICINE & REHABILITATION
Payer: MEDICARE

## 2019-10-17 PROCEDURE — 97535 SELF CARE MNGMENT TRAINING: CPT | Mod: GO | Performed by: OCCUPATIONAL THERAPIST

## 2019-10-17 PROCEDURE — 97112 NEUROMUSCULAR REEDUCATION: CPT | Mod: GP,KX | Performed by: PHYSICAL THERAPIST

## 2019-10-17 PROCEDURE — 97110 THERAPEUTIC EXERCISES: CPT | Mod: GP,KX | Performed by: PHYSICAL THERAPIST

## 2019-10-21 ENCOUNTER — HOSPITAL ENCOUNTER (OUTPATIENT)
Dept: PHYSICAL THERAPY | Facility: CLINIC | Age: 84
Setting detail: THERAPIES SERIES
End: 2019-10-21
Attending: PHYSICAL MEDICINE & REHABILITATION
Payer: MEDICARE

## 2019-10-21 ENCOUNTER — OFFICE VISIT (OUTPATIENT)
Dept: CARDIOLOGY | Facility: CLINIC | Age: 84
End: 2019-10-21
Payer: MEDICARE

## 2019-10-21 VITALS
HEART RATE: 52 BPM | WEIGHT: 168 LBS | DIASTOLIC BLOOD PRESSURE: 61 MMHG | HEIGHT: 69 IN | SYSTOLIC BLOOD PRESSURE: 101 MMHG | BODY MASS INDEX: 24.88 KG/M2

## 2019-10-21 DIAGNOSIS — I71.21 ASCENDING AORTIC ANEURYSM (H): ICD-10-CM

## 2019-10-21 DIAGNOSIS — I63.512 ACUTE ISCHEMIC CEREBROVASCULAR ACCIDENT (CVA) INVOLVING LEFT MIDDLE CEREBRAL ARTERY TERRITORY (H): ICD-10-CM

## 2019-10-21 DIAGNOSIS — I35.1 NON-RHEUMATIC AORTIC REGURGITATION: ICD-10-CM

## 2019-10-21 DIAGNOSIS — I10 ESSENTIAL HYPERTENSION: ICD-10-CM

## 2019-10-21 DIAGNOSIS — I48.0 PAROXYSMAL ATRIAL FIBRILLATION (H): Primary | ICD-10-CM

## 2019-10-21 PROCEDURE — 99214 OFFICE O/P EST MOD 30 MIN: CPT | Performed by: INTERNAL MEDICINE

## 2019-10-21 PROCEDURE — 97112 NEUROMUSCULAR REEDUCATION: CPT | Mod: GP,KX | Performed by: PHYSICAL THERAPIST

## 2019-10-21 RX ORDER — ASPIRIN 81 MG
100 TABLET, DELAYED RELEASE (ENTERIC COATED) ORAL AT BEDTIME
Status: ON HOLD | COMMUNITY
End: 2022-01-01

## 2019-10-21 ASSESSMENT — MIFFLIN-ST. JEOR: SCORE: 1452.42

## 2019-10-21 NOTE — LETTER
10/21/2019      Shyam Mclean, [facility] [address] [address] [address]      RE: Tk Richmond       Dear Colleague,    I had the pleasure of seeing Tk Richmond in the [facility].    Service Date: 10/21/2019      Shyam Mclean, [facility]   [address] [place] [address]      RE: Tk Richmond    MRN: [id number]   : 1935      Dear Dr. Mclean:       I again had the pleasure of seeing your patient Tk Richmond at [facility] for evaluation of mild to moderate aortic insufficiency and moderately severe ascending aortic aneurysm.  I saw this patient on 2018.  He is status post previous cerebrovascular accidents in 2004 and 2012.  He had mild aphasia in , which gradually improved.  Echocardiography in  showed mild concentric left ventricular hypertrophy with an ejection fraction of 60%-65%.  Left atrium was mildly enlarged.  Mild to moderate aortic regurgitation.  Mild aortic root dilatation with moderately severe dilatation of the ascending aorta at 4.6 cm.  Followup echocardiography performed 10/16/2018 demonstrated moderate to severely dilated ascending aortic aneurysm at 4.8 cm.  Followup CT of the chest has shown the ascending aorta to be 4.8-4.9 cm.  This was last performed in July of this year.  The patient has mild valvular aortic sclerosis without stenosis and mild to moderate central aortic regurgitation.  The patient suffered a subsequent stroke in July of this year.  His brain CT continues to show a middle cerebral artery stenosis as the culprit.  He was initially on Plavix.  He has continued with physical therapy and occupational therapy as well as speech therapy.  In late September, the patient was helping his wife moving their mattress, and he fell fracturing his left wrist.  He presented on 10/04 for a preop physical exam  showing atrial fibrillation with rapid ventricular response.  This patient was then hospitalized and anticoagulated.  Surgery was completed on 10/06.  He continues now on aspirin and Eliquis with the Plavix being discontinued.  The patient is unaware of any palpitations.  He denies bleeding diathesis.  He notes dyspnea on exertion.  He has chronic peripheral edema treated with compression stockings.  His blood pressure has been under reasonably good control.  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  He denies PND or orthopnea.      PHYSICAL EXAMINATION:  As listed below.  The patient has lost 21 pounds since December of last year.  His wife notes that she is the principle caregiver.      ASSESSMENT:   1.  Tk Richmond is a pleasant, 84-year-old male with recent onset of paroxysmal atrial fibrillation.  It is unclear whether any of his previous strokes were due to paroxysmal atrial fibrillation and thromboemboli versus atherosclerosis.  The patient has converted back to normal sinus rhythm, and I suspect the atrial fibrillation is paroxysmal.  He is anticoagulated with Eliquis with ongoing aspirin 81 mg as well.  He has not had any further falls.  We will continue with his anticoagulation.  Although his heart rate is a bit slow, we will continue with the very low-dose Toprol XL as well.  The patient will call for any syncope or presyncope.   2.  The patient's ascending aortic aneurysm is stable.  We will plan a CT angiogram of his chest in 07/2020.   3.  The patient will follow up with my ROBERT in 6 months for rhythm and blood pressure.   4.  I will see this patient again in 1 year.  The patient will call for further arrhythmias.  I think a rhythm-control strategy will be possible since this is paroxysmal.  I would not start this patient on any other antiarrhythmics for now.      It is my pleasure to assist in the care of Tk Richmond.  All his questions were answered to his  satisfaction.  His wife was in attendance today.      Sincerely,      Uriel Anderson MD, [facility]         URIEL ANDERSON MD, [facility]             D: 10/21/2019   T: 10/21/2019   MT: mariya      Name:     AVIS GOODE   MRN:      [id number]        Account:      [id number]   :      1935           Service Date: 10/21/2019      Document: [id number]           Outpatient Encounter Medications as of 10/21/2019   Medication Sig Dispense Refill     acetaminophen (TYLENOL) 325 MG tablet Take 2 tablets (650 mg) by mouth every 4 hours as needed for mild pain or fever (> 101 F)       amLODIPine (NORVASC) 5 MG tablet Take 1 tablet (5 mg) by mouth daily 30 tablet 0     apixaban ANTICOAGULANT (ELIQUIS) 5 MG tablet Take 1 tablet (5 mg) by mouth 2 times daily 60 tablet 3     aspirin (ASA) 81 MG EC tablet Take 1 tablet (81 mg) by mouth daily 21 tablet 0     brinzolamide-brimonidine (SIMBRINZA) 1-0.2 % ophthalmic suspension Place 1 drop into both eyes 2 times daily        Calcium Carbonate-Vitamin D (CALCIUM 600+D PO) Take 1 tablet by mouth 2 times daily At noon and supper       citalopram (CELEXA) 40 MG tablet Take 40 mg by mouth daily       docusate sodium (COLACE) 100 MG tablet Take 100 mg by mouth 2 times daily       glucosamine-chondroitin 500-400 MG CAPS per capsule Take 1 capsule by mouth 2 times daily At noon and supper       lovastatin (MEVACOR) 40 MG tablet Take 40 mg by mouth At Bedtime       metoprolol succinate ER (TOPROL-XL) 25 MG 24 hr tablet Take 1 tablet (25 mg) by mouth daily 30 tablet 0     Multiple Vitamins-Minerals (CENTRUM SILVER ADULT 50+ PO) Take 1 tablet by mouth daily        Multiple Vitamins-Minerals (PRESERVISION AREDS PO) Take by mouth 2 times daily       [] pantoprazole (PROTONIX) 40 MG EC tablet Take 1 tablet (40 mg) by mouth every morning (before breakfast) 30 tablet 0     vitamin B-12 (CYANOCOBALAMIN) 1000 MCG tablet Take 1,000 mcg by mouth daily        hydrOXYzine (ATARAX) 10 MG tablet  Take 1 tablet (10 mg) by mouth 3 times daily as needed (muscle spasm, pain, itch) (Patient not taking: Reported on 10/21/2019) 20 tablet 0     senna (SENOKOT) 8.6 MG tablet Take 1 tablet by mouth 2 times daily as needed for constipation (Patient not taking: Reported on 10/21/2019) 30 tablet 0     [] traMADol (ULTRAM) 50 MG tablet Take 1 tablet (50 mg) by mouth every 6 hours as needed for severe pain (Patient not taking: Reported on 10/21/2019) 20 tablet 0     No facility-administered encounter medications on file as of 10/21/2019.        Again, thank you for allowing me to participate in the care of your patient.      Sincerely,    Uriel Anderson MD     [facility]

## 2019-10-21 NOTE — PROGRESS NOTES
Service Date: 10/21/2019      Shyam Mclean DO   [address] [address]      RE: Tk Richmond    MRN: [id number]   : 1935      Dear Dr. Mclean:       I again had the pleasure of seeing your patient Tk Richmond at [facility] for evaluation of mild to moderate aortic insufficiency and moderately severe ascending aortic aneurysm.  I saw this patient on 2018.  He is status post previous cerebrovascular accidents in 2004 and 2012.  He had mild aphasia in , which gradually improved.  Echocardiography in  showed mild concentric left ventricular hypertrophy with an ejection fraction of 60%-65%.  Left atrium was mildly enlarged.  Mild to moderate aortic regurgitation.  Mild aortic root dilatation with moderately severe dilatation of the ascending aorta at 4.6 cm.  Followup echocardiography performed 10/16/2018 demonstrated moderate to severely dilated ascending aortic aneurysm at 4.8 cm.  Followup CT of the chest has shown the ascending aorta to be 4.8-4.9 cm.  This was last performed in July of this year.  The patient has mild valvular aortic sclerosis without stenosis and mild to moderate central aortic regurgitation.  The patient suffered a subsequent stroke in July of this year.  His brain CT continues to show a middle cerebral artery stenosis as the culprit.  He was initially on Plavix.  He has continued with physical therapy and occupational therapy as well as speech therapy.  In late September, the patient was helping his wife moving their mattress, and he fell fracturing his left wrist.  He presented on 10/04 for a preop physical exam showing atrial fibrillation with rapid ventricular response.  This patient was then hospitalized and anticoagulated.  Surgery was completed on 10/06.  He continues now on aspirin and Eliquis with the Plavix being discontinued.  The patient is unaware of any  palpitations.  He denies bleeding diathesis.  He notes dyspnea on exertion.  He has chronic peripheral edema treated with compression stockings.  His blood pressure has been under reasonably good control.  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  He denies PND or orthopnea.      PHYSICAL EXAMINATION:  As listed below.  The patient has lost 21 pounds since December of last year.  His wife notes that she is the principle caregiver.      ASSESSMENT:   1.  Tk Goode is a pleasant, 84-year-old male with recent onset of paroxysmal atrial fibrillation.  It is unclear whether any of his previous strokes were due to paroxysmal atrial fibrillation and thromboemboli versus atherosclerosis.  The patient has converted back to normal sinus rhythm, and I suspect the atrial fibrillation is paroxysmal.  He is anticoagulated with Eliquis with ongoing aspirin 81 mg as well.  He has not had any further falls.  We will continue with his anticoagulation.  Although his heart rate is a bit slow, we will continue with the very low-dose Toprol XL as well.  The patient will call for any syncope or presyncope.   2.  The patient's ascending aortic aneurysm is stable.  We will plan a CT angiogram of his chest in 07/2020.   3.  The patient will follow up with my ROBERT in 6 months for rhythm and blood pressure.   4.  I will see this patient again in 1 year.  The patient will call for further arrhythmias.  I think a rhythm-control strategy will be possible since this is paroxysmal.  I would not start this patient on any other antiarrhythmics for now.      It is my pleasure to assist in the care of Tk Goode.  All his questions were answered to his satisfaction.  His wife was in attendance today.      Sincerely,      Susie Anderson MD, FACC         SUSIE ANDERSON MD, FACC             D: 10/21/2019   T: 10/21/2019   MT: mariya      Name:     TK GOODE   MRN:      [id number]        Account:       [id number]   :      1935           Service Date: 10/21/2019      Document: [id number]

## 2019-10-21 NOTE — PROGRESS NOTES
HPI and Plan:   See dictation:[id number]    Orders Placed This Encounter   Procedures     Follow-Up with Cardiac Advanced Practice Provider     Follow-Up with Cardiologist       Orders Placed This Encounter   Medications     Multiple Vitamins-Minerals (PRESERVISION AREDS PO)     Sig: Take by mouth 2 times daily     docusate sodium (COLACE) 100 MG tablet     Sig: Take 100 mg by mouth 2 times daily       There are no discontinued medications.      Encounter Diagnoses   Name Primary?     Paroxysmal atrial fibrillation (H) Yes     Acute ischemic cerebrovascular accident (CVA) involving left middle cerebral artery territory (H)      Ascending aortic aneurysm (H)      Non-rheumatic aortic regurgitation      Essential hypertension        CURRENT MEDICATIONS:  Current Outpatient Medications   Medication Sig Dispense Refill     acetaminophen (TYLENOL) 325 MG tablet Take 2 tablets (650 mg) by mouth every 4 hours as needed for mild pain or fever (> 101 F)       amLODIPine (NORVASC) 5 MG tablet Take 1 tablet (5 mg) by mouth daily 30 tablet 0     apixaban ANTICOAGULANT (ELIQUIS) 5 MG tablet Take 1 tablet (5 mg) by mouth 2 times daily 60 tablet 3     aspirin (ASA) 81 MG EC tablet Take 1 tablet (81 mg) by mouth daily 21 tablet 0     brinzolamide-brimonidine (SIMBRINZA) 1-0.2 % ophthalmic suspension Place 1 drop into both eyes 2 times daily        Calcium Carbonate-Vitamin D (CALCIUM 600+D PO) Take 1 tablet by mouth 2 times daily At noon and supper       citalopram (CELEXA) 40 MG tablet Take 40 mg by mouth daily       docusate sodium (COLACE) 100 MG tablet Take 100 mg by mouth 2 times daily       glucosamine-chondroitin 500-400 MG CAPS per capsule Take 1 capsule by mouth 2 times daily At noon and supper       lovastatin (MEVACOR) 40 MG tablet Take 40 mg by mouth At Bedtime       metoprolol succinate ER (TOPROL-XL) 25 MG 24 hr tablet Take 1 tablet (25 mg) by mouth daily 30 tablet 0     Multiple Vitamins-Minerals (CENTRUM SILVER ADULT  50+ PO) Take 1 tablet by mouth daily        Multiple Vitamins-Minerals (PRESERVISION AREDS PO) Take by mouth 2 times daily       pantoprazole (PROTONIX) 40 MG EC tablet Take 1 tablet (40 mg) by mouth every morning (before breakfast) 30 tablet 0     vitamin B-12 (CYANOCOBALAMIN) 1000 MCG tablet Take 1,000 mcg by mouth daily        hydrOXYzine (ATARAX) 10 MG tablet Take 1 tablet (10 mg) by mouth 3 times daily as needed (muscle spasm, pain, itch) (Patient not taking: Reported on 10/21/2019) 20 tablet 0     senna (SENOKOT) 8.6 MG tablet Take 1 tablet by mouth 2 times daily as needed for constipation (Patient not taking: Reported on 10/21/2019) 30 tablet 0       ALLERGIES     Allergies   Allergen Reactions     Contrast Dye Rash       PAST MEDICAL HISTORY:  Past Medical History:   Diagnosis Date     Glaucoma      Hyperlipidemia LDL goal < 100      Hypertension      Stroke (H)     2004, speech deficit       PAST SURGICAL HISTORY:  Past Surgical History:   Procedure Laterality Date     IR CAROTID CEREBRAL ANGIOGRAM LEFT  7/17/2019     OPEN REDUCTION INTERNAL FIXATION WRIST Left 10/4/2019    Procedure: Open reduction internal fixation, left distal radius fracture;  Surgeon: Krystal Interiano MD;  Location: [facility] OR     OPEN REDUCTION INTERNAL FIXATION WRIST Left 10/6/2019    Procedure: Open reduction internal fixation, left distal radius fracture.  ;  Surgeon: Krystal Interiano MD;  Location: [facility] OR     ORTHOPEDIC SURGERY         FAMILY HISTORY:  Family History   Problem Relation Age of Onset     Breast Cancer Mother      No Known Problems Father      Breast Cancer Sister      No Known Problems Brother      No Known Problems Maternal Grandmother      No Known Problems Maternal Grandfather      No Known Problems Paternal Grandmother      No Known Problems Paternal Grandfather      Diabetes No family hx of      Hypertension No family hx of      Cancer - colorectal No family hx of        SOCIAL HISTORY:  Social History     Socioeconomic  "History     Marital status:      Spouse name: None     Number of children: None     Years of education: None     Highest education level: None   Occupational History     None   Social Needs     Financial resource strain: None     Food insecurity:     Worry: None     Inability: None     Transportation needs:     Medical: None     Non-medical: None   Tobacco Use     Smoking status: Former Smoker     Packs/day: 0.00     Years: 10.00     Pack years: 0.00     Types: Cigars     Last attempt to quit: 1972     Years since quittin.7     Smokeless tobacco: Never Used   Substance and Sexual Activity     Alcohol use: Yes     Alcohol/week: 0.0 standard drinks     Comment: OCC beer     Drug use: No     Sexual activity: Not Currently     Partners: Female   Lifestyle     Physical activity:     Days per week: None     Minutes per session: None     Stress: None   Relationships     Social connections:     Talks on phone: None     Gets together: None     Attends Rastafarian service: None     Active member of club or organization: None     Attends meetings of clubs or organizations: None     Relationship status: None     Intimate partner violence:     Fear of current or ex partner: None     Emotionally abused: None     Physically abused: None     Forced sexual activity: None   Other Topics Concern     Parent/sibling w/ CABG, MI or angioplasty before 65F 55M? Not Asked   Social History Narrative     None       Review of Systems:  Skin:  Negative       Eyes:  Positive for glasses    ENT:  Positive for hearing loss \"some\" per wife  Respiratory:  Positive for dyspnea on exertion     Cardiovascular:    Positive for;palpitations;edema;fatigue wears compression stockings  Gastroenterology: Negative      Genitourinary:  Positive for nocturia    Musculoskeletal:  Negative      Neurologic:  Negative      Psychiatric:  Negative      Heme/Lymph/Imm:  Positive for allergies contrast dye  Endocrine:  Negative        Physical " "Exam:  Vitals: /61 (BP Location: Right arm, Patient Position: Sitting, Cuff Size: Adult Large)   Pulse 52   Ht 1.753 m (5' 9\")   Wt 76.2 kg (168 lb)   BMI 24.81 kg/m      Constitutional:  cooperative, alert and oriented, well developed, well nourished, in no acute distress        Skin:  warm and dry to the touch;no apparent skin lesions or masses noted     healed skin graft on top of head    Head:  normocephalic, no masses or lesions        Eyes:  pupils equal and round, conjunctivae and lids unremarkable, sclera white, no xanthalasma, EOMS intact, no nystagmus        Lymph:      ENT:  no pallor or cyanosis, dentition good        Neck:  carotid pulses are full and equal bilaterally, JVP normal, no carotid bruit        Respiratory:  normal breath sounds, clear to auscultation, normal A-P diameter, normal symmetry, normal respiratory excursion, no use of accessory muscles         Cardiac: regular rhythm;normal S1 and S2;no S3 or S4;apical impulse not displaced occasional premature beats     systolic ejection murmur;LLSB;radiation to the RUSB;grade 2   early diastolic murmur;blowing;RUSB;LLSB;grade 2    pulses full and equal, no bruits auscultated                                        GI:  abdomen soft, non-tender, BS normoactive, no mass, no HSM, no bruits        Extremities and Muscular Skeletal:  no deformities, clubbing, cyanosis, erythema observed   pitting;bilateral LE edema;1+          Neurological:  affect appropriate aphasia;right sided weakness brace on left wrist    Psych:  Alert and Oriented x 3        CC  No referring provider defined for this encounter.              "

## 2019-10-21 NOTE — LETTER
10/21/2019    Shyam Mclean DO  [facility] [address]  [facility] [id number]    RE: Tk Richmond       Dear Colleague,    I had the pleasure of seeing Tk Richmond in the [facility].    HPI and Plan:   See dictation:[id number]    Orders Placed This Encounter   Procedures     Follow-Up with Cardiac Advanced Practice Provider     Follow-Up with Cardiologist       Orders Placed This Encounter   Medications     Multiple Vitamins-Minerals (PRESERVISION AREDS PO)     Sig: Take by mouth 2 times daily     docusate sodium (COLACE) 100 MG tablet     Sig: Take 100 mg by mouth 2 times daily       There are no discontinued medications.      Encounter Diagnoses   Name Primary?     Paroxysmal atrial fibrillation (H) Yes     Acute ischemic cerebrovascular accident (CVA) involving left middle cerebral artery territory (H)      Ascending aortic aneurysm (H)      Non-rheumatic aortic regurgitation      Essential hypertension        CURRENT MEDICATIONS:  Current Outpatient Medications   Medication Sig Dispense Refill     acetaminophen (TYLENOL) 325 MG tablet Take 2 tablets (650 mg) by mouth every 4 hours as needed for mild pain or fever (> 101 F)       amLODIPine (NORVASC) 5 MG tablet Take 1 tablet (5 mg) by mouth daily 30 tablet 0     apixaban ANTICOAGULANT (ELIQUIS) 5 MG tablet Take 1 tablet (5 mg) by mouth 2 times daily 60 tablet 3     aspirin (ASA) 81 MG EC tablet Take 1 tablet (81 mg) by mouth daily 21 tablet 0     brinzolamide-brimonidine (SIMBRINZA) 1-0.2 % ophthalmic suspension Place 1 drop into both eyes 2 times daily        Calcium Carbonate-Vitamin D (CALCIUM 600+D PO) Take 1 tablet by mouth 2 times daily At noon and supper       citalopram (CELEXA) 40 MG tablet Take 40 mg by mouth daily       docusate sodium (COLACE) 100 MG tablet Take 100 mg by mouth 2 times daily       glucosamine-chondroitin 500-400 MG CAPS per capsule Take 1 capsule by mouth 2 times  daily At noon and supper       lovastatin (MEVACOR) 40 MG tablet Take 40 mg by mouth At Bedtime       metoprolol succinate ER (TOPROL-XL) 25 MG 24 hr tablet Take 1 tablet (25 mg) by mouth daily 30 tablet 0     Multiple Vitamins-Minerals (CENTRUM SILVER ADULT 50+ PO) Take 1 tablet by mouth daily        Multiple Vitamins-Minerals (PRESERVISION AREDS PO) Take by mouth 2 times daily       pantoprazole (PROTONIX) 40 MG EC tablet Take 1 tablet (40 mg) by mouth every morning (before breakfast) 30 tablet 0     vitamin B-12 (CYANOCOBALAMIN) 1000 MCG tablet Take 1,000 mcg by mouth daily        hydrOXYzine (ATARAX) 10 MG tablet Take 1 tablet (10 mg) by mouth 3 times daily as needed (muscle spasm, pain, itch) (Patient not taking: Reported on 10/21/2019) 20 tablet 0     senna (SENOKOT) 8.6 MG tablet Take 1 tablet by mouth 2 times daily as needed for constipation (Patient not taking: Reported on 10/21/2019) 30 tablet 0       ALLERGIES     Allergies   Allergen Reactions     Contrast Dye Rash       PAST MEDICAL HISTORY:  Past Medical History:   Diagnosis Date     Glaucoma      Hyperlipidemia LDL goal < 100      Hypertension      Stroke (H)     2004, speech deficit       PAST SURGICAL HISTORY:  Past Surgical History:   Procedure Laterality Date     IR CAROTID CEREBRAL ANGIOGRAM LEFT  7/17/2019     OPEN REDUCTION INTERNAL FIXATION WRIST Left 10/4/2019    Procedure: Open reduction internal fixation, left distal radius fracture;  Surgeon: Krystal Interiano MD;  Location:  OR     OPEN REDUCTION INTERNAL FIXATION WRIST Left 10/6/2019    Procedure: Open reduction internal fixation, left distal radius fracture.  ;  Surgeon: Krystal Interiano MD;  Location:  OR     ORTHOPEDIC SURGERY         FAMILY HISTORY:  Family History   Problem Relation Age of Onset     Breast Cancer Mother      No Known Problems Father      Breast Cancer Sister      No Known Problems Brother      No Known Problems Maternal Grandmother      No Known Problems Maternal  "Grandfather      No Known Problems Paternal Grandmother      No Known Problems Paternal Grandfather      Diabetes No family hx of      Hypertension No family hx of      Cancer - colorectal No family hx of        SOCIAL HISTORY:  Social History     Socioeconomic History     Marital status:      Spouse name: None     Number of children: None     Years of education: None     Highest education level: None   Occupational History     None   Social Needs     Financial resource strain: None     Food insecurity:     Worry: None     Inability: None     Transportation needs:     Medical: None     Non-medical: None   Tobacco Use     Smoking status: Former Smoker     Packs/day: 0.00     Years: 10.00     Pack years: 0.00     Types: Cigars     Last attempt to quit: 1972     Years since quittin.7     Smokeless tobacco: Never Used   Substance and Sexual Activity     Alcohol use: Yes     Alcohol/week: 0.0 standard drinks     Comment: OCC beer     Drug use: No     Sexual activity: Not Currently     Partners: Female   Lifestyle     Physical activity:     Days per week: None     Minutes per session: None     Stress: None   Relationships     Social connections:     Talks on phone: None     Gets together: None     Attends Synagogue service: None     Active member of club or organization: None     Attends meetings of clubs or organizations: None     Relationship status: None     Intimate partner violence:     Fear of current or ex partner: None     Emotionally abused: None     Physically abused: None     Forced sexual activity: None   Other Topics Concern     Parent/sibling w/ CABG, MI or angioplasty before 65F 55M? Not Asked   Social History Narrative     None       Review of Systems:  Skin:  Negative       Eyes:  Positive for glasses    ENT:  Positive for hearing loss \"some\" per wife  Respiratory:  Positive for dyspnea on exertion     Cardiovascular:    Positive for;palpitations;edema;fatigue wears compression " "stockings  Gastroenterology: Negative      Genitourinary:  Positive for nocturia    Musculoskeletal:  Negative      Neurologic:  Negative      Psychiatric:  Negative      Heme/Lymph/Imm:  Positive for allergies contrast dye  Endocrine:  Negative        Physical Exam:  Vitals: /61 (BP Location: Right arm, Patient Position: Sitting, Cuff Size: Adult Large)   Pulse 52   Ht 1.753 m (5' 9\")   Wt 76.2 kg (168 lb)   BMI 24.81 kg/m       Constitutional:  cooperative, alert and oriented, well developed, well nourished, in no acute distress        Skin:  warm and dry to the touch;no apparent skin lesions or masses noted     healed skin graft on top of head    Head:  normocephalic, no masses or lesions        Eyes:  pupils equal and round, conjunctivae and lids unremarkable, sclera white, no xanthalasma, EOMS intact, no nystagmus        Lymph:      ENT:  no pallor or cyanosis, dentition good        Neck:  carotid pulses are full and equal bilaterally, JVP normal, no carotid bruit        Respiratory:  normal breath sounds, clear to auscultation, normal A-P diameter, normal symmetry, normal respiratory excursion, no use of accessory muscles         Cardiac: regular rhythm;normal S1 and S2;no S3 or S4;apical impulse not displaced occasional premature beats     systolic ejection murmur;LLSB;radiation to the RUSB;grade 2   early diastolic murmur;blowing;RUSB;LLSB;grade 2    pulses full and equal, no bruits auscultated                                        GI:  abdomen soft, non-tender, BS normoactive, no mass, no HSM, no bruits        Extremities and Muscular Skeletal:  no deformities, clubbing, cyanosis, erythema observed   pitting;bilateral LE edema;1+          Neurological:  affect appropriate aphasia;right sided weakness brace on left wrist    Psych:  Alert and Oriented x 3        CC  No referring provider defined for this encounter.                Thank you for allowing me to participate in the care of your " patient.      Sincerely,     Uriel Anderson MD     [facility]    cc:   No referring provider defined for this encounter.

## 2019-10-22 ENCOUNTER — HOSPITAL ENCOUNTER (OUTPATIENT)
Dept: OCCUPATIONAL THERAPY | Facility: CLINIC | Age: 84
Setting detail: THERAPIES SERIES
End: 2019-10-22
Attending: PHYSICAL MEDICINE & REHABILITATION
Payer: MEDICARE

## 2019-10-22 PROCEDURE — 97535 SELF CARE MNGMENT TRAINING: CPT | Mod: GO,KX | Performed by: OCCUPATIONAL THERAPIST

## 2019-10-23 ENCOUNTER — HOSPITAL ENCOUNTER (OUTPATIENT)
Dept: SPEECH THERAPY | Facility: CLINIC | Age: 84
Setting detail: THERAPIES SERIES
End: 2019-10-23
Attending: PHYSICAL MEDICINE & REHABILITATION
Payer: MEDICARE

## 2019-10-23 PROCEDURE — 92507 TX SP LANG VOICE COMM INDIV: CPT | Mod: GN,KX | Performed by: SPEECH-LANGUAGE PATHOLOGIST

## 2019-10-24 ENCOUNTER — HOSPITAL ENCOUNTER (OUTPATIENT)
Dept: PHYSICAL THERAPY | Facility: CLINIC | Age: 84
Setting detail: THERAPIES SERIES
End: 2019-10-24
Attending: PHYSICAL MEDICINE & REHABILITATION
Payer: MEDICARE

## 2019-10-24 PROCEDURE — 97112 NEUROMUSCULAR REEDUCATION: CPT | Mod: GP,KX | Performed by: PHYSICAL THERAPIST

## 2019-10-25 ENCOUNTER — HOSPITAL ENCOUNTER (OUTPATIENT)
Dept: SPEECH THERAPY | Facility: CLINIC | Age: 84
Setting detail: THERAPIES SERIES
End: 2019-10-25
Attending: PHYSICAL MEDICINE & REHABILITATION
Payer: MEDICARE

## 2019-10-25 ENCOUNTER — HOSPITAL ENCOUNTER (OUTPATIENT)
Dept: OCCUPATIONAL THERAPY | Facility: CLINIC | Age: 84
Setting detail: THERAPIES SERIES
End: 2019-10-25
Attending: PHYSICAL MEDICINE & REHABILITATION
Payer: MEDICARE

## 2019-10-25 PROCEDURE — 92507 TX SP LANG VOICE COMM INDIV: CPT | Mod: GN,KX | Performed by: SPEECH-LANGUAGE PATHOLOGIST

## 2019-10-25 PROCEDURE — 97110 THERAPEUTIC EXERCISES: CPT | Mod: GO | Performed by: OCCUPATIONAL THERAPIST

## 2019-10-28 ENCOUNTER — HOSPITAL ENCOUNTER (OUTPATIENT)
Dept: PHYSICAL THERAPY | Facility: CLINIC | Age: 84
Setting detail: THERAPIES SERIES
End: 2019-10-28
Attending: PHYSICAL MEDICINE & REHABILITATION
Payer: MEDICARE

## 2019-10-28 PROCEDURE — 97112 NEUROMUSCULAR REEDUCATION: CPT | Mod: GP | Performed by: PHYSICAL THERAPIST

## 2019-10-29 ENCOUNTER — HOSPITAL ENCOUNTER (OUTPATIENT)
Dept: OCCUPATIONAL THERAPY | Facility: CLINIC | Age: 84
Setting detail: THERAPIES SERIES
End: 2019-10-29
Attending: PHYSICAL MEDICINE & REHABILITATION
Payer: MEDICARE

## 2019-10-29 PROCEDURE — 97110 THERAPEUTIC EXERCISES: CPT | Mod: GO,KX | Performed by: OCCUPATIONAL THERAPIST

## 2019-10-29 PROCEDURE — 97535 SELF CARE MNGMENT TRAINING: CPT | Mod: GO,KX | Performed by: OCCUPATIONAL THERAPIST

## 2019-10-30 ENCOUNTER — HOSPITAL ENCOUNTER (OUTPATIENT)
Dept: SPEECH THERAPY | Facility: CLINIC | Age: 84
Setting detail: THERAPIES SERIES
End: 2019-10-30
Attending: PHYSICAL MEDICINE & REHABILITATION
Payer: MEDICARE

## 2019-10-30 PROCEDURE — 92507 TX SP LANG VOICE COMM INDIV: CPT | Mod: GN,KX | Performed by: SPEECH-LANGUAGE PATHOLOGIST

## 2019-10-31 ENCOUNTER — HOSPITAL ENCOUNTER (OUTPATIENT)
Dept: OCCUPATIONAL THERAPY | Facility: CLINIC | Age: 84
Setting detail: THERAPIES SERIES
End: 2019-10-31
Attending: PHYSICAL MEDICINE & REHABILITATION
Payer: MEDICARE

## 2019-10-31 ENCOUNTER — HOSPITAL ENCOUNTER (OUTPATIENT)
Dept: PHYSICAL THERAPY | Facility: CLINIC | Age: 84
Setting detail: THERAPIES SERIES
End: 2019-10-31
Attending: PHYSICAL MEDICINE & REHABILITATION
Payer: MEDICARE

## 2019-10-31 PROCEDURE — 97112 NEUROMUSCULAR REEDUCATION: CPT | Mod: GP | Performed by: PHYSICAL THERAPIST

## 2019-10-31 PROCEDURE — 97535 SELF CARE MNGMENT TRAINING: CPT | Mod: GO,KX | Performed by: OCCUPATIONAL THERAPIST

## 2019-10-31 PROCEDURE — 97110 THERAPEUTIC EXERCISES: CPT | Mod: GP | Performed by: PHYSICAL THERAPIST

## 2019-10-31 PROCEDURE — 97110 THERAPEUTIC EXERCISES: CPT | Mod: GO | Performed by: OCCUPATIONAL THERAPIST

## 2019-11-01 ENCOUNTER — HOSPITAL ENCOUNTER (OUTPATIENT)
Dept: SPEECH THERAPY | Facility: CLINIC | Age: 84
Setting detail: THERAPIES SERIES
End: 2019-11-01
Attending: PHYSICAL MEDICINE & REHABILITATION
Payer: MEDICARE

## 2019-11-01 PROCEDURE — 92507 TX SP LANG VOICE COMM INDIV: CPT | Mod: GN,KX | Performed by: SPEECH-LANGUAGE PATHOLOGIST

## 2019-11-01 NOTE — PROGRESS NOTES
[facility]      OUTPATIENT SPEECH LANGUAGE PATHOLOGY  PLAN OF TREATMENT FOR OUTPATIENT REHABILITATION    Patient's Last Name, First Name, M.I.                YOB: 1935  Tk Richmond                        Provider's Name  [facility] Medical Record No.  [id number]                               Onset Date: 7/13/19   Start of Care Date: 8/9/19   Type:     ___PT   ___OT   _X_SLP Medical Diagnosis: Aphasia, L MCA CVA                       SLP Diagnosis: Aphasia, apraxia of speech      _________________________________________________________________________________  Plan of Treatment:    Frequency/Duration: 2x/week x 12 weeks     Goals:  See most recent progress report dated 11/1/19 for progress towards goals.     Certification date from 11/1/19 to 1/27/2020.    Mili Lockhart, SLP          I CERTIFY THE NEED FOR THESE SERVICES FURNISHED UNDER        THIS PLAN OF TREATMENT AND WHILE UNDER MY CARE     (Physician co-signature of this document indicates review and certification of the therapy plan).                Referring Provider: Dr. Janet Bell

## 2019-11-01 NOTE — PROGRESS NOTES
Outpatient Speech Language Pathology Progress Note & Medicare 10th Visit Note     Patient: Tk Richmond  : 1935    Beginning/End Dates of Reporting Period:  2019 to 2019    Referring Provider: Dr. Janet Bell    Therapy Diagnosis: Aphasia, L MCA CVA    Client Self Report: Tk Richmond is a 84 year old y.o.male s/p L MCA CVA in 2019.  Please refer to the initial outpatient speech-language pathology evaluation dated 2019 for further background information.  Patient has been seen for 20 visits since the start of care addressing his aphasia and apraxia of speech.  He has been motivated and cooperative throughout treatment and is demonstrating steady progress towards all goals.  Mr. Richmond broke his left wrist in early October and required surgery and is unable to use either hand easily. Patient is able to complete his home program.  He continues to become slightly frustrated when speaking due to his apraxia.     Objective Measurements:     Goals:  Goal Identifier LTG   Goal Description Patient will improve his functional expressive and receptive language to communicate in daily living and emergency situations with at least 90% and use of strategies prn.    Target Date 20   Date Met      Progress: Continue goal. See below for progress toward goals.      Goal Identifier STG 1: Language/Auditory Comprehension   Goal Description Patient will follow 2 step directions and listen to short paragraphs and answer questions with 90% and minimal cues for improved comprehension needed for daily living communication needs.   Target Date 19   Date Met      Progress: Continue goal.   Patient is currently 68%, but with moderate to maximal repetition and visual cues or model he improves to 95%.     Goal Identifier STG 2: Language/Verbal Expression   Goal Description Patient will complete moderate level verbal expression tasks including naming, describing and sentence completion with  use of trained word-retrieval strategies to improve verbal expression needed for daily living communication needs with 80% and minimal cues.   Target Date 11/06/19   Date Met      Progress: Continue goal.  Patient is 65% without cues but with moderate cues he improves to 88%.       Goal Identifier STG 3: Language/Reading comprehension   Goal Description Patient will complete practical reading comprehension tasks including reading menus, signs, labels, to improve reading comprehension needed for daily living and  safety needs with 90 % accuracy and minimal cues.   Target Date 11/06/19   Date Met      Progress: Continue goal.  Patient is 85% with minimal cues.      Goal Identifier STG 4: Speech-intellgibility   Goal Description Patient will implement trained speech production/intelligibility strategies to improve speech intelligibility to at least 90% at the sentence and conversational level.   Target Date 11/06/19   Date Met      Progress: Continue goal.  Patient is 86% intelligible when reading words and phrases.  His articulation and intelligibility is reduced to 50% during conversational speech.      Progress Toward Goals:    Progress this reporting period: Mr. Richmond presents with moderate aphasia and apraxia of speech.  He has not yet met his short-term goals but had made progress towards his new goals this progress reporting period and continues to improve with his speech production and intelligibility with use of trained strategies.  It is recommended that outpatient speech-language therapy continue on a 2x/week basis for the next 12 weeks with re-evaluation at the end of that time to further determine a plan of care.  Prognosis continues to be good and patient will continue to benefit from skilled speech-language services for improved communication needed for daily living and safety needs.     Plan:  Continue therapy per current plan of care.  Goals to be met in 12 weeks:  1. Patient will follow 2 step  directions and listen to short paragraphs and answer questions with 90% and minimal cues for improved comprehension needed for daily living communication needs.    2.  Patient will complete moderate level verbal expression tasks including naming, describing and sentence completion with use of trained word-retrieval strategies to improve verbal expression needed for daily living communication needs with 80% and minimal cues.    3. Patient will complete practical reading comprehension tasks including reading menus, signs, labels, to improve reading comprehension needed for daily living and  safety needs with 90 % accuracy and minimal cues.    4.  Patient will implement trained speech production/intelligibility strategies to improve speech intelligibility to at least 90% at the sentence and conversational level.    Discharge:  No    Thank you for the referral of this patient.  If you have any questions regarding the information in this report, please feel free to contact me at [phone].     Mili Lockhart MA, CCC-SLP  Speech-Language Pathologist  [facility] [address] [address]  Fax:  [phone]

## 2019-11-04 ENCOUNTER — HOSPITAL ENCOUNTER (OUTPATIENT)
Dept: PHYSICAL THERAPY | Facility: CLINIC | Age: 84
Setting detail: THERAPIES SERIES
End: 2019-11-04
Attending: PHYSICAL MEDICINE & REHABILITATION
Payer: MEDICARE

## 2019-11-04 PROCEDURE — 97112 NEUROMUSCULAR REEDUCATION: CPT | Mod: GP,KX | Performed by: PHYSICAL THERAPIST

## 2019-11-04 PROCEDURE — 97110 THERAPEUTIC EXERCISES: CPT | Mod: GP,KX | Performed by: PHYSICAL THERAPIST

## 2019-11-05 ENCOUNTER — HOSPITAL ENCOUNTER (OUTPATIENT)
Dept: OCCUPATIONAL THERAPY | Facility: CLINIC | Age: 84
Setting detail: THERAPIES SERIES
End: 2019-11-05
Attending: PHYSICAL MEDICINE & REHABILITATION
Payer: MEDICARE

## 2019-11-05 PROCEDURE — 97110 THERAPEUTIC EXERCISES: CPT | Mod: GO,KX | Performed by: OCCUPATIONAL THERAPIST

## 2019-11-05 PROCEDURE — 97535 SELF CARE MNGMENT TRAINING: CPT | Mod: GO,KX | Performed by: OCCUPATIONAL THERAPIST

## 2019-11-06 ENCOUNTER — HOSPITAL ENCOUNTER (OUTPATIENT)
Dept: PHYSICAL THERAPY | Facility: CLINIC | Age: 84
Setting detail: THERAPIES SERIES
End: 2019-11-06
Attending: PHYSICAL MEDICINE & REHABILITATION
Payer: MEDICARE

## 2019-11-06 ENCOUNTER — HOSPITAL ENCOUNTER (OUTPATIENT)
Dept: SPEECH THERAPY | Facility: CLINIC | Age: 84
Setting detail: THERAPIES SERIES
End: 2019-11-06
Attending: PHYSICAL MEDICINE & REHABILITATION
Payer: MEDICARE

## 2019-11-06 PROCEDURE — 97116 GAIT TRAINING THERAPY: CPT | Mod: GP,KX | Performed by: PHYSICAL THERAPIST

## 2019-11-06 PROCEDURE — 97112 NEUROMUSCULAR REEDUCATION: CPT | Mod: GP,KX | Performed by: PHYSICAL THERAPIST

## 2019-11-06 PROCEDURE — 92507 TX SP LANG VOICE COMM INDIV: CPT | Mod: GN | Performed by: SPEECH-LANGUAGE PATHOLOGIST

## 2019-11-08 ENCOUNTER — HOSPITAL ENCOUNTER (OUTPATIENT)
Dept: SPEECH THERAPY | Facility: CLINIC | Age: 84
Setting detail: THERAPIES SERIES
End: 2019-11-08
Attending: PHYSICAL MEDICINE & REHABILITATION
Payer: MEDICARE

## 2019-11-08 PROCEDURE — 92507 TX SP LANG VOICE COMM INDIV: CPT | Mod: GN | Performed by: SPEECH-LANGUAGE PATHOLOGIST

## 2019-11-08 NOTE — DISCHARGE SUMMARY
Discharge Summary    Tk Richmond MRN# [id number]   YOB: 1935 Age: 84 year old     Date of Admission:  10/4/2019  Date of Discharge:  10/7/2019  5:05 PM  Admitting Physician:  Nate Linda MD  Discharge Physician:  MARTI RochaC     Primary Provider: Shyam Mclean W11          Admission Diagnoses:   Left distal radius fracture          Discharge Diagnosis:   Same           Surgical Procedure:   Left distal radius fracture open reduction and internal fixation           Secondary Diagnosis:   NA           Discharge Disposition:   Discharged to home           Medications Prior to Admission:     No medications prior to admission.             Discharge Medications:     Discharge Medication List as of 10/7/2019  4:08 PM      START taking these medications    Details   apixaban ANTICOAGULANT (ELIQUIS) 5 MG tablet Take 1 tablet (5 mg) by mouth 2 times daily, Disp-60 tablet, R-3, E-Prescribe      hydrOXYzine (ATARAX) 10 MG tablet Take 1 tablet (10 mg) by mouth 3 times daily as needed (muscle spasm, pain, itch), Disp-20 tablet, R-0, E-Prescribe      metoprolol succinate ER (TOPROL-XL) 25 MG 24 hr tablet Take 1 tablet (25 mg) by mouth daily, Disp-30 tablet, R-0, E-Prescribe      traMADol (ULTRAM) 50 MG tablet Take 1 tablet (50 mg) by mouth every 6 hours as needed for severe pain, Disp-20 tablet, R-0, Local Print         CONTINUE these medications which have CHANGED    Details   aspirin (ASA) 81 MG EC tablet Take 1 tablet (81 mg) by mouth daily, Disp-21 tablet, R-0, E-Prescribe         CONTINUE these medications which have NOT CHANGED    Details   acetaminophen (TYLENOL) 325 MG tablet Take 2 tablets (650 mg) by mouth every 4 hours as needed for mild pain or fever (> 101 F), OTC      amLODIPine (NORVASC) 5 MG tablet Take 1 tablet (5 mg) by mouth daily, Disp-30 tablet, R-0, E-Prescribe      brinzolamide-brimonidine (SIMBRINZA) 1-0.2 % ophthalmic suspension Place 1 drop into both  eyes 2 times daily , Historical      Calcium Carbonate-Vitamin D (CALCIUM 600+D PO) Take 1 tablet by mouth 2 times daily At noon and supper, Historical      citalopram (CELEXA) 40 MG tablet Take 40 mg by mouth daily, Historical      glucosamine-chondroitin 500-400 MG CAPS per capsule Take 1 capsule by mouth 2 times daily At noon and supper, Historical      lovastatin (MEVACOR) 40 MG tablet Take 40 mg by mouth At Bedtime, Historical      Multiple Vitamins-Minerals (CENTRUM SILVER ADULT 50+ PO) Take 1 tablet by mouth daily , Historical      senna (SENOKOT) 8.6 MG tablet Take 1 tablet by mouth 2 times daily as needed for constipation, Disp-30 tablet, R-0, Local Print      vitamin B-12 (CYANOCOBALAMIN) 1000 MCG tablet Take 1,000 mcg by mouth daily , Historical         STOP taking these medications       pantoprazole (PROTONIX) 40 MG EC tablet Comments:   Reason for Stopping:         clopidogrel (PLAVIX) 75 MG tablet Comments:   Reason for Stopping:         HYDROcodone-acetaminophen (NORCO) 5-325 MG tablet Comments:   Reason for Stopping:         omega 3 1000 MG CAPS Comments:   Reason for Stopping:                     Consultations:   Consultation during this admission received from internal medicine.  No medical intervention was indicated.             Hospital Course:   The patient was admitted from home. Sustained a fall on out stretched arm resulting in a left distal radius fracture. The patient underwent an uneventful left distal radius fracture but developed concerning heart rhythm and was kept inpatient for observation. Postoperatively, anticoagulation   Eliquis was started and Plavix was stopped secondary to A. Fib. No transfusion was required. The patient will be discharged to home. Home medications have been reconciled. Tramadol was prescribed for pain. Eliquis  will be prescribed.             Pending Results:   None           Discharge Instructions and Follow-Up:        Discharge activity: Activity as  tolerated  Non-WB left UE   Discharge follow-up: Follow up with Dr. Interiano in 10-14 days   Outpatient therapy: None    Home Care agency: None    Supplies and equipment: None        Wound care: do not remove splint or cast   Other instructions: None

## 2019-11-11 ENCOUNTER — HOSPITAL ENCOUNTER (OUTPATIENT)
Dept: PHYSICAL THERAPY | Facility: CLINIC | Age: 84
Setting detail: THERAPIES SERIES
End: 2019-11-11
Attending: PHYSICAL MEDICINE & REHABILITATION
Payer: MEDICARE

## 2019-11-11 ENCOUNTER — HOSPITAL ENCOUNTER (OUTPATIENT)
Dept: OCCUPATIONAL THERAPY | Facility: CLINIC | Age: 84
Setting detail: THERAPIES SERIES
End: 2019-11-11
Attending: PHYSICAL MEDICINE & REHABILITATION
Payer: MEDICARE

## 2019-11-11 PROCEDURE — 97750 PHYSICAL PERFORMANCE TEST: CPT | Mod: GP,KX | Performed by: PHYSICAL THERAPIST

## 2019-11-11 PROCEDURE — 97535 SELF CARE MNGMENT TRAINING: CPT | Mod: GO,KX | Performed by: OCCUPATIONAL THERAPIST

## 2019-11-11 PROCEDURE — 97110 THERAPEUTIC EXERCISES: CPT | Mod: GP,KX | Performed by: PHYSICAL THERAPIST

## 2019-11-11 PROCEDURE — 97110 THERAPEUTIC EXERCISES: CPT | Mod: GO,KX | Performed by: OCCUPATIONAL THERAPIST

## 2019-11-13 ENCOUNTER — HOSPITAL ENCOUNTER (OUTPATIENT)
Dept: SPEECH THERAPY | Facility: CLINIC | Age: 84
Setting detail: THERAPIES SERIES
End: 2019-11-13
Attending: PHYSICAL MEDICINE & REHABILITATION
Payer: MEDICARE

## 2019-11-13 ENCOUNTER — HOSPITAL ENCOUNTER (OUTPATIENT)
Dept: PHYSICAL THERAPY | Facility: CLINIC | Age: 84
Setting detail: THERAPIES SERIES
End: 2019-11-13
Attending: PHYSICAL MEDICINE & REHABILITATION
Payer: MEDICARE

## 2019-11-13 PROCEDURE — 97116 GAIT TRAINING THERAPY: CPT | Mod: GP,KX | Performed by: PHYSICAL THERAPIST

## 2019-11-13 PROCEDURE — 97112 NEUROMUSCULAR REEDUCATION: CPT | Mod: GP,KX | Performed by: PHYSICAL THERAPIST

## 2019-11-13 PROCEDURE — 92507 TX SP LANG VOICE COMM INDIV: CPT | Mod: GN,KX | Performed by: SPEECH-LANGUAGE PATHOLOGIST

## 2019-11-13 PROCEDURE — 97110 THERAPEUTIC EXERCISES: CPT | Mod: GP,KX,59 | Performed by: PHYSICAL THERAPIST

## 2019-11-15 ENCOUNTER — HOSPITAL ENCOUNTER (OUTPATIENT)
Dept: SPEECH THERAPY | Facility: CLINIC | Age: 84
Setting detail: THERAPIES SERIES
End: 2019-11-15
Attending: PHYSICAL MEDICINE & REHABILITATION
Payer: MEDICARE

## 2019-11-15 ENCOUNTER — HOSPITAL ENCOUNTER (OUTPATIENT)
Dept: OCCUPATIONAL THERAPY | Facility: CLINIC | Age: 84
Setting detail: THERAPIES SERIES
End: 2019-11-15
Attending: PHYSICAL MEDICINE & REHABILITATION
Payer: MEDICARE

## 2019-11-15 PROCEDURE — 97110 THERAPEUTIC EXERCISES: CPT | Mod: GO,KX,59 | Performed by: OCCUPATIONAL THERAPIST

## 2019-11-15 PROCEDURE — 92507 TX SP LANG VOICE COMM INDIV: CPT | Mod: GN,KX | Performed by: SPEECH-LANGUAGE PATHOLOGIST

## 2019-11-15 PROCEDURE — 97535 SELF CARE MNGMENT TRAINING: CPT | Mod: GO,KX | Performed by: OCCUPATIONAL THERAPIST

## 2019-11-19 ENCOUNTER — HOSPITAL ENCOUNTER (OUTPATIENT)
Dept: OCCUPATIONAL THERAPY | Facility: CLINIC | Age: 84
Setting detail: THERAPIES SERIES
End: 2019-11-19
Attending: PHYSICAL MEDICINE & REHABILITATION
Payer: MEDICARE

## 2019-11-19 ENCOUNTER — HOSPITAL ENCOUNTER (OUTPATIENT)
Dept: PHYSICAL THERAPY | Facility: CLINIC | Age: 84
Setting detail: THERAPIES SERIES
End: 2019-11-19
Attending: PHYSICAL MEDICINE & REHABILITATION
Payer: MEDICARE

## 2019-11-19 PROCEDURE — 97535 SELF CARE MNGMENT TRAINING: CPT | Mod: GO,KX | Performed by: OCCUPATIONAL THERAPIST

## 2019-11-19 PROCEDURE — 97116 GAIT TRAINING THERAPY: CPT | Mod: GP,KX | Performed by: PHYSICAL THERAPIST

## 2019-11-19 PROCEDURE — 97110 THERAPEUTIC EXERCISES: CPT | Mod: GO,KX | Performed by: OCCUPATIONAL THERAPIST

## 2019-11-19 PROCEDURE — 97110 THERAPEUTIC EXERCISES: CPT | Mod: GP,KX | Performed by: PHYSICAL THERAPIST

## 2019-11-19 NOTE — PROGRESS NOTES
[facility]      OUTPATIENT OCCUPATIONAL THERAPY  PLAN OF TREATMENT FOR OUTPATIENT REHABILITATION    Patient's Last Name, First Name, M.I.                YOB: 1935  Tk Richmond                        Provider's Name  [facility] Medical Record No.  [id number]                               Onset Date: 7/13/19   Start of Care Date: 8/22/19   Type:     ___PT   _X_OT   ___SLP Medical Diagnosis:  L MCA ischemic stroke                       OT Diagnosis: Decreased ADL/IADL      Visit #: :20   _________________________________________________________________________________  Plan of Treatment:    Frequency/Duration: 2x/wk x 12 weeks     Goals:    Goal Identifier HEP   Goal Description Pt will demonstrate good follow through at home of a B UE HEP for strength  and  coordination with SBA and min cues to increase functional strength and coordination while maximizing  independence and performance of ADL/IADLs. Pt will demonstrate I with HEP of 6-8 activities for weight bearing, stretching, AROM and progress to resistance exercises   Target Date 02/17/20   Date Met      Progress:     Goal Identifier R UE use   Goal Description Patient to demonstrate functional tasks with 60% use of R UE as functional assist for clothing fasteners, opening food packages, and increased ADL/IADL independence for closer return to prior level of function   Target Date 02/17/20   Date Met      Progress:     Goal Identifier Safety with reach/simple meal prep task    Goal Description Pt will demonstrate kitchen safety while completing a simple to moderately complex meal prep task with electric range independently and while reaching at all levels using safe techniques to access items   Target Date 02/17/20   Date Met      Progress:     Goal Identifier Memory compensation/problem solving strategies   Goal  Description Patient to verbalize an understanding of memory compensation strategies and utilize memory tools (planner, calendar, etc effectively and independently for increased ability to manage time, appointments, daily schedule etc.     Target Date 02/17/20   Date Met      Progress:     Goal Identifier AE for increased ease of ADL/IADL completion   Goal Description Patient to verbalize and utilize 3 strategies and/or AE to compensate for decreased UE function and promote and demonstrate increased independence with ADL/IADLs, such as, dressing,    Target Date 02/17/20   Date Met      Progress:       Progress Toward Goals:   Progress this reporting period: See attached progress note.    Certification date from 11/19/19 to 2/17/20.    Henrietta Lora OTR          I CERTIFY THE NEED FOR THESE SERVICES FURNISHED UNDER        THIS PLAN OF TREATMENT AND WHILE UNDER MY CARE     (Physician co-signature of this document indicates review and certification of the therapy plan).                Referring Provider: Janet Bell MD

## 2019-11-19 NOTE — PROGRESS NOTES
Outpatient Occupational Therapy Progress Note     Patient: Tk Richmond  : 1935    Beginning/End Dates of Reporting Period:  19 to 2019    Referring Provider: Janet Bell MD    Therapy Diagnosis: Decreased ADL/IADL    Client Self Report: Pt's wife wondering what else pt can be doing to promote R UE use.  DIscussed rhythmic tasks like wiping counters, folding towels. Not using his R hand to push up from chairs.       Pt is getting his xray/possible cast removal on 19.     Pt is 83 yo male with past medical history significant for multiple strokes of unknown etiology, HTN, and MDD who was admitted to the hospital on  due to left MCA ischemic stroke and then admitted to the ARU on 2019.  Pt is accompanied by his supportive wife, Tamika,  to his OT evaluation.     Objective Measurements:     Objective Measure:    Details: R  17,17, 21# in pos #2  (stable, was )  L hand NT due to casting.       Objective Measure: Pinch   Details: Key R 8#, 1# increase Pickering R 6 #, 1# increase     Objective Measure: 9HPT   Details: R 16 Sec for first peg, three pegs in one minute.  Able to  single pegs placed in the well of board better, due to flexion restrictions at IP's.  Removes 9 pegs from holes in 25 sec.      Objective Measure: GMC   Has > 2 inches dysmetria with FNF. Moderate deficts with positional awareness at elbow, forearm, wrist and fingers.        Goals:     Goal Identifier HEP   Goal Description Pt will demonstrate good follow through at home of a B UE HEP for strength  and  coordination with SBA and min cues to increase functional strength and coordination while maximizing  independence and performance of ADL/IADLs. Pt will demonstrate I with HEP of 6-8 activities for weight bearing, stretching, AROM and progress to resistance exercises   Target Date 19   Date Met      Progress:  Pt admits he is not as regular with R wrist, hand stretches and  putty exercises, but is really using his R hand as much as possible to eat, do dressing and grooming.  He does better with number related tasks than rote handwriting practice sheets.       Goal Identifier R UE use   Goal Description Patient to demonstrate functional tasks with 60% use of R UE as functional assist for clothing fasteners, opening food packages, and increased ADL/IADL independence for closer return to prior level of function   Target Date 11/20/19   Date Met      Progress: Pt is able to open food packages with intermittent assist.  Mod A to cut food. Feeds self, using scoop plate and large foam handled silverware, with set up.  Able to shave and brush teeth with his R hand. Mod A with clothing fasteners.       Goal Identifier Safety with reach/simple meal prep task    Goal Description Pt will demonstrate kitchen safety while completing a simple to moderately complex meal prep task with electric range independently and while reaching at all levels using safe techniques to access items   Target Date 11/20/19   Date Met      Progress: Not being addressed yet per complexity of goal.     Goal Identifier Memory compensation/problem solving strategies   Goal Description Patient to verbalize an understanding of memory compensation strategies and utilize memory tools (planner, calendar, etc effectively and independently for increased ability to manage time, appointments, daily schedule etc.     Target Date 11/20/19   Date Met      Progress:  Doing better with simple math problem solving (severe word finding aphasia, affecting numbers also.) Has progressed to adding double but not triple digits.  Working towards multiplication next.       Goal Identifier AE for increased ease of ADL/IADL completion   Goal Description Patient to verbalize and utilize 3 strategies and/or AE to compensate for decreased UE function and promote and demonstrate increased independence with ADL/IADLs, such as, dressing,    Target Date  11/20/19   Date Met      Progress: Adapted feeding and dressing per above.         Progress Toward Goals:   Progress this reporting period: Pt has attended 20 visits of skilled outpatient occupational since being evaluated on 8/22/19.  Treatment plan has included  ADL training, IADL training, Cognitive performance testing, Coordination training, ROM, Self care/Home management, Strengthening, Stretching to address above goal areas.      His course of care was complicated by having a mechanical fall 9/30/19 while helping his wife flip a mattress, got knocked over backwards and fractured his L distal radius. He attended OP hand therapy 1x/week x 6 sessions so far.  Sustained two falls per dizziness on 10/31, 11/1/19.      Plan:  Continue therapy per current plan of care.    Discharge:  Pt's goals still very appropriate and medically necessary; plan to continue OT 2x/week x 12-20 weeks per the complexity of his goals and density of motor planning issues jhoana ANDREW.      Thank you for this thoughtful referral! If you have any questions, please call me at [phone].  Nice to share in this patient's care with you.    Sincerely,   Henrietta Lora, OTR/l

## 2019-11-21 ENCOUNTER — HOSPITAL ENCOUNTER (OUTPATIENT)
Dept: OCCUPATIONAL THERAPY | Facility: CLINIC | Age: 84
Setting detail: THERAPIES SERIES
End: 2019-11-21
Attending: PHYSICAL MEDICINE & REHABILITATION
Payer: MEDICARE

## 2019-11-21 PROCEDURE — 97110 THERAPEUTIC EXERCISES: CPT | Mod: GO | Performed by: OCCUPATIONAL THERAPIST

## 2019-11-21 PROCEDURE — 97535 SELF CARE MNGMENT TRAINING: CPT | Mod: GO,KX | Performed by: OCCUPATIONAL THERAPIST

## 2019-11-22 ENCOUNTER — HOSPITAL ENCOUNTER (OUTPATIENT)
Dept: PHYSICAL THERAPY | Facility: CLINIC | Age: 84
Setting detail: THERAPIES SERIES
End: 2019-11-22
Attending: PHYSICAL MEDICINE & REHABILITATION
Payer: MEDICARE

## 2019-11-22 PROCEDURE — 97110 THERAPEUTIC EXERCISES: CPT | Mod: GP,KX | Performed by: PHYSICAL THERAPIST

## 2019-11-22 PROCEDURE — 97112 NEUROMUSCULAR REEDUCATION: CPT | Mod: GP | Performed by: PHYSICAL THERAPIST

## 2019-11-22 PROCEDURE — 97116 GAIT TRAINING THERAPY: CPT | Mod: GP | Performed by: PHYSICAL THERAPIST

## 2019-11-25 ENCOUNTER — HOSPITAL ENCOUNTER (OUTPATIENT)
Dept: OCCUPATIONAL THERAPY | Facility: CLINIC | Age: 84
Setting detail: THERAPIES SERIES
End: 2019-11-25
Attending: PHYSICAL MEDICINE & REHABILITATION
Payer: MEDICARE

## 2019-11-25 PROCEDURE — 97535 SELF CARE MNGMENT TRAINING: CPT | Mod: GO,KX | Performed by: OCCUPATIONAL THERAPIST

## 2019-11-25 PROCEDURE — 97110 THERAPEUTIC EXERCISES: CPT | Mod: GO,KX | Performed by: OCCUPATIONAL THERAPIST

## 2019-12-02 ENCOUNTER — HOSPITAL ENCOUNTER (OUTPATIENT)
Dept: PHYSICAL THERAPY | Facility: CLINIC | Age: 84
Setting detail: THERAPIES SERIES
End: 2019-12-02
Attending: PHYSICAL MEDICINE & REHABILITATION
Payer: MEDICARE

## 2019-12-02 ENCOUNTER — HOSPITAL ENCOUNTER (OUTPATIENT)
Dept: OCCUPATIONAL THERAPY | Facility: CLINIC | Age: 84
Setting detail: THERAPIES SERIES
End: 2019-12-02
Attending: PHYSICAL MEDICINE & REHABILITATION
Payer: MEDICARE

## 2019-12-02 PROCEDURE — 97110 THERAPEUTIC EXERCISES: CPT | Mod: GO,KX | Performed by: OCCUPATIONAL THERAPIST

## 2019-12-02 PROCEDURE — 97110 THERAPEUTIC EXERCISES: CPT | Mod: GP,KX | Performed by: PHYSICAL THERAPIST

## 2019-12-02 PROCEDURE — 97535 SELF CARE MNGMENT TRAINING: CPT | Mod: GO,KX | Performed by: OCCUPATIONAL THERAPIST

## 2019-12-02 PROCEDURE — 97112 NEUROMUSCULAR REEDUCATION: CPT | Mod: GP,KX | Performed by: PHYSICAL THERAPIST

## 2019-12-03 ENCOUNTER — HOSPITAL ENCOUNTER (OUTPATIENT)
Dept: SPEECH THERAPY | Facility: CLINIC | Age: 84
Setting detail: THERAPIES SERIES
End: 2019-12-03
Attending: PHYSICAL MEDICINE & REHABILITATION
Payer: MEDICARE

## 2019-12-03 PROCEDURE — 92507 TX SP LANG VOICE COMM INDIV: CPT | Mod: GN,KX | Performed by: SPEECH-LANGUAGE PATHOLOGIST

## 2019-12-05 ENCOUNTER — HOSPITAL ENCOUNTER (OUTPATIENT)
Dept: OCCUPATIONAL THERAPY | Facility: CLINIC | Age: 84
Setting detail: THERAPIES SERIES
End: 2019-12-05
Attending: PHYSICAL MEDICINE & REHABILITATION
Payer: MEDICARE

## 2019-12-05 ENCOUNTER — HOSPITAL ENCOUNTER (OUTPATIENT)
Dept: SPEECH THERAPY | Facility: CLINIC | Age: 84
Setting detail: THERAPIES SERIES
End: 2019-12-05
Attending: PHYSICAL MEDICINE & REHABILITATION
Payer: MEDICARE

## 2019-12-05 PROCEDURE — 97535 SELF CARE MNGMENT TRAINING: CPT | Mod: GO,KX | Performed by: OCCUPATIONAL THERAPIST

## 2019-12-05 PROCEDURE — 92507 TX SP LANG VOICE COMM INDIV: CPT | Mod: GN,KX | Performed by: SPEECH-LANGUAGE PATHOLOGIST

## 2019-12-10 ENCOUNTER — HOSPITAL ENCOUNTER (OUTPATIENT)
Dept: SPEECH THERAPY | Facility: CLINIC | Age: 84
Setting detail: THERAPIES SERIES
End: 2019-12-10
Attending: PHYSICAL MEDICINE & REHABILITATION
Payer: MEDICARE

## 2019-12-10 ENCOUNTER — HOSPITAL ENCOUNTER (OUTPATIENT)
Dept: PHYSICAL THERAPY | Facility: CLINIC | Age: 84
Setting detail: THERAPIES SERIES
End: 2019-12-10
Attending: PHYSICAL MEDICINE & REHABILITATION
Payer: MEDICARE

## 2019-12-10 PROCEDURE — 97112 NEUROMUSCULAR REEDUCATION: CPT | Mod: GP,KX,59 | Performed by: PHYSICAL THERAPIST

## 2019-12-10 PROCEDURE — 92507 TX SP LANG VOICE COMM INDIV: CPT | Mod: GN,KX | Performed by: SPEECH-LANGUAGE PATHOLOGIST

## 2019-12-10 PROCEDURE — 97110 THERAPEUTIC EXERCISES: CPT | Mod: GP,KX,59 | Performed by: PHYSICAL THERAPIST

## 2019-12-10 PROCEDURE — 97116 GAIT TRAINING THERAPY: CPT | Mod: GP | Performed by: PHYSICAL THERAPIST

## 2019-12-12 ENCOUNTER — HOSPITAL ENCOUNTER (OUTPATIENT)
Dept: OCCUPATIONAL THERAPY | Facility: CLINIC | Age: 84
Setting detail: THERAPIES SERIES
End: 2019-12-12
Attending: PHYSICAL MEDICINE & REHABILITATION
Payer: MEDICARE

## 2019-12-12 ENCOUNTER — HOSPITAL ENCOUNTER (OUTPATIENT)
Dept: SPEECH THERAPY | Facility: CLINIC | Age: 84
Setting detail: THERAPIES SERIES
End: 2019-12-12
Attending: PHYSICAL MEDICINE & REHABILITATION
Payer: MEDICARE

## 2019-12-12 PROCEDURE — 97535 SELF CARE MNGMENT TRAINING: CPT | Mod: GO | Performed by: OCCUPATIONAL THERAPIST

## 2019-12-12 PROCEDURE — 92507 TX SP LANG VOICE COMM INDIV: CPT | Mod: GN,KX | Performed by: SPEECH-LANGUAGE PATHOLOGIST

## 2019-12-12 PROCEDURE — 97110 THERAPEUTIC EXERCISES: CPT | Mod: GO,KX,59 | Performed by: OCCUPATIONAL THERAPIST

## 2019-12-16 ENCOUNTER — HOSPITAL ENCOUNTER (OUTPATIENT)
Dept: OCCUPATIONAL THERAPY | Facility: CLINIC | Age: 84
Setting detail: THERAPIES SERIES
End: 2019-12-16
Attending: PHYSICAL MEDICINE & REHABILITATION
Payer: MEDICARE

## 2019-12-16 PROCEDURE — 97535 SELF CARE MNGMENT TRAINING: CPT | Mod: GO,KX | Performed by: OCCUPATIONAL THERAPIST

## 2019-12-17 ENCOUNTER — HOSPITAL ENCOUNTER (OUTPATIENT)
Dept: SPEECH THERAPY | Facility: CLINIC | Age: 84
Setting detail: THERAPIES SERIES
End: 2019-12-17
Attending: PHYSICAL MEDICINE & REHABILITATION
Payer: MEDICARE

## 2019-12-17 ENCOUNTER — HOSPITAL ENCOUNTER (OUTPATIENT)
Dept: PHYSICAL THERAPY | Facility: CLINIC | Age: 84
Setting detail: THERAPIES SERIES
End: 2019-12-17
Attending: PHYSICAL MEDICINE & REHABILITATION
Payer: MEDICARE

## 2019-12-17 PROCEDURE — 92507 TX SP LANG VOICE COMM INDIV: CPT | Mod: GN,KX | Performed by: SPEECH-LANGUAGE PATHOLOGIST

## 2019-12-17 PROCEDURE — 97112 NEUROMUSCULAR REEDUCATION: CPT | Mod: GP,KX,59 | Performed by: PHYSICAL THERAPIST

## 2019-12-17 PROCEDURE — 97116 GAIT TRAINING THERAPY: CPT | Mod: GP | Performed by: PHYSICAL THERAPIST

## 2019-12-17 PROCEDURE — 97110 THERAPEUTIC EXERCISES: CPT | Mod: GP | Performed by: PHYSICAL THERAPIST

## 2019-12-19 ENCOUNTER — HOSPITAL ENCOUNTER (OUTPATIENT)
Dept: PHYSICAL THERAPY | Facility: CLINIC | Age: 84
Setting detail: THERAPIES SERIES
End: 2019-12-19
Attending: PHYSICAL MEDICINE & REHABILITATION
Payer: MEDICARE

## 2019-12-19 ENCOUNTER — HOSPITAL ENCOUNTER (OUTPATIENT)
Dept: SPEECH THERAPY | Facility: CLINIC | Age: 84
Setting detail: THERAPIES SERIES
End: 2019-12-19
Attending: PHYSICAL MEDICINE & REHABILITATION
Payer: MEDICARE

## 2019-12-19 PROCEDURE — 97530 THERAPEUTIC ACTIVITIES: CPT | Mod: GP,KX | Performed by: PHYSICAL THERAPIST

## 2019-12-19 PROCEDURE — 97110 THERAPEUTIC EXERCISES: CPT | Mod: GP,KX | Performed by: PHYSICAL THERAPIST

## 2019-12-19 PROCEDURE — 92507 TX SP LANG VOICE COMM INDIV: CPT | Mod: GN,KX | Performed by: SPEECH-LANGUAGE PATHOLOGIST

## 2019-12-19 PROCEDURE — 97112 NEUROMUSCULAR REEDUCATION: CPT | Mod: GP,KX | Performed by: PHYSICAL THERAPIST

## 2019-12-19 NOTE — PROGRESS NOTES
"Outpatient Speech Language Pathology Progress Note & Medicare 10th Visit Note     Patient: Tk Richmond  : 1935    Beginning/End Dates of Reporting Period:  2019 to 2019    Referring Provider: Dr. Janet Bell  Primary MD = Dr. Shyam Mclean    Therapy Diagnosis: Aphasia, Apraxia of Speech    Client Self Report: Tk Richmond is a 84 year old y.o.male s/p L MCA CVA in 2019.  Please refer to the initial outpatient speech-language pathology evaluation dated 19 for further background information.  Patient has been seen for 30 visits since the start of care addressing his aphasia and apraxia of speech.  Patient continues to complete his home program and reports the \"words come out a little easier.\"     Objective Measurements: Goals:  Goal Identifier LTG   Goal Description Patient will improve his functional expressive and receptive language to communicate in daily living and emergency situations with at least 90% and use of strategies prn.    Target Date 20   Date Met      Progress: Continue goal.  See below for progress towards long-term goals.      Goal Identifier STG 1: Language/Auditory Comprehension   Goal Description Patient will follow 2 step directions and listen to short paragraphs and answer questions with 90% and minimal cues for improved comprehension needed for daily living communication needs.   Target Date 20   Date Met      Progress: Continue goal. Pt is 90% with maximal repetition in this goal area. Pt benefits from keeping things short and simple and when given a model.      Goal Identifier STG 2: Language/Verbal Expression   Goal Description Patient will complete moderate level verbal expression tasks including naming, describing and sentence completion with use of trained word-retrieval strategies to improve verbal expression needed for daily living communication needs with 80% and minimal cues.   Target Date 20   Date Met      Progress: " Continue goal.  Patient is 95% with maximal cues in this goal area.  His comprehensibility of his message is 85-90% when using any means of communication to express himself. He benefits from some written cues or pictures to facilitate message.       Goal Identifier STG 3: Language/Reading comprehension   Goal Description Patient will complete practical reading comprehension tasks including reading menus, signs, labels, to improve reading comprehension needed for daily living and  safety needs with 90 % accuracy and minimal cues.   Target Date 01/27/20   Date Met      Progress: Continue goal.  Patient is 90% with moderate cues in this goal area.      Goal Identifier STG 4: Speech-intellgibility   Goal Description Patient will implement trained speech production/intelligibility strategies to improve speech intelligibility to at least 90% at the sentence and conversational level.   Target Date 01/27/20   Date Met      Progress: Continue goal.  Patient's speech clarity and intelligibility is 90% after SLP model for words and sentences.  Without model he is 65%.       Progress Toward Goals:    Progress this reporting period: Mr. Richmond presents with moderate aphasia and apraxia of speech.  He has made progress towards all his goals this progress reporting period and continues to improve with his speech production and intelligibility with use of trained strategies.  It is recommended that outpatient speech-language therapy continue on a 2x/week basis for the next 8 weeks with re-evaluation at the end of that time to further determine a plan of care.  Prognosis continues to be good and patient will continue to benefit from skilled speech-language services for improved communication needed for daily living and safety needs.     Plan:  Continue therapy per current plan of care.  Goals to be met in 8 weeks:  1. Patient will follow 2 step directions and listen to short paragraphs and answer questions with 90% and minimal  cues for improved comprehension needed for daily living communication needs.    2.  Patient will complete moderate level verbal expression tasks including naming, describing and sentence completion with use of trained word-retrieval strategies to improve verbal expression needed for daily living communication needs with 80% and minimal cues.    3. Patient will complete practical reading comprehension tasks including reading menus, signs, labels, to improve reading comprehension needed for daily living and  safety needs with 90 % accuracy and minimal cues.    4.  Patient will implement trained speech production/intelligibility strategies to improve speech intelligibility to at least 90% at the sentence and conversational level.    Discharge:  No    Thank you for the referral of this patient.  If you have any questions regarding the information in this report, please feel free to contact me at [phone].     Mili Lockhart MA, CCC-SLP  Speech-Language Pathologist  [address] [address]  Fax:  [phone]

## 2019-12-20 ENCOUNTER — HOSPITAL ENCOUNTER (OUTPATIENT)
Dept: OCCUPATIONAL THERAPY | Facility: CLINIC | Age: 84
Setting detail: THERAPIES SERIES
End: 2019-12-20
Attending: PHYSICAL MEDICINE & REHABILITATION
Payer: MEDICARE

## 2019-12-20 PROCEDURE — 97535 SELF CARE MNGMENT TRAINING: CPT | Mod: GO,KX | Performed by: OCCUPATIONAL THERAPIST

## 2019-12-23 ENCOUNTER — HOSPITAL ENCOUNTER (OUTPATIENT)
Dept: OCCUPATIONAL THERAPY | Facility: CLINIC | Age: 84
Setting detail: THERAPIES SERIES
End: 2019-12-23
Attending: PHYSICAL MEDICINE & REHABILITATION
Payer: MEDICARE

## 2019-12-23 ENCOUNTER — HOSPITAL ENCOUNTER (OUTPATIENT)
Dept: PHYSICAL THERAPY | Facility: CLINIC | Age: 84
Setting detail: THERAPIES SERIES
End: 2019-12-23
Attending: PHYSICAL MEDICINE & REHABILITATION
Payer: MEDICARE

## 2019-12-23 PROCEDURE — 97110 THERAPEUTIC EXERCISES: CPT | Mod: GP,KX | Performed by: PHYSICAL THERAPIST

## 2019-12-23 PROCEDURE — 97116 GAIT TRAINING THERAPY: CPT | Mod: GP | Performed by: PHYSICAL THERAPIST

## 2019-12-23 PROCEDURE — 97112 NEUROMUSCULAR REEDUCATION: CPT | Mod: GP,KX | Performed by: PHYSICAL THERAPIST

## 2019-12-23 PROCEDURE — 97110 THERAPEUTIC EXERCISES: CPT | Mod: GO,KX | Performed by: OCCUPATIONAL THERAPIST

## 2019-12-26 ENCOUNTER — HOSPITAL ENCOUNTER (OUTPATIENT)
Dept: PHYSICAL THERAPY | Facility: CLINIC | Age: 84
Setting detail: THERAPIES SERIES
End: 2019-12-26
Attending: PHYSICAL MEDICINE & REHABILITATION
Payer: MEDICARE

## 2019-12-26 ENCOUNTER — HOSPITAL ENCOUNTER (OUTPATIENT)
Dept: OCCUPATIONAL THERAPY | Facility: CLINIC | Age: 84
Setting detail: THERAPIES SERIES
End: 2019-12-26
Attending: PHYSICAL MEDICINE & REHABILITATION
Payer: MEDICARE

## 2019-12-26 PROCEDURE — 97110 THERAPEUTIC EXERCISES: CPT | Mod: GP | Performed by: PHYSICAL THERAPIST

## 2019-12-26 PROCEDURE — 97535 SELF CARE MNGMENT TRAINING: CPT | Mod: GO | Performed by: OCCUPATIONAL THERAPIST

## 2019-12-26 PROCEDURE — 97110 THERAPEUTIC EXERCISES: CPT | Mod: GO | Performed by: OCCUPATIONAL THERAPIST

## 2019-12-26 PROCEDURE — 97116 GAIT TRAINING THERAPY: CPT | Mod: GP,KX | Performed by: PHYSICAL THERAPIST

## 2019-12-26 PROCEDURE — 97112 NEUROMUSCULAR REEDUCATION: CPT | Mod: GP,KX | Performed by: PHYSICAL THERAPIST

## 2020-01-02 ENCOUNTER — HOSPITAL ENCOUNTER (OUTPATIENT)
Dept: OCCUPATIONAL THERAPY | Facility: CLINIC | Age: 85
Setting detail: THERAPIES SERIES
End: 2020-01-02
Attending: PHYSICAL MEDICINE & REHABILITATION
Payer: MEDICARE

## 2020-01-02 ENCOUNTER — HOSPITAL ENCOUNTER (OUTPATIENT)
Dept: PHYSICAL THERAPY | Facility: CLINIC | Age: 85
Setting detail: THERAPIES SERIES
End: 2020-01-02
Attending: PHYSICAL MEDICINE & REHABILITATION
Payer: MEDICARE

## 2020-01-02 PROCEDURE — 97535 SELF CARE MNGMENT TRAINING: CPT | Mod: GO | Performed by: OCCUPATIONAL THERAPIST

## 2020-01-02 PROCEDURE — 97110 THERAPEUTIC EXERCISES: CPT | Mod: GO | Performed by: OCCUPATIONAL THERAPIST

## 2020-01-02 PROCEDURE — 97116 GAIT TRAINING THERAPY: CPT | Mod: GP | Performed by: PHYSICAL THERAPIST

## 2020-01-02 PROCEDURE — 97110 THERAPEUTIC EXERCISES: CPT | Mod: GP | Performed by: PHYSICAL THERAPIST

## 2020-01-02 PROCEDURE — 97750 PHYSICAL PERFORMANCE TEST: CPT | Mod: GP | Performed by: PHYSICAL THERAPIST

## 2020-01-02 NOTE — PROGRESS NOTES
Outpatient Occupational Therapy Progress Note     Patient: Tk Richmond  : 1935    Beginning/End Dates of Reporting Period:  19 to 2020    Referring Provider: Janet Bell MD    Therapy Diagnosis: decreased adls/iadls    Client Self Report: I had a happy new year and we ate food.    Objective Measurements:     Objective Measure:    Details: from R  17,18, 20# in pos #2  (stable, was )  L hand NT due to casting.     Objective Measure: Pinch   Details: from  Key R 9#, 1# increase Pickering R 6 #, stable   Objective Measure: 9HPT   Details:  from Key R 9#, 1# increase Pickering R 6 #, stable   Objective Measure: Box and block   Details:  from  R 14 blcks L 32 blcks  BNL compared to age and gender, but demonstrating progress with ability to grasp objects and release objects.                       Goals:     Goal Identifier HEP   Goal Description Pt will demonstrate good follow through at home of a B UE HEP for strength  and  coordination with SBA and min cues to increase functional strength and coordination while maximizing  independence and performance of ADL/IADLs. Pt will demonstrate I with HEP of 6-8 activities for weight bearing, stretching, AROM and progress to resistance exercises   Target Date 20   Date Met      Progress:Ongoing.  Pt continues to comply with new HEP strategies.      Goal Identifier R UE use   Goal Description Patient to demonstrate functional tasks with 60% use of R UE as functional assist for clothing fasteners, opening food packages, and increased ADL/IADL independence for closer return to prior level of function   Target Date 20   Date Met      Progress: Pt reports opening jars, writing, using hand to open fridge and other drawers and doors.      Goal Identifier Safety with reach/simple meal prep task    Goal Description Pt will demonstrate kitchen safety while completing a simple to moderately complex meal prep task with  electric range independently and while reaching at all levels using safe techniques to access items   Target Date 02/17/20   Date Met      Progress:     Goal Identifier Memory compensation/problem solving strategies   Goal Description Patient to verbalize an understanding of memory compensation strategies and utilize memory tools (planner, calendar, etc effectively and independently for increased ability to manage time, appointments, daily schedule etc.     Target Date 02/17/20   Date Met      Progress: In progress     Goal Identifier AE for increased ease of ADL/IADL completion   Goal Description Patient to verbalize and utilize 3 strategies and/or AE to compensate for decreased UE function and promote and demonstrate increased independence with ADL/IADLs, such as, dressing,    Target Date 02/17/20   Date Met      Progress: Pt instructed in ways to utilize cup better, use of adaptive writing utensils         Progress Toward Goals:   Progress this reporting period: Pt continues to make progress with accuracy with math and ability to use R UE for writing tasks, demonstrating increased legibility and accuracy.  Pt compliant with HEP and completing fxl tasks with R UE including opening jars and containers at home. Pt phys assessments improving for R UE.  See objective measures above for details.  Pt will continue to benefit from OT services to address the above goals and continue to achieve in the above areas. Continue current treatment plan.     Plan:  Continue therapy per current plan of care.    Discharge:  No

## 2020-01-06 ENCOUNTER — HOSPITAL ENCOUNTER (OUTPATIENT)
Dept: OCCUPATIONAL THERAPY | Facility: CLINIC | Age: 85
Setting detail: THERAPIES SERIES
End: 2020-01-06
Attending: PHYSICAL MEDICINE & REHABILITATION
Payer: MEDICARE

## 2020-01-06 ENCOUNTER — HOSPITAL ENCOUNTER (OUTPATIENT)
Dept: PHYSICAL THERAPY | Facility: CLINIC | Age: 85
Setting detail: THERAPIES SERIES
End: 2020-01-06
Attending: PHYSICAL MEDICINE & REHABILITATION
Payer: MEDICARE

## 2020-01-06 PROCEDURE — 97535 SELF CARE MNGMENT TRAINING: CPT | Mod: GO | Performed by: OCCUPATIONAL THERAPIST

## 2020-01-06 PROCEDURE — 97116 GAIT TRAINING THERAPY: CPT | Mod: GP | Performed by: PHYSICAL THERAPIST

## 2020-01-06 PROCEDURE — 97110 THERAPEUTIC EXERCISES: CPT | Mod: GO | Performed by: OCCUPATIONAL THERAPIST

## 2020-01-06 PROCEDURE — 97110 THERAPEUTIC EXERCISES: CPT | Mod: GP | Performed by: PHYSICAL THERAPIST

## 2020-01-07 ENCOUNTER — HOSPITAL ENCOUNTER (OUTPATIENT)
Dept: SPEECH THERAPY | Facility: CLINIC | Age: 85
Setting detail: THERAPIES SERIES
End: 2020-01-07
Attending: PHYSICAL MEDICINE & REHABILITATION
Payer: MEDICARE

## 2020-01-07 PROCEDURE — 92507 TX SP LANG VOICE COMM INDIV: CPT | Mod: GN | Performed by: SPEECH-LANGUAGE PATHOLOGIST

## 2020-01-08 ENCOUNTER — HOSPITAL ENCOUNTER (OUTPATIENT)
Dept: OCCUPATIONAL THERAPY | Facility: CLINIC | Age: 85
Setting detail: THERAPIES SERIES
End: 2020-01-08
Attending: PHYSICAL MEDICINE & REHABILITATION
Payer: MEDICARE

## 2020-01-08 PROCEDURE — 97535 SELF CARE MNGMENT TRAINING: CPT | Mod: GO | Performed by: OCCUPATIONAL THERAPIST

## 2020-01-09 ENCOUNTER — HOSPITAL ENCOUNTER (OUTPATIENT)
Dept: SPEECH THERAPY | Facility: CLINIC | Age: 85
Setting detail: THERAPIES SERIES
End: 2020-01-09
Attending: PHYSICAL MEDICINE & REHABILITATION
Payer: MEDICARE

## 2020-01-09 ENCOUNTER — HOSPITAL ENCOUNTER (OUTPATIENT)
Dept: PHYSICAL THERAPY | Facility: CLINIC | Age: 85
Setting detail: THERAPIES SERIES
End: 2020-01-09
Attending: PHYSICAL MEDICINE & REHABILITATION
Payer: MEDICARE

## 2020-01-09 PROCEDURE — 92507 TX SP LANG VOICE COMM INDIV: CPT | Mod: GN | Performed by: SPEECH-LANGUAGE PATHOLOGIST

## 2020-01-09 PROCEDURE — 97116 GAIT TRAINING THERAPY: CPT | Mod: GP | Performed by: PHYSICAL THERAPIST

## 2020-01-09 PROCEDURE — 97110 THERAPEUTIC EXERCISES: CPT | Mod: GP | Performed by: PHYSICAL THERAPIST

## 2020-01-13 ENCOUNTER — HOSPITAL ENCOUNTER (OUTPATIENT)
Dept: OCCUPATIONAL THERAPY | Facility: CLINIC | Age: 85
Setting detail: THERAPIES SERIES
End: 2020-01-13
Attending: PHYSICAL MEDICINE & REHABILITATION
Payer: MEDICARE

## 2020-01-13 ENCOUNTER — HOSPITAL ENCOUNTER (OUTPATIENT)
Dept: PHYSICAL THERAPY | Facility: CLINIC | Age: 85
Setting detail: THERAPIES SERIES
End: 2020-01-13
Attending: PHYSICAL MEDICINE & REHABILITATION
Payer: MEDICARE

## 2020-01-13 PROCEDURE — 97110 THERAPEUTIC EXERCISES: CPT | Mod: GP | Performed by: PHYSICAL THERAPIST

## 2020-01-13 PROCEDURE — 97110 THERAPEUTIC EXERCISES: CPT | Mod: GO | Performed by: OCCUPATIONAL THERAPIST

## 2020-01-13 PROCEDURE — 97535 SELF CARE MNGMENT TRAINING: CPT | Mod: GO | Performed by: OCCUPATIONAL THERAPIST

## 2020-01-13 PROCEDURE — 97116 GAIT TRAINING THERAPY: CPT | Mod: GP | Performed by: PHYSICAL THERAPIST

## 2020-01-14 ENCOUNTER — HOSPITAL ENCOUNTER (OUTPATIENT)
Dept: SPEECH THERAPY | Facility: CLINIC | Age: 85
Setting detail: THERAPIES SERIES
End: 2020-01-14
Attending: PHYSICAL MEDICINE & REHABILITATION
Payer: MEDICARE

## 2020-01-14 PROCEDURE — 92507 TX SP LANG VOICE COMM INDIV: CPT | Mod: GN | Performed by: SPEECH-LANGUAGE PATHOLOGIST

## 2020-01-15 ENCOUNTER — HOSPITAL ENCOUNTER (OUTPATIENT)
Dept: OCCUPATIONAL THERAPY | Facility: CLINIC | Age: 85
Setting detail: THERAPIES SERIES
End: 2020-01-15
Attending: PHYSICAL MEDICINE & REHABILITATION
Payer: MEDICARE

## 2020-01-15 PROCEDURE — 97535 SELF CARE MNGMENT TRAINING: CPT | Mod: GO | Performed by: OCCUPATIONAL THERAPIST

## 2020-01-15 PROCEDURE — 97110 THERAPEUTIC EXERCISES: CPT | Mod: GO | Performed by: OCCUPATIONAL THERAPIST

## 2020-01-16 ENCOUNTER — HOSPITAL ENCOUNTER (OUTPATIENT)
Dept: SPEECH THERAPY | Facility: CLINIC | Age: 85
Setting detail: THERAPIES SERIES
End: 2020-01-16
Attending: PHYSICAL MEDICINE & REHABILITATION
Payer: MEDICARE

## 2020-01-16 ENCOUNTER — HOSPITAL ENCOUNTER (OUTPATIENT)
Dept: PHYSICAL THERAPY | Facility: CLINIC | Age: 85
Setting detail: THERAPIES SERIES
End: 2020-01-16
Attending: PHYSICAL MEDICINE & REHABILITATION
Payer: MEDICARE

## 2020-01-16 PROCEDURE — 97530 THERAPEUTIC ACTIVITIES: CPT | Mod: GP | Performed by: PHYSICAL THERAPIST

## 2020-01-16 PROCEDURE — 92507 TX SP LANG VOICE COMM INDIV: CPT | Mod: GN | Performed by: SPEECH-LANGUAGE PATHOLOGIST

## 2020-01-16 PROCEDURE — 97110 THERAPEUTIC EXERCISES: CPT | Mod: GP,59 | Performed by: PHYSICAL THERAPIST

## 2020-01-21 ENCOUNTER — HOSPITAL ENCOUNTER (OUTPATIENT)
Dept: OCCUPATIONAL THERAPY | Facility: CLINIC | Age: 85
Setting detail: THERAPIES SERIES
End: 2020-01-21
Attending: PHYSICAL MEDICINE & REHABILITATION
Payer: MEDICARE

## 2020-01-21 ENCOUNTER — HOSPITAL ENCOUNTER (OUTPATIENT)
Dept: SPEECH THERAPY | Facility: CLINIC | Age: 85
Setting detail: THERAPIES SERIES
End: 2020-01-21
Attending: PHYSICAL MEDICINE & REHABILITATION
Payer: MEDICARE

## 2020-01-21 PROCEDURE — 92507 TX SP LANG VOICE COMM INDIV: CPT | Mod: GN | Performed by: SPEECH-LANGUAGE PATHOLOGIST

## 2020-01-21 PROCEDURE — 97535 SELF CARE MNGMENT TRAINING: CPT | Mod: GO | Performed by: OCCUPATIONAL THERAPIST

## 2020-01-21 PROCEDURE — 97110 THERAPEUTIC EXERCISES: CPT | Mod: GO,59 | Performed by: OCCUPATIONAL THERAPIST

## 2020-01-22 ENCOUNTER — HOSPITAL ENCOUNTER (OUTPATIENT)
Dept: OCCUPATIONAL THERAPY | Facility: CLINIC | Age: 85
Setting detail: THERAPIES SERIES
End: 2020-01-22
Attending: PHYSICAL MEDICINE & REHABILITATION
Payer: MEDICARE

## 2020-01-22 PROCEDURE — 97110 THERAPEUTIC EXERCISES: CPT | Mod: GO | Performed by: OCCUPATIONAL THERAPIST

## 2020-01-23 ENCOUNTER — HOSPITAL ENCOUNTER (OUTPATIENT)
Dept: PHYSICAL THERAPY | Facility: CLINIC | Age: 85
Setting detail: THERAPIES SERIES
End: 2020-01-23
Attending: PHYSICAL MEDICINE & REHABILITATION
Payer: MEDICARE

## 2020-01-23 ENCOUNTER — HOSPITAL ENCOUNTER (OUTPATIENT)
Dept: SPEECH THERAPY | Facility: CLINIC | Age: 85
Setting detail: THERAPIES SERIES
End: 2020-01-23
Attending: PHYSICAL MEDICINE & REHABILITATION
Payer: MEDICARE

## 2020-01-23 PROCEDURE — 92507 TX SP LANG VOICE COMM INDIV: CPT | Mod: GN | Performed by: SPEECH-LANGUAGE PATHOLOGIST

## 2020-01-23 PROCEDURE — 97530 THERAPEUTIC ACTIVITIES: CPT | Mod: GP | Performed by: PHYSICAL THERAPIST

## 2020-01-27 ENCOUNTER — HOSPITAL ENCOUNTER (OUTPATIENT)
Dept: OCCUPATIONAL THERAPY | Facility: CLINIC | Age: 85
Setting detail: THERAPIES SERIES
End: 2020-01-27
Attending: PHYSICAL MEDICINE & REHABILITATION
Payer: MEDICARE

## 2020-01-27 ENCOUNTER — HOSPITAL ENCOUNTER (OUTPATIENT)
Dept: PHYSICAL THERAPY | Facility: CLINIC | Age: 85
Setting detail: THERAPIES SERIES
End: 2020-01-27
Attending: PHYSICAL MEDICINE & REHABILITATION
Payer: MEDICARE

## 2020-01-27 PROCEDURE — 97535 SELF CARE MNGMENT TRAINING: CPT | Mod: GO,59 | Performed by: OCCUPATIONAL THERAPIST

## 2020-01-27 PROCEDURE — 97110 THERAPEUTIC EXERCISES: CPT | Mod: GO | Performed by: OCCUPATIONAL THERAPIST

## 2020-01-27 PROCEDURE — 97530 THERAPEUTIC ACTIVITIES: CPT | Mod: GP | Performed by: PHYSICAL THERAPIST

## 2020-01-29 ENCOUNTER — HOSPITAL ENCOUNTER (OUTPATIENT)
Dept: OCCUPATIONAL THERAPY | Facility: CLINIC | Age: 85
Setting detail: THERAPIES SERIES
End: 2020-01-29
Attending: PHYSICAL MEDICINE & REHABILITATION
Payer: MEDICARE

## 2020-01-29 PROCEDURE — 97535 SELF CARE MNGMENT TRAINING: CPT | Mod: GO | Performed by: OCCUPATIONAL THERAPIST

## 2020-01-30 ENCOUNTER — HOSPITAL ENCOUNTER (OUTPATIENT)
Dept: PHYSICAL THERAPY | Facility: CLINIC | Age: 85
Setting detail: THERAPIES SERIES
End: 2020-01-30
Attending: PHYSICAL MEDICINE & REHABILITATION
Payer: MEDICARE

## 2020-01-30 ENCOUNTER — HOSPITAL ENCOUNTER (OUTPATIENT)
Dept: SPEECH THERAPY | Facility: CLINIC | Age: 85
Setting detail: THERAPIES SERIES
End: 2020-01-30
Attending: PHYSICAL MEDICINE & REHABILITATION
Payer: MEDICARE

## 2020-01-30 PROCEDURE — 92507 TX SP LANG VOICE COMM INDIV: CPT | Mod: GN | Performed by: SPEECH-LANGUAGE PATHOLOGIST

## 2020-01-30 PROCEDURE — 97530 THERAPEUTIC ACTIVITIES: CPT | Mod: GP,59 | Performed by: PHYSICAL THERAPIST

## 2020-01-30 NOTE — PROGRESS NOTES
[facility]      OUTPATIENT SPEECH LANGUAGE PATHOLOGY  PLAN OF TREATMENT FOR OUTPATIENT REHABILITATION    Patient's Last Name, First Name, M.I.                YOB: 1935  Tk Richmond                        Provider's Name  [facility] Medical Record No.  [id number]                               Onset Date: 7/13/2019   Start of Care Date: 8/9/2019   Type:     ___PT   ___OT   _X_SLP Medical Diagnosis: L MCA, CVA                       SLP Diagnosis: Aphasia, Apraxia of Speech      _________________________________________________________________________________  Plan of Treatment:    Frequency/Duration: 2x/week x 12 weeks     Goals:  Goal Identifier LTG   Goal Description Patient will improve his functional expressive and receptive language to communicate in daily living and emergency situations with at least 90% and use of strategies prn.    Target Date 02/09/20   Date Met      Progress:     Goal Identifier STG 1: Language/Auditory Comprehension   Goal Description Patient will follow 2 step directions and listen to short paragraphs and answer questions with 90% and minimal cues for improved comprehension needed for daily living communication needs.   Target Date 01/27/20   Date Met      Progress:     Goal Identifier STG 2: Language/Verbal Expression   Goal Description Patient will complete moderate level verbal expression tasks including naming, describing and sentence completion with use of trained word-retrieval strategies to improve verbal expression needed for daily living communication needs with 80% and minimal cues.   Target Date 01/27/20   Date Met      Progress:     Goal Identifier STG 3: Language/Reading comprehension   Goal Description Patient will complete practical reading comprehension tasks including reading menus, signs, labels, to improve reading comprehension  needed for daily living and  safety needs with 90 % accuracy and minimal cues.   Target Date 01/27/20   Date Met      Progress:     Goal Identifier STG 4: Speech-intellgibility   Goal Description Patient will implement trained speech production/intelligibility strategies to improve speech intelligibility to at least 90% at the sentence and conversational level.   Target Date 01/27/20   Date Met      Progress:   Progress Toward Goals:   Progress this reporting period: Pt presents with moderate aphasia and apraxia of speech.  See most recent progress note and daily note from 1/23/20, as patient making progress towards all goals         Certification date from 1/28/2020 to 4/27/2020.    Mili Lockhart, SLP          I CERTIFY THE NEED FOR THESE SERVICES FURNISHED UNDER        THIS PLAN OF TREATMENT AND WHILE UNDER MY CARE     (Physician co-signature of this document indicates review and certification of the therapy plan).                Referring Provider: Dr. Arabella Gonzales

## 2020-02-03 ENCOUNTER — HOSPITAL ENCOUNTER (OUTPATIENT)
Dept: PHYSICAL THERAPY | Facility: CLINIC | Age: 85
Setting detail: THERAPIES SERIES
End: 2020-02-03
Attending: PHYSICAL MEDICINE & REHABILITATION
Payer: MEDICARE

## 2020-02-03 ENCOUNTER — HOSPITAL ENCOUNTER (OUTPATIENT)
Dept: OCCUPATIONAL THERAPY | Facility: CLINIC | Age: 85
Setting detail: THERAPIES SERIES
End: 2020-02-03
Attending: PHYSICAL MEDICINE & REHABILITATION
Payer: MEDICARE

## 2020-02-03 PROCEDURE — 97110 THERAPEUTIC EXERCISES: CPT | Mod: GO | Performed by: OCCUPATIONAL THERAPIST

## 2020-02-03 PROCEDURE — 97530 THERAPEUTIC ACTIVITIES: CPT | Mod: GP | Performed by: PHYSICAL THERAPIST

## 2020-02-03 PROCEDURE — 97110 THERAPEUTIC EXERCISES: CPT | Mod: GP | Performed by: PHYSICAL THERAPIST

## 2020-02-03 PROCEDURE — 97535 SELF CARE MNGMENT TRAINING: CPT | Mod: GO,59 | Performed by: OCCUPATIONAL THERAPIST

## 2020-02-05 ENCOUNTER — HOSPITAL ENCOUNTER (OUTPATIENT)
Dept: SPEECH THERAPY | Facility: CLINIC | Age: 85
Setting detail: THERAPIES SERIES
End: 2020-02-05
Attending: PHYSICAL MEDICINE & REHABILITATION
Payer: MEDICARE

## 2020-02-05 ENCOUNTER — HOSPITAL ENCOUNTER (OUTPATIENT)
Dept: OCCUPATIONAL THERAPY | Facility: CLINIC | Age: 85
Setting detail: THERAPIES SERIES
End: 2020-02-05
Attending: PHYSICAL MEDICINE & REHABILITATION
Payer: MEDICARE

## 2020-02-05 PROCEDURE — 97535 SELF CARE MNGMENT TRAINING: CPT | Mod: GO | Performed by: OCCUPATIONAL THERAPIST

## 2020-02-05 PROCEDURE — 97110 THERAPEUTIC EXERCISES: CPT | Mod: GO | Performed by: OCCUPATIONAL THERAPIST

## 2020-02-05 PROCEDURE — 92507 TX SP LANG VOICE COMM INDIV: CPT | Mod: GN | Performed by: SPEECH-LANGUAGE PATHOLOGIST

## 2020-02-05 NOTE — PROGRESS NOTES
[facility]      OUTPATIENT OCCUPATIONAL THERAPY  PLAN OF TREATMENT FOR OUTPATIENT REHABILITATION    Patient's Last Name, First Name, M.I.                YOB: 1935  Tk Richmond                        Provider's Name  [facility] Medical Record No.  [id number]                                 Onset Date: 7/13/19    Start of Care Date: 8/22/19   Type:     ___PT   _X_OT   ___SLP Medical Diagnosis:  L MCA ischemic stroke                       OT Diagnosis: Decreased ADL/IADL       Visit #: :40   _________________________________________________________________________________  Plan of Treatment: ADL training, IADL training, Cognitive performance testing, Coordination training, ROM, Self care/Home management, Strengthening, Stretching          Frequency/Duration: 2x/wk x 12 weeks          Goals:    Goal Identifier HEP   Goal Description Pt will demonstrate good follow through at home of a B UE HEP for strength  and  coordination with SBA and min cues to increase functional strength and coordination while maximizing  independence and performance of ADL/IADLs. Pt will demonstrate I with HEP of 6-8 activities for weight bearing, stretching, AROM and progress to resistance exercises   Target Date 05/04/20   Date Met      Progress:     Goal Identifier R UE use   Goal Description Patient to demonstrate functional tasks with 60% use of R UE as functional assist for clothing fasteners, opening food packages, and increased ADL/IADL independence for closer return to prior level of function   Target Date 05/04/20   Date Met      Progress:     Goal Identifier Safety with reach/simple meal prep task    Goal Description Pt will demonstrate kitchen safety while completing a simple to moderately complex meal prep task with electric range independently and while reaching at all levels using safe  techniques to access items   Target Date 05/04/20   Date Met      Progress:     Goal Identifier Memory compensation/problem solving strategies   Goal Description Patient to verbalize an understanding of memory compensation strategies and utilize memory tools (planner, calendar, etc effectively and independently for increased ability to manage time, appointments, daily schedule etc.     Target Date 05/04/20   Date Met      Progress:     Goal Identifier AE for increased ease of ADL/IADL completion   Goal Description Patient to verbalize and utilize 3 strategies and/or AE to compensate for decreased UE function and promote and demonstrate increased independence with ADL/IADLs, such as, dressing,    Target Date 05/04/20   Date Met      Progress:       Progress Toward Goals:   Progress this reporting period: per attached progress report    Certification date from 2/5/20  to 5/4/20.    HERBERTH Barron          I CERTIFY THE NEED FOR THESE SERVICES FURNISHED UNDER        THIS PLAN OF TREATMENT AND WHILE UNDER MY CARE     (Physician co-signature of this document indicates review and certification of the therapy plan).                Referring Provider: Janet Bell MD

## 2020-02-05 NOTE — PROGRESS NOTES
Outpatient Occupational Therapy Progress Note     Patient: Tk Richmond  : 1935    Beginning/End Dates of Reporting Period:  20 to 2020    Referring Provider: Janet Bell MD    Therapy Diagnosis: Decreased ADL/ IADL    Client Self Report:   Pt has great difficulty wtih word finding, but indicates things are going pretty well physcially, writing is getting a bit easier, still laborious.      Objective Measurements:     Objective Measure:    Details: R  24,25,23 #, cues to pull back, flex elbow faciliates better  approximation. ( was 20, 25, 30) ) in pos #2  L hand 28   (was 30# at eval, and now, after L WR FX, stillwearing a soft splint ).     Objective Measure: Pinch   Details:  Key R 10, 8, 10 #  (improved from 8,8,9#).  Pickering R 6,5,6 #, stable.  L is 15#.  3 pt is 7,6,6 (was 6,5,6) R and 8#L.       Objective Measure: 9HPT   Details: Places and  removes 9 pegs from modifed set up i putty coil in 1:30 ( was 1:58.)  And loads 4 from table top/wash cloth in 2 min. Removes 9 pegs from board in 41 sec.   (difficult to  from table top/wash cloth).     Objective Measure: Box and block   Details: L hand 33, R hand 19,19 blocks moved in one min ( improved from 14,18).  Does better w releasing blocks but needs to keep looking at his R hand.       Objective Measure: CAM   Details: Per 20  WNL with temporal awareness, foresight/planning (improved from mod deficit on 19). Simple problem solving: WNL 4 attempts per dexterity issues (was mild deficit). MODERATE deficits noted with single digit math (improved to add, subtraction and divide), multiple digit math (getting add, subtract.  Was severe deficit). Mental manipulation--counts change out forward, unable to do mental math subtraction, Moderate deficit.  Newly able to test auditory memory forward, getting 4 letter string forward and 3 in reverse, several trials per aphasia.         Goals:     Goal Identifier HEP   Goal  Description Pt will demonstrate good follow through at home of a B UE HEP for strength  and  coordination with SBA and min cues to increase functional strength and coordination while maximizing  independence and performance of ADL/IADLs. Pt will demonstrate I with HEP of 6-8 activities for weight bearing, stretching, AROM and progress to resistance exercises   Target Date 02/17/20   Date Met      Progress: Pt doing much better with written format of homework for  Counting change with R index finger isolated, math calculation and handwriting tasks,  Has difficulty following through on gripper, putty or band exercises at home, as he needs step by step cues w visual targets. Wife indicates he is not a good at home exerciser.       Goal Identifier R UE use   Goal Description Patient to demonstrate functional tasks with 60% use of R UE as functional assist for clothing fasteners, opening food packages, and increased ADL/IADL independence for closer return to prior level of function   Target Date 02/17/20   Date Met      Progress:  He is able to carry his folders in L hand pulled close to his chest.  Does well with self feeding and putting on socks/shoes with B hands. Requires structured tasks to complete fine motor manipulation.        Goal Identifier Safety with reach/simple meal prep task    Goal Description Pt will demonstrate kitchen safety while completing a simple to moderately complex meal prep task with electric range independently and while reaching at all levels using safe techniques to access items   Target Date 02/17/20   Date Met      Progress: Not primary emphasis in therapy, but able to get snacks out of cupbaord and fridge without loss of balance.     Goal Identifier Memory compensation/problem solving strategies   Goal Description Patient to verbalize an understanding of memory compensation strategies and utilize memory tools (planner, calendar, etc effectively and independently for increased ability to  manage time, appointments, daily schedule etc.     Target Date 02/17/20   Date Met      Progress: Does well with following calendar, dates for managing appointments and getting homework done on time.  Working on math calculations--has mastered three digit adding and one digit multiplication.  Working harder on subtraction ( much harder thinking in reverse per aphasia).     Goal Identifier AE for increased ease of ADL/IADL completion   Goal Description Patient to verbalize and utilize 3 strategies and/or AE to compensate for decreased UE function and promote and demonstrate increased independence with ADL/IADLs, such as, dressing,    Target Date 02/17/20 GOAL MET.     Progress Toward Goals:   Progress this reporting period: Progress this reporting period: Pt continues to make progress with accuracy with math and ability to use R UE for writing tasks, demonstrating increased legibility and accuracy.  Pt compliant with written HEP. Needs continued encouragement to completing fxl tasks with R UE including managing grooming supplies, food packaging, opening jars and containers at home. Pt phys assessments improving for R UE.  See objective measures above for details.  Pt will continue to benefit from OT services to address the above goals and continue to achieve in the above areas. Continue current treatment plan.      Pt's L wrist FX is healing well, being managed by [facility] hand therapy, no longer resistricting progress in OT for R CVA affected UE.      Plan:  Continue therapy per current plan of care.    Discharge:  Not anticipated at this time.  OT to continue 2x/week x 12 weeks with same goals for progression.    Thank you for this thoughtful referral! If you have any questions, please call me at [phone].  Nice to share in this patient's care with you.    Sincerely,   Henrietta Lora OTR/ilana

## 2020-02-06 ENCOUNTER — HOSPITAL ENCOUNTER (OUTPATIENT)
Dept: PHYSICAL THERAPY | Facility: CLINIC | Age: 85
Setting detail: THERAPIES SERIES
End: 2020-02-06
Attending: PHYSICAL MEDICINE & REHABILITATION
Payer: MEDICARE

## 2020-02-06 PROCEDURE — 97116 GAIT TRAINING THERAPY: CPT | Mod: GP | Performed by: PHYSICAL THERAPIST

## 2020-02-06 PROCEDURE — 97110 THERAPEUTIC EXERCISES: CPT | Mod: GP | Performed by: PHYSICAL THERAPIST

## 2020-02-07 ENCOUNTER — HOSPITAL ENCOUNTER (OUTPATIENT)
Dept: SPEECH THERAPY | Facility: CLINIC | Age: 85
Setting detail: THERAPIES SERIES
End: 2020-02-07
Attending: PHYSICAL MEDICINE & REHABILITATION
Payer: MEDICARE

## 2020-02-07 PROCEDURE — 92507 TX SP LANG VOICE COMM INDIV: CPT | Mod: GN | Performed by: SPEECH-LANGUAGE PATHOLOGIST

## 2020-02-10 ENCOUNTER — HOSPITAL ENCOUNTER (OUTPATIENT)
Dept: PHYSICAL THERAPY | Facility: CLINIC | Age: 85
Setting detail: THERAPIES SERIES
End: 2020-02-10
Attending: PHYSICAL MEDICINE & REHABILITATION
Payer: MEDICARE

## 2020-02-10 ENCOUNTER — HOSPITAL ENCOUNTER (OUTPATIENT)
Dept: OCCUPATIONAL THERAPY | Facility: CLINIC | Age: 85
Setting detail: THERAPIES SERIES
End: 2020-02-10
Attending: PHYSICAL MEDICINE & REHABILITATION
Payer: MEDICARE

## 2020-02-10 PROCEDURE — 97750 PHYSICAL PERFORMANCE TEST: CPT | Mod: GP | Performed by: PHYSICAL THERAPIST

## 2020-02-10 PROCEDURE — 97535 SELF CARE MNGMENT TRAINING: CPT | Mod: GO,59 | Performed by: OCCUPATIONAL THERAPIST

## 2020-02-10 PROCEDURE — 97110 THERAPEUTIC EXERCISES: CPT | Mod: GO | Performed by: OCCUPATIONAL THERAPIST

## 2020-02-10 PROCEDURE — 97530 THERAPEUTIC ACTIVITIES: CPT | Mod: GP | Performed by: PHYSICAL THERAPIST

## 2020-02-10 PROCEDURE — 97110 THERAPEUTIC EXERCISES: CPT | Mod: GP | Performed by: PHYSICAL THERAPIST

## 2020-02-17 ENCOUNTER — HOSPITAL ENCOUNTER (OUTPATIENT)
Dept: PHYSICAL THERAPY | Facility: CLINIC | Age: 85
Setting detail: THERAPIES SERIES
End: 2020-02-17
Attending: PHYSICAL MEDICINE & REHABILITATION
Payer: MEDICARE

## 2020-02-17 PROCEDURE — 97530 THERAPEUTIC ACTIVITIES: CPT | Mod: GP,KX | Performed by: PHYSICAL THERAPIST

## 2020-02-17 PROCEDURE — 97110 THERAPEUTIC EXERCISES: CPT | Mod: GP,KX | Performed by: PHYSICAL THERAPIST

## 2020-02-18 ENCOUNTER — HOSPITAL ENCOUNTER (OUTPATIENT)
Dept: SPEECH THERAPY | Facility: CLINIC | Age: 85
Setting detail: THERAPIES SERIES
End: 2020-02-18
Attending: PHYSICAL MEDICINE & REHABILITATION
Payer: MEDICARE

## 2020-02-18 ENCOUNTER — HOSPITAL ENCOUNTER (OUTPATIENT)
Dept: OCCUPATIONAL THERAPY | Facility: CLINIC | Age: 85
Setting detail: THERAPIES SERIES
End: 2020-02-18
Attending: PHYSICAL MEDICINE & REHABILITATION
Payer: MEDICARE

## 2020-02-18 PROCEDURE — 97110 THERAPEUTIC EXERCISES: CPT | Mod: GO | Performed by: OCCUPATIONAL THERAPIST

## 2020-02-18 PROCEDURE — 92507 TX SP LANG VOICE COMM INDIV: CPT | Mod: GN,KX | Performed by: SPEECH-LANGUAGE PATHOLOGIST

## 2020-02-18 PROCEDURE — 97535 SELF CARE MNGMENT TRAINING: CPT | Mod: GO | Performed by: OCCUPATIONAL THERAPIST

## 2020-02-18 NOTE — PROGRESS NOTES
"Outpatient Speech Language Pathology Progress Note & Medicare 10th Visit Note     Patient: Tk Richmond  : 1935    Beginning/End Dates of Reporting Period:  2019 to 2020    Referring Provider: Dr. Janet Bell  Primary MD = Dr. Shyam Mclean    Therapy Diagnosis: Aphasia, Apraxia of Speech    Client Self Report: Tk Richmond is a 84 year old y.o.male s/p L MCA CVA in 2019.  Please refer to the initial outpatient speech-language pathology evaluation dated 19 for further background information.  Patient has been seen for 40 visits since the start of care addressing his aphasia and apraxia of speech.  Patient continues to complete his home program and reports the \"words come out a little easier.\"   He continues to be motivated to improve his verbal expression so that he can communicate better with his spouse.    Objective Measurements:      Goals:  Goal Identifier LTG   Goal Description Patient will improve his functional expressive and receptive language to communicate in daily living and emergency situations with at least 90% and use of strategies prn.    Target Date 2020   Date Met      Progress: Continue goal.  See below for progress towards goals.      Goal Identifier STG 1: Language/Auditory Comprehension   Goal Description Patient will follow 2 step directions and listen to short paragraphs and answer questions with 90% and minimal cues for improved comprehension needed for daily living communication needs.   Target Date 20   Date Met      Progress: Continue goal. Patient improved to 80% with minimal cues from maximal cues and 90% last progress reporting period.      Goal Identifier STG 2: Language/Verbal Expression   Goal Description Patient will complete moderate level verbal expression tasks including naming, describing and sentence completion with use of trained word-retrieval strategies to improve verbal expression needed for daily living communication " needs with 80% and minimal cues.   Target Date 02/27/20   Date Met   2/18/20   Progress: Goal Met with 80% and minimal cues.      Goal Identifier STG 3: Language/Reading comprehension   Goal Description Patient will complete practical reading comprehension tasks including reading menus, signs, labels, to improve reading comprehension needed for daily living and  safety needs with 90 % accuracy and minimal cues.   Target Date 02/27/20   Date Met      Progress: Continue goal.  Patient improved to 85% with minimal to no cues. This is an improvement from 90% with moderate cues.      Goal Identifier STG 4: Speech-intellgibility   Goal Description Patient will implement trained speech production/intelligibility strategies to improve speech intelligibility to at least 90% at the sentence and conversational level.   Target Date 02/27/20   Date Met      Progress: Continue goal. With use of Melodic Intonation Therapy (YOVANI) patient is able to read or repeat 3 - 6 word phrases/sentences with 90%.  Without use of YOVANI he is 75%.  This is an improvement from last progress reporting period when he was 65%.       Progress Toward Goals:    Progress this reporting period: Mr. Richmond presents with mild-moderate aphasia and apraxia of speech.  He has met one of his goals this progress reporting period and continues to make progress towards all other goals.  His speech production and intelligibility improves with use of trained strategies.  It is recommended that outpatient speech-language therapy continue on a 2x/week basis for the next 8 weeks with re-evaluation at the end of that time to further determine a plan of care.  Prognosis continues to be good and patient will continue to benefit from skilled speech-language services for improved communication needed for daily living and safety needs.     Plan:  Continue therapy per current plan of care.  Goals to be met in 60 days:  1. Patient will follow 2 step directions and listen to  short paragraphs and answer questions with 90% and minimal cues for improved comprehension needed for daily living communication needs.    2.  Patient will complete moderate level verbal expression tasks including naming, describing and sentence completion with use of trained word-retrieval strategies to improve verbal expression needed for daily living communication needs with 90% and minimal cues.    3. Patient will complete practical reading comprehension tasks including reading menus, signs, labels, to improve reading comprehension needed for daily living and  safety needs with 90 % accuracy and minimal cues.    4.  Patient will implement trained speech production/intelligibility strategies to improve speech intelligibility to at least 90% at the sentence and conversational level.    Discharge:  No    Thank you for the referral of this patient.  If you have any questions regarding the information in this report, please feel free to contact me at [phone].     Mili Lockhart MA, CCC-SLP  Speech-Language Pathologist  [address] [address]  Fax:  [phone]

## 2020-02-20 ENCOUNTER — HOSPITAL ENCOUNTER (OUTPATIENT)
Dept: PHYSICAL THERAPY | Facility: CLINIC | Age: 85
Setting detail: THERAPIES SERIES
End: 2020-02-20
Attending: PHYSICAL MEDICINE & REHABILITATION
Payer: MEDICARE

## 2020-02-20 PROCEDURE — 97112 NEUROMUSCULAR REEDUCATION: CPT | Mod: GP,KX | Performed by: PHYSICAL THERAPIST

## 2020-02-20 PROCEDURE — 97530 THERAPEUTIC ACTIVITIES: CPT | Mod: GP,KX | Performed by: PHYSICAL THERAPIST

## 2020-02-21 ENCOUNTER — HOSPITAL ENCOUNTER (OUTPATIENT)
Dept: SPEECH THERAPY | Facility: CLINIC | Age: 85
Setting detail: THERAPIES SERIES
End: 2020-02-21
Attending: PHYSICAL MEDICINE & REHABILITATION
Payer: MEDICARE

## 2020-02-21 ENCOUNTER — HOSPITAL ENCOUNTER (OUTPATIENT)
Dept: OCCUPATIONAL THERAPY | Facility: CLINIC | Age: 85
Setting detail: THERAPIES SERIES
End: 2020-02-21
Attending: PHYSICAL MEDICINE & REHABILITATION
Payer: MEDICARE

## 2020-02-21 PROCEDURE — 97110 THERAPEUTIC EXERCISES: CPT | Mod: GO,59 | Performed by: OCCUPATIONAL THERAPIST

## 2020-02-21 PROCEDURE — 97535 SELF CARE MNGMENT TRAINING: CPT | Mod: GO | Performed by: OCCUPATIONAL THERAPIST

## 2020-02-21 PROCEDURE — 92507 TX SP LANG VOICE COMM INDIV: CPT | Mod: GN,KX | Performed by: SPEECH-LANGUAGE PATHOLOGIST

## 2020-02-24 ENCOUNTER — HOSPITAL ENCOUNTER (OUTPATIENT)
Dept: OCCUPATIONAL THERAPY | Facility: CLINIC | Age: 85
Setting detail: THERAPIES SERIES
End: 2020-02-24
Attending: PHYSICAL MEDICINE & REHABILITATION
Payer: MEDICARE

## 2020-02-24 ENCOUNTER — HOSPITAL ENCOUNTER (OUTPATIENT)
Dept: PHYSICAL THERAPY | Facility: CLINIC | Age: 85
Setting detail: THERAPIES SERIES
End: 2020-02-24
Attending: PHYSICAL MEDICINE & REHABILITATION
Payer: MEDICARE

## 2020-02-24 PROCEDURE — 97535 SELF CARE MNGMENT TRAINING: CPT | Mod: GO | Performed by: OCCUPATIONAL THERAPIST

## 2020-02-24 PROCEDURE — 97116 GAIT TRAINING THERAPY: CPT | Mod: GP | Performed by: PHYSICAL THERAPIST

## 2020-02-24 PROCEDURE — 97110 THERAPEUTIC EXERCISES: CPT | Mod: GO | Performed by: OCCUPATIONAL THERAPIST

## 2020-02-24 PROCEDURE — 97112 NEUROMUSCULAR REEDUCATION: CPT | Mod: GP,KX | Performed by: PHYSICAL THERAPIST

## 2020-02-24 NOTE — PROGRESS NOTES
02/10/20 1100   Signing Clinician's Name / Credentials   Signing clinician's name / credentials Terri Gonzalez PT, DPT   Session Number   Session Number PROGRESS NOTE: Episode of care 8/1/19-2/10/20. Pt has participated in 41 PT sessions including evaluation (most recent reassessment 1/2/20) and today's reassessment  to address decreased independence with gait and ADLs s/p L MCA CVA. See below for details of reassessment and 41st visit.    Authorization status 10/10 Medicare   Progress Note/Recertification   Progress Note Due Date   (every 10th visit)   Progress Note Completed Date 02/10/20   Recertification Due Date 04/06/20   Recertification Completed Date  02/10/20   Goal 1   Goal Identifier TUG (baseline: 18.4 sec no AD)   Goal Description Tk will perform TUG in less tan 13.5 seconds without an AD to demonstrate decreased fall risk and improved gait abilities.     Target Date 10/30/19   Date Met 09/27/19  (11/11/19: goal maintained;9/27/19:GOAL MET, 12.06 sec w/o AD)   Goal 2   Goal Identifier Stairs (baseline: 3 JAY home without railing; has beend avoiding stairs to basement in home where there is a TV, [place] to dock with railing)   Goal Description Tk will be able to negotiate a flight of stairs in a reciprocal pattern with support of one rail with supervision in order to access his lower level of his home.     Target Date 04/06/20   Date Met   (2/10/20 2feet/step,UE support from railing, CTG for safety)   Goal 3   Goal Identifier Household mobility (baseline: using walker at all times 4ww in home)   Goal Description Tk will be able to ambulate household distances without an assistive device in order to return to independent mobility within his home.     Target Date 10/30/19   Date Met 09/27/19  (9/27: GOAL MET, pt walks 2x12 min w/o AD in home w/o LOB)   Goal 4   Goal Identifier Community mobility (baseline: using walker at all times, 2ww outside of home)  19: 814'  (GOAL PROGRESSED to w/o AD ; GOAL PROGRESSED 19)   Goal Description Tk to complete 6MWT with additional 164' to acheive MCID for geriatrics and stroke without difficulty in order to access areas in his community safely with improved endurance (Healthy age/gender norms  1368').       Target Date 20   Date Met   (2/10/20: no change/decline, 580')   Goal 5   Goal Identifier PISANO/56 (6-8 pt increase considered MDC s/p CVA; 53/56 = WNL for age/gender matched norms)  (NEW GOAL established 19)   Goal Description Pt will score >50/56 on the pisano balance assessment to indicate MDC of improved postural stability and low fall risk.    Target Date 20   Date Met   (2/10/20: not reassessed, 48/56 on 20; continue goal)   Goal 6   Goal Identifier DGI (baseline: 16; </= 19 = inc risk for falls; MDC = 4 pts s/p CVA)  (NEW GOAL  established 19)   Goal Description The pt will improve score on DGI from 16 to >/= 20/24 on the dynamic gait index to indicate low fall risk with community ambulation or use a cane consistently    Target Date 20   Date Met   (2/10/24: , 1 pt progress from 20, continue goal)   Goal 7   Goal Identifier 30 sec sit <> stand (baseline: 5 1/2 reps, retropulsive)   (goal established 19)   Goal Description Pt to achieve >/= 8 reps to demonstrate improved functional LE strength with transition from standard height chair.   Target Date 20   Date Met   (2/10/20: 3 reps w/o UE support, 2 1/2 rep decline)   Subjective Report   Subjective Report Went to the gym and used R bike x10 minutes and it felt good. Continued with daily HEP without questions.    Objective Measure 1   Objective Measure 20' walk/Gait   Details 6 sec, 11-13 steps, no AD, decreased R LE step length, decreased bilat stride length, decreased toe push-off and heel strike at inital contact with maintained foot clearance and stability   Objective Measure 2   Objective  "Measure 30 sec sit <> stand   Details 3 reps, difficulty anterior wt shifting, wide KALANI; 11 reps from 20\" height   Objective Measure 3   Objective Measure Stairs   Details 2 feet to a step, reciprocal stepping occasionally, 1 UE support from railing, therapist CTG-SBA for safety   Objective Measure 4   Objective Measure 6MWT   Details 580' no AD, no instability, first 3 minutes 320', with fatigue decreased step length over 20' from 12 to 16 steps, maintains stability   Objective Measure 5   Objective Measure DGI   Details 18/24   Objective Measure 6   Objective Measure PISANO   Details not reassessed, 48/56 on 1/2/20   Treatment Interventions   Interventions Therapeutic Procedure/Exercise;Physical Performance Test/Measures;Therapeutic Activity   Therapeutic Procedure/exercise   Therapeutic Procedures: strength, endurance, ROM, flexibillity minutes (22165) 10   Skilled Intervention instructions on asssessment, feedback on response relative to baseline and goals   Patient Response (see objectives)   Treatment Detail Reassessment: Walking endurance/6MWT. Functional LE strength/30 sec sit <> stand.   Progress 30 sec sit <> stand: 3 reps, difficulty anterior wt shifting, wide KALANI; 11 reps from 20\" height, no change from most recent reassessment 1/2/20, decline from 5 1/2 reps on 11/11/19. CONTINUE GOAL. Community mobility/6MWT: no chage/decline. 580' (compaired to 814' in 6 minutes on 11/11/19) no AD, no instability, first 3 minutes 320', with fatigue decreased step length over 20' from 12 to 16 steps, maintains stability CONTINUE GOAL.   Therapeutic Activity   Therapeutic Activities: dynamic activities to improve functional performance minutes (99976) 13   Skilled Intervention instructions and feedback on reassessments relative to baseline/goals   Patient Response (see objectives for details of reassessments)   Treatment Detail Reassessment: Stairs   Progress Stairs: min progress, no LOB; 2 feet to a step, reciprocal " "stepping occasionally, 1 UE support from railing, therapist CTG-SBA for safety. MAINTAIN GOAL.   Physical Performance Test/measures   Physical Performance Test/Measurement, Minutes (28549) 15   Skilled Intervention instructions and feedback on reassessments of postural and gait stability   Patient Response (see objectives and additional documentation for details)   Treatment Detail Reassessments: DGI   Progress DGI: 18/24, 1 pt progress from 17/24 from most recent reassessment 1/2/20 (MDC = 4 pts s/p CVA) </= 19 = inc risk for falls; GOAL MAINTAINED.   Education   Learner Patient;Significant Other   Readiness Acceptance   Method Explanation;Demonstration   Response Verbalizes Understanding;Demonstrates Understanding   Education Comments Results of reassessment, extended PT POC   Plan   Home program continue as provided, includes pt walking 2x 8 min/day in home, repeated sit <> stand from 20\" jennifer   Updates to plan of care Extended POC 2x/week x8 weeks through 4/6/2020 for continued progress towards goals as stated above   Plan for next session progress gait training, coordination/balance/grading, general strength   Total Session Time   Timed Code Treatment Minutes 38   Total Treatment Time (sum of timed and untimed services) 38     "

## 2020-02-24 NOTE — PROGRESS NOTES
[facility]      OUTPATIENT PHYSICAL THERAPY  PLAN OF TREATMENT FOR OUTPATIENT REHABILITATION    Patient's Last Name, First Name, M.I.                YOB: 1935  [place]k [place]                        Provider's Name  [facility] Medical Record No.  [id number]                               Onset Date: 07/12/19   Start of Care Date: 08/01/19   Type:     _X_PT   ___OT   ___SLP Medical Diagnosis: L MCA CVA                       PT Diagnosis: decreased independence with gait and ADLs       _________________________________________________________________________________  Plan of Treatment:    Frequency/Duration: 2x/week x12 weeks     Goals:  Goal Identifier TUG (baseline: 18.4 sec no AD)   Goal Description Tk will perform TUG in less tan 13.5 seconds without an AD to demonstrate decreased fall risk and improved gait abilities.     Target Date 10/30/19   Date Met  09/27/19(11/11/19: goal maintained;9/27/19:GOAL MET, 12.06 sec w/o AD)   Progress:     Goal Identifier Stairs (baseline: 3 [place]AY home without railing; has beend avoiding stairs to basement in [place] where [place] TV[place])   Goal Description Tk will be able to negotiate a flight of stairs in a reciprocal pattern with support of one rail with supervision in order to access his lower level of his home.     Target Date 03/26/20   Date Met  (1/2/20: 2feet/step,UE support from railing, CTG for safety)   Progress:     Goal Identifier Household mobility (baseline: using walker at all times 4ww in home)   Goal Description Tk will be able to ambulate household distances without an assistive device in order to return to independent mobility within his home.     Target Date 10/30/19   Date Met  09/27/19(9/27: GOAL MET, pt walks 2x12 min w/o AD in home w/o LOB)    Progress:     Goal Identifier Community mobility (baseline: using walker at all times, 2ww outside of home)(GOAL PROGRESSED to w/o AD ; GOAL PROGRESSED 19)   Goal Description Tk to complete 6MWT with additional 164' to acheive MCID for geriatrics and stroke without difficulty in order to access areas in his community safely with improved endurance (Healthy age/gender norms  1368').       Target Date 20   Date Met  (20: 3MWT vs 6MWT, 570')   Progress:     Goal Identifier PISANO/56 (6-8 pt increase considered MDC s/p CVA; 53/56 = WNL for age/gender matched norms)(NEW GOAL established 19)   Goal Description Pt will score >50/56 on the pisano balance assessment to indicate MDC of improved postural stability and low fall risk.    Target Date 20   Date Met  (20: 48/56, 1 pt progress from 19, continue goal )   Progress:     Goal Identifier DGI (baseline: ; </= 19 = inc risk for falls; MDC = 4 pts s/p CVA)(NEW GOAL  established 19)   Goal Description The pt will improve score on DGI from 16 to >/= 2024 on the dynamic gait index to indicate low fall risk with community ambulation or use a cane consistently    Target Date 20   Date Met  (20: , 1 pt progress from 19, continue goal)   Progress:     Goal Identifier 30 sec sit <> stand (baseline: 5 1/2 reps, retropulsive) (goal established 19)   Goal Description Pt to achieve >/= 8 reps to demonstrate improved functional LE strength with transition from standard height chair.   Target Date 20   Date Met  (20: 3 reps w/o UE support, 2 1/2 rep decline)   Progress:     Progress Toward Goals:   See progress note dated 20 for details.     Certification date from 20-3/26/20    Terri Gonzalez, PT          I CERTIFY THE NEED FOR THESE SERVICES FURNISHED UNDER        THIS PLAN OF TREATMENT AND WHILE UNDER MY CARE     (Physician co-signature of this document indicates review and  certification of the therapy plan).                Referring Provider: Janet Bell

## 2020-02-24 NOTE — PROGRESS NOTES
11/11/19 1100   Signing Clinician's Name / Credentials   Signing clinician's name / credentials Terri Gonzalez PT, DPT   Session Number   Session Number PROGRESS NOTE: Episode of care 8/1/19-11/11/19. Pt has participated in 21 PT sessions including evaluation (most recent reassessment 9/27/19) and today's reassessment  to address decreased independence with gait and ADLs s/p L MCA CVA. See below for details of reassessment and 21st visit.    Progress Note/Recertification   Progress Note Due Date 11/22/19   Progress Note Completed Date 11/11/19   Recertification Due Date 11/22/19   Recertification Completed Date  11/11/19   Goal 1   Goal Identifier TUG (baseline: 18.4 sec no AD)   Goal Description Tk will perform TUG in less tan 13.5 seconds without an AD to demonstrate decreased fall risk and improved gait abilities.     Target Date 10/30/19   Date Met 09/27/19  (11/11/19: goal maintained;9/27/19:GOAL MET, 12.06 sec w/o AD)   Goal 2   Goal Identifier Stairs (baseline: 3 [place] home without railing; has beend avoiding stairs to basement in home where there is a TV, [place])   Goal Description Tk will be able to negotiate a flight of stairs in a reciprocal pattern with support of one rail with supervision in order to access his lower level of his home.     Target Date 02/03/20   Date Met   (11/11/19: 2feet/step,UE support from railing, CTG for safety)   Goal 3   Goal Identifier Household mobility (baseline: using walker at all times 4ww in home)   Goal Description Tk will be able to ambulate household distances without an assistive device in order to return to independent mobility within his home.     Target Date 10/30/19   Date Met 09/27/19  (9/27: GOAL MET, pt walks 2x12 min w/o AD in home w/o LOB)   Goal 4   Goal Identifier Community mobility (baseline: using walker at all times, 2ww outside of home)  (GOAL PROGRESSED to w/o AD 9/27; GOAL PROGRESSED  "19)   Goal Description Tk to complete 6MWT with additional 164' to acheive MCID for geriatrics and stroke without difficulty in order to access areas in his community safely with improved endurance (Healthy age/gender norms  1368').       Target Date 20   Date Met   (19: GOAL MET, 814' 6MWT - GOAL PROGRESSED)   Goal 5   Goal Identifier PISANO/56 (6-8 pt increase considered MDC s/p CVA; 53/56 = WNL for age/gender matched norms)  (NEW GOAL established 19)   Goal Description Pt will score >50/56 on the pisano balance assessment to indicate MDC of improved postural stability and low fall risk.    Target Date 20   Date Met   (19: 47/56, 3 pt progress, continue goal )   Goal 6   Goal Identifier DGI (baseline: ; </= 19 = inc risk for falls; MDC = 4 pts s/p CVA)  (NEW GOAL  established 19)   Goal Description The pt will improve score on DGI from 16 to >/= 20/24 on the dynamic gait index to indicate low fall risk with community ambulation or use a cane consistently    Target Date 20   Date Met   (19: , maintain goal)   Goal 7   Goal Identifier 30 sec sit <> stand (baseline: 5 1/2 reps, retropulsive)   (goal established 19)   Goal Description Pt to achieve >/= 8 reps to demonstrate improved functional LE strength with transition from standard height chair.   Target Date 20   Subjective Report   Subjective Report Since last reassessed fell with injury. Pt feels his balance is \"bad\" but agrees it has been getting better overall. No falls since last seen. Continued with exercises provided.  Has cane but doesnt use since injury to L hand, carries cane in R hand vs using it correctly.   Objective Measures   Objective Measures Objective Measure 1;Objective Measure 2;Objective Measure 3;Objective Measure 4;Objective Measure 5;Objective Measure 6   Objective Measure 1   Objective Measure TUG/' walk   Details 13.25 sec, no AD, short step length, no " instability//7.72 sec no AD   Objective Measure 2   Objective Measure 30 sec sit <> stand   Details 5 1/2 reps, retropulstive, dec R LE Wbing, wide KALANI   Objective Measure 3   Objective Measure Stairs   Details 2 feet to a step, UE support from railing, CTG for safety   Objective Measure 4   Objective Measure Community mobility   Details  814',  (healthy age/gender norms  1368') decreased R LE step length and foot clearance without instability, fatigues   Objective Measure 5   Objective Measure DGI   Details    Objective Measure 6   Objective Measure PISANO   Details    Therapeutic Procedure/exercise   Therapeutic Procedures: strength, endurance, ROM, flexibillity minutes (61717) 10   Skilled Intervention instructions on asssessment, feedback on response relative to baseline and goals   Patient Response (see objectives)   Treatment Detail Assessment: Walking endurance/6MWT. Functional LE strength/30 sec sit <> stand.   Progress 30 sec sit <> stand: GOAL ESTABLISHED. 5 1/2 reps, retropulsive. Community mobility: GOAL MET/EXCEEDED, 814' in 6 minutes (>/= 300' goal). Progressed without AD from SPC and close supervision on 19. Pt demos decreased R LE step length and foot clearance without instability, fatigues. GOAL PROGRESSED: To acheive additional 164' = MCID for geriatrics and stroke. (Healthy age/gender norms  1368'.)   Gait Training   Gait Training Minutes, includes stair climbing (94759) 5   Skilled Intervention instructions and feedback on reassessments relative to baseline/goals   Patient Response (see objectives for details of reassessments)   Treatment Detail Reassessments: TUG, Stairs   Progress TU.25 sec, no AD. GOAL MAINTAINED/PREVIOUSLY MET/DISCONTINUE.STAIRS: 2 feet to a step, UE support from railing, CTG for safety; slight decline from reciprocal gait on most recent reassessment 19. MAINTAIN GOAL.   Physical Performance Test/measures   Physical Performance Test/Measurement, Minutes  (97938) 25   Skilled Intervention instructions and feedback on reassessments of postural and gait stability   Patient Response (see objectives and additional documentation for details)   Treatment Detail Assessments: NORRIS MACKAY   Progress DGI: , no change from most recent reassessment 19 (MDC = 4 pts s/p CVA) </= 19 = inc risk for falls; GOAL MAINTAINED. PISANO/56, 3 pt progress from most recent reassessment 19 (6-8 pt increase considered MDC s/p CVA). Continued dec R stance stability. 53/56 = WNL for age/gender matched norms. MAINTAIN GOAL.   Education   Learner Patient;Significant Other   Readiness Acceptance   Method Explanation;Demonstration   Response Verbalizes Understanding;Demonstrates Understanding   Education Comments Results of reassessment, extended PT POC   Plan   Home program continue as provided - daily activity list in 2 hr blocks: includes repeated sit < >stand, 2 walks (12 min), step ups from garage   Updates to plan of care Extended POC 2x/week x12 weeks through 2/3/20 for continued progress towards goals as stated above   Plan for next session stepper or walking indoors/outdoors for conditioning and gait training,  coordination/balance/grading, general strength, marches with SLS, robert negotiation vs four square   Total Session Time   Timed Code Treatment Minutes 40   Total Treatment Time (sum of timed and untimed services) 40

## 2020-02-24 NOTE — PROGRESS NOTES
[facility]      OUTPATIENT PHYSICAL THERAPY  PLAN OF TREATMENT FOR OUTPATIENT REHABILITATION    Patient's Last Name, First Name, M.I.                YOB: 1935  [place]k [place]                        Provider's Name  [facility] Medical Record No.  [id number]                               Onset Date: 07/12/19   Start of Care Date: 08/01/19   Type:     _X_PT   ___OT   ___SLP Medical Diagnosis: L MCA CVA                       PT Diagnosis: decreased independence with gait and ADLs       _________________________________________________________________________________  Plan of Treatment:    Frequency/Duration: 2x/week x8 weeks     Goals:  Goal Identifier TUG (baseline: 18.4 sec no AD)   Goal Description Tk will perform TUG in less tan 13.5 seconds without an AD to demonstrate decreased fall risk and improved gait abilities.     Target Date 10/30/19   Date Met  09/27/19(11/11/19: goal maintained;9/27/19:GOAL MET, 12.06 sec w/o AD)   Progress:     Goal Identifier Stairs (baseline: 3 [place] home without railing; has beend avoiding stairs to basement in [place] where there is a TV[place])   Goal Description Tk will be able to negotiate a flight of stairs in a reciprocal pattern with support of one rail with supervision in order to access his lower level of his home.     Target Date 04/06/20   Date Met  (2/10/20 2feet/step,UE support from railing, CTG for safety)   Progress:     Goal Identifier Household mobility (baseline: using walker at all times 4ww in home)   Goal Description Tk will be able to ambulate household distances without an assistive device in order to return to independent mobility within his home.     Target Date 10/30/19   Date Met  09/27/19(9/27: GOAL MET, pt walks 2x12 min w/o AD in home w/o LOB)    Progress:     Goal Identifier Community mobility (baseline: using walker at all times, 2ww outside of home) 19: 814'(GOAL PROGRESSED to w/o AD ; GOAL PROGRESSED 19)   Goal Description Tk to complete 6MWT with additional 164' to acheive MCID for geriatrics and stroke without difficulty in order to access areas in his community safely with improved endurance (Healthy age/gender norms  1368').       Target Date 20   Date Met  (2/10/20: no change/decline, 580')   Progress:     Goal Identifier PISANO/56 (6-8 pt increase considered MDC s/p CVA; 53/56 = WNL for age/gender matched norms)(NEW GOAL established 19)   Goal Description Pt will score >50/56 on the pisano balance assessment to indicate MDC of improved postural stability and low fall risk.    Target Date 20   Date Met  (2/10/20: not reassessed, 48/56 on 20; continue goal)   Progress:     Goal Identifier DGI (baseline: ; </= 19 = inc risk for falls; MDC = 4 pts s/p CVA)(NEW GOAL  established 19)   Goal Description The pt will improve score on DGI from 16 to >/= 2024 on the dynamic gait index to indicate low fall risk with community ambulation or use a cane consistently    Target Date 20   Date Met  (2/10/24: 1824, 1 pt progress from 20, continue goal)   Progress:     Goal Identifier 30 sec sit <> stand (baseline: 5 1/2 reps, retropulsive) (goal established 19)   Goal Description Pt to achieve >/= 8 reps to demonstrate improved functional LE strength with transition from standard height chair.   Target Date 20   Date Met  (2/10/20: 3 reps w/o UE support, 2 1/2 rep decline)   Progress:     Progress Toward Goals:   See progress note dated 2/10/20 for details.     Certification date from 2/10/    Terri Gonzalez, PT          I CERTIFY THE NEED FOR THESE SERVICES FURNISHED UNDER        THIS PLAN OF TREATMENT AND WHILE UNDER MY CARE     (Physician co-signature of this document indicates  review and certification of the therapy plan).                Referring Provider: Janet Bell

## 2020-02-24 NOTE — PROGRESS NOTES
[facility]      OUTPATIENT PHYSICAL THERAPY  PLAN OF TREATMENT FOR OUTPATIENT REHABILITATION    Patient's Last Name, First Name, M.I.                YOB: 1935  [place]k [place]                        Provider's Name  [facility] Medical Record No.  [id number]                               Onset Date: 07/12/19   Start of Care Date: 08/01/19   Type:     _X_PT   ___OT   ___SLP Medical Diagnosis: L MCA CVA                       PT Diagnosis: decreased independence with gait and ADLs       _________________________________________________________________________________  Plan of Treatment:    Frequency/Duration: 2x/week x12 weeks     Goals:  Goal Identifier TUG (baseline: 18.4 sec no AD)   Goal Description Tk will perform TUG in less tan 13.5 seconds without an AD to demonstrate decreased fall risk and improved gait abilities.     Target Date 10/30/19   Date Met  09/27/19(11/11/19: goal maintained;9/27/19:GOAL MET, 12.06 sec w/o AD)   Progress:     Goal Identifier Stairs (baseline: 3 [place] home without railing; has beend avoiding stairs to basement in [place] where there is a TV, [place] to [place] with railing)   Goal Description Tk will be able to negotiate a flight of stairs in a reciprocal pattern with support of one rail with supervision in order to access his lower level of his home.     Target Date 02/03/20   Date Met  (11/11/19: 2feet/step,UE support from railing, CTG for safety)   Progress:     Goal Identifier Household mobility (baseline: using walker at all times 4ww in home)   Goal Description Tk will be able to ambulate household distances without an assistive device in order to return to independent mobility within his home.     Target Date 10/30/19   Date Met  09/27/19(9/27: GOAL MET, pt walks 2x12 min w/o AD in home w/o LOB)    Progress:     Goal Identifier Community mobility (baseline: using walker at all times, 2ww outside of home)(GOAL PROGRESSED to w/o AD ; GOAL PROGRESSED 19)   Goal Description Tk to complete 6MWT with additional 164' to acheive MCID for geriatrics and stroke without difficulty in order to access areas in his community safely with improved endurance (Healthy age/gender norms  1368').       Target Date 20   Date Met  (19: GOAL MET, 814' 6MWT - GOAL PROGRESSED)   Progress:     Goal Identifier PISANO/56 (6-8 pt increase considered MDC s/p CVA; 53/56 = WNL for age/gender matched norms)(NEW GOAL established 19)   Goal Description Pt will score >50/56 on the pisano balance assessment to indicate MDC of improved postural stability and low fall risk.    Target Date 20   Date Met  (19: 47/56, 3 pt progress, continue goal )   Progress:     Goal Identifier DGI (baseline: ; </= 19 = inc risk for falls; MDC = 4 pts s/p CVA)(NEW GOAL  established 19)   Goal Description The pt will improve score on DGI from 16 to >/= 20/24 on the dynamic gait index to indicate low fall risk with community ambulation or use a cane consistently    Target Date 20   Date Met  (19: , maintain goal)   Progress:     Goal Identifier 30 sec sit <> stand (baseline: 5 1/2 reps, retropulsive) (goal established 19)   Goal Description Pt to achieve >/= 8 reps to demonstrate improved functional LE strength with transition from standard height chair.   Target Date 20   Date Met      Progress:     Progress Toward Goals:   See progress note dated 19 for details.    Certification date from 2019-2/3/20    Terri Gonzalez, PT           I CERTIFY THE NEED FOR THESE SERVICES FURNISHED UNDER        THIS PLAN OF TREATMENT AND WHILE UNDER MY CARE     (Physician co-signature of this document indicates review and certification of the therapy plan).                Referring Provider:  Janet Bell

## 2020-02-24 NOTE — PROGRESS NOTES
01/02/20 1100   Signing Clinician's Name / Credentials   Signing clinician's name / credentials Terri Gonzalez PT, DPT   Session Number   Session Number PROGRESS NOTE: Episode of care 8/1/19-1/2/20. Pt has participated in 31 PT sessions including evaluation (most recent reassessment 11/11/19) and today's reassessment  to address decreased independence with gait and ADLs s/p L MCA CVA. See below for details of reassessment and 31st visit.    Authorization status 10/10 Medicare   Progress Note/Recertification   Progress Note Due Date   (10th visit)   Progress Note Completed Date 01/02/20   Recertification Due Date 02/03/20   Recertification Completed Date  01/02/20   Goal 1   Goal Identifier TUG (baseline: 18.4 sec no AD)   Goal Description Tk will perform TUG in less tan 13.5 seconds without an AD to demonstrate decreased fall risk and improved gait abilities.     Target Date 10/30/19   Date Met 09/27/19  (11/11/19: goal maintained;9/27/19:GOAL MET, 12.06 sec w/o AD)   Goal 2   Goal Identifier Stairs (baseline: 3 JAY home without railing; has beend avoiding stairs to basement in home where there is a TV, [place] to [place] with railing)   Goal Description Tk will be able to negotiate a flight of stairs in a reciprocal pattern with support of one rail with supervision in order to access his lower level of his home.     Target Date 03/26/20   Date Met   (1/2/20: 2feet/step,UE support from railing, CTG for safety)   Goal 3   Goal Identifier Household mobility (baseline: using walker at all times 4ww in home)   Goal Description Tk will be able to ambulate household distances without an assistive device in order to return to independent mobility within his home.     Target Date 10/30/19   Date Met 09/27/19  (9/27: GOAL MET, pt walks 2x12 min w/o AD in home w/o LOB)   Goal 4   Goal Identifier Community mobility (baseline: using walker at all times, 2ww outside of home)  (GOAL  PROGRESSED to w/o AD ; GOAL PROGRESSED 19)   Goal Description Tk to complete 6MWT with additional 164' to acheive MCID for geriatrics and stroke without difficulty in order to access areas in his community safely with improved endurance (Healthy age/gender norms  1368').       Target Date 20   Date Met   (20: 3MWT vs 6MWT, 570')   Goal 5   Goal Identifier PISANO/56 (6-8 pt increase considered MDC s/p CVA; 53/56 = WNL for age/gender matched norms)  (NEW GOAL established 19)   Goal Description Pt will score >50/56 on the pisano balance assessment to indicate MDC of improved postural stability and low fall risk.    Target Date 20   Date Met   (20: 48/56, 1 pt progress from 19, continue goal )   Goal 6   Goal Identifier DGI (baseline: 16; </= 19 = inc risk for falls; MDC = 4 pts s/p CVA)  (NEW GOAL  established 19)   Goal Description The pt will improve score on DGI from 16 to >/= 20/24 on the dynamic gait index to indicate low fall risk with community ambulation or use a cane consistently    Target Date 20   Date Met   (20: , 1 pt progress from 19, continue goal)   Goal 7   Goal Identifier 30 sec sit <> stand (baseline: 5 1/2 reps, retropulsive)   (goal established 19)   Goal Description Pt to achieve >/= 8 reps to demonstrate improved functional LE strength with transition from standard height chair.   Target Date 20   Date Met   (20: 3 reps w/o UE support, 2 1/2 rep decline)   Subjective Report   Subjective Report Pt's spouse reports pt is fatigued from OT and SLP therapies preceeding today's appointment. No questions or concerns for therapist. Continued with HEP with checklist completed.   Objective Measure 1   Objective Measure 20' walk/Gait   Details 6.7 sec, 12 steps no AD    Objective Measure 2   Objective Measure 30 sec sit <> stand   Details 3 reps with UE support, too fatigued to repeat assessment   Objective Measure 3    Objective Measure Stairs   Details descending with R UE support from railing, posterior LOB with maxA to prevent fall. continued with B UE support, cues for R hand placement because R UE doesn't advance with body d/t tone/inc  on railing R hand, reciprocal gait ascending, improved effeciency with practice   Objective Measure 4   Objective Measure Community mobility/3MWT**vs 6MWT   Details 570', maintains shortened step length, 1x R toes catch no AD, maintains stability throughout   Objective Measure 5   Objective Measure DGI   Details 17/24   Objective Measure 6   Objective Measure PISANO   Details 48/56   Therapeutic Procedure/exercise   Therapeutic Procedures: strength, endurance, ROM, flexibillity minutes (16666) 10   Skilled Intervention instructions on asssessment, feedback on response relative to baseline and goals   Patient Response (see objectives)   Treatment Detail Reassessment: Walking endurance/3MWT*vs 6MWT. Functional LE strength/30 sec sit <> stand.   Progress 30 sec sit <> stand: 3 reps without UE support, decline from 5 1/2 reps on last reassessment on 11/11/19. CONTINUE GOAL. Community mobility: 570' in 3 minutes compaired to 814' in 6 minutes on 11/11/19. 3MWT vs 6MWT d/t pt fatigue. CONTINUE GOAL.   Gait Training   Gait Training Minutes, includes stair climbing (23348) 8   Skilled Intervention instructions and feedback on reassessments relative to baseline/goals   Patient Response (see objectives for details of reassessments)   Treatment Detail Reassessment: Stairs   Progress Stairs: descending with R UE support from railing, posterior LOB with maxA to prevent fall. continued with B UE support, cues for R hand placement because R UE doesn't advance with body d/t tone/inc  on railing R hand, reciprocal gait ascending, improved effeciency with practice - maintained 2 feet to a step, UE support from railing, CTG for safety. MAINTAIN GOAL.   Physical Performance Test/measures   Physical  Performance Test/Measurement, Minutes (63307) 20   Skilled Intervention instructions and feedback on reassessments of postural and gait stability   Patient Response (see objectives and additional documentation for details)   Treatment Detail Reassessments: NORRIS MACKAY   Progress DGTANISHA: , 1 pt progress from  from most recent reassessment 19 (MDC = 4 pts s/p CVA) </= 19 = inc risk for falls; GOAL MAINTAINED. PISANO/, 1 pt progress from most recent reassessment 19 (6-8 pt increase considered MDC s/p CVA). Continued dec R stance stability. 53/56 = WNL for age/gender matched norms. MAINTAIN GOAL.   Education   Learner Patient;Significant Other   Readiness Acceptance   Method Explanation;Demonstration   Response Verbalizes Understanding;Demonstrates Understanding   Education Comments Results of reassessment, extended PT POC   Plan   Home program continue as provided - daily activity list in 2 hr blocks: includes repeated sit < >stand, 2 walks (12 min), step ups from garage   Updates to plan of care Extended POC 2x/week x12 weeks through 3/26/20 for continued progress towards goals as stated above   Plan for next session stepper or walking indoors/outdoors for conditioning and gait training,  coordination/balance/grading, general strength, marches with SLS, robert negotiation vs four square   Total Session Time   Timed Code Treatment Minutes 38   Total Treatment Time (sum of timed and untimed services) 38

## 2020-02-26 ENCOUNTER — HOSPITAL ENCOUNTER (OUTPATIENT)
Dept: OCCUPATIONAL THERAPY | Facility: CLINIC | Age: 85
Setting detail: THERAPIES SERIES
End: 2020-02-26
Attending: PHYSICAL MEDICINE & REHABILITATION
Payer: MEDICARE

## 2020-02-26 PROCEDURE — 97110 THERAPEUTIC EXERCISES: CPT | Mod: GO | Performed by: OCCUPATIONAL THERAPIST

## 2020-02-26 PROCEDURE — 97535 SELF CARE MNGMENT TRAINING: CPT | Mod: GO | Performed by: OCCUPATIONAL THERAPIST

## 2020-02-27 ENCOUNTER — HOSPITAL ENCOUNTER (OUTPATIENT)
Dept: PHYSICAL THERAPY | Facility: CLINIC | Age: 85
Setting detail: THERAPIES SERIES
End: 2020-02-27
Attending: PHYSICAL MEDICINE & REHABILITATION
Payer: MEDICARE

## 2020-02-27 PROCEDURE — 97112 NEUROMUSCULAR REEDUCATION: CPT | Mod: GP,KX | Performed by: PHYSICAL THERAPIST

## 2020-02-27 PROCEDURE — 97530 THERAPEUTIC ACTIVITIES: CPT | Mod: GP,KX | Performed by: PHYSICAL THERAPIST

## 2020-02-27 PROCEDURE — 97110 THERAPEUTIC EXERCISES: CPT | Mod: GP,KX | Performed by: PHYSICAL THERAPIST

## 2020-02-28 ENCOUNTER — HOSPITAL ENCOUNTER (OUTPATIENT)
Dept: SPEECH THERAPY | Facility: CLINIC | Age: 85
Setting detail: THERAPIES SERIES
End: 2020-02-28
Attending: PHYSICAL MEDICINE & REHABILITATION
Payer: MEDICARE

## 2020-02-28 PROCEDURE — 92507 TX SP LANG VOICE COMM INDIV: CPT | Mod: GN,KX | Performed by: SPEECH-LANGUAGE PATHOLOGIST

## 2020-03-10 ENCOUNTER — HOSPITAL ENCOUNTER (OUTPATIENT)
Dept: PHYSICAL THERAPY | Facility: CLINIC | Age: 85
Setting detail: THERAPIES SERIES
End: 2020-03-10
Attending: PHYSICAL MEDICINE & REHABILITATION
Payer: MEDICARE

## 2020-03-10 PROCEDURE — 97116 GAIT TRAINING THERAPY: CPT | Mod: GP | Performed by: PHYSICAL THERAPIST

## 2020-03-10 PROCEDURE — 97112 NEUROMUSCULAR REEDUCATION: CPT | Mod: GP,KX | Performed by: PHYSICAL THERAPIST

## 2020-03-10 PROCEDURE — 97110 THERAPEUTIC EXERCISES: CPT | Mod: GP | Performed by: PHYSICAL THERAPIST

## 2020-03-11 ENCOUNTER — HOSPITAL ENCOUNTER (OUTPATIENT)
Dept: SPEECH THERAPY | Facility: CLINIC | Age: 85
Setting detail: THERAPIES SERIES
End: 2020-03-11
Attending: PHYSICAL MEDICINE & REHABILITATION
Payer: MEDICARE

## 2020-03-11 ENCOUNTER — HOSPITAL ENCOUNTER (OUTPATIENT)
Dept: OCCUPATIONAL THERAPY | Facility: CLINIC | Age: 85
Setting detail: THERAPIES SERIES
End: 2020-03-11
Attending: PHYSICAL MEDICINE & REHABILITATION
Payer: MEDICARE

## 2020-03-11 PROCEDURE — 97535 SELF CARE MNGMENT TRAINING: CPT | Mod: GO | Performed by: OCCUPATIONAL THERAPIST

## 2020-03-11 PROCEDURE — 97110 THERAPEUTIC EXERCISES: CPT | Mod: GO,59 | Performed by: OCCUPATIONAL THERAPIST

## 2020-03-11 PROCEDURE — 92507 TX SP LANG VOICE COMM INDIV: CPT | Mod: GN,KX | Performed by: SPEECH-LANGUAGE PATHOLOGIST

## 2020-03-13 ENCOUNTER — HOSPITAL ENCOUNTER (OUTPATIENT)
Dept: OCCUPATIONAL THERAPY | Facility: CLINIC | Age: 85
Setting detail: THERAPIES SERIES
End: 2020-03-13
Attending: PHYSICAL MEDICINE & REHABILITATION
Payer: MEDICARE

## 2020-03-13 ENCOUNTER — HOSPITAL ENCOUNTER (OUTPATIENT)
Dept: SPEECH THERAPY | Facility: CLINIC | Age: 85
Setting detail: THERAPIES SERIES
End: 2020-03-13
Attending: PHYSICAL MEDICINE & REHABILITATION
Payer: MEDICARE

## 2020-03-13 PROCEDURE — 97535 SELF CARE MNGMENT TRAINING: CPT | Mod: GO | Performed by: OCCUPATIONAL THERAPIST

## 2020-03-13 PROCEDURE — 92507 TX SP LANG VOICE COMM INDIV: CPT | Mod: GN,KX | Performed by: SPEECH-LANGUAGE PATHOLOGIST

## 2020-03-13 PROCEDURE — 97110 THERAPEUTIC EXERCISES: CPT | Mod: GO | Performed by: OCCUPATIONAL THERAPIST

## 2020-06-01 ENCOUNTER — HOSPITAL ENCOUNTER (OUTPATIENT)
Dept: OCCUPATIONAL THERAPY | Facility: CLINIC | Age: 85
Setting detail: THERAPIES SERIES
End: 2020-06-01
Attending: PHYSICAL MEDICINE & REHABILITATION
Payer: MEDICARE

## 2020-06-01 ENCOUNTER — HOSPITAL ENCOUNTER (OUTPATIENT)
Dept: PHYSICAL THERAPY | Facility: CLINIC | Age: 85
Setting detail: THERAPIES SERIES
End: 2020-06-01
Attending: PHYSICAL MEDICINE & REHABILITATION
Payer: MEDICARE

## 2020-06-01 PROCEDURE — 97110 THERAPEUTIC EXERCISES: CPT | Mod: GO | Performed by: OCCUPATIONAL THERAPIST

## 2020-06-01 PROCEDURE — 97535 SELF CARE MNGMENT TRAINING: CPT | Mod: GO,KX | Performed by: OCCUPATIONAL THERAPIST

## 2020-06-01 PROCEDURE — 97110 THERAPEUTIC EXERCISES: CPT | Mod: GP,KX | Performed by: PHYSICAL THERAPIST

## 2020-06-01 PROCEDURE — 97750 PHYSICAL PERFORMANCE TEST: CPT | Mod: GP | Performed by: PHYSICAL THERAPIST

## 2020-06-01 NOTE — PROGRESS NOTES
Outpatient Occupational Therapy Progress Note     Patient: Tk Richmond  : 1935    Beginning/End Dates of Reporting Period:  20 to 2020    Referring Provider: Janet Bell MD    Therapy Diagnosis: CVA, Decreased ADL/ IADL    Client Self Report: Patient reports not using R UE at all, it just doesn't cooperate.  Spouse not sitting in due to covid protocol, however, spoke with spouse at end of session and she agreed patient not using R UE.. She reports she continues to try to encourage him to use it more and do his exercises, but he just gets upset.     Objective Measurements:     Objective Measure:    Details: R: 17, 11# (was 20, 24, 25, 23 on 20)     Objective Measure: Pinch   Details: R lateral: 8# (averaged 9# on 20), R palmar:4# (was 6,7,7 on 20)     Objective Measure: 9HPT   Details: at 3 minutes - 1 peg in, able to  the pegs with just min difficulty, however, unable to manipulate the pegs to place in holes.  (on 20: Places and removes 9 pegs from modifed set up i putty coil in 1:30 - was 1:58 previously.  And loads 4 from table top/wash cloth in 2 min. Removes 9 pegs from board in 41 sec.- difficult to  from table top/wash cloth).       Objective Measure: Cognitive Assessment of MN (CAM)   Details: Retested a few areas (compared to 20): Patient improved to moderate deficits with forward visual memory and sequencing (was severe deficits), still moderate deficits with backward visual memory and sequencing.  Patient continues at within normal limits with coin recognition, however, improved to mild deficits with mental manipulation with simple money skills (previously moderate deficits).  Patient scored at the same category for single digit math skills, moderate deficits, though did decline from 3 of 4 correct to 2/4 correct.     Goals:     Goal Identifier HEP   Goal Description Pt will demonstrate good follow through at home of a B UE HEP for strength   and  coordination with SBA and min cues to increase functional strength and coordination while maximizing  independence and performance of ADL/IADLs. Pt will demonstrate I with HEP of 6-8 activities for weight bearing, stretching, AROM and progress to resistance exercises   Target Date 05/04/20   Date Met      Progress:  Goal not being met with decline in compliance of HEP; will continue to address.     Goal Identifier R UE use   Goal Description Patient to demonstrate functional tasks with 60% use of R UE as functional assist for clothing fasteners, opening food packages, and increased ADL/IADL independence for closer return to prior level of function   Target Date 05/04/20   Date Met      Progress:  Goal not met and has demonstrated a decline in R UE function with unexpected break in therapy due to COVID pandemic; will continue goal.     Goal Identifier Safety with reach/simple meal prep task    Goal Description Pt will demonstrate kitchen safety while completing a simple to moderately complex meal prep task with electric range independently and while reaching at all levels using safe techniques to access items   Target Date 05/04/20   Date Met      Progress: Goal not addressed due to limited number of visits.     Goal Identifier Memory compensation/problem solving strategies   Goal Description Patient to verbalize an understanding of memory compensation strategies and utilize memory tools (planner, calendar, etc effectively and independently for increased ability to manage time, appointments, daily schedule etc.     Target Date 05/04/20   Date Met      Progress: Patient with improving memory function, see objective measures above; goal ongoing.     Goal Identifier AE for increased ease of ADL/IADL completion   Goal Description Patient to verbalize and utilize 3 strategies and/or AE to compensate for decreased UE function and promote and demonstrate increased independence with ADL/IADLs, such as, dressing,     Target Date 05/04/20   Date Met      Progress: Patient has been educated in numerous strategies for feeding self with right upper extremity, though goal not fully met.  Goal ongoing.     Progress Toward Goals:   Progress this reporting period: Patient seen for only 9 sessions since last progress note with an unexpected break in therapy from 3/13/2020 to 6/1/2020 with COVID pandemic.  Patient continues to make progress with ability to use R UE for writing tasks and also demonstrating increased numerical reasoning, problem solving and visual memory skills.  Patient unfortunately had a decline in right hand function with unexpected break in therapy and has not been as compliant with completing his home program for R UE.  See objective measures above for details.  Patient will continue to benefit from OT services to address the above goals at a frequency of 2x/week x 12 weeks, decreasing frequency prn.    Plan:  Continue therapy per current plan of care.    Discharge:  No

## 2020-06-01 NOTE — PROGRESS NOTES
[facility]      OUTPATIENT OCCUPATIONAL THERAPY  PLAN OF TREATMENT FOR OUTPATIENT REHABILITATION    Patient's Last Name, First Name, M.I.                YOB: 1935  Tk [place]                        Provider's Name  [facility] Medical Record No.  [id number]                               Onset Date: 7/13/19   Start of Care Date: 8/22/19   Type:     ___PT   _X_OT   ___SLP Medical Diagnosis: L MCA ischemic stroke                       OT Diagnosis: Decreased ADL/IADL      _________________________________________________________________________________  Plan of Treatment/Frequency/Duration/Goals: Please see detailed progress note dated 6/1/20.    Certification date from 5/5/20 to 7/27/20.   Cert. delayed due to patient not seen due to COVID pandemic.    Katrina Sotelo OTR/L         I CERTIFY THE NEED FOR THESE SERVICES FURNISHED UNDER        THIS PLAN OF TREATMENT AND WHILE UNDER MY CARE     (Physician co-signature of this document indicates review and certification of the therapy plan).                Referring Provider: Janet Bell MD

## 2020-06-02 ENCOUNTER — TRANSFERRED RECORDS (OUTPATIENT)
Dept: HEALTH INFORMATION MANAGEMENT | Facility: CLINIC | Age: 85
End: 2020-06-02

## 2020-06-08 ENCOUNTER — HOSPITAL ENCOUNTER (OUTPATIENT)
Dept: OCCUPATIONAL THERAPY | Facility: CLINIC | Age: 85
Setting detail: THERAPIES SERIES
End: 2020-06-08
Attending: PHYSICAL MEDICINE & REHABILITATION
Payer: MEDICARE

## 2020-06-08 PROCEDURE — 97110 THERAPEUTIC EXERCISES: CPT | Mod: GO | Performed by: OCCUPATIONAL THERAPIST

## 2020-06-08 PROCEDURE — 97535 SELF CARE MNGMENT TRAINING: CPT | Mod: GO,KX | Performed by: OCCUPATIONAL THERAPIST

## 2020-06-11 ENCOUNTER — HOSPITAL ENCOUNTER (OUTPATIENT)
Dept: PHYSICAL THERAPY | Facility: CLINIC | Age: 85
Setting detail: THERAPIES SERIES
End: 2020-06-11
Attending: PHYSICAL MEDICINE & REHABILITATION
Payer: MEDICARE

## 2020-06-11 ENCOUNTER — HOSPITAL ENCOUNTER (OUTPATIENT)
Dept: SPEECH THERAPY | Facility: CLINIC | Age: 85
Setting detail: THERAPIES SERIES
End: 2020-06-11
Attending: PHYSICAL MEDICINE & REHABILITATION
Payer: MEDICARE

## 2020-06-11 PROCEDURE — 97110 THERAPEUTIC EXERCISES: CPT | Mod: GP,KX | Performed by: PHYSICAL THERAPIST

## 2020-06-11 PROCEDURE — 92507 TX SP LANG VOICE COMM INDIV: CPT | Mod: GN,KX | Performed by: SPEECH-LANGUAGE PATHOLOGIST

## 2020-06-11 NOTE — PROGRESS NOTES
[facility]      OUTPATIENT SPEECH LANGUAGE PATHOLOGY  PLAN OF TREATMENT FOR OUTPATIENT REHABILITATION    Patient's Last Name, First Name, M.I.                YOB: 1935  Tk [place]                        Provider's Name  [facility] Medical Record No.  [id number]                               Onset Date: 7/13/2019   Start of Care Date: 8/9/2019   Type:     ___PT   ___OT   _X_SLP Medical Diagnosis: Aphasia, L MCA CVA                       SLP Diagnosis: Aphasia, apraxia of speech      _________________________________________________________________________________  Plan of Treatment:    Frequency/Duration: 1x/week x 90 days     Goals: All goals will continue as patient only seen for 4 sessions from certification period 2/18/20-4/27/20 due to COVID.   Goal Identifier LTG   Goal Description Patient will improve his functional expressive and receptive language to communicate in daily living and emergency situations with at least 90% and use of strategies prn.    Target Date 06/30/20   Date Met      Progress: Continue goal.  See below for progress     Goal Identifier STG 1: Language/Auditory Comprehension   Goal Description Patient will follow 2 step directions and listen to short paragraphs and answer questions with 90% and minimal cues for improved comprehension needed for daily living communication needs.   Target Date 04/27/20   Date Met      Progress: Continue goal-Current progress = 70%.  Goal will continue as patient only seen for 4 sessions since progress report on 2/18/20 due to COVID.      Goal Identifier STG 2: Language/Verbal Expression   Goal Description Patient will complete moderate level verbal expression tasks including naming, describing and sentence completion with use of trained word-retrieval strategies to improve verbal expression needed for daily living  communication needs with 90% and minimal cues.   Target Date 04/27/20   Date Met      Progress: Continue goal.  Current progress is 85%.     Goal Identifier STG 3: Language/Reading comprehension   Goal Description Patient will complete practical reading comprehension tasks including reading menus, signs, labels, to improve reading comprehension needed for daily living and  safety needs with 90 % accuracy and minimal cues.   Target Date 04/27/20   Date Met      Progress: Continue goal.  Not addressed during this progress reporting period due to priority of other goals and patient not seen due to COVID.      Goal Identifier STG 4: Speech-intellgibility   Goal Description Patient will implement trained speech production/intelligibility strategies to improve speech intelligibility to at least 90% at the sentence and conversational level.   Target Date 04/27/20   Date Met      Progress: Continue goal.  Current progress is 75%.    Progress Toward Goals:   Progress limited due to COVID and patient only seen for 4 sessions from certification period 2/18/20-4/27/20 due to COVID.    Certification date from 4/28/20 to 7/26/20.    Mili Lockhart, SLP          I CERTIFY THE NEED FOR THESE SERVICES FURNISHED UNDER        THIS PLAN OF TREATMENT AND WHILE UNDER MY CARE     (Physician co-signature of this document indicates review and certification of the therapy plan).                Referring Provider: Dr. Janet Bell

## 2020-06-15 ENCOUNTER — HOSPITAL ENCOUNTER (OUTPATIENT)
Dept: OCCUPATIONAL THERAPY | Facility: CLINIC | Age: 85
Setting detail: THERAPIES SERIES
End: 2020-06-15
Attending: PHYSICAL MEDICINE & REHABILITATION
Payer: MEDICARE

## 2020-06-15 ENCOUNTER — HOSPITAL ENCOUNTER (OUTPATIENT)
Dept: PHYSICAL THERAPY | Facility: CLINIC | Age: 85
Setting detail: THERAPIES SERIES
End: 2020-06-15
Attending: PHYSICAL MEDICINE & REHABILITATION
Payer: MEDICARE

## 2020-06-15 PROCEDURE — 97110 THERAPEUTIC EXERCISES: CPT | Mod: GP | Performed by: PHYSICAL THERAPIST

## 2020-06-15 PROCEDURE — 97110 THERAPEUTIC EXERCISES: CPT | Mod: GO,KX | Performed by: OCCUPATIONAL THERAPIST

## 2020-06-15 PROCEDURE — 97112 NEUROMUSCULAR REEDUCATION: CPT | Mod: GO,KX | Performed by: OCCUPATIONAL THERAPIST

## 2020-06-15 PROCEDURE — 97530 THERAPEUTIC ACTIVITIES: CPT | Mod: GP | Performed by: PHYSICAL THERAPIST

## 2020-06-15 PROCEDURE — 97535 SELF CARE MNGMENT TRAINING: CPT | Mod: GO,KX | Performed by: OCCUPATIONAL THERAPIST

## 2020-06-19 ENCOUNTER — HOSPITAL ENCOUNTER (OUTPATIENT)
Dept: SPEECH THERAPY | Facility: CLINIC | Age: 85
Setting detail: THERAPIES SERIES
End: 2020-06-19
Attending: PHYSICAL MEDICINE & REHABILITATION
Payer: MEDICARE

## 2020-06-19 PROCEDURE — 92507 TX SP LANG VOICE COMM INDIV: CPT | Mod: GN,KX | Performed by: SPEECH-LANGUAGE PATHOLOGIST

## 2020-06-22 ENCOUNTER — HOSPITAL ENCOUNTER (OUTPATIENT)
Dept: PHYSICAL THERAPY | Facility: CLINIC | Age: 85
Setting detail: THERAPIES SERIES
End: 2020-06-22
Attending: PHYSICAL MEDICINE & REHABILITATION
Payer: MEDICARE

## 2020-06-22 ENCOUNTER — HOSPITAL ENCOUNTER (OUTPATIENT)
Dept: OCCUPATIONAL THERAPY | Facility: CLINIC | Age: 85
Setting detail: THERAPIES SERIES
End: 2020-06-22
Attending: PHYSICAL MEDICINE & REHABILITATION
Payer: MEDICARE

## 2020-06-22 PROCEDURE — 97116 GAIT TRAINING THERAPY: CPT | Mod: GP | Performed by: PHYSICAL THERAPIST

## 2020-06-22 PROCEDURE — 97535 SELF CARE MNGMENT TRAINING: CPT | Mod: GO,KX | Performed by: OCCUPATIONAL THERAPIST

## 2020-06-22 PROCEDURE — 97110 THERAPEUTIC EXERCISES: CPT | Mod: GO,KX | Performed by: OCCUPATIONAL THERAPIST

## 2020-06-22 PROCEDURE — 97530 THERAPEUTIC ACTIVITIES: CPT | Mod: GP | Performed by: PHYSICAL THERAPIST

## 2020-06-30 ENCOUNTER — HOSPITAL ENCOUNTER (OUTPATIENT)
Dept: SPEECH THERAPY | Facility: CLINIC | Age: 85
Setting detail: THERAPIES SERIES
End: 2020-06-30
Attending: PHYSICAL MEDICINE & REHABILITATION
Payer: MEDICARE

## 2020-06-30 ENCOUNTER — HOSPITAL ENCOUNTER (OUTPATIENT)
Dept: OCCUPATIONAL THERAPY | Facility: CLINIC | Age: 85
Setting detail: THERAPIES SERIES
End: 2020-06-30
Attending: PHYSICAL MEDICINE & REHABILITATION
Payer: MEDICARE

## 2020-06-30 ENCOUNTER — HOSPITAL ENCOUNTER (OUTPATIENT)
Dept: PHYSICAL THERAPY | Facility: CLINIC | Age: 85
Setting detail: THERAPIES SERIES
End: 2020-06-30
Attending: PHYSICAL MEDICINE & REHABILITATION
Payer: MEDICARE

## 2020-06-30 PROCEDURE — 92507 TX SP LANG VOICE COMM INDIV: CPT | Mod: GN,KX | Performed by: SPEECH-LANGUAGE PATHOLOGIST

## 2020-06-30 PROCEDURE — 97750 PHYSICAL PERFORMANCE TEST: CPT | Mod: GP,KX | Performed by: PHYSICAL THERAPIST

## 2020-06-30 PROCEDURE — 97535 SELF CARE MNGMENT TRAINING: CPT | Mod: GO,KX | Performed by: OCCUPATIONAL THERAPIST

## 2020-06-30 PROCEDURE — 97110 THERAPEUTIC EXERCISES: CPT | Mod: GP,KX | Performed by: PHYSICAL THERAPIST

## 2020-06-30 PROCEDURE — 97110 THERAPEUTIC EXERCISES: CPT | Mod: GO,KX | Performed by: OCCUPATIONAL THERAPIST

## 2020-07-09 ENCOUNTER — HOSPITAL ENCOUNTER (OUTPATIENT)
Dept: SPEECH THERAPY | Facility: CLINIC | Age: 85
Setting detail: THERAPIES SERIES
End: 2020-07-09
Attending: PHYSICAL MEDICINE & REHABILITATION
Payer: MEDICARE

## 2020-07-09 PROCEDURE — 92507 TX SP LANG VOICE COMM INDIV: CPT | Mod: GN,KX | Performed by: SPEECH-LANGUAGE PATHOLOGIST

## 2020-07-12 ENCOUNTER — APPOINTMENT (OUTPATIENT)
Dept: GENERAL RADIOLOGY | Facility: CLINIC | Age: 85
End: 2020-07-12
Attending: EMERGENCY MEDICINE
Payer: MEDICARE

## 2020-07-12 ENCOUNTER — APPOINTMENT (OUTPATIENT)
Dept: CT IMAGING | Facility: CLINIC | Age: 85
End: 2020-07-12
Attending: EMERGENCY MEDICINE
Payer: MEDICARE

## 2020-07-12 ENCOUNTER — APPOINTMENT (OUTPATIENT)
Dept: PHYSICAL THERAPY | Facility: CLINIC | Age: 85
End: 2020-07-12
Attending: INTERNAL MEDICINE
Payer: MEDICARE

## 2020-07-12 ENCOUNTER — HOSPITAL ENCOUNTER (OUTPATIENT)
Facility: CLINIC | Age: 85
Setting detail: OBSERVATION
Discharge: NURSING, REHAB OR LONG-TERM CARE | End: 2020-07-13
Attending: EMERGENCY MEDICINE | Admitting: INTERNAL MEDICINE
Payer: MEDICARE

## 2020-07-12 DIAGNOSIS — R29.6 FALLS FREQUENTLY: ICD-10-CM

## 2020-07-12 DIAGNOSIS — K59.03 DRUG-INDUCED CONSTIPATION: Primary | ICD-10-CM

## 2020-07-12 DIAGNOSIS — S32.010A COMPRESSION FRACTURE OF L1 VERTEBRA, INITIAL ENCOUNTER (H): ICD-10-CM

## 2020-07-12 PROBLEM — M54.9 BACK PAIN: Status: ACTIVE | Noted: 2020-07-12

## 2020-07-12 LAB
ALBUMIN UR-MCNC: 10 MG/DL
ANION GAP SERPL CALCULATED.3IONS-SCNC: 5 MMOL/L (ref 3–14)
APPEARANCE UR: CLEAR
BASOPHILS # BLD AUTO: 0 10E9/L (ref 0–0.2)
BASOPHILS NFR BLD AUTO: 0.4 %
BILIRUB UR QL STRIP: NEGATIVE
BUN SERPL-MCNC: 24 MG/DL (ref 7–30)
CALCIUM SERPL-MCNC: 8.9 MG/DL (ref 8.5–10.1)
CHLORIDE SERPL-SCNC: 111 MMOL/L (ref 94–109)
CO2 SERPL-SCNC: 26 MMOL/L (ref 20–32)
COLOR UR AUTO: YELLOW
CREAT SERPL-MCNC: 1.38 MG/DL (ref 0.66–1.25)
DIFFERENTIAL METHOD BLD: ABNORMAL
EOSINOPHIL # BLD AUTO: 0.2 10E9/L (ref 0–0.7)
EOSINOPHIL NFR BLD AUTO: 3.4 %
ERYTHROCYTE [DISTWIDTH] IN BLOOD BY AUTOMATED COUNT: 14.2 % (ref 10–15)
GFR SERPL CREATININE-BSD FRML MDRD: 47 ML/MIN/{1.73_M2}
GLUCOSE SERPL-MCNC: 102 MG/DL (ref 70–99)
GLUCOSE UR STRIP-MCNC: NEGATIVE MG/DL
HCT VFR BLD AUTO: 36.7 % (ref 40–53)
HGB BLD-MCNC: 12.2 G/DL (ref 13.3–17.7)
HGB UR QL STRIP: NEGATIVE
IMM GRANULOCYTES # BLD: 0 10E9/L (ref 0–0.4)
IMM GRANULOCYTES NFR BLD: 0.2 %
INTERPRETATION ECG - MUSE: NORMAL
KETONES UR STRIP-MCNC: NEGATIVE MG/DL
LEUKOCYTE ESTERASE UR QL STRIP: NEGATIVE
LYMPHOCYTES # BLD AUTO: 0.6 10E9/L (ref 0.8–5.3)
LYMPHOCYTES NFR BLD AUTO: 10.8 %
MCH RBC QN AUTO: 31.1 PG (ref 26.5–33)
MCHC RBC AUTO-ENTMCNC: 33.2 G/DL (ref 31.5–36.5)
MCV RBC AUTO: 94 FL (ref 78–100)
MONOCYTES # BLD AUTO: 0.5 10E9/L (ref 0–1.3)
MONOCYTES NFR BLD AUTO: 9 %
NEUTROPHILS # BLD AUTO: 4.3 10E9/L (ref 1.6–8.3)
NEUTROPHILS NFR BLD AUTO: 76.2 %
NITRATE UR QL: NEGATIVE
NRBC # BLD AUTO: 0 10*3/UL
NRBC BLD AUTO-RTO: 0 /100
PH UR STRIP: 7.5 PH (ref 5–7)
PLATELET # BLD AUTO: 143 10E9/L (ref 150–450)
POTASSIUM SERPL-SCNC: 3.8 MMOL/L (ref 3.4–5.3)
RBC # BLD AUTO: 3.92 10E12/L (ref 4.4–5.9)
RBC #/AREA URNS AUTO: 1 /HPF (ref 0–2)
SARS-COV-2 PCR COMMENT: NORMAL
SARS-COV-2 RNA SPEC QL NAA+PROBE: NEGATIVE
SARS-COV-2 RNA SPEC QL NAA+PROBE: NORMAL
SODIUM SERPL-SCNC: 142 MMOL/L (ref 133–144)
SOURCE: ABNORMAL
SP GR UR STRIP: 1.01 (ref 1–1.03)
SPECIMEN SOURCE: NORMAL
SPECIMEN SOURCE: NORMAL
UROBILINOGEN UR STRIP-MCNC: NORMAL MG/DL (ref 0–2)
WBC # BLD AUTO: 5.6 10E9/L (ref 4–11)
WBC #/AREA URNS AUTO: 0 /HPF (ref 0–5)

## 2020-07-12 PROCEDURE — 85025 COMPLETE CBC W/AUTO DIFF WBC: CPT | Performed by: EMERGENCY MEDICINE

## 2020-07-12 PROCEDURE — 80048 BASIC METABOLIC PNL TOTAL CA: CPT | Performed by: EMERGENCY MEDICINE

## 2020-07-12 PROCEDURE — 72070 X-RAY EXAM THORAC SPINE 2VWS: CPT

## 2020-07-12 PROCEDURE — 99220 ZZC INITIAL OBSERVATION CARE,LEVL III: CPT | Performed by: INTERNAL MEDICINE

## 2020-07-12 PROCEDURE — 72131 CT LUMBAR SPINE W/O DYE: CPT

## 2020-07-12 PROCEDURE — 81001 URINALYSIS AUTO W/SCOPE: CPT | Performed by: EMERGENCY MEDICINE

## 2020-07-12 PROCEDURE — 25000132 ZZH RX MED GY IP 250 OP 250 PS 637: Mod: GY | Performed by: INTERNAL MEDICINE

## 2020-07-12 PROCEDURE — 97161 PT EVAL LOW COMPLEX 20 MIN: CPT | Mod: GP

## 2020-07-12 PROCEDURE — 70450 CT HEAD/BRAIN W/O DYE: CPT

## 2020-07-12 PROCEDURE — 99285 EMERGENCY DEPT VISIT HI MDM: CPT | Mod: 25

## 2020-07-12 PROCEDURE — C9803 HOPD COVID-19 SPEC COLLECT: HCPCS

## 2020-07-12 PROCEDURE — G0378 HOSPITAL OBSERVATION PER HR: HCPCS

## 2020-07-12 PROCEDURE — 25000132 ZZH RX MED GY IP 250 OP 250 PS 637: Mod: GY | Performed by: EMERGENCY MEDICINE

## 2020-07-12 PROCEDURE — 93005 ELECTROCARDIOGRAM TRACING: CPT

## 2020-07-12 PROCEDURE — 72125 CT NECK SPINE W/O DYE: CPT

## 2020-07-12 PROCEDURE — 97116 GAIT TRAINING THERAPY: CPT | Mod: GP

## 2020-07-12 PROCEDURE — 96374 THER/PROPH/DIAG INJ IV PUSH: CPT

## 2020-07-12 PROCEDURE — 25000128 H RX IP 250 OP 636: Performed by: EMERGENCY MEDICINE

## 2020-07-12 RX ORDER — PANTOPRAZOLE SODIUM 40 MG/1
40 TABLET, DELAYED RELEASE ORAL DAILY
COMMUNITY

## 2020-07-12 RX ORDER — ONDANSETRON 2 MG/ML
4 INJECTION INTRAMUSCULAR; INTRAVENOUS EVERY 6 HOURS PRN
Status: DISCONTINUED | OUTPATIENT
Start: 2020-07-12 | End: 2020-07-13 | Stop reason: HOSPADM

## 2020-07-12 RX ORDER — DOCUSATE SODIUM 100 MG/1
100 CAPSULE, LIQUID FILLED ORAL AT BEDTIME
Status: DISCONTINUED | OUTPATIENT
Start: 2020-07-12 | End: 2020-07-13 | Stop reason: HOSPADM

## 2020-07-12 RX ORDER — AMLODIPINE BESYLATE 5 MG/1
5 TABLET ORAL AT BEDTIME
Status: ON HOLD | COMMUNITY
End: 2022-01-01

## 2020-07-12 RX ORDER — PANTOPRAZOLE SODIUM 40 MG/1
40 TABLET, DELAYED RELEASE ORAL
Status: DISCONTINUED | OUTPATIENT
Start: 2020-07-12 | End: 2020-07-13 | Stop reason: HOSPADM

## 2020-07-12 RX ORDER — AMLODIPINE BESYLATE 5 MG/1
5 TABLET ORAL DAILY
Status: DISCONTINUED | OUTPATIENT
Start: 2020-07-12 | End: 2020-07-13 | Stop reason: HOSPADM

## 2020-07-12 RX ORDER — NALOXONE HYDROCHLORIDE 0.4 MG/ML
.1-.4 INJECTION, SOLUTION INTRAMUSCULAR; INTRAVENOUS; SUBCUTANEOUS
Status: DISCONTINUED | OUTPATIENT
Start: 2020-07-12 | End: 2020-07-13 | Stop reason: HOSPADM

## 2020-07-12 RX ORDER — ATORVASTATIN CALCIUM 10 MG/1
10 TABLET, FILM COATED ORAL AT BEDTIME
Status: DISCONTINUED | OUTPATIENT
Start: 2020-07-12 | End: 2020-07-13 | Stop reason: HOSPADM

## 2020-07-12 RX ORDER — METOPROLOL SUCCINATE 25 MG/1
25 TABLET, EXTENDED RELEASE ORAL EVERY EVENING
Status: DISCONTINUED | OUTPATIENT
Start: 2020-07-12 | End: 2020-07-13 | Stop reason: HOSPADM

## 2020-07-12 RX ORDER — POLYETHYLENE GLYCOL 3350 17 G/17G
17 POWDER, FOR SOLUTION ORAL DAILY PRN
Status: DISCONTINUED | OUTPATIENT
Start: 2020-07-12 | End: 2020-07-13 | Stop reason: HOSPADM

## 2020-07-12 RX ORDER — HYDROMORPHONE HYDROCHLORIDE 1 MG/ML
0.5 INJECTION, SOLUTION INTRAMUSCULAR; INTRAVENOUS; SUBCUTANEOUS ONCE
Status: COMPLETED | OUTPATIENT
Start: 2020-07-12 | End: 2020-07-12

## 2020-07-12 RX ORDER — HYDROMORPHONE HYDROCHLORIDE 1 MG/ML
0.2 INJECTION, SOLUTION INTRAMUSCULAR; INTRAVENOUS; SUBCUTANEOUS
Status: DISCONTINUED | OUTPATIENT
Start: 2020-07-12 | End: 2020-07-13 | Stop reason: HOSPADM

## 2020-07-12 RX ORDER — ACETAMINOPHEN 500 MG
1000 TABLET ORAL 3 TIMES DAILY
Status: DISCONTINUED | OUTPATIENT
Start: 2020-07-12 | End: 2020-07-13 | Stop reason: HOSPADM

## 2020-07-12 RX ORDER — ACETAMINOPHEN 500 MG
1000 TABLET ORAL ONCE
Status: COMPLETED | OUTPATIENT
Start: 2020-07-12 | End: 2020-07-12

## 2020-07-12 RX ORDER — ONDANSETRON 4 MG/1
4 TABLET, ORALLY DISINTEGRATING ORAL EVERY 6 HOURS PRN
Status: DISCONTINUED | OUTPATIENT
Start: 2020-07-12 | End: 2020-07-13 | Stop reason: HOSPADM

## 2020-07-12 RX ORDER — CITALOPRAM HYDROBROMIDE 20 MG/1
40 TABLET ORAL DAILY
Status: DISCONTINUED | OUTPATIENT
Start: 2020-07-12 | End: 2020-07-13 | Stop reason: HOSPADM

## 2020-07-12 RX ADMIN — MENTHOL 2 PATCH: 205.5 PATCH TOPICAL at 18:07

## 2020-07-12 RX ADMIN — HYDROMORPHONE HYDROCHLORIDE 0.5 MG: 1 INJECTION, SOLUTION INTRAMUSCULAR; INTRAVENOUS; SUBCUTANEOUS at 10:44

## 2020-07-12 RX ADMIN — APIXABAN 5 MG: 5 TABLET, FILM COATED ORAL at 20:13

## 2020-07-12 RX ADMIN — METOPROLOL SUCCINATE 25 MG: 25 TABLET, EXTENDED RELEASE ORAL at 20:13

## 2020-07-12 RX ADMIN — PANTOPRAZOLE SODIUM 40 MG: 40 TABLET, DELAYED RELEASE ORAL at 15:28

## 2020-07-12 RX ADMIN — ATORVASTATIN CALCIUM 10 MG: 10 TABLET, FILM COATED ORAL at 22:43

## 2020-07-12 RX ADMIN — BRINZOLAMIDE/BRIMONIDINE TARTRATE 1 DROP: 10; 2 SUSPENSION/ DROPS OPHTHALMIC at 15:29

## 2020-07-12 RX ADMIN — OXYCODONE HYDROCHLORIDE 2.5 MG: 5 TABLET ORAL at 15:28

## 2020-07-12 RX ADMIN — ACETAMINOPHEN 1000 MG: 500 TABLET, FILM COATED ORAL at 10:43

## 2020-07-12 RX ADMIN — ACETAMINOPHEN 1000 MG: 500 TABLET, FILM COATED ORAL at 22:43

## 2020-07-12 RX ADMIN — ACETAMINOPHEN 1000 MG: 500 TABLET, FILM COATED ORAL at 15:28

## 2020-07-12 RX ADMIN — DOCUSATE SODIUM 100 MG: 100 CAPSULE, LIQUID FILLED ORAL at 22:43

## 2020-07-12 RX ADMIN — APIXABAN 5 MG: 5 TABLET, FILM COATED ORAL at 15:28

## 2020-07-12 RX ADMIN — AMLODIPINE BESYLATE 5 MG: 5 TABLET ORAL at 15:28

## 2020-07-12 RX ADMIN — CITALOPRAM HYDROBROMIDE 40 MG: 20 TABLET ORAL at 15:27

## 2020-07-12 ASSESSMENT — ENCOUNTER SYMPTOMS
FEVER: 0
BACK PAIN: 1
COLOR CHANGE: 1
COUGH: 0
ABDOMINAL PAIN: 0
NECK PAIN: 0
SHORTNESS OF BREATH: 0

## 2020-07-12 ASSESSMENT — MIFFLIN-ST. JEOR: SCORE: 1468.29

## 2020-07-12 NOTE — PLAN OF CARE
"PT orders received & acknowledged. Chart reviewed. Eval completed & treatment initiated.     Wife present during session and providing PLOF/living environment information as patient with expressive aphasia from previous stroke. Patient lives in a town house with his where are all needs are met on the main level. There are 3 steps to enter the home with bilateral railing. At baseline patient is IND/BERONICA with functional mobility & ADLs using a raised commode, with R sided weakness. He owns a walker but does not typically use it. Wife reports lately he has been having the most difficulty getting up for lower surfaces (commode/chair) - and he is refusing a \"lift chair/recliner\" despite spouse wanting to get him one.     Discharge Planner PT   Patient plan for discharge: Wife unsure if she can manage patient at home   Current status: Greeted patient supine in bed with spouse at bedside and agreeable to session. Engaged patient in supine > EOB at SBA. Engaged patient in sit <> stand from bed height at SBA and from recliner at Joselyn requiring multiple attempts. Engaged patient in ambulation with FWW at CGA-SBA for a distance of 150ft and for 50ft without assistive device, patient demonstrates increased stability with FWW. Engaged patient in ascending/descending 3 steps with bilateral railing at CGA with education on ascending with stronger leg leading and descending with weaker leg leading for increased ease of task. Discussed discharge recommendation - spouse unsure if she can provided support needed as her hands have really bad arthritis & she cannot lift him up. Concluded session with patient sitting up in recliner, all needs in reach and nursing notified of status.   Barriers to return to prior living situation: LE weakness, decreased activity tolerance, impaired balance, fall risk.   Recommendations for discharge: Home with Ax1 for all OOB mobility; HH-PT  Rationale for recommendations: Patient demonstrates necessary " level of functional mobility with Ax1 & FWW in order to return to home environment. Patient is recommended to continue with skilled HH-PT in order to improve LE strength, balance and activity tolerance in order to return to baseline mobility.  Pt appropriate for Home PT as they require AD, assistive person, and taxing effort to leave the home.    If spouse is unable to provide Ax1 for OOB mobility, patient would require TCU.          Entered by: Jami Vicente 07/12/2020 3:23 PM        Patient to mobilize with nursing staff 3x/day with FWW assistx1 to maintain current level of mobility and prevent decline in function during hospital stay.

## 2020-07-12 NOTE — ED PROVIDER NOTES
History     Chief Complaint:  Fall     HPI   Tk Richmond is a 84 year old male with a history of multiple strokes and atrial fibrillation, on Eliquis, who presents via EMS for evaluation of back pain in the setting of a recent fall. Three days ago, the patient reports falling while getting out of the bath tub. His wife states that she heard him fall while he was in the bathroom, but did not witness the event. she does note that he hit his neck and back in the fall, but has been primarily complaining of back pain. At that time, he was able to get up with the help of his wife and has been ambulatory until today. This morning, he was unable to get out of bed due to mid-back pain. When his wife was not able to help him out of bed either, she called 911. Here, the patient reports the pain is exacerbated with movement and it is not present at rest. He denies any current neck pain, or chest pain, abdominal pain, or shortness of breath. His wife also notes that he had a second fall this week when he missed the bed and fell to the ground; this is unusual for him to fall twice in a short period of time. She states he has not been sick recently and has had no change in appetite. The only recent medication change was the addition of Eliquis when he was found to be in atrial fibrillation.     Allergies:  Contrast Dye - Rash     Medications:    Eliquis   Baby aspirin   Metoprolol   Celexa   Colace   Lovastatin   Calcium/Vitamin D    Past Medical History:    CVA, left middle cerebral artery territory -2019  CVA - 2012  Aortic root dilatation   Ascending aortic aneurysm   Glaucoma   Hyperlipidemia  Hypertension   Anemia  Lower GI bleed   Aortic regurgitation   Paroxysmal atrial fibrillation     Past Surgical History:    IR carotid cerebral angiogram, left   Left wrist ORIF x2    Family History:    Breast cancer     Social History:  Marital Status:   [2]  Former smoker. Quit 1972.   Positive for alcohol use. Comment:  "the occasional beer.   Negative for drug use.   Lives in a private residence with his wife.      Review of Systems   Constitutional: Negative for fever.   Respiratory: Negative for cough and shortness of breath.    Cardiovascular: Negative for chest pain.   Gastrointestinal: Negative for abdominal pain.   Musculoskeletal: Positive for back pain. Negative for neck pain.   Skin: Positive for color change (bruising, neck).   All other systems reviewed and are negative.        Physical Exam     Patient Vitals for the past 24 hrs:   BP Temp Temp src Pulse Heart Rate Resp SpO2 Height Weight   07/12/20 0734 (!) 153/84 98.6  F (37  C) Oral 57 57 17 98 % 1.778 m (5' 10\") 76.2 kg (168 lb)      Physical Exam  GENERAL: well developed, pleasant  HEAD: no signs of head trauma.   EYES: pupils reactive, extraocular muscles intact, conjunctivae normal  ENT:  mucus membranes moist  NECK:  trachea midline, normal range of motion, some bruising to the lateral side of the cervical spine  RESPIRATORY: no tachypnea, breath sounds clear to auscultation   CVS: normal S1/S2, no murmurs, intact distal pulses  ABDOMEN: soft, nontender, nondistention  MUSCULOSKELETAL: no deformities, low back pain  SKIN: warm and dry, no acute rashes or ulceration, bruising as noted above  NEURO: GCS 15, cranial nerves intact, alert and oriented x3. Slow difficult speech.   PSYCH:  Mood/affect normal    Emergency Department Course   ECG:  Indication: Frequent Falls  Time: 0751  Vent. Rate 58 bpm. OK interval 194. QRS duration 88. QT/QTc 462/453. P-R-T axis 67 -49 21.    Sinus bradycardia with occasional PVCs and PACs.   Left axis deviation.  Abnormal ECG. Read time: 0756.    Imaging:  Radiographic findings were communicated with the patient who voiced understanding of the findings.  CT Cervical Spine without contrast:   1. No fractures are identified.   2. Multilevel degenerative changes, as per radiology.    CT Lumbar Spine without contrast:   1. Compression " fractures of L1 and L2. These have a chronic   appearance. I do not identify any acute appearing fracture lines.   2. Multilevel degenerative changes with moderate central spinal   stenosis at L2-3, L3-4, and L4-5, as per radiology.    CT Head without contrast:   1. No intracranial bleed or skull fractures are identified.   2. Chronic areas of encephalomalacia in the left middle cerebral   artery distribution.   3. There is diffuse parenchymal volume loss.  White matter changes are   present in the cerebral hemispheres that are consistent with small   vessel ischemic disease in this age patient, as per radiology.    XR Thoracic Spine, 2 views:  1. Chronic compression fractures of T12 and the superior endplate of   L1. These fractures were present on a  film from a chest CT dated   7/9/2019.   2. No acute appearing fractures are identified.   3. 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   4. Calcified gallstones, as per radiology.     Laboratory:  CBC: WBC: 5.6, HGB: 12.2 (L), PLT: 143 (L)  BMP: Glucose 102 (H), Cl: 111 (H), Creatinine: 1.38 (H), GFR: 47 (L), o/w WNL     UA with Microscopic: pH: 7.5 (H), Protein albumin: 10 (A), o/w WNL     Asymptomatic COVID-19 Virus PCR: Pending     Interventions:  1043 Tylenol, 1000 mg, PO  1044 Dilaudid, 0.5 mg, IV injection      Emergency Department Course:  Nursing notes and vitals reviewed.   0730: I performed an exam of the patient as documented above.      0734: I spoke with Ml, the patient's wife, and obtained additional history at this time.     IV was inserted and blood was drawn for laboratory testing, results above.     The patient was sent for head, neck, and low back CTs while in the emergency department, results above.      1033: I rechecked the patient and discussed the results of his workup with him and his wife who has now arrived at bedside. They are amenable to admission.     1041: I consulted with Dr. Ogden of the  hospitalist services. He is in agreement to accept the patient for admission. He requested I obtain a thoracic spine radiograph prior to admission, findings above.     The patient's nose was swabbed and this sample was sent for viral testing.     Findings and plan explained to the Patient who consents to admission. Discussed the patient with Dr. Ogden, who will admit the patient to an observation bed for further monitoring, evaluation, and treatment.    I personally reviewed the laboratory and imaging results with the Patient and spouse and answered all related questions prior to admission.      Impression & Plan      Medical Decision Making:  Tk Richmond is a 84 year old male who presents with fall twice this week.  They are at the bedside.  She notes that he had difficulty with speech and getting around for this past year after his stroke and being home from rehab.  He now had an unwitnessed fall and was found in the tub and looked hit his neck and then had another episode where he missed the bed and fell.  She could not get him up out of bed today and complaining of back pain.  CT head shows no acute bleed or new stroke.  CT neck as he had some bruising on his cervical spine did not show any acute fractures or expanding hematoma.  Lumbar imaging shows compression fractures but felt to be old.  Labs and urine look to be unrevealing.  Thoracic x-ray is added on but talking with the wife she does not feel she can take him home even with pain medications.  She notes she is overwhelmed and he cannot get around.  Spoke with the hospitalist regarding admission.    Diagnosis:    ICD-10-CM    1. Falls frequently  R29.6    2. Compression fracture of L1 vertebra, initial encounter (H)  S32.010A        Disposition:  Admitted to observation under the care of Dr. Alin Machado Disclosure:  I, Natalie Mitchell, am serving as a scribe on 7/12/2020 at 7:30 AM to personally document services performed by Gerardo Shirley MD  based on my observations and the provider's statements to me.       7/12/2020    EMERGENCY DEPARTMENT       Gerardo Shirley MD  07/12/20 3746

## 2020-07-12 NOTE — PLAN OF CARE
Pt arrived to unit at from ED around 1230. VSS, ex expressive aphagia(receptive language intact), but does not demonstrate pain when rolling/turning. LS clear, SKIN WDI ex blanchable coccyx- maintain PIP measures. Urinal at bedside w/ assist. PT assessed pt to be A1+w=Gb. Able to stand from bed with min assistance, needs mod assist to raise from recliner chair. RIGHT had contraction(baseline), Neuros otherwise intact, some right side weakness resulting from h/o CVA. Pt receiving PRN meds for back pain. Icy hot patches to back. Regular diet-needs assist with feeding. Plan: TCU placement as wife cannot care for pt at home.

## 2020-07-12 NOTE — PROGRESS NOTES
"   07/12/20 1501   Quick Adds   Type of Visit Initial PT Evaluation   Living Environment   Lives With spouse   Living Arrangements house   Home Accessibility stairs to enter home   Number of Stairs, Main Entrance 3   Stair Railings, Main Entrance railings on both sides of stairs   Transportation Anticipated family or friend will provide   Living Environment Comment Patient lives in a town house with his where are all needs are met on the main level. There are 3 steps to enter the home with bilateral railing. They receive cleaning services 2x/month. Otherwise wife is doing all household chores.    Self-Care   Usual Activity Tolerance good   Current Activity Tolerance fair   Equipment Currently Used at Home cane, straight;walker, rolling;grab bar, tub/shower;commode;raised toilet   Activity/Exercise/Self-Care Comment At baseline patient is IND/EBRONICA with functional mobility & ADLs using a raised commode, with R sided weakness. He owns a walker but does not typically use it. Wife reports lately he has been having the most difficulty getting up for lower surfaces (commode/chair) - and he is refusing a \"lift chair/recliner\" despite spouse wanting to get him one.    Functional Level Prior   Ambulation 0-->independent   Transferring 0-->independent   Fall history within last six months yes   Number of times patient has fallen within last six months 6   Which of the above functional risks had a recent onset or change? ambulation;transferring   General Information   Onset of Illness/Injury or Date of Surgery - Date 07/12/20   Referring Physician Collins Ogden, DO   Patient/Family Goals Statement To get stronger   Pertinent History of Current Problem (include personal factors and/or comorbidities that impact the POC) per chart: 84 year old male with prior CVA and chronic right sided deficits who presented to the [facility] ER with back pain and inability to transfer  He has had two falls in the past week (last Thursday) and it has " been harder for him to get up even with his wife assisting.  He also has a history of additional falls over the past 6 months.  No recent infectious complaints and describes the falls as unsteadiness/mechanical without syncope.   Precautions/Limitations fall precautions   General Observations Greeted patient supine in bed with spouse at bedside.   Cognitive Status Examination   Orientation orientation to person, place and time   Level of Consciousness alert   Cognitive Comment patient with expressive aphasia from previous stroke   Pain Assessment   Patient Currently in Pain No   Integumentary/Edema   Integumentary/Edema Comments bruise on back of head/neck area   Posture    Posture Forward head position;Protracted shoulders   Range of Motion (ROM)   ROM Comment B LE WFL   Strength   Strength Comments B LE functionally weak (R side > L side from previous stroke)   Bed Mobility   Bed Mobility Comments supine > EOB at SBA   Transfer Skills   Transfer Comments sit <> stand at Joselyn with FWW for chair height   Gait   Gait Comments 10ft with FWW at CGA, lack of heel strike with R foot, slight ER of R foot.   Balance   Balance Comments unsteady with ambulation without assistive device   General Therapy Interventions   Planned Therapy Interventions balance training;gait training;neuromuscular re-education;strengthening;transfer training;home program guidelines;progressive activity/exercise   Clinical Impression   Criteria for Skilled Therapeutic Intervention yes, treatment indicated   PT Diagnosis impaired functional mobility   Influenced by the following impairments LE weakness, decreased activity tolerance, impaired balance   Functional limitations due to impairments bed mobility, transfers, ambulation, stairs   Clinical Presentation Stable/Uncomplicated   Clinical Presentation Rationale PMH, current presentation, clinical judgement   Clinical Decision Making (Complexity) Low complexity   Therapy Frequency 3x/week  (while  "on OBS)   Predicted Duration of Therapy Intervention (days/wks) 3   Anticipated Discharge Disposition Home with Assist;Home with Home Therapy   Risk & Benefits of therapy have been explained Yes   Patient, Family & other staff in agreement with plan of care Yes   Therapy Certification   Start of care date 07/12/20   Certification date from 07/12/20   Certification date to 07/15/20   [facility] TM \"6 Clicks\"   2016, [employer] of [employer], under license to [employer].  All rights reserved.   6 Clicks Short Forms Basic Mobility Inpatient Short Form   [facility]  \"6 Clicks\" V.2 Basic Mobility Inpatient Short Form   1. Turning from your back to your side while in a flat bed without using bedrails? 4 - None   2. Moving from lying on your back to sitting on the side of a flat bed without using bedrails? 3 - A Little   3. Moving to and from a bed to a chair (including a wheelchair)? 3 - A Little   4. Standing up from a chair using your arms (e.g., wheelchair, or bedside chair)? 3 - A Little   5. To walk in hospital room? 3 - A Little   6. Climbing 3-5 steps with a railing? 3 - A Little   Basic Mobility Raw Score (Score out of 24.Lower scores equate to lower levels of function) 19   Total Evaluation Time   Total Evaluation Time (Minutes) 8     "

## 2020-07-12 NOTE — CONSULTS
Care Transition Initial Assessment -      Met with: Patient and Family    Active Problems:    Back pain       DATA  Lives With: spouse   Living Arrangements: house  Pt had a stoke around a year ago which decreased his mobility and left him unable to write or unable to speak much. It appears he would benefit from skilled PT/OT & ST to get stronger before returning home. This pt likely would benefit from the waiver being offered by TCU due to Covid if pt qualifies. Pt/wife gave the TCU choices of [facility] if TCU is an option. If pt needs to return home his wife will continue to assist him and staff encouraged them to find another person near to assist with some of the transfers as well as to have [facility] which they have had in the past and were happy with to continue helping with strengthening.  Identified issues/concerns regarding health management: More and more often pt is unable to get up from a seated position & it is difficult for his wife to help.  Transportation Anticipated: family or friend will provide    ASSESSMENT  Cognitive Status:  awake, alert and oriented  Concerns to be addressed:Pt would benefit from a TCU stay.   PLAN  Patient given options and choices for discharge YES .  Patient/family is agreeable to the plan?  Yes  Transportation/person available to transport on day of discharge  is pt.'s wife   Patient anticipates discharging to:  TCU vs. Home with 24hr assist & homcare.

## 2020-07-12 NOTE — H&P
[facility]  Hospitalist History and Physical    Name: Tk Richmond    MRN: [id number]  YOB: 1935    Age: 84 year old  Date of Admission:  7/12/2020  Physician:  Collins Ogden DO, [facility]    Assessment & Plan   Tk Richmond is a 84 year old male with prior CVA and chronic right sided deficits who presented to the [facility] ER with back pain and inability to transfer  He has had two falls in the past week (last Thursday) and it has been harder for him to get up even with his wife assisting.  He also has a history of additional falls over the past 6 months.  No recent infectious complaints and describes the falls as unsteadiness/mechanical without syncope.    Mechanical falls in setting of prior CVA with defiits  Intractable back pain, appears musculoskeletal:  -  No new focal neuro changes.  Will monitor neuro checks initially.  -  Old compression fractures, no definitive acute one.  Plan therapy and pain control.  If pain not controlled/worsening consider spine consult.  Initially will use scheduled tylenol, low dose PRN narcotics, icy hot patches.    Parox afib:  -  Continue eliquis, no overt bleeding currently.  Continue metoprolol.    HTN:  -  Continue routine medications, BP a little high now but having pain.    CKD:    -  Creat slightly higher than baseline.  Recheck in AM.    Depression/Anxiety - continue pta Celexa     Glaucoma - continue pta eye gtts once pharmacy rec complete     GERD - continue pta PPI    COVID:  Asymptomatic hospital admission testing pending  DVT Prophylaxis: Pneumatic Compression Devices  Code Status: NO CPR-Do NOT Intubate    Disposition: Expected discharge unclear, suspect discharge in 1-2 days with improved pain control.    Primary Care Physician   Shyam Mclean, [phone]    Chief Complaint   Back pain after recent falls    History is obtained from the patient and his wife.  I also spoke with the ER provider about the history.     History of  Present Illness   Tk Richmond is a 84 year old male who presents with worsening back pain when trying to change position/inability to transfer following a couple recent mechanical Falls.  He fell Wednesday in the bathroom with a slip and fall.  He also fell Thursday in his home with a slip and fall.  There was no LOC.  He had worsening low back pain since then and today he could not get up.  Yesterday with maximal effort by his wife he was able to transfer but it was extremely challenging by their report.  No bowel/bladder changes.  No infectious complaints.  He also has had several other falls over the past 6 months.  He is supposed to use a walker but doesn't routinely use it at home.    Past Medical History    Past Medical History:   Diagnosis Date     Acute embolic stroke (H) 7/17/2019     Acute ischemic cerebrovascular accident (CVA) involving left middle cerebral artery territory (H) 7/20/2019     Aortic root dilatation (H)      Ascending aortic aneurysm (H)      CVA (cerebral vascular accident) (H) 2/18/2012    CVA 2/18/12     Former smoker      Glaucoma      Hyperlipidemia LDL goal <100 2/18/2012     Hypertension      Iron deficiency anemia      Lower GI bleed 6/28/2019     Mild aortic regurgitation     mild to moderate     Paroxysmal atrial fibrillation (H) 10/4/2019     Stroke (H)     2004, speech deficit       Past Surgical History   Past Surgical History:   Procedure Laterality Date     IR CAROTID CEREBRAL ANGIOGRAM LEFT  7/17/2019     OPEN REDUCTION INTERNAL FIXATION WRIST Left 10/4/2019    Procedure: Open reduction internal fixation, left distal radius fracture;  Surgeon: Krystal Interiano MD;  Location:  OR     OPEN REDUCTION INTERNAL FIXATION WRIST Left 10/6/2019    Procedure: Open reduction internal fixation, left distal radius fracture.  ;  Surgeon: Krystal Interiano MD;  Location:  OR     ORTHOPEDIC SURGERY          Prior to Admission Medications   Prior to Admission Medications    Prescriptions Last Dose Informant Patient Reported? Taking?   Calcium Carbonate-Vitamin D (CALCIUM 600+D PO) 7/11/2020 at PM Spouse/Significant Other Yes Yes   Sig: Take 1 tablet by mouth 2 times daily At noon and supper   Multiple Vitamins-Minerals (CENTRUM SILVER ADULT 50+ PO) 7/11/2020 at 1200 Spouse/Significant Other Yes Yes   Sig: Take 1 tablet by mouth daily    Multiple Vitamins-Minerals (PRESERVISION AREDS PO) 7/11/2020 at PM Spouse/Significant Other Yes Yes   Sig: Take by mouth 2 times daily   acetaminophen (TYLENOL) 325 MG tablet prn at prn Spouse/Significant Other No No   Sig: Take 2 tablets (650 mg) by mouth every 4 hours as needed for mild pain or fever (> 101 F)   amLODIPine (NORVASC) 5 MG tablet 7/11/2020 at AM Spouse/Significant Other Yes Yes   Sig: Take 5 mg by mouth daily   apixaban ANTICOAGULANT (ELIQUIS) 5 MG tablet 7/11/2020 at PM Spouse/Significant Other No Yes   Sig: Take 1 tablet (5 mg) by mouth 2 times daily   brinzolamide-brimonidine (SIMBRINZA) 1-0.2 % ophthalmic suspension 7/12/2020 at AM Spouse/Significant Other Yes Yes   Sig: Place 1 drop into both eyes 2 times daily    citalopram (CELEXA) 40 MG tablet 7/11/2020 at AM Spouse/Significant Other Yes Yes   Sig: Take 40 mg by mouth daily   docusate sodium (COLACE) 100 MG tablet 7/11/2020 at HS Spouse/Significant Other Yes Yes   Sig: Take 100 mg by mouth At Bedtime    glucosamine-chondroitin 500-400 MG CAPS per capsule 7/11/2020 at PM Spouse/Significant Other Yes Yes   Sig: Take 1 capsule by mouth 2 times daily At noon and supper   lovastatin (MEVACOR) 40 MG tablet 7/11/2020 at HS Spouse/Significant Other Yes Yes   Sig: Take 40 mg by mouth At Bedtime   metoprolol succinate ER (TOPROL-XL) 25 MG 24 hr tablet 7/11/2020 at PM Spouse/Significant Other No Yes   Sig: Take 1 tablet (25 mg) by mouth daily   pantoprazole (PROTONIX) 40 MG EC tablet 7/11/2020 at AM Spouse/Significant Other Yes Yes   Sig: Take 40 mg by mouth daily   vitamin B-12  (CYANOCOBALAMIN) 1000 MCG tablet 2020 at AM Spouse/Significant Other Yes Yes   Sig: Take 1,000 mcg by mouth daily       Facility-Administered Medications: None     Allergies   Allergies   Allergen Reactions     Contrast Dye Rash       Social History   Social History     Tobacco Use     Smoking status: Former Smoker     Packs/day: 0.00     Years: 10.00     Pack years: 0.00     Types: Cigars     Last attempt to quit: 1972     Years since quittin.4     Smokeless tobacco: Never Used   Substance Use Topics     Alcohol use: Yes     Alcohol/week: 0.0 standard drinks     Comment: OCC beer     Family History   Reviewed, non-contributory    Review of Systems   A Comprehensive greater than 10 system review of systems was carried out.  Pertinent positives and negatives are noted above.  Otherwise negative for contributory information.    Physical Exam   Temp: 98.6  F (37  C) Temp src: Oral BP: (!) 153/84 Pulse: 57 Heart Rate: 57 Resp: 17 SpO2: 98 %      Vital Signs with Ranges  Temp:  [98.6  F (37  C)] 98.6  F (37  C)  Pulse:  [57] 57  Heart Rate:  [57] 57  Resp:  [17] 17  BP: (153)/(84) 153/84  SpO2:  [98 %] 98 %  168 lbs 0 oz    GEN:  Alert, oriented x 3, appears comfortable until tries to change position, no overt distress at rest.  HEENT:  Normocephalic/atraumatic, no scleral icterus, no nasal discharge, mouth moist.  CV:  Regular rate and rhythm, distant.  Normal rate.  No rub.  LUNGS:  Clear to auscultation bilaterally without rales/rhonchi/wheezing/retractions.  Symmetric chest rise on inhalation noted.  Breath sounds diminished bases.  ABD:  Active bowel sounds, soft, non-tender/non-distended.  No rebound/guarding/rigidity.  BACK:  Tender mostly around L1-L5 region.  EXT:  Trace LE edema (chronic and wears stockings per patient).  No cyanosis.  No acute joint synovitis noted.  SKIN:  Dry to touch, some ecchymosis, no exanthems noted in the visualized areas.  NEURO:  Chronic right sided deficits, speech  deficits noted.  No acute change when looking back at prior reports.  Can raise legs off bed.      Data   Data reviewed today:  I personally reviewed xrays and reports.    Recent Labs   Lab 07/12/20  0749   WBC 5.6   HGB 12.2*   HCT 36.7*   MCV 94   *       Recent Labs   Lab 07/12/20  0749      POTASSIUM 3.8   CHLORIDE 111*   CO2 26   ANIONGAP 5   *   BUN 24   CR 1.38*   GFRESTIMATED 47*   GFRESTBLACK 54*   WILLIAM 8.9     Recent Labs   Lab 07/12/20  0955   COLOR Yellow   APPEARANCE Clear   URINEGLC Negative   URINEBILI Negative   URINEKETONE Negative   SG 1.011   UBLD Negative   URINEPH 7.5*   PROTEIN 10*   NITRITE Negative   LEUKEST Negative   RBCU 1   WBCU 0       Recent Results (from the past 24 hour(s))   CT Head w/o Contrast    Narrative    CT SCAN OF THE HEAD WITHOUT CONTRAST   7/12/2020 9:50 AM     HISTORY: 2 recent falls    TECHNIQUE:  Axial images of the head and coronal reformations without  IV contrast material. Radiation dose for this scan was reduced using  automated exposure control, adjustment of the mA and/or kV according  to patient size, or iterative reconstruction technique.    COMPARISON: MR scan dated 7/17/2019    FINDINGS:     Intracranial contents: There is diffuse parenchymal volume loss.   White matter changes are present in the cerebral hemispheres that are  consistent with small vessel ischemic disease in this age patient.  Areas of encephalomalacia are seen in the left middle cerebral artery  distribution. This corresponds to the areas of infarction seen on the  scan of 7/17/2019. There is no evidence of intracranial hemorrhage,  mass, acute infarct or anomaly. Small calcifications are again seen in  the subarachnoid space of both hemispheres. These were also present on  the scan of 7/17/2019. These small calcifications could be due to  small chronic vascular emboli.    Visualized orbits/sinuses/mastoids:  The visualized portions of the  sinuses and mastoids appear  normal.    Osseous structures/soft tissues:  No intracranial hemorrhage or skull  fractures.      Impression    IMPRESSION:   1. No intracranial bleed or skull fractures are identified.  2. Chronic areas of encephalomalacia in the left middle cerebral  artery distribution.  3. There is diffuse parenchymal volume loss.  White matter changes are  present in the cerebral hemispheres that are consistent with small  vessel ischemic disease in this age patient.      CELESTE GRIFFIN MD   CT Cervical Spine w/o Contrast    Narrative    CT CERVICAL SPINE WITHOUT CONTRAST   7/12/2020 9:50 AM     HISTORY: Neck pain, initial exam; recent fall, bruising     TECHNIQUE: Axial images of the cervical spine were obtained without  intravenous contrast. Multiplanar reformations were performed.   Radiation dose for this scan was reduced using automated exposure  control, adjustment of the mA and/or kV according to patient size, or  iterative reconstruction technique.    COMPARISON: None.    FINDINGS: There is no evidence of fracture. There is reversal of the  cervical lordosis.      Craniocervical junction: Normal.     C1-C2:  Normal.     C2-C3:  Mild annular disc bulge. Severe degenerative change in the  left facet joint.     C3-C4:  Severe degenerative changes in the facet joints. Central canal  and neural foramen are patent.     C4-C5:  Loss of disc space height. Severe degenerative change in the  facet joints. 3 mm of anterior subluxation of C4 on C5. Moderate right  foraminal stenosis.     C5-C6:  Loss of disc space height. Mild annular bulge. Central canal  and neural foramen are patent.      C6-C7:  Loss of disc space height. Mild annular disc bulge. Central  canal normal. Moderate left foraminal stenosis.      C7-T1:   Mild annular disc bulge.      Impression    IMPRESSION:    1. No fractures are identified.  2. Multilevel degenerative changes.    CELESTE GRIFFIN MD   Lumbar spine CT w/o contrast    Narrative    CT LUMBAR SPINE WITHOUT  CONTRAST  7/12/2020 9:51 AM     HISTORY: fall, low back pain     TECHNIQUE: Axial images of the lumbar spine were obtained without  intravenous contrast. Multiplanar reformations were performed.   Radiation dose for this scan was reduced using automated exposure  control, adjustment of the mA and/or kV according to patient size, or  iterative reconstruction technique.    COMPARISON: None.    FINDINGS: Curvature of the lumbar spine convex towards the left. There  are compression fractures of the superior and inferior endplates of L1  in the inferior endplate of L2. These fractures have a chronic  appearance. I do not identify any definite acute appearing fracture  lines.   The paraspinous soft tissues appear normal.    T12-L1:  Mild annular disc bulge. Mild degenerative change in the  facet joints.     L1-L2:  Vacuum disc phenomenon. Mild annular disc bulge. Central canal  normal. Neural foramen appear patent.    L2-L3:  Mild annular disc bulge. Degenerative change in the facet  joints. These degenerative changes are leading to moderate central  spinal stenosis. The neural foramen are patent.     L3-L4:  Loss of disc space height. Mild annular disc bulge. Moderate  central stenosis. Previous right laminectomy at this level. Vacuum  disc phenomenon. Mild bilateral foraminal stenosis.     L4-L5:  Loss of disc space height. Mild annular disc bulge.  Degenerative change in the facet joints. Moderate central spinal  stenosis. Mild right and moderate left foraminal stenosis.     L5-S1:  Central canal and neural foramen are patent.    Mild degenerative change in the sacroiliac joints. No definite sacral  fractures identified.        Impression    IMPRESSION:    1. Compression fractures of L1 and L2. These have a chronic  appearance. I do not identify any acute appearing fracture lines.  2. Multilevel degenerative changes with moderate central spinal  stenosis at L2-3, L3-4, and L4-5.    CELESTE GRIFFIN MD   XR Thoracic Spine 2  Views    Narrative    THORACIC SPINE TWO VIEWS 7/12/2020 11:22 AM     HISTORY: fall, back pain    COMPARISON:  film from a CT scan dated 7/9/2019    FINDINGS: There is a compression fracture of T12. There is also mild  compression of superior endplate of L1. These fractures were present  on the  film of the CT of the chest dated 7/9/2019. No acute  appearing fractures are identified..  There are prominent anterior  osteophytes throughout the mid and lower thoracic spine and there is  ossification of the anterior longitudinal ligament. This pattern is  compatible with diffuse idiopathic skeletal hyperostosis. Gallstones  are seen in the right upper quadrant of the abdomen.      Impression    IMPRESSION:   1. Chronic compression fractures of T12 and the superior endplate of  L1. These fractures were present on a  film from a chest CT dated  7/9/2019.  2. No acute appearing fractures are identified.  3. Findings are consistent with diffuse idiopathic skeletal  hyperostosis.  4. Calcified gallstones.

## 2020-07-12 NOTE — PROGRESS NOTES
RECEIVING UNIT ED HANDOFF REVIEW    ED Nurse Handoff Report was reviewed by: Shira Renee RN on July 12, 2020 at 12:10 PM

## 2020-07-12 NOTE — PHARMACY-ADMISSION MEDICATION HISTORY
Pharmacy Medication History  Admission medication history interview status for the 7/12/2020  admission is complete. See EPIC admission navigator for prior to admission medications     Medication history sources: Patient's family/friend (Wife Sveta) and Sures[employer]s  Medication history source reliability: Moderate  Adherence assessment: Moderate    Significant changes made to the medication list:  Added- pantoprazole  Removed- aspirin, hydroxyzine, senna    Medication reconciliation completed by provider prior to medication history? No    Time spent in this activity: 15 min      Prior to Admission medications    Medication Sig Last Dose Taking? Auth Provider   amLODIPine (NORVASC) 5 MG tablet Take 5 mg by mouth daily 7/11/2020 at AM Yes Unknown, Entered By History   apixaban ANTICOAGULANT (ELIQUIS) 5 MG tablet Take 1 tablet (5 mg) by mouth 2 times daily 7/11/2020 at PM Yes Thania Horner PA-C   brinzolamide-brimonidine (SIMBRINZA) 1-0.2 % ophthalmic suspension Place 1 drop into both eyes 2 times daily  7/12/2020 at AM Yes Reported, Patient   Calcium Carbonate-Vitamin D (CALCIUM 600+D PO) Take 1 tablet by mouth 2 times daily At noon and supper 7/11/2020 at PM Yes Reported, Patient   citalopram (CELEXA) 40 MG tablet Take 40 mg by mouth daily 7/11/2020 at AM Yes Reported, Patient   docusate sodium (COLACE) 100 MG tablet Take 100 mg by mouth At Bedtime  7/11/2020 at HS Yes Reported, Patient   glucosamine-chondroitin 500-400 MG CAPS per capsule Take 1 capsule by mouth 2 times daily At noon and supper 7/11/2020 at PM Yes Unknown, Entered By History   lovastatin (MEVACOR) 40 MG tablet Take 40 mg by mouth At Bedtime 7/11/2020 at HS Yes Unknown, Entered By History   metoprolol succinate ER (TOPROL-XL) 25 MG 24 hr tablet Take 1 tablet (25 mg) by mouth daily 7/11/2020 at PM Yes Maxi Meza MD   Multiple Vitamins-Minerals (CENTRUM SILVER ADULT 50+ PO) Take 1 tablet by mouth daily  7/11/2020 at 1200 Yes  Reported, Patient   Multiple Vitamins-Minerals (PRESERVISION AREDS PO) Take by mouth 2 times daily 7/11/2020 at PM Yes Reported, Patient   pantoprazole (PROTONIX) 40 MG EC tablet Take 40 mg by mouth daily 7/11/2020 at AM Yes Unknown, Entered By History   vitamin B-12 (CYANOCOBALAMIN) 1000 MCG tablet Take 1,000 mcg by mouth daily  7/11/2020 at AM Yes Reported, Patient   acetaminophen (TYLENOL) 325 MG tablet Take 2 tablets (650 mg) by mouth every 4 hours as needed for mild pain or fever (> 101 F) prn at Janet Smallwood MD

## 2020-07-12 NOTE — ED NOTES
"[facility]  ED Nurse Handoff Report    ED Chief complaint: Fall (fell thursday and was unable to get out of bed  wife called 911 pt lives at home has pain mid back with movement states no pain at rest)      ED Diagnosis:   Final diagnoses:   Falls frequently   Compression fracture of L1 vertebra, initial encounter (H)       Code Status: to be addressed by admitting MD    Allergies:   Allergies   Allergen Reactions     Contrast Dye Rash       Patient Story: Pt has had fall on Thursday and then missed the bed yesterday and fell again. X-ray of spine shows l1 compression fx. Pt reports only pain with movement  Focused Assessment:  Resp- no SOB resp even and reg  Cardiac-bradycardic  Neuro- hx of stroke and has expressive aphasia  msk- pain in lower back with mvmt    Treatments and/or interventions provided: tylenol and dilaudid  Patient's response to treatments and/or interventions:     To be done/followed up on inpatient unit:  admission orders    Does this patient have any cognitive concerns?: hx of stroke    Activity level - Baseline/Home:  pt is supposed to use walker or cane but does not   Activity Level - Current:   Unknown    Patient's Preferred language: English   Needed?: No    Isolation: None  Infection: Not Applicable  Bariatric?: No    Vital Signs:   Vitals:    07/12/20 0734   BP: (!) 153/84   Pulse: 57   Resp: 17   Temp: 98.6  F (37  C)   TempSrc: Oral   SpO2: 98%   Weight: 76.2 kg (168 lb)   Height: 1.778 m (5' 10\")       Cardiac Rhythm:     Was the PSS-3 completed:   Yes  What interventions are required if any?               Family Comments: Norma- wife at bedside  OBS brochure/video discussed/provided to patient/family: N/A              Name of person given brochure if not patient:               Relationship to patient:     For the majority of the shift this patient's behavior was Green.   Behavioral interventions performed were .    ED NURSE PHONE NUMBER: *[id number]       "

## 2020-07-12 NOTE — ED NOTES
Bed: ED20  Expected date: 7/12/20  Expected time: 7:08 AM  Means of arrival: Ambulance  Comments:  512 84m fall, back pain ETA 0717

## 2020-07-12 NOTE — PLAN OF CARE
[facility]      OUTPATIENT PHYSICAL THERAPY EVALUATION  PLAN OF TREATMENT FOR OUTPATIENT REHABILITATION  (COMPLETE FOR INITIAL CLAIMS ONLY)  Patient's Last Name, First Name, M.I.  YOB: 1935  Tk Richmond                        Provider's Name  [facility] Medical Record No.  [id number]                               Onset Date:  07/12/20   Start of Care Date:  07/12/20      Type:     _X_PT   ___OT   ___SLP Medical Diagnosis:                           PT Diagnosis:  impaired functional mobility   Visits from SOC:  1   _________________________________________________________________________________  Plan of Treatment/Functional Goals    Planned Interventions:  ,    balance training, gait training, neuromuscular re-education, strengthening, transfer training, home program guidelines, progressive activity/exercise,       Goals: See Physical Therapy Goals on Care Plan in [place] [facility] electronic health record.    Therapy Frequency: 3x/week(while on OBS)  Predicted Duration of Therapy Intervention: 3  _________________________________________________________________________________    I CERTIFY THE NEED FOR THESE SERVICES FURNISHED UNDER        THIS PLAN OF TREATMENT AND WHILE UNDER MY CARE     (Physician co-signature of this document indicates review and certification of the therapy plan).                Certification date from: 07/12/20, Certification date to: 07/15/20    Referring Physician: Collins Ogden DO            Initial Assessment        See Physical Therapy evaluation dated 07/12/20 in Epic electronic health record.

## 2020-07-13 VITALS
TEMPERATURE: 96.2 F | WEIGHT: 168 LBS | OXYGEN SATURATION: 95 % | DIASTOLIC BLOOD PRESSURE: 60 MMHG | RESPIRATION RATE: 18 BRPM | HEIGHT: 70 IN | BODY MASS INDEX: 24.05 KG/M2 | HEART RATE: 53 BPM | SYSTOLIC BLOOD PRESSURE: 136 MMHG

## 2020-07-13 LAB
ANION GAP SERPL CALCULATED.3IONS-SCNC: 8 MMOL/L (ref 3–14)
BUN SERPL-MCNC: 29 MG/DL (ref 7–30)
CALCIUM SERPL-MCNC: 8.7 MG/DL (ref 8.5–10.1)
CHLORIDE SERPL-SCNC: 109 MMOL/L (ref 94–109)
CO2 SERPL-SCNC: 22 MMOL/L (ref 20–32)
CREAT SERPL-MCNC: 1.27 MG/DL (ref 0.66–1.25)
GFR SERPL CREATININE-BSD FRML MDRD: 52 ML/MIN/{1.73_M2}
GLUCOSE SERPL-MCNC: 104 MG/DL (ref 70–99)
POTASSIUM SERPL-SCNC: 3.8 MMOL/L (ref 3.4–5.3)
SODIUM SERPL-SCNC: 139 MMOL/L (ref 133–144)

## 2020-07-13 PROCEDURE — G0378 HOSPITAL OBSERVATION PER HR: HCPCS

## 2020-07-13 PROCEDURE — 80048 BASIC METABOLIC PNL TOTAL CA: CPT | Performed by: INTERNAL MEDICINE

## 2020-07-13 PROCEDURE — 36415 COLL VENOUS BLD VENIPUNCTURE: CPT | Performed by: INTERNAL MEDICINE

## 2020-07-13 PROCEDURE — 99217 ZZC OBSERVATION CARE DISCHARGE: CPT | Performed by: PHYSICIAN ASSISTANT

## 2020-07-13 PROCEDURE — 25000132 ZZH RX MED GY IP 250 OP 250 PS 637: Mod: GY | Performed by: INTERNAL MEDICINE

## 2020-07-13 RX ORDER — OXYCODONE HYDROCHLORIDE 5 MG/1
2.5 TABLET ORAL EVERY 6 HOURS PRN
Qty: 15 TABLET | Refills: 0 | Status: SHIPPED | OUTPATIENT
Start: 2020-07-13 | End: 2021-05-24

## 2020-07-13 RX ORDER — ACETAMINOPHEN 500 MG
1000 TABLET ORAL 3 TIMES DAILY
Status: ON HOLD | DISCHARGE
Start: 2020-07-13 | End: 2022-01-01

## 2020-07-13 RX ORDER — AMOXICILLIN 250 MG
1-2 CAPSULE ORAL 2 TIMES DAILY PRN
DISCHARGE
Start: 2020-07-13 | End: 2022-01-01

## 2020-07-13 RX ORDER — LIDOCAINE 4 G/G
1 PATCH TOPICAL EVERY 24 HOURS
Status: ON HOLD | DISCHARGE
Start: 2020-07-13 | End: 2022-01-01

## 2020-07-13 RX ORDER — POLYETHYLENE GLYCOL 3350 17 G/17G
17 POWDER, FOR SOLUTION ORAL DAILY PRN
DISCHARGE
Start: 2020-07-13 | End: 2021-05-24

## 2020-07-13 RX ADMIN — ACETAMINOPHEN 1000 MG: 500 TABLET, FILM COATED ORAL at 09:24

## 2020-07-13 RX ADMIN — AMLODIPINE BESYLATE 5 MG: 5 TABLET ORAL at 09:24

## 2020-07-13 RX ADMIN — CITALOPRAM HYDROBROMIDE 40 MG: 20 TABLET ORAL at 09:24

## 2020-07-13 RX ADMIN — APIXABAN 5 MG: 5 TABLET, FILM COATED ORAL at 09:24

## 2020-07-13 RX ADMIN — BRINZOLAMIDE/BRIMONIDINE TARTRATE 1 DROP: 10; 2 SUSPENSION/ DROPS OPHTHALMIC at 09:23

## 2020-07-13 RX ADMIN — PANTOPRAZOLE SODIUM 40 MG: 40 TABLET, DELAYED RELEASE ORAL at 06:47

## 2020-07-13 RX ADMIN — MENTHOL 1 PATCH: 205.5 PATCH TOPICAL at 09:43

## 2020-07-13 NOTE — DISCHARGE SUMMARY
[facility]  Hospitalist Discharge Summary      Date of Admission:  7/12/2020  Date of Discharge:  7/13/2020  Discharging Provider: Pham Olea PA-C    Discharge Diagnoses   Recurrent, mechanical falls in setting of prior CVA with right-sided hemiparesis with acute mid to low back pain  Chronic compression fractures of T12, L1, L2    Follow-ups Needed After Discharge   Follow-up Appointments     Follow Up and recommended labs and tests      Follow up with TCU provider.  No follow up labs or test are needed.           Unresulted Labs Ordered in the Past 30 Days of this Admission     No orders found for last 31 day(s).      These results will be followed up by PCP    Discharge Disposition   Discharged to TCU  Condition at discharge: Stable  Patient ready to discharge to a skilled nursing facility as soon as possible in order to create capacity for patients related to the COVID-19 pandemic.    Hospital Course   Tk Richmond is a 84 year old male with prior CVA and chronic right sided deficits who presented to the [facility] ER with back pain and inability to transfer  He has had two falls in the past week (last on 7/9/2020) and it has been harder for him to get up even with his wife assisting.  He also has a history of additional, recurrent falls over the past 6 months.  No recent infectious complaints and describes the falls as unsteadiness/mechanical without syncope. Refer to H&P by Dr. Ogden for complete details on presentation.      Recurrent, mechanical falls in setting of prior CVA with right-sided hemiparesis with acute mid to low back pain  Chronic compression fractures of T12, L1, L2: Refer to admission H&P for complete details on presentation. CT head negative for acute pathology, does show chronic areas of encephalomalacia in the left middle cerebral artery distribution. CT cervical spine negative.   - Analgesic regimen with scheduled tylenol, PRN oxycodone, ice/heat,  lidoderm patch   - PT assessed, recommended home with A1 and HHPT vs TCU if wife could not manage pt at home. 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 Baseline deficits include right-sided hemiparesis, dysarthria, aphasia, and cognitive deficits.   - Continue Eliquis as below      Paroxymal atrial fibrillation  Chronic anticoagulation use: Continue PTA Eliquis and Toprol XL at discharge      HTN: Continue Norvasc 5 mg po every day and Toprol XL 25 mg po every day at discharge      CKD III: Baseline creatinine 1.1-1.2. Stable      Depression  Anxiety: Continue PTA Celexa 40 mg po qd     Glaucoma: Continue PTA eye gtts    GERD: Continue PTA Protonix      COVID:  Asymptomatic hospital admission testing negative 7/12/2020     Family conversation: Updated wife at bedside.     Consultations This Hospital Stay   PHYSICAL THERAPY ADULT IP CONSULT  OCCUPATIONAL THERAPY ADULT IP CONSULT  SOCIAL WORK IP CONSULT  PHYSICAL THERAPY ADULT IP CONSULT  OCCUPATIONAL THERAPY ADULT IP CONSULT    Code Status   No CPR- Do NOT Intubate    Time Spent on this Encounter   I, Pham Olea PA-C, personally saw the patient today and spent less than or equal to 30 minutes discharging this patient.     Pham Olea PA-C  [facility]  ______________________________________________________________________    Physical Exam   Vital Signs: Temp: 96.2  F (35.7  C) Temp src: Oral BP: 136/60 Pulse: 53 Heart Rate: 54 Resp: 18 SpO2: 95 % O2 Device: None (Room air)    Weight: 168 lbs 0 oz    CONSTITUTIONAL: Pt laying in bed, dressed in hospital garb. Appears comfortable. Cooperative with interview. Accompanied by wife at bedside.   HEENT: Normocephalic, atraumatic. Pupils equal, round, and reactive to light. EOMI, bilat. Negative  "for conjunctival redness or scleral icterus.  Oral mucosa pink and moist; negative for ulcerations, erythema, or exudates.  Dentition in good repair.   CARDIOVASCULAR: RRR, slight murmur heard at aortic region. No rubs or extra heart sounds appreciated. Pulses +2/4 and regular in upper and lower extremities, bilaterally.   RESPIRATORY: No increased work of breathing.  CTA, bilat; no wheezes, rales, or rhonchi appreciated.  GASTROINTESTINAL:  Abdomen soft, non-distended. BS auscultated in all four quadrants. Negative for tenderness to percussion or light and deep palpation.  No masses or organomegaly noted.  MUSCULOSKELETAL: Baseline right-sided hemiparesis. No gross deformities noted. Normal muscle tone.   HEMATOLOGIC/LYMPHATIC/IMMUNOLOGIC: Negative for lower extremity edema, bilaterally.  NEUROLOGIC: Alert and oriented, answers best to \"yes\" or \"no\" questions given baseline dysarthria and aphasia. Baseline right-sided hemiparesis. No reported sensory deficits.   SKIN: Warm, dry, intact. No jaundice noted. Negative for suspicious lesions, rashes, bruising, open sores or abrasions.        Primary Care Physician   Shyam Mclean    Discharge Orders      General info for SNF    Length of Stay Estimate: Short Term Care: Estimated # of Days <30  Condition at Discharge: Stable  Level of care:skilled   Rehabilitation Potential: Good  Admission H&P remains valid and up-to-date: Yes  Recent Chemotherapy: N/A  Use Nursing Home Standing Orders: Yes     Mantoux instructions    Give two-step Mantoux (PPD) Per Facility Policy Yes     Reason for your hospital stay    You were hospitalized for further evaluation of recurrent falls with subsequent back pain. You were discharged to a transitional care unit for ongoing rehabilitation.     Intake and output    Every shift     Daily weights    Call Provider for weight gain of more than 2 pounds per day or 5 pounds per week.     Additional Discharge Instructions    1) Pain control " with scheduled tylenol, PRN oxycodone, lidoderm patch, ice/heat   2) No additional medication changes   3) Ongoing PT/OT at TCU     Follow Up and recommended labs and tests    Follow up with TCU provider.  No follow up labs or test are needed.     Activity - Up with nursing assistance     Encourage PO fluids     Physical Therapy Adult Consult    Evaluate and treat as clinically indicated.    Reason:  Recurrent falls, physical deconditioning     Occupational Therapy Adult Consult    Evaluate and treat as clinically indicated.    Reason:  Recurrent falls, physical deconditioning     Fall precautions     Hip precautions     Advance Diet as Tolerated    Follow this diet upon discharge: Orders Placed This Encounter      Regular Diet Adult     Significant Results and Procedures   Most Recent 3 CBC's:  Recent Labs   Lab Test 07/12/20  0749 10/07/19  0704 10/04/19  1450   WBC 5.6 7.8 5.2   HGB 12.2* 12.2* 12.9*   MCV 94 95 94   * 179 179     Most Recent 3 BMP's:  Recent Labs   Lab Test 07/13/20  0853 07/12/20  0749 10/07/19  0704    142 142   POTASSIUM 3.8 3.8 3.9   CHLORIDE 109 111* 111*   CO2 22 26 25   BUN 29 24 26   CR 1.27* 1.38* 1.15   ANIONGAP 8 5 6   WILLIAM 8.7 8.9 8.5   * 102* 106*   ,   Results for orders placed or performed during the hospital encounter of 07/12/20   CT Cervical Spine w/o Contrast    Narrative    CT CERVICAL SPINE WITHOUT CONTRAST   7/12/2020 9:50 AM     HISTORY: Neck pain, initial exam; recent fall, bruising     TECHNIQUE: Axial images of the cervical spine were obtained without  intravenous contrast. Multiplanar reformations were performed.   Radiation dose for this scan was reduced using automated exposure  control, adjustment of the mA and/or kV according to patient size, or  iterative reconstruction technique.    COMPARISON: None.    FINDINGS: There is no evidence of fracture. There is reversal of the  cervical lordosis.      Craniocervical junction: Normal.     C1-C2:   Normal.     C2-C3:  Mild annular disc bulge. Severe degenerative change in the  left facet joint.     C3-C4:  Severe degenerative changes in the facet joints. Central canal  and neural foramen are patent.     C4-C5:  Loss of disc space height. Severe degenerative change in the  facet joints. 3 mm of anterior subluxation of C4 on C5. Moderate right  foraminal stenosis.     C5-C6:  Loss of disc space height. Mild annular bulge. Central canal  and neural foramen are patent.      C6-C7:  Loss of disc space height. Mild annular disc bulge. Central  canal normal. Moderate left foraminal stenosis.      C7-T1:   Mild annular disc bulge.      Impression    IMPRESSION:    1. No fractures are identified.  2. Multilevel degenerative changes.    CELESTE GRIFFIN MD   Lumbar spine CT w/o contrast    Narrative    CT LUMBAR SPINE WITHOUT CONTRAST  7/12/2020 9:51 AM     HISTORY: fall, low back pain     TECHNIQUE: Axial images of the lumbar spine were obtained without  intravenous contrast. Multiplanar reformations were performed.   Radiation dose for this scan was reduced using automated exposure  control, adjustment of the mA and/or kV according to patient size, or  iterative reconstruction technique.    COMPARISON: None.    FINDINGS: Curvature of the lumbar spine convex towards the left. There  are compression fractures of the superior and inferior endplates of L1  in the inferior endplate of L2. These fractures have a chronic  appearance. I do not identify any definite acute appearing fracture  lines.   The paraspinous soft tissues appear normal.    T12-L1:  Mild annular disc bulge. Mild degenerative change in the  facet joints.     L1-L2:  Vacuum disc phenomenon. Mild annular disc bulge. Central canal  normal. Neural foramen appear patent.    L2-L3:  Mild annular disc bulge. Degenerative change in the facet  joints. These degenerative changes are leading to moderate central  spinal stenosis. The neural foramen are patent.     L3-L4:   Loss of disc space height. Mild annular disc bulge. Moderate  central stenosis. Previous right laminectomy at this level. Vacuum  disc phenomenon. Mild bilateral foraminal stenosis.     L4-L5:  Loss of disc space height. Mild annular disc bulge.  Degenerative change in the facet joints. Moderate central spinal  stenosis. Mild right and moderate left foraminal stenosis.     L5-S1:  Central canal and neural foramen are patent.    Mild degenerative change in the sacroiliac joints. No definite sacral  fractures identified.        Impression    IMPRESSION:    1. Compression fractures of L1 and L2. These have a chronic  appearance. I do not identify any acute appearing fracture lines.  2. Multilevel degenerative changes with moderate central spinal  stenosis at L2-3, L3-4, and L4-5.    CELESTE GRIFFIN MD   CT Head w/o Contrast    Narrative    CT SCAN OF THE HEAD WITHOUT CONTRAST   7/12/2020 9:50 AM     HISTORY: 2 recent falls    TECHNIQUE:  Axial images of the head and coronal reformations without  IV contrast material. Radiation dose for this scan was reduced using  automated exposure control, adjustment of the mA and/or kV according  to patient size, or iterative reconstruction technique.    COMPARISON: MR scan dated 7/17/2019    FINDINGS:     Intracranial contents: There is diffuse parenchymal volume loss.   White matter changes are present in the cerebral hemispheres that are  consistent with small vessel ischemic disease in this age patient.  Areas of encephalomalacia are seen in the left middle cerebral artery  distribution. This corresponds to the areas of infarction seen on the  scan of 7/17/2019. There is no evidence of intracranial hemorrhage,  mass, acute infarct or anomaly. Small calcifications are again seen in  the subarachnoid space of both hemispheres. These were also present on  the scan of 7/17/2019. These small calcifications could be due to  small chronic vascular emboli.    Visualized  orbits/sinuses/mastoids:  The visualized portions of the  sinuses and mastoids appear normal.    Osseous structures/soft tissues:  No intracranial hemorrhage or skull  fractures.      Impression    IMPRESSION:   1. No intracranial bleed or skull fractures are identified.  2. Chronic areas of encephalomalacia in the left middle cerebral  artery distribution.  3. There is diffuse parenchymal volume loss.  White matter changes are  present in the cerebral hemispheres that are consistent with small  vessel ischemic disease in this age patient.      CELESTE GRIFFIN MD   XR Thoracic Spine 2 Views    Narrative    THORACIC SPINE TWO VIEWS 7/12/2020 11:22 AM     HISTORY: fall, back pain    COMPARISON:  film from a CT scan dated 7/9/2019    FINDINGS: There is a compression fracture of T12. There is also mild  compression of superior endplate of L1. These fractures were present  on the  film of the CT of the chest dated 7/9/2019. No acute  appearing fractures are identified..  There are prominent anterior  osteophytes throughout the mid and lower thoracic spine and there is  ossification of the anterior longitudinal ligament. This pattern is  compatible with diffuse idiopathic skeletal hyperostosis. Gallstones  are seen in the right upper quadrant of the abdomen.      Impression    IMPRESSION:   1. Chronic compression fractures of T12 and the superior endplate of  L1. These fractures were present on a  film from a chest CT dated  7/9/2019.  2. No acute appearing fractures are identified.  3. Findings are consistent with diffuse idiopathic skeletal  hyperostosis.  4. Calcified gallstones.    CELESTE GRIFFIN MD       Discharge Medications   Current Discharge Medication List      START taking these medications    Details   Lidocaine (LIDOCARE) 4 % Patch Place 1 patch onto the skin every 24 hours To prevent lidocaine toxicity, patient should be patch free for 12 hrs daily.  Qty:      Associated Diagnoses: Compression fracture  of L1 vertebra, initial encounter (H)      oxyCODONE (ROXICODONE) 5 MG tablet Take 0.5 tablets (2.5 mg) by mouth every 6 hours as needed  Qty: 15 tablet, Refills: 0    Associated Diagnoses: Compression fracture of L1 vertebra, initial encounter (H)      polyethylene glycol (MIRALAX) 17 g packet Take 17 g by mouth daily as needed for constipation  Qty:      Associated Diagnoses: Drug-induced constipation      senna-docusate (SENOKOT-S/PERICOLACE) 8.6-50 MG tablet Take 1-2 tablets by mouth 2 times daily as needed for constipation    Associated Diagnoses: Drug-induced constipation         CONTINUE these medications which have CHANGED    Details   acetaminophen (TYLENOL) 500 MG tablet Take 2 tablets (1,000 mg) by mouth 3 times daily  Qty:      Associated Diagnoses: Compression fracture of L1 vertebra, initial encounter (H)         CONTINUE these medications which have NOT CHANGED    Details   amLODIPine (NORVASC) 5 MG tablet Take 5 mg by mouth daily      apixaban ANTICOAGULANT (ELIQUIS) 5 MG tablet Take 1 tablet (5 mg) by mouth 2 times daily  Qty: 60 tablet, Refills: 3    Associated Diagnoses: Closed fracture of distal end of left radius, unspecified fracture morphology, initial encounter; H/O: CVA (cerebrovascular accident); Atrial fibrillation, unspecified type (H)      brinzolamide-brimonidine (SIMBRINZA) 1-0.2 % ophthalmic suspension Place 1 drop into both eyes 2 times daily       Calcium Carbonate-Vitamin D (CALCIUM 600+D PO) Take 1 tablet by mouth 2 times daily At noon and supper      citalopram (CELEXA) 40 MG tablet Take 40 mg by mouth daily      docusate sodium (COLACE) 100 MG tablet Take 100 mg by mouth At Bedtime       glucosamine-chondroitin 500-400 MG CAPS per capsule Take 1 capsule by mouth 2 times daily At noon and supper      lovastatin (MEVACOR) 40 MG tablet Take 40 mg by mouth At Bedtime      metoprolol succinate ER (TOPROL-XL) 25 MG 24 hr tablet Take 1 tablet (25 mg) by mouth daily  Qty: 30 tablet,  Refills: 0    Associated Diagnoses: Atrial fibrillation, unspecified type (H)      !! Multiple Vitamins-Minerals (CENTRUM SILVER ADULT 50+ PO) Take 1 tablet by mouth daily       !! Multiple Vitamins-Minerals (PRESERVISION AREDS PO) Take by mouth 2 times daily      pantoprazole (PROTONIX) 40 MG EC tablet Take 40 mg by mouth daily      vitamin B-12 (CYANOCOBALAMIN) 1000 MCG tablet Take 1,000 mcg by mouth daily        !! - Potential duplicate medications found. Please discuss with provider.        Allergies   Allergies   Allergen Reactions     Contrast Dye Rash

## 2020-07-13 NOTE — PLAN OF CARE
Discharge Planner OT   Patient plan for discharge: Wife unable to care for patient at home so discharge plan is TCU per chart  Current status: OT orders received, chart reviewed. PT has already evaluated pt and recommends TCU if wife unable to manage A of 1 for all mobility. Pt is observation status with plan to discharge to TCU in the next 1-2 days, therefore OT to defer to next level of care.  Barriers to return to prior living situation: Defer to PT  Recommendations for discharge: Defer to PT  Rationale for recommendations: Defer to PT       Entered by: Emily Bonilla 07/13/2020 8:25 AM

## 2020-07-13 NOTE — PLAN OF CARE
Pt is A&O- has expressive aphagia but follows commands. VSS ex ingrid and slightly hypertensive. Pain managed w/ scheduled tylenol. LS clear. Blanchable redness to coccyx, repos q2hrs. PIV SL. Up A1 GB walker. R sided weakness noted d/t hx of CVA, neuros intact/unchanged. Plan for discharge to TCU pending placement as wife cannot care for pt at home. Slept between cares.

## 2020-07-13 NOTE — PROGRESS NOTES
SW Discharge Note:    D/I:  SW received call from [facility] and they do not have any available beds at their facility.  SW spoke with Danya SHARPE and they would be able to accept patient at their facility.  SW spoke with patient and spouse and they are in agreement with discharge plan.  Spouse will provide transportation for patient and they will leave around 3:00.  Updated facility and they are in agreement.  Faxed orders, scripts and PAS.    PAS-RR    D: Per DHS regulation, SW completed and submitted PAS-RR to [place] [employer] via the [employer].  PAS-RR confirmation # is : [id number].    I: SW spoke with patient and spouse and they are aware a PAS-RR has been submitted.  SW reviewed with patient and spouse that they may be contacted for a follow up appointment within 10 days of hospital discharge if their SNF stay is < 30 days.  Contact information for [employer] was also provided.    A: Patient and spouse verbalized understanding.    P: Further questions may be directed to [employer] at #[phone], option #4 for PAS-RR staff.    AZALIA Matthew, LGSW  [phone]  [facility]

## 2020-07-14 NOTE — PROGRESS NOTES
Physical Therapy Discharge Summary    Reason for therapy discharge:    Discharged to transitional care facility.    Progress towards therapy goal(s). See goals on Care Plan in [facility] electronic health record for goal details.  Goals not met.  Barriers to achieving goals:   discharge from facility.    Therapy recommendation(s):    Continued therapy is recommended.  Rationale/Recommendations:  To progress towards prior function. See PT notes for details..

## 2020-07-16 ENCOUNTER — RECORDS - HEALTHEAST (OUTPATIENT)
Dept: LAB | Facility: CLINIC | Age: 85
End: 2020-07-16

## 2020-07-22 LAB
GAMMA INTERFERON BACKGROUND BLD IA-ACNC: 0.03 IU/ML
M TB IFN-G BLD-IMP: NEGATIVE
MITOGEN IGNF BCKGRD COR BLD-ACNC: 0 IU/ML
MITOGEN IGNF BCKGRD COR BLD-ACNC: 0 IU/ML
QTF INTERPRETATION: NORMAL
QTF MITOGEN - NIL: 7.37 IU/ML

## 2020-07-23 NOTE — PROGRESS NOTES
[facility]      OUTPATIENT PHYSICAL THERAPY  PLAN OF TREATMENT FOR OUTPATIENT REHABILITATION    Patient's Last Name, First Name, M.I.                YOB: 1935  Tk Richmond                        Provider's Name  [facility] Medical Record No.  [id number]                               Onset Date: 07/12/19   Start of Care Date: 08/01/19   Type:     _X_PT   ___OT   ___SLP Medical Diagnosis: L MCA CVA                       PT Diagnosis: decreased independence with gait and ADLs       _________________________________________________________________________________  Plan of Treatment:    ADDENDED TO INCLUDE DATES: 3/11/20-5/31/20 (pt seen 0x/week x 12 weeks d/t COVID pandemic)    ---  Extended POC (0x/week x 12 weeks d/t COVID pandemic) resume 1x/week x4 weeks through 6/22/2020 for continued progress towards goals as stated below    Frequency/Duration: (0x/week x 12 weeks d/t COVID pandemic) resume 1x/week x4 weeks      Goals:  Goal Identifier TUG (baseline: 18.4 sec no AD)(GOAL DISCONTINUED/MET 9/27/19)   Goal Description Tk will perform TUG in less tan 13.5 seconds without an AD to demonstrate decreased fall risk and improved gait abilities.     Target Date 10/30/19   Date Met  09/27/19(11/11/19: goal maintained;9/27/19:GOAL MET, 12.06 sec w/o AD)   Progress:     Goal Identifier Stairs (baseline: 3 JAY home without railing; has beend avoiding stairs to basement in home where there is a TV, [place]13 steps to dock with railing)   Goal Description Tk will be able to negotiate a flight of stairs in a reciprocal pattern with support of one rail with supervision in order to access his lower level of his home.     Target Date 06/22/20   Date Met  (6/1/20: 2 feet/step,UE support from railing, CTG for safety )   Progress:     Goal Identifier Household  mobility (baseline: using walker at all times 4ww in home)(GOAL DISCONTNUED/MET 19)   Goal Description Tk will be able to ambulate household distances without an assistive device in order to return to independent mobility within his home.     Target Date 10/30/19   Date Met  19(: GOAL MET, pt walks 2x12 min w/o AD in home w/o LOB)   Progress:     Goal Identifier Community mobility (baseline: using walker at all times, 2ww outside of home; 19: 814')(GOAL PROGRESSED to w/o AD ; GOAL PROGRESSED 19)   Goal Description Tk to complete 6MWT with additional 164' to acheive MCID for geriatrics and stroke without difficulty in order to access areas in his community safely with improved endurance (Healthy age/gender norms  1368').       Target Date 20   Date Met  (20: no change, 580')   Progress:     Goal Identifier PISANO/56 (6-8 pt increase considered MDC s/p CVA; 53/56 = WNL for age/gender matched norms)(NEW GOAL established 19)   Goal Description Pt will score >50/56 on the pisano balance assessment to indicate MDC of improved postural stability and low fall risk.    Target Date 20   Date Met  (20: decline, 43/56)   Progress:     Goal Identifier DGI (baseline: 16; </= 19 = inc risk for falls; MDC = 4 pts s/p CVA)(NEW GOAL  established 19)   Goal Description The pt will improve score on DGI from 16 to >/= 20/24 on the dynamic gait index to indicate low fall risk with community ambulation or use a cane consistently    Target Date 20   Date Met  (20: no significant change,  )   Progress:     Goal Identifier 30 sec sit <> stand (baseline: 5 1/2 reps, retropulsive) (goal established 19)   Goal Description Pt to achieve >/= 8 reps to demonstrate improved functional LE strength with transition from standard height chair.   Target Date 20   Date Met  (20: decline, 3 reps, requires UE support )   Progress:       Progress Toward  Goals:   See progress note dated 6/1/20 for details    Certification date from: 3/11/20- to 6/30/20.    Terri Gonzalez, PT          I CERTIFY THE NEED FOR THESE SERVICES FURNISHED UNDER        THIS PLAN OF TREATMENT AND WHILE UNDER MY CARE     (Physician co-signature of this document indicates review and certification of the therapy plan).                Referring Provider: Janet Bell

## 2020-07-23 NOTE — PROGRESS NOTES
06/01/20 1100   Signing Clinician's Name / Credentials   Signing clinician's name / credentials Terri Gonzalez PT, DPT   Session Number   Session Number PROGRESS NOTE: Episode of care 8/1/19-6/1/20. Pt has participated in 47 PT sessions throughout this EOC including evaluation (most recent reassessment 2/10/20) and today's reassessment. Focus of therapy has been to address decreased independence with gait and ADLs s/p L MCA CVA. Pt arrives for his first PT visit in 3 months with break in care d/t COVID pandemic. See below for details of reassessment and 47th visit.     Authorization status 6/10 Medicare   Progress Note/Recertification   Progress Note Completed Date 06/01/20   Recertification Due Date 04/06/20   Recertification Completed Date  06/01/20   Goal 1   Goal Identifier TUG (baseline: 18.4 sec no AD)  (GOAL DISCONTINUED/MET 9/27/19)   Goal Description Tk will perform TUG in less tan 13.5 seconds without an AD to demonstrate decreased fall risk and improved gait abilities.     Target Date 10/30/19   Date Met 09/27/19  (11/11/19: goal maintained;9/27/19:GOAL MET, 12.06 sec w/o AD)   Goal 2   Goal Identifier Stairs (baseline: 3 JAY home without railing; has beend avoiding stairs to basement in home where there is a TV, [place] to [place] with railing)   Goal Description Tk will be able to negotiate a flight of stairs in a reciprocal pattern with support of one rail with supervision in order to access his lower level of his home.     Target Date 06/22/20   Date Met   (6/1/20: 2 feet/step,UE support from railing, CTG for safety )   Goal 3   Goal Identifier Household mobility (baseline: using walker at all times 4ww in home)  (GOAL DISCONTNUED/MET 9/27/19)   Goal Description Tk will be able to ambulate household distances without an assistive device in order to return to independent mobility within his home.     Target Date 10/30/19   Date Met 09/27/19  (9/27: GOAL MET, pt  walks 2x12 min w/o AD in home w/o LOB)   Goal 4   Goal Identifier Community mobility (baseline: using walker at all times, 2ww outside of home; 19: 814')  (GOAL PROGRESSED to w/o AD ; GOAL PROGRESSED 19)   Goal Description Tk to complete 6MWT with additional 164' to acheive MCID for geriatrics and stroke without difficulty in order to access areas in his community safely with improved endurance (Healthy age/gender norms  1368').       Target Date 20   Date Met   (20: no change, 580')   Goal 5   Goal Identifier PISANO/56 (6-8 pt increase considered MDC s/p CVA; 53/56 = WNL for age/gender matched norms)  (NEW GOAL established 19)   Goal Description Pt will score >50/56 on the pisano balance assessment to indicate MDC of improved postural stability and low fall risk.    Target Date 20   Date Met   (20: decline, 43/56)   Goal 6   Goal Identifier DGI (baseline: ; </= 19 = inc risk for falls; MDC = 4 pts s/p CVA)  (NEW GOAL  established 19)   Goal Description The pt will improve score on DGI from 16 to >/= 20/24 on the dynamic gait index to indicate low fall risk with community ambulation or use a cane consistently    Target Date 20   Date Met   (20: no significant change,  )   Goal 7   Goal Identifier 30 sec sit <> stand (baseline: 5 1/2 reps, retropulsive)   (goal established 19)   Goal Description Pt to achieve >/= 8 reps to demonstrate improved functional LE strength with transition from standard height chair.   Target Date 20   Date Met   (20: decline, 3 reps, requires UE support )   Subjective Report   Subjective Report Pt arrives for first PT appointment in 12 weeks with break in care d/t COVID pandemic. Pt's spouse is waiting in car d/t family member restrictions in clinic. Attempts made to contact spouse during session over the phone but unable to reach. Pt reports he wants to resume therpay to progress towards goals as  "established in therapy. Hasn't been doing home exercises provided. No falls throughout break in care.    Objective Measure 1   Objective Measure 20' walk/Gait   Details 7.47 sec, 15 steps (2/10/20: 6 sec, 11-13 steps, no AD, decreased R LE step length, decreased bilat stride length, decreased toe push-off and heel strike at inital contact with maintained foot clearance and stability)   Objective Measure 2   Objective Measure 30 sec sit <> stand   Details 3 reps from 18\" wide KALANI, R UE support from armrest needed, fatigues/inc RR, no instability (2/10/20: 3 reps without UE support, difficulty anterior wt shifting, wide KALANI; 11 reps from 20\" height)   Objective Measure 3   Objective Measure Stairs   Details 2 feet to a step, reciprocal stepping occasionally, 1 UE support from railing, therapist CTG-SBA for safety (2/10/20: 2 feet to a step, reciprocal stepping occasionally, 1 UE support from railing, therapist   Objective Measure 4   Objective Measure 6MWT   Details  580', inc RR, no instability, R LE maintains ER, R UE held stiffly at his side, fatigues after 3 minutes with shorter step length, maintains foor clearance no instability, no Ad (2/10/20: 580' no AD, no instability, first 3 minutes 320', with fatigue decreased step length over 20' from 12 to 16 steps, maintains stability)    Objective Measure 5   Objective Measure DGI   Details 17/24 (2/10/20: 18/24)    Objective Measure 6   Objective Measure PISANO   Details  43/56 from (1/2/20: 48/56)    Vitals Signs   Heart Rate 54   SpO2 96   Blood Pressure 142/68   Vital Signs Comments following 6MWT, L UE, automatic cuff   Treatment Interventions   Interventions Physical Performance Test/Measures;Therapeutic Procedure/Exercise   Therapeutic Procedure/exercise   Therapeutic Procedures: strength, endurance, ROM, flexibillity minutes (71634) 30   Skilled Intervention instructions on asssessment, feedback on response relative to baseline and goals   Patient Response (see " "objectives)   Treatment Detail Reassessment: Walking endurance/6MWT. Functional LE strength/30 sec sit <> stand. // 3) 10 minute YouTube video gudiance  for seated/standing dynamic stepping/reaching exercise \"Cardio & Core exercises for Seniors. Best exercise for seniors. Great senior exercise routine.\" - assist with set-up and modifications prn with self assisted L UE support for arm movements written instructions provided to access video at home for inc compliance with guided HEP   Progress 6MWT: NO CHANGE: 580', inc RR, no instability, R LE maintains ER, R UE held stiffly at his side, fatigues after 3 minutes with shorter step length, maintains foor clearance no instability, no Ad (2/10/20: 580' no AD, no instability, first 3 minutes 320', with fatigue decreased step length over 20' from 12 to 16 steps, maintains stability) CONTINUE GOAL AS STATED  // 30 sec sit <> stand: DECLINE. 3 reps from 18\" wide KALANI, R UE support from armrest needed, fatigues/inc RR, no instability (2/10/20: 3 reps without UE support, difficulty anterior wt shifting, wide KALANI; 11 reps from 20\" height) CONTINUE GOAL AS STATED.    Physical Performance Test/measures   Physical Performance Test/Measurement, Minutes (29375) 20   Skilled Intervention instructions and feedback on reassessments of postural and gait stability   Patient Response (see objectives and additional documentation for details)   Treatment Detail Reassessments: DGI, PISNAO, gait speed/Stairs   Progress PISANO  DECLINE: 43/56 from (1/2/20: 48/56) CONTINUE GOAL AS STATED // DGI: NO CHANGE 17/24 (2/10/20: 18/24) (1 pt difference) CONTINUE GOAL AS STATED // 20' walk/Gait: DECLINE, 7.47 sec, 15 steps (2/10/20: 6 sec, 11-13 steps, no AD, decreased R LE step length, decreased bilat stride length, decreased toe push-off and heel strike at inital contact with maintained foot clearance and stability) // Stairs: NO CHANGE.  2 feet to a step, reciprocal stepping occasionally, 1 UE support " "from alfredo, therapist JUMANA-SBA for safety (2/10/20: 2 feet to a step, reciprocal stepping occasionally, 1 UE support from alfredo, therapist CTG-SBA for safety - CONTINUE GOAL AS STATED   Education   Learner Patient;Significant Other   Readiness Acceptance   Method Booklet/handout;Explanation;Demonstration   Response Verbalizes Understanding;Demonstrates Understanding   Education Comments modification to HEP with added video guided exercise towards improved motivation/compliance with daily HEP; Recommendations on modified HEP: Written handout for communication with pt's spouse   Plan   Home program HEP checklist in 2 hour blocks: 1) sit <> stand 5-10 reps 2x/day 2) daily walks in or outside of home with spouse x6 min 3x/day 3) 10 minute YouTube video gudiance  for seated/standing dynamic stepping/reaching exercise \"Cardio & Core exercises for Seniors. Best exercise for seniors. Great senior exercise routine.\" - assist with set-up from pt's spouse    Updates to plan of care Extended POC (0x/week x 12 weeks d/t COVID pandemic) resume 1x/week x4 weeks through 6/22/2020 for continued progress towards goals as stated above   Plan for next session follow-up on pt's participation in new youtube video exercise with modifications prn.    Comments   Comments KX modifier needed- patient continues to have deficits in LE coordination, strength, balance, and gait and will benefit from continued skilled therapy services to increase safety with all functional mobility in the home and community environments.   Total Session Time   Timed Code Treatment Minutes 50   Total Treatment Time (sum of timed and untimed services) 50   AMBULATORY CLINICS ONLY-MEDICAL AND TREATMENT DIAGNOSIS   Medical Diagnosis L MCA CVA   PT Diagnosis decreased independence with gait and ADLs      "

## 2020-09-01 NOTE — PROGRESS NOTES
Occupational Therapy Discharge Summary     Reason for therapy discharge:    Discharged to transitional care facility.     Progress towards therapy goal(s). See goals on Care Plan in [facility] electronic health record for goal details.  Goals not met.  Barriers to achieving goals:   discharge from facility.     Therapy recommendation(s):    Continued therapy is recommended.  Rationale/Recommendations:  To progress towards prior function. See OT note on 6/10/20 for details..    Henrietta Lora. OTR/L

## 2020-12-11 NOTE — PROGRESS NOTES
Outpatient Speech Language Pathology Discharge Note     Patient: Tk Richmond  : 1935    Beginning/End Dates of Reporting Period:  2020 to 2020    Referring Provider: Dr. Janet Bell  Primary MD = Dr. Shyam Mclean    Therapy Diagnosis: Aphasia, Apraxia of Speech    Client Self Report: Tk Richmond is a 85year old y.o.male s/p L MCA CVA in 2019.  Please refer to the initial outpatient speech-language pathology evaluation dated 19 for further background information.  Patient has been seen for 48 visits since the start of care addressing his aphasia and apraxia of speech.  Patient was seen consistently through 2020, then had a break in therapy due to COVID-19 pandemic.  He returned to therapy in 2020 and July but then was hospitalized in July and has been receiving homecare since last therapy appointment at this location in 2020.      Objective Measurements: Goals will discontinue as patient transitioned to homecare therapy.  Progress is based on last visit of 2020.  Goals:  Goal Identifier LTG   Goal Description Patient will improve his functional expressive and receptive language to communicate in daily living and emergency situations with at least 90% and use of strategies prn.    Target Date 20   Date Met      Progress: Goal discontinues. Pt less than 90% at time of discharge.      Goal Identifier STG 1: Language/Auditory Comprehension   Goal Description Patient will follow 2 step directions and listen to short paragraphs and answer questions with 90% and minimal cues for improved comprehension needed for daily living communication needs.   Target Date 20   Date Met      Progress: Goal discontinues.  Progress Follow commands = 5/15 (reduced from 6/15).  Discourse comprehension= 5/8.     Goal Identifier STG 2: Language/Verbal Expression   Goal Description Patient will complete moderate level verbal expression tasks including naming, describing  and sentence completion with use of trained word-retrieval strategies to improve verbal expression needed for daily living communication needs with 90% and minimal cues.   Target Date 07/26/20   Date Met      Progress: Goal discontinues. Patient is averaging 80% with cues for verbal expression language skills.      Goal Identifier STG 3: Language/Reading comprehension   Goal Description Patient will complete practical reading comprehension tasks including reading menus, signs, labels, to improve reading comprehension needed for daily living and  safety needs with 90 % accuracy and minimal cues.   Target Date 07/26/20   Date Met      Progress: Goal discontinues.  Patient is able to read basic sentences and practical information with cues.      Goal Identifier STG 4: Speech-intellgibility   Goal Description Patient will implement trained speech production/intelligibility strategies to improve speech intelligibility to at least 90% at the sentence and conversational level.   Target Date 07/26/20   Date Met      Progress: Goal discontinues. YOVANI of functional sentences after SLP model = 100%.  Speech is more fluent and clear when implementing YOVANI.  Without use of YOVANI, speech is reduced 80% of the time.      Progress Toward Goals:    Progress limited due to hospitalization and transition to home care.    Plan:  Discharge from therapy.    Discharge:    Reason for Discharge: Change in medical status.    Discharge Plan: Recommend patient return for outpatient speech-language therapy when he is able.    Thank you for the referral of this patient.  If you have any questions regarding the information in this report, please feel free to contact me at [phone].     Mili Lockhart MA, CCC-SLP  Speech-Language Pathologist  [address] [address]  Fax:  [phone]

## 2021-01-01 ENCOUNTER — INFUSION THERAPY VISIT (OUTPATIENT)
Dept: INFUSION THERAPY | Facility: CLINIC | Age: 86
End: 2021-01-01
Attending: NURSE PRACTITIONER
Payer: MEDICARE

## 2021-01-01 ENCOUNTER — MEDICAL CORRESPONDENCE (OUTPATIENT)
Dept: HEALTH INFORMATION MANAGEMENT | Facility: CLINIC | Age: 86
End: 2021-01-01
Payer: MEDICARE

## 2021-01-01 ENCOUNTER — TRANSFERRED RECORDS (OUTPATIENT)
Dept: HEALTH INFORMATION MANAGEMENT | Facility: CLINIC | Age: 86
End: 2021-01-01
Payer: MEDICARE

## 2021-01-01 ENCOUNTER — LAB (OUTPATIENT)
Dept: LAB | Facility: CLINIC | Age: 86
End: 2021-01-01
Payer: MEDICARE

## 2021-01-01 VITALS
HEART RATE: 65 BPM | RESPIRATION RATE: 16 BRPM | TEMPERATURE: 98.2 F | DIASTOLIC BLOOD PRESSURE: 74 MMHG | SYSTOLIC BLOOD PRESSURE: 146 MMHG | OXYGEN SATURATION: 97 %

## 2021-01-01 DIAGNOSIS — D50.9 ANEMIA, IRON DEFICIENCY: Primary | ICD-10-CM

## 2021-01-01 DIAGNOSIS — D50.0 IRON DEFICIENCY ANEMIA SECONDARY TO BLOOD LOSS (CHRONIC): ICD-10-CM

## 2021-01-01 LAB
ABO/RH(D): NORMAL
ANTIBODY SCREEN: NEGATIVE
BLD PROD TYP BPU: NORMAL
BLOOD COMPONENT TYPE: NORMAL
CODING SYSTEM: NORMAL
CROSSMATCH: NORMAL
ISSUE DATE AND TIME: NORMAL
SPECIMEN EXPIRATION DATE: NORMAL
UNIT ABO/RH: NORMAL
UNIT NUMBER: NORMAL
UNIT STATUS: NORMAL
UNIT TYPE ISBT: 1700

## 2021-01-01 PROCEDURE — P9016 RBC LEUKOCYTES REDUCED: HCPCS | Performed by: INTERNAL MEDICINE

## 2021-01-01 PROCEDURE — 86923 COMPATIBILITY TEST ELECTRIC: CPT | Performed by: INTERNAL MEDICINE

## 2021-01-01 PROCEDURE — 36430 TRANSFUSION BLD/BLD COMPNT: CPT

## 2021-01-01 PROCEDURE — 36415 COLL VENOUS BLD VENIPUNCTURE: CPT

## 2021-01-01 PROCEDURE — 86901 BLOOD TYPING SEROLOGIC RH(D): CPT

## 2021-01-01 RX ORDER — HEPARIN SODIUM (PORCINE) LOCK FLUSH IV SOLN 100 UNIT/ML 100 UNIT/ML
5 SOLUTION INTRAVENOUS
Status: CANCELLED | OUTPATIENT
Start: 2021-01-01

## 2021-01-01 ASSESSMENT — PAIN SCALES - GENERAL: PAINLEVEL: NO PAIN (0)

## 2021-04-30 ENCOUNTER — APPOINTMENT (OUTPATIENT)
Dept: CT IMAGING | Facility: CLINIC | Age: 86
End: 2021-04-30
Attending: EMERGENCY MEDICINE
Payer: MEDICARE

## 2021-04-30 ENCOUNTER — HOSPITAL ENCOUNTER (EMERGENCY)
Facility: CLINIC | Age: 86
Discharge: HOME | End: 2021-04-30
Attending: EMERGENCY MEDICINE | Admitting: EMERGENCY MEDICINE
Payer: MEDICARE

## 2021-04-30 VITALS
DIASTOLIC BLOOD PRESSURE: 90 MMHG | TEMPERATURE: 97.7 F | RESPIRATION RATE: 18 BRPM | SYSTOLIC BLOOD PRESSURE: 179 MMHG | OXYGEN SATURATION: 96 % | HEART RATE: 57 BPM

## 2021-04-30 DIAGNOSIS — K80.20 CALCULUS OF GALLBLADDER WITHOUT CHOLECYSTITIS WITHOUT OBSTRUCTION: ICD-10-CM

## 2021-04-30 DIAGNOSIS — R10.84 ABDOMINAL PAIN, GENERALIZED: ICD-10-CM

## 2021-04-30 DIAGNOSIS — K44.9 HIATAL HERNIA: ICD-10-CM

## 2021-04-30 DIAGNOSIS — N28.9 RENAL LESION: ICD-10-CM

## 2021-04-30 DIAGNOSIS — N28.9 RENAL INSUFFICIENCY: ICD-10-CM

## 2021-04-30 DIAGNOSIS — K76.9 LIVER LESION: ICD-10-CM

## 2021-04-30 LAB
ALBUMIN SERPL-MCNC: 3.3 G/DL (ref 3.4–5)
ALBUMIN UR-MCNC: NEGATIVE MG/DL
ALP SERPL-CCNC: 90 U/L (ref 40–150)
ALT SERPL W P-5'-P-CCNC: 19 U/L (ref 0–70)
ANION GAP SERPL CALCULATED.3IONS-SCNC: 4 MMOL/L (ref 3–14)
APPEARANCE UR: CLEAR
AST SERPL W P-5'-P-CCNC: 14 U/L (ref 0–45)
BASOPHILS # BLD AUTO: 0 10E9/L (ref 0–0.2)
BASOPHILS NFR BLD AUTO: 0.7 %
BILIRUB SERPL-MCNC: 0.5 MG/DL (ref 0.2–1.3)
BILIRUB UR QL STRIP: NEGATIVE
BUN SERPL-MCNC: 23 MG/DL (ref 7–30)
CALCIUM SERPL-MCNC: 8.8 MG/DL (ref 8.5–10.1)
CHLORIDE SERPL-SCNC: 111 MMOL/L (ref 94–109)
CO2 SERPL-SCNC: 26 MMOL/L (ref 20–32)
COLOR UR AUTO: ABNORMAL
CREAT SERPL-MCNC: 1.44 MG/DL (ref 0.66–1.25)
DIFFERENTIAL METHOD BLD: ABNORMAL
EOSINOPHIL # BLD AUTO: 0.2 10E9/L (ref 0–0.7)
EOSINOPHIL NFR BLD AUTO: 3.5 %
ERYTHROCYTE [DISTWIDTH] IN BLOOD BY AUTOMATED COUNT: 14.4 % (ref 10–15)
GFR SERPL CREATININE-BSD FRML MDRD: 44 ML/MIN/{1.73_M2}
GLUCOSE SERPL-MCNC: 92 MG/DL (ref 70–99)
GLUCOSE UR STRIP-MCNC: NEGATIVE MG/DL
HCT VFR BLD AUTO: 36.6 % (ref 40–53)
HGB BLD-MCNC: 12.1 G/DL (ref 13.3–17.7)
HGB UR QL STRIP: NEGATIVE
IMM GRANULOCYTES # BLD: 0 10E9/L (ref 0–0.4)
IMM GRANULOCYTES NFR BLD: 0.2 %
KETONES UR STRIP-MCNC: NEGATIVE MG/DL
LACTATE BLD-SCNC: 0.9 MMOL/L (ref 0.7–2)
LACTATE BLD-SCNC: 1.4 MMOL/L (ref 0.7–2)
LEUKOCYTE ESTERASE UR QL STRIP: NEGATIVE
LIPASE SERPL-CCNC: 280 U/L (ref 73–393)
LYMPHOCYTES # BLD AUTO: 1.3 10E9/L (ref 0.8–5.3)
LYMPHOCYTES NFR BLD AUTO: 23.5 %
MCH RBC QN AUTO: 30.4 PG (ref 26.5–33)
MCHC RBC AUTO-ENTMCNC: 33.1 G/DL (ref 31.5–36.5)
MCV RBC AUTO: 92 FL (ref 78–100)
MONOCYTES # BLD AUTO: 0.4 10E9/L (ref 0–1.3)
MONOCYTES NFR BLD AUTO: 7.6 %
NEUTROPHILS # BLD AUTO: 3.5 10E9/L (ref 1.6–8.3)
NEUTROPHILS NFR BLD AUTO: 64.5 %
NITRATE UR QL: NEGATIVE
NRBC # BLD AUTO: 0 10*3/UL
NRBC BLD AUTO-RTO: 0 /100
PH UR STRIP: 7.5 PH (ref 5–7)
PLATELET # BLD AUTO: 166 10E9/L (ref 150–450)
POTASSIUM SERPL-SCNC: 4 MMOL/L (ref 3.4–5.3)
PROT SERPL-MCNC: 6.9 G/DL (ref 6.8–8.8)
RBC # BLD AUTO: 3.98 10E12/L (ref 4.4–5.9)
RBC #/AREA URNS AUTO: <1 /HPF (ref 0–2)
SODIUM SERPL-SCNC: 141 MMOL/L (ref 133–144)
SOURCE: ABNORMAL
SP GR UR STRIP: 1.02 (ref 1–1.03)
TROPONIN I SERPL-MCNC: <0.015 UG/L (ref 0–0.04)
UROBILINOGEN UR STRIP-MCNC: NORMAL MG/DL (ref 0–2)
WBC # BLD AUTO: 5.4 10E9/L (ref 4–11)
WBC #/AREA URNS AUTO: <1 /HPF (ref 0–5)

## 2021-04-30 PROCEDURE — 84484 ASSAY OF TROPONIN QUANT: CPT | Performed by: EMERGENCY MEDICINE

## 2021-04-30 PROCEDURE — 83690 ASSAY OF LIPASE: CPT | Performed by: EMERGENCY MEDICINE

## 2021-04-30 PROCEDURE — 81001 URINALYSIS AUTO W/SCOPE: CPT | Performed by: EMERGENCY MEDICINE

## 2021-04-30 PROCEDURE — 85025 COMPLETE CBC W/AUTO DIFF WBC: CPT | Performed by: EMERGENCY MEDICINE

## 2021-04-30 PROCEDURE — 99285 EMERGENCY DEPT VISIT HI MDM: CPT | Mod: 25

## 2021-04-30 PROCEDURE — 250N000011 HC RX IP 250 OP 636: Performed by: EMERGENCY MEDICINE

## 2021-04-30 PROCEDURE — 96374 THER/PROPH/DIAG INJ IV PUSH: CPT | Mod: 59

## 2021-04-30 PROCEDURE — 80053 COMPREHEN METABOLIC PANEL: CPT | Performed by: EMERGENCY MEDICINE

## 2021-04-30 PROCEDURE — 74174 CTA ABD&PLVS W/CONTRAST: CPT

## 2021-04-30 PROCEDURE — 83605 ASSAY OF LACTIC ACID: CPT | Performed by: EMERGENCY MEDICINE

## 2021-04-30 PROCEDURE — 250N000009 HC RX 250: Performed by: EMERGENCY MEDICINE

## 2021-04-30 RX ORDER — IOPAMIDOL 755 MG/ML
500 INJECTION, SOLUTION INTRAVASCULAR ONCE
Status: COMPLETED | OUTPATIENT
Start: 2021-04-30 | End: 2021-04-30

## 2021-04-30 RX ORDER — DIPHENHYDRAMINE HYDROCHLORIDE 50 MG/ML
25 INJECTION INTRAMUSCULAR; INTRAVENOUS ONCE
Status: COMPLETED | OUTPATIENT
Start: 2021-04-30 | End: 2021-04-30

## 2021-04-30 RX ADMIN — SODIUM CHLORIDE 80 ML: 9 INJECTION, SOLUTION INTRAVENOUS at 10:17

## 2021-04-30 RX ADMIN — IOPAMIDOL 80 ML: 755 INJECTION, SOLUTION INTRAVENOUS at 10:17

## 2021-04-30 RX ADMIN — DIPHENHYDRAMINE HYDROCHLORIDE 25 MG: 50 INJECTION, SOLUTION INTRAMUSCULAR; INTRAVENOUS at 09:32

## 2021-04-30 ASSESSMENT — ENCOUNTER SYMPTOMS
CONSTIPATION: 0
NAUSEA: 0
BLOOD IN STOOL: 0
ABDOMINAL PAIN: 1
VOMITING: 0
DIARRHEA: 0
ABDOMINAL DISTENTION: 0
FEVER: 0

## 2021-04-30 NOTE — ED NOTES
Bed: ED12  Expected date: 4/30/21  Expected time: 8:36 AM  Means of arrival: Ambulance  Comments:  [place] [phone], abd pain

## 2021-04-30 NOTE — ED PROVIDER NOTES
History   Chief Complaint:  Abdominal pain     HPI   Tk Richmond is a 85 year old male with history of prior CVA, atrial fibrillation (on Eliquis), anemia, aortic regurgitation, who presents with abdominal pain. The patient reports having abdominal pain that began last night (4/29/2021) and has persisted to today. He notes his pain is generalized in nature across his abdomen, maximal to the periumbilical region.  He has never had this type of pain before. His last bowel movement was this morning which he notes was normal. Past surgical history notable for appendectomy. At baseline, he has difficulty forming sentences due to his past stroke and this is not changed. He also denies nausea, emesis, diarrhea, constipation, blood in stool, fever, and abdominal distention.    Review of Systems   Constitutional: Negative for fever.   Gastrointestinal: Positive for abdominal pain. Negative for abdominal distention, blood in stool, constipation, diarrhea, nausea and vomiting.   All other systems reviewed and are negative.    Allergies:  Contrast Dye    Medications:  Celexa  Eliquis  Lovastatin  Norvasc  Colace  Cosamin DS  Protonix  Preservision Areds    Past Medical History:    Acute embolic stroke  Aortic root dilatation  Aortic aneurysm  CVA  Hyperlipidemia  Hypertension  Anemia  Mild aortic regurgitation  Paroxysmal atrial fibrillation    Past Surgical History:    IR Carotid Cerebral angiogram  Open reduction internal fixation wrist  Cataract extraction bilateral  Appendectomy  Right knee arthroplasty  Skin cancer excision    Family History:    Cancer    Social History:  The patient presents by himself  The patient is a former smoker.    Physical Exam     Patient Vitals for the past 24 hrs:   BP Temp Temp src Pulse SpO2   04/30/21 1200 (!) 182/95 -- -- 55 96 %   04/30/21 1100 (!) 170/87 -- -- 59 98 %   04/30/21 1045 (!) 171/85 -- -- 56 98 %   04/30/21 1015 -- -- -- -- 96 %   04/30/21 1000 (!) 175/76 -- -- 57 95 %    04/30/21 0945 (!) 192/84 -- -- 60 96 %   04/30/21 0930 (!) 168/86 -- -- 56 98 %   04/30/21 0915 (!) 169/78 -- -- 58 98 %   04/30/21 0900 (!) 184/76 -- -- 59 97 %   04/30/21 0854 -- 97.7  F (36.5  C) Oral -- 98 %       Physical Exam  General:   Well-nourished   Speaking in full sentences  Eyes:   Conjunctiva without injection or scleral icterus  ENT:   Moist mucous membranes   Nares patent   Pinnae normal  Neck:   Full ROM   No stiffness appreciated  Resp:   Lungs CTAB   No crackles, wheezing or audible rubs   Good air movement  CV:    Normal rate, regular rhythm   S1 and S2 present   No murmur, gallop or rub  GI:   BS present   Abdomen soft without distention   Diffuse mild tenderness   No pulsatility noted   No palpable mass   No guarding or rebound tenderness  Skin:   Warm, dry, well perfused   No rashes or open wounds on exposed skin  MSK:   Moves all extremities   No focal deformities or swelling  Neuro:   Alert   Answers questions appropriately   Moves all extremities equally   Gait stable  Psych:   Normal affect, normal mood    Emergency Department Course   Imaging:    CTA Abdomen/Pelvis w/ contrast:  1. No definitive cause for patient's abdominal pain identified.   2. Large hiatal hernia, though unchanged from CT on July 9, 2019.   3. Multiple compression deformities, most notable at L1 and L2. These   were present on previous CT of the lumbar spine, though degree of   compression has progressed.   4. Cholelithiasis, without complicating features.   5. Hypodensities in the liver and kidneys, suggestive of cysts.   6. Sigmoid diverticulosis.  Result per radiology    Laboratory:    CBC: WBC: 5.4, HGB: 12.1 (L), PLT: 166  CMP: Chloride: 111 (H), Creatinine: 1.44 (H), GFR: 44 (L), Albumin: 3.3 (L), o/w WNL  UA: pH: 7.5 (H) o/w Negative  Lactic acid (Resulted 1129): 1.4  Lactic acid (Resulted 1151): 0.9  Lipase: 280  Troponin: <0.015    Emergency Department Course:    Reviewed:    I reviewed the patient's  nursing notes, vitals, past medical records, Care Everywhere.     Assessments:    0910: I performed an exam of the patient and obtained history, as documented above.    1225: I rechecked the patient and updated them on findings.    1311: I rechecked the patient and updated them on findings. They are amenable to discharge.     Interventions:  0932: Benadryl 25 mg IV (pre-treatment for CT)    Disposition:  The patient was discharged to home.       Impression & Plan   Medical Decision Making:  Tk Richmond is a very pleasant 85-year-old male with a complex PMH significant for previous CVA, atrial fibrillation on anticoagulation, presenting to the ER for evaluation of abdominal pain.  VS on presentation notable for elevated BP though otherwise are unremarkable.  Examination notable for a well-appearing male, with baseline dysarthria and right-sided weakness (unchanged from baseline for patient).  He does exhibit diffuse tenderness, maximal to the periumbilical region.  A broad differential was considered including though not limited to, bowel obstruction, perforation, biliary colic, cholecystitis, pancreatitis, acute hepatitis, intra-abdominal malignancy, AAA, acute intestinal ischemia, gastroenteritis, colitis, among others.  Given prior history of embolic stroke, rather acute onset of symptoms, I considered acute intestinal ischemia.  Work-up did include CTA of the abdomen and pelvis.  Fortunately, no evidence of acute intra-abdominal pathology is noted. Note is made of large hiatal hernia, which has been seen on previous imaging study from 2019, and is unchanged per radiology.  Vasculature appears unremarkable.  No evidence of bowel obstruction, perforation, or intra-abdominal mass.  Patient and wife were informed of the incidentally noted renal and hepatic lesions, for which I have recommended follow-up with PCP for ongoing surveillance.  We also discussed the incidentally noted findings of cholelithiasis.  This  may have been a component of patient's pain given its relative rapid onset, as well as resolution of pain during his ER course without need for medication intervention.  There are no findings on imaging or on exam to suggest acute cholecystitis, and LFTs are unremarkable.  No evidence of inflammatory changes on CT imaging and lactate is normal at 1.4.  Renal function baseline at 1.44.  Patient denies symptoms of chest pain or chest pressure and troponin returned undetectable for which I feel referred cardiac ischemia to be unlikely as well.  Results and clinical impression were discussed with the patient and his wife present at bedside.  Patient is felt stable for discharge from the ER with close monitoring of symptoms, and close outpatient follow-up.  I have also suggested follow-up with general surgery as an outpatient to discuss gallstones.  Return to ER with severe pain, vomiting, fevers, bloody stool, or any other new or troubling symptoms.  All questions are answered prior to discharge.    Diagnosis:    ICD-10-CM    1. Abdominal pain, generalized  R10.84 Troponin I     Troponin I     CANCELED: Troponin I   2. Liver lesion  K76.9    3. Renal lesion  N28.9    4. Renal insufficiency  N28.9    5. Hiatal hernia  K44.9    6. Calculus of gallbladder without cholecystitis without obstruction  K80.20        Scribe Disclosure:  IEmilia, am serving as a scribe at 9:04 AM on 4/30/2021 to document services personally performed by Arnie White MD based on my observations and the provider's statements to me.         Arnie White MD  04/30/21 [id number]

## 2021-04-30 NOTE — DISCHARGE INSTRUCTIONS
Follow-up:  Please follow-up with your primary care provider in 2-3 days for re-evaluation and discussion of your visit to the emergency department today.    You may also follow-up with General Surgery to discuss your gallstones.    Home treatments:  Recommended home therapies include rest, fluids, close monitoring of your symptoms.    Return precautions:  Warning signs which should prompt you to return to the ER include worsening pain, shortness of breath, vomiting, or any other new or troubling symptoms.  We are always happy to see you again.    Discharge Instructions  Abdominal Pain    Abdominal pain (belly pain) can be caused by many things. Your evaluation today does not show the exact cause for your pain. Your provider today has decided that it is unlikely your pain is due to a life threatening problem, or a problem requiring surgery or hospital admission. Sometimes those problems cannot be found right away, so it is very important that you follow up as directed.  Sometimes only the changes which occur over time allow the cause of your pain to be found.    Generally, every Emergency Department visit should have a follow-up clinic visit with either a primary or a specialty clinic/provider. Please follow-up as instructed by your emergency provider today. With abdominal pain, we often recommend very close follow-up, such as the following day.    ADULTS:  Return to the Emergency Department right away if:      You get an oral temperature above 102oF or as directed by your provider.    You have blood in your stools. This may be bright red or appear as black, tarry stools.      You keep vomiting (throwing up) or cannot drink liquids.    You see blood when you vomit.     You cannot have a bowel movement or you cannot pass gas.    Your stomach gets bloated or bigger.    Your skin or the whites of your eyes look yellow.    You faint.    You have bloody, frequent or painful urination (peeing).    You have new symptoms or  "anything that worries you.    MORE INFORMATION:    Appendicitis:  A possible cause of abdominal pain in any person who still has their appendix is acute appendicitis. Appendicitis is often hard to diagnose.  Testing does not always rule out early appendicitis or other causes of abdominal pain. Close follow-up with your provider and re-evaluations may be needed to figure out the reason for your abdominal pain.    Follow-up:  It is very important that you make an appointment with your clinic and go to the appointment.  If you do not follow-up with your primary provider, it may result in missing an important development which could result in permanent injury or disability and/or lasting pain.  If there is any problem keeping your appointment, call your provider or return to the Emergency Department.    Medications:  Take your medications as directed by your provider today.  Before using over-the-counter medications, ask your provider and make sure to take the medications as directed.  If you have any questions about medications, ask your provider.    Diet:  Resume your normal diet as much as possible, but do not eat fried, fatty or spicy foods while you have pain.  Do not drink alcohol or have caffeine.  Do not smoke tobacco.    Probiotics: If you have been given an antibiotic, you may want to also take a probiotic pill or eat yogurt with live cultures. Probiotics have \"good bacteria\" to help your intestines stay healthy. Studies have shown that probiotics help prevent diarrhea (loose stools) and other intestine problems (including C. diff infection) when you take antibiotics. You can buy these without a prescription in the pharmacy section of the store.     If you were given a prescription for medicine here today, be sure to read all of the information (including the package insert) that comes with your prescription.  This will include important information about the medicine, its side effects, and any warnings that " you need to know about.  The pharmacist who fills the prescription can provide more information and answer questions you may have about the medicine.  If you have questions or concerns that the pharmacist cannot address, please call or return to the Emergency Department.       Remember that you can always come back to the Emergency Department if you are not able to see your regular provider in the amount of time listed above, if you get any new symptoms, or if there is anything that worries you.

## 2021-04-30 NOTE — ED TRIAGE NOTES
Patient arrives via EMS from home after experiencing diffused abdominal pain since last night. He denies diarrhea or nausea. History of Afib and strokes with right sided deficits. A&O to baseline upon arrival, ABCs intact.   Yes

## 2021-05-24 ENCOUNTER — OFFICE VISIT (OUTPATIENT)
Dept: SURGERY | Facility: CLINIC | Age: 86
End: 2021-05-24
Payer: MEDICARE

## 2021-05-24 VITALS
HEART RATE: 55 BPM | WEIGHT: 168 LBS | DIASTOLIC BLOOD PRESSURE: 68 MMHG | OXYGEN SATURATION: 97 % | SYSTOLIC BLOOD PRESSURE: 116 MMHG | HEIGHT: 70 IN | RESPIRATION RATE: 16 BRPM | BODY MASS INDEX: 24.05 KG/M2

## 2021-05-24 DIAGNOSIS — R10.84 ABDOMINAL PAIN, GENERALIZED: Primary | ICD-10-CM

## 2021-05-24 PROCEDURE — 99204 OFFICE O/P NEW MOD 45 MIN: CPT | Performed by: SURGERY

## 2021-05-24 ASSESSMENT — MIFFLIN-ST. JEOR: SCORE: 1463.29

## 2021-05-24 NOTE — LETTER
May 24, 2021    RE: Tk Richmond, : 1935      Chief complaint:  Abdominal pain, periumbilical and to both lower quadrants      HPI:  This patient is a 85 year old  male who presents for evaluation of diffuse abdominal pain that prompted an emergency room visit on 2021.  Pain resolved during the ER visit.  Because of the patient's prior history of vascular disease, a CT angiogram was obtained which showed no specific etiology for his pain.  Incidental compression fractures of the upper lumbar spine, gallstones, kidney cysts and diverticulosis were identified as well as the large hiatal hernia which was noted previously.  In discussing his pain today with him and his wife, they relate the pain to the periumbilical region and both left and right lower quadrants but specifically not in the epigastrium or right upper quadrant.  Currently he feels fine.     Anticoagulation: eliquis     Past Medical History:  Has a past medical history of Acute embolic stroke (H) (2019), Acute ischemic cerebrovascular accident (CVA) involving left middle cerebral artery territory (H) (2019), Aortic root dilatation (H), Ascending aortic aneurysm (H), CVA (cerebral vascular accident) (H) (2012), Former smoker, Glaucoma, Hyperlipidemia LDL goal <100 (2012), Hypertension, Iron deficiency anemia, Lower GI bleed (2019), Mild aortic regurgitation, Paroxysmal atrial fibrillation (H) (10/4/2019), and Stroke (H).     Review of Systems:  GI - no jaundice  resp - negative  The 10 point Review of Systems is negative other than noted in the HPI and above.     Physical Exam:  General - This is a well developed, well nourished male in no apparent distress.  HEENT - Normocephalic, atraumatic.  NO scleral icterus.  Skin - no jaundice or rash  Neck - supple without masses  Neurologic: Significant sequela I from CVA,  Psychiatric: Mood and affect are appropriate  Lungs - respirations regular and non-labored.     Abdomen:              soft, non-distended with no tenderness noted. no masses palpated.  Extremities - warm without edema      Relevant labs:  Liver function panel- normal  WBC- normal  Lipase- normal     Imaging:  CT:  2 calcified gallstones, no inflammation to suggest cholecystitis, large hiatal hernia, diverticulosis  Stones were present (simiar appearance/ size) in 2018.  He also has a substantially distended bladder at the time of the scan.  Both old and new scans were reviewed with the patient and his wife.     Assessment and Plan:  It is my impression that he has gallstones (possibly symptomatic) without definitive evidence of cholecystitis. Large hiatal hernia.  I have offered him a Laparoscopic cholecystectomy with cholangiograms.  We have discussed the indication, risks and expected recovery.  Risks discussed included duct/ organ injury, conversion to open surgery with increased recovery, post cholecystectomy syndromes and their treatment.   We also discussed alternatives including dissolution, lithotripsy and low fat diet. Literature was given to review.     With his symptoms not concordant with an acute gallbladder attack and with his age and multiple comorbidities as well as having had the stones present for at least 3 years on CT scanning, I think continued observation would be reasonable.  Should he develop additional/recurrent symptoms of abdominal pain, it may be worthwhile to obtain an ultrasound of his gallbladder to see if we can identify any evidence for wall thickening which may indicate cholecystectomy should be performed (CT scans have not shown any evidence of wall thickening or pericholecystic inflammation).  Both he and his wife are quite happy to continue without surgery as they understand his risks of complications are substantially higher than average.     Jovanny Malave MD  [facility], [place]

## 2021-05-24 NOTE — PROGRESS NOTES
Chief complaint:  Abdominal pain, periumbilical and to both lower quadrants      HPI:  This patient is a 85 year old  male who presents for evaluation of diffuse abdominal pain that prompted an emergency room visit on 4/30/2021.  Pain resolved during the ER visit.  Because of the patient's prior history of vascular disease, a CT angiogram was obtained which showed no specific etiology for his pain.  Incidental compression fractures of the upper lumbar spine, gallstones, kidney cysts and diverticulosis were identified as well as the large hiatal hernia which was noted previously.  In discussing his pain today with him and his wife, they relate the pain to the periumbilical region and both left and right lower quadrants but specifically not in the epigastrium or right upper quadrant.  Currently he feels fine.    Anticoagulation: eliquis    Past Medical History:   has a past medical history of Acute embolic stroke (H) (7/17/2019), Acute ischemic cerebrovascular accident (CVA) involving left middle cerebral artery territory (H) (7/20/2019), Aortic root dilatation (H), Ascending aortic aneurysm (H), CVA (cerebral vascular accident) (H) (2/18/2012), Former smoker, Glaucoma, Hyperlipidemia LDL goal <100 (2/18/2012), Hypertension, Iron deficiency anemia, Lower GI bleed (6/28/2019), Mild aortic regurgitation, Paroxysmal atrial fibrillation (H) (10/4/2019), and Stroke (H).    Past Surgical History:  Past Surgical History:   Procedure Laterality Date     IR CAROTID CEREBRAL ANGIOGRAM LEFT  7/17/2019     OPEN REDUCTION INTERNAL FIXATION WRIST Left 10/4/2019    Procedure: Open reduction internal fixation, left distal radius fracture;  Surgeon: Krystal Interiano MD;  Location:  OR     OPEN REDUCTION INTERNAL FIXATION WRIST Left 10/6/2019    Procedure: Open reduction internal fixation, left distal radius fracture.  ;  Surgeon: Krystal Interiano MD;  Location:  OR     ORTHOPEDIC SURGERY          Social History:  Social  History     Socioeconomic History     Marital status:      Spouse name: Not on file     Number of children: Not on file     Years of education: Not on file     Highest education level: Not on file   Occupational History     Not on file   Social Needs     Financial resource strain: Not on file     Food insecurity     Worry: Not on file     Inability: Not on file     Transportation needs     Medical: Not on file     Non-medical: Not on file   Tobacco Use     Smoking status: Former Smoker     Packs/day: 0.00     Years: 10.00     Pack years: 0.00     Types: Cigars     Quit date: 1972     Years since quittin.2     Smokeless tobacco: Never Used   Substance and Sexual Activity     Alcohol use: Yes     Alcohol/week: 0.0 standard drinks     Comment: OCC beer     Drug use: No     Sexual activity: Not Currently     Partners: Female   Lifestyle     Physical activity     Days per week: Not on file     Minutes per session: Not on file     Stress: Not on file   Relationships     Social connections     Talks on phone: Not on file     Gets together: Not on file     Attends [employer] service: Not on file     Active member of club or organization: Not on file     Attends meetings of clubs or organizations: Not on file     Relationship status: Not on file     Intimate partner violence     Fear of current or ex partner: Not on file     Emotionally abused: Not on file     Physically abused: Not on file     Forced sexual activity: Not on file   Other Topics Concern     Parent/sibling w/ CABG, MI or angioplasty before 65F 55M? Not Asked   Social History Narrative     Not on file        Family History:  Family History   Problem Relation Age of Onset     Breast Cancer Mother      No Known Problems Father      Breast Cancer Sister      No Known Problems Brother      No Known Problems Maternal Grandmother      No Known Problems Maternal Grandfather      No Known Problems Paternal Grandmother      No Known Problems Paternal  Grandfather      Diabetes No family hx of      Hypertension No family hx of      Cancer - colorectal No family hx of        Review of Systems:  GI - no jaundice  resp - negative  The 10 point Review of Systems is negative other than noted in the HPI and above.    Physical Exam:  General - This is a well developed, well nourished male in no apparent distress.  HEENT - Normocephalic, atraumatic.  NO scleral icterus.  Skin - no jaundice or rash  Neck - supple without masses  Neurologic: Significant sequela I from CVA,  Psychiatric: Mood and affect are appropriate  Lungs - respirations regular and non-labored.    Abdomen:   soft, non-distended with no tenderness noted. no masses palpated.  Extremities - warm without edema      Relevant labs:  Liver function panel- normal  WBC- normal  Lipase- normal    Imaging:  CT:  2 calcified gallstones, no inflammation to suggest cholecystitis, large hiatal hernia, diverticulosis  Stones were present (simiar appearance/ size) in 2018.  He also has a substantially distended bladder at the time of the scan.  Both old and new scans were reviewed with the patient and his wife.    Assessment and Plan:  It is my impression that he has gallstones (possibly symptomatic) without definitive evidence of cholecystitis. Large hiatal hernia.  I have offered him a Laparoscopic cholecystectomy with cholangiograms.  We have discussed the indication, risks and expected recovery.  Risks discussed included duct/ organ injury, conversion to open surgery with increased recovery, post cholecystectomy syndromes and their treatment.   We also discussed alternatives including dissolution, lithotripsy and low fat diet. Literature was given to review.    With his symptoms not concordant with an acute gallbladder attack and with his age and multiple comorbidities as well as having had the stones present for at least 3 years on CT scanning, I think continued observation would be reasonable.  Should he develop  additional/recurrent symptoms of abdominal pain, it may be worthwhile to obtain an ultrasound of his gallbladder to see if we can identify any evidence for wall thickening which may indicate cholecystectomy should be performed (CT scans have not shown any evidence of wall thickening or pericholecystic inflammation).  Both he and his wife are quite happy to continue without surgery as they understand his risks of complications are substantially higher than average.    Jovanny Malave MD  [facility], [place]    Please route or send letter to:  Primary Care Provider (PCP) and Include Progress Note    Level 4

## 2021-10-11 ENCOUNTER — HOSPITAL ENCOUNTER (EMERGENCY)
Facility: CLINIC | Age: 86
Discharge: HOME | End: 2021-10-11
Attending: NURSE PRACTITIONER | Admitting: NURSE PRACTITIONER
Payer: MEDICARE

## 2021-10-11 ENCOUNTER — APPOINTMENT (OUTPATIENT)
Dept: GENERAL RADIOLOGY | Facility: CLINIC | Age: 86
End: 2021-10-11
Attending: NURSE PRACTITIONER
Payer: MEDICARE

## 2021-10-11 VITALS
RESPIRATION RATE: 19 BRPM | HEART RATE: 69 BPM | OXYGEN SATURATION: 99 % | TEMPERATURE: 97.6 F | DIASTOLIC BLOOD PRESSURE: 72 MMHG | SYSTOLIC BLOOD PRESSURE: 145 MMHG

## 2021-10-11 DIAGNOSIS — M62.830 BACK MUSCLE SPASM: ICD-10-CM
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
## 2021-10-11 DIAGNOSIS — M54.50 BILATERAL LOW BACK PAIN WITHOUT SCIATICA: ICD-10-CM

## 2021-10-11 PROCEDURE — 250N000013 HC RX MED GY IP 250 OP 250 PS 637: Performed by: NURSE PRACTITIONER

## 2021-10-11 PROCEDURE — 72100 X-RAY EXAM L-S SPINE 2/3 VWS: CPT

## 2021-10-11 PROCEDURE — 99284 EMERGENCY DEPT VISIT MOD MDM: CPT

## 2021-10-11 RX ORDER — HYDROCODONE BITARTRATE AND ACETAMINOPHEN 5; 325 MG/1; MG/1
1 TABLET ORAL ONCE
Status: COMPLETED | OUTPATIENT
Start: 2021-10-11 | End: 2021-10-11

## 2021-10-11 RX ORDER — HYDROCODONE BITARTRATE AND ACETAMINOPHEN 5; 325 MG/1; MG/1
1 TABLET ORAL EVERY 6 HOURS PRN
Qty: 6 TABLET | Refills: 0 | Status: SHIPPED | OUTPATIENT
Start: 2021-10-11 | End: 2022-01-01

## 2021-10-11 RX ORDER — LIDOCAINE 4 G/G
2 PATCH TOPICAL ONCE
Status: DISCONTINUED | OUTPATIENT
Start: 2021-10-11 | End: 2021-10-11 | Stop reason: HOSPADM

## 2021-10-11 RX ORDER — METHOCARBAMOL 750 MG/1
750 TABLET, FILM COATED ORAL 3 TIMES DAILY PRN
Qty: 15 TABLET | Refills: 0 | Status: SHIPPED | OUTPATIENT
Start: 2021-10-11 | End: 2022-01-01

## 2021-10-11 RX ORDER — METHOCARBAMOL 750 MG/1
750 TABLET, FILM COATED ORAL ONCE
Status: COMPLETED | OUTPATIENT
Start: 2021-10-11 | End: 2021-10-11

## 2021-10-11 RX ADMIN — LIDOCAINE 2 PATCH: 246 PATCH TOPICAL at 17:53

## 2021-10-11 RX ADMIN — HYDROCODONE BITARTRATE AND ACETAMINOPHEN 1 TABLET: 5; 325 TABLET ORAL at 17:51

## 2021-10-11 RX ADMIN — METHOCARBAMOL 750 MG: 750 TABLET ORAL at 17:51

## 2021-10-11 ASSESSMENT — ENCOUNTER SYMPTOMS
BACK PAIN: 1
WOUND: 0
FEVER: 0
HEADACHES: 0
ABDOMINAL PAIN: 0
SHORTNESS OF BREATH: 0
CHILLS: 0

## 2021-10-11 NOTE — ED PROVIDER NOTES
History   Chief Complaint:  Fall and Back Pain       HPI   Tk Richmond is a 86 year old male with history of CVA who presents with wife for evaluation of low back pain..  Reports that he had a fall approximately 1 week ago and was not seen at that time.  She reported that he did not hit his head.  Has not had headaches nausea vomiting or change in mental status at that time.  Patient does have at baseline right-sided deficit and expressive aphasia due to residual from his previous CVA.  He is able to answer yes no and simple questions without difficulty.  He has not had any new or worsening neurologic changes.  Wife states that over the last week he has said he is doing fine however today he admitted that his low back has been continually bothering him.  She has tried Biofreeze and Tylenol without success.    Review of Systems   Constitutional: Negative for chills and fever.   Respiratory: Negative for shortness of breath.    Cardiovascular: Negative for chest pain.   Gastrointestinal: Negative for abdominal pain.   Musculoskeletal: Positive for back pain.   Skin: Negative for wound.   Neurological: Negative for headaches.   All other systems reviewed and are negative.    Allergies:  Contrast Dye    Medications:  Norvasc  Eliquis  Celexa  Colace  Mevacor  Protonix  Senokot-S    Past Medical History:     Acute embolic stroke  Aortic root dilation  Ascending aortic aneurysm  Glaucoma  Former smoker  HDL  HTN  Anemia  Lower GI bleed  Atrial fibrillation      Past Surgical History:    Appendectomy  Total knee arthroplasty  Cataract extraction  Carotid cerebral angiogram  Open reduction internal fixation wrist  Skin cancer excision  Orthopedic surgery     Family History:    Mother: breast cancer  Sister: breast cancer    Social History:  Presents with wife.  Former smoker.    Physical Exam     Patient Vitals for the past 24 hrs:   BP Temp Temp src Pulse Resp SpO2   10/11/21 1346 (!) 145/72 97.6  F (36.4  C)  "Temporal 69 19 99 %       Physical Exam  Nursing notes reviewed. Vitals reviewed.  General: Alert.  No obvious discomfort . Well kept.  HENT: Normal voice.   Neck: non-tender. Full ROM, no bony deformity, step-off, or crepitus. No obvious bilateral paracervical muscle spasm.  Eyes: Sclera and conjunctiva normal  Pulmonary: Normal respiratory effort. No cough.    Musculoskeletal: Normal gross range of motion of all 4 extremities. No spinal tenderness, deformity, step-off, or crepitus.  Bilateral lumbar sacral paravertebral area tenderness.  No bony tenderness crepitus or deformity.  Mild muscular spasming in this area.    Neurological: Alert. Normal speech. Responds appropriately. Normal sensation and strength distally.  Skin: Warm and dry. Normal appearance of visualized exposed skin.  Psych: Affect normal. Normal personal interaction. Good eye contact.    Emergency Department Course     Imaging:  Lumbar spine XR, 2-3 views   Final Result   IMPRESSION: Fractures involving the L1 and L2 vertebral bodies with moderate height loss, slightly worsened at L2 compared to the prior exam. No definite new lumbar spine fractures are identified. T12 is excluded from the lateral field-of-view with mild    height loss noted on the AP view, presumably chronic as mild height loss was present on the preceding CT. Severe degenerative change. Levoconvex curvature. Sacrum is partially obscured by bowel gas. Cholelithiasis.          Laboratory:  Labs Ordered and Resulted from Time of ED Arrival Up to the Time of Departure from the ED - No data to display       Emergency Department Course:  Reviewed:  I reviewed nursing notes, vitals, past medical history and Care Everywhere    Assessments:   I obtained history and examined the patient as noted above.    I rechecked the patient and explained findings.  Patient feels better and \"would like to go home.\"    Interventions:  Medications   Lidocaine (LIDOCARE) 4 % Patch 2 patch (2 patches " Transdermal Patch/Med Applied 10/11/21 1753)   HYDROcodone-acetaminophen (NORCO) 5-325 MG per tablet 1 tablet (1 tablet Oral Given 10/11/21 1751)   methocarbamol (ROBAXIN) tablet 750 mg (750 mg Oral Given 10/11/21 1751)       Disposition:  The patient was discharged to home.     Impression & Plan     Medical Decision Making:  Tk Richmond is a 86 year old male who presents today for evaluation of bilateral low back pain after fall 1 week ago.  Patient was brought in by wife after admitting today that he had low back discomfort.  She previously stated he stated that he was feeling better.  His evaluation did elicit tenderness however this was in the soft tissue area.  Given his history I did obtain x-ray as above which shows no significant acute changes since July of this year from comparable CT scan.  He was given medications as above with good relief from his symptomology.  I suspect this is soft tissue/muscular in nature.  Plan will be close follow-up with primary care or pain management if needed.  There is no indication for further testing or imaging.  He appears to be safe and appropriate for outpatient management follow-up and is discharged home.  Return protocols were discussed.  Extensive education about the use of Robaxin and Norco given.    Diagnosis:    ICD-10-CM    1. Fall, initial encounter  W19.XXXA    2. Bilateral low back pain without sciatica  M54.50    3. Back muscle spasm  M62.830        Discharge Medications:  New Prescriptions    HYDROCODONE-ACETAMINOPHEN (NORCO) 5-325 MG TABLET    Take 1 tablet by mouth every 6 hours as needed for pain    METHOCARBAMOL (ROBAXIN) 750 MG TABLET    Take 1 tablet (750 mg) by mouth 3 times daily as needed for muscle spasms       Scribe Disclosure:  I, Shanna Bradshaw, am serving as a scribe at 5:40 PM on 10/11/2021 to document services personally performed by Jefe Hylton APRN based on my observations and the provider's statements to me.             Jefe Hylton, APRN CNP  10/11/21 [id number]

## 2021-10-11 NOTE — ED TRIAGE NOTES
Mechanical fall last Tuesday 10/5. Pt reports he's unsure if he hit his head, spouse reports she doesn't believe he did but unsure. Today reports ongoing pain. Pt on eliquis for hx of stroke, right sided deficit and slurred speech at baseline. VSS on RA.

## 2021-10-12 NOTE — DISCHARGE INSTRUCTIONS
Tylenol scheduled for the next 3-5 days. 6 650-1000 mg of Tylenol.  Tylenol is every 6 hours. Do not take extra tylenol if using Norco. Follow directions. Use OTC lidocaine patches as directed.   Ice or heat to area.  I recommend ice in the first 24-48 hours.

## 2021-12-03 PROBLEM — D50.9 ANEMIA, IRON DEFICIENCY: Status: ACTIVE | Noted: 2021-01-01

## 2021-12-05 NOTE — PROGRESS NOTES
Infusion Nursing Note:  Tk Richmond presents today for 1 unit PRBC.    Patient seen by provider today: No   present during visit today: Not Applicable.    Note: Patient and wife here today, patient has significant difficulty with speech due to history of stroke. Wife reports increase in weakness and fatigue, otherwise no other new concerns today. First time receiving a blood transfusion, reviewed consent and reaction symptoms.      Intravenous Access:  Peripheral IV placed.    Treatment Conditions:  Per orders from [facility], transfuse 1 unit PRBCs today for hgb 7.0 on 12/2/21.  Blood transfusion consent signed 12/5/21.      Post Infusion Assessment:  Patient tolerated transfusion without incident.  Blood return noted pre and post infusion.  Site patent and intact, free from redness, edema or discomfort.  No evidence of extravasations.  Access discontinued per protocol.       Discharge Plan:   Discharge instructions reviewed with: Patient.  Patient and/or family verbalized understanding of discharge instructions and all questions answered.  Copy of AVS reviewed with patient and/or family.  Patient will return as needed for next appointment.  Patient discharged in stable condition accompanied by: wife.  Departure Mode: Wheelchair.      Sylvie Kohli RN

## 2022-01-01 ENCOUNTER — PATIENT OUTREACH (OUTPATIENT)
Dept: CARE COORDINATION | Facility: CLINIC | Age: 87
End: 2022-01-01

## 2022-01-01 ENCOUNTER — APPOINTMENT (OUTPATIENT)
Dept: PHYSICAL THERAPY | Facility: CLINIC | Age: 87
DRG: 435 | End: 2022-01-01
Attending: INTERNAL MEDICINE
Payer: MEDICARE

## 2022-01-01 ENCOUNTER — APPOINTMENT (OUTPATIENT)
Dept: OCCUPATIONAL THERAPY | Facility: CLINIC | Age: 87
DRG: 435 | End: 2022-01-01
Payer: MEDICARE

## 2022-01-01 ENCOUNTER — LAB REQUISITION (OUTPATIENT)
Dept: LAB | Facility: CLINIC | Age: 87
End: 2022-01-01
Payer: MEDICARE

## 2022-01-01 ENCOUNTER — APPOINTMENT (OUTPATIENT)
Dept: PHYSICAL THERAPY | Facility: CLINIC | Age: 87
DRG: 435 | End: 2022-01-01
Payer: MEDICARE

## 2022-01-01 ENCOUNTER — APPOINTMENT (OUTPATIENT)
Dept: CT IMAGING | Facility: CLINIC | Age: 87
DRG: 435 | End: 2022-01-01
Attending: EMERGENCY MEDICINE
Payer: MEDICARE

## 2022-01-01 ENCOUNTER — HOSPITAL ENCOUNTER (INPATIENT)
Facility: CLINIC | Age: 87
LOS: 6 days | Discharge: NURSING, REHAB OR LONG-TERM CARE | DRG: 435 | End: 2022-08-10
Attending: EMERGENCY MEDICINE | Admitting: INTERNAL MEDICINE
Payer: MEDICARE

## 2022-01-01 ENCOUNTER — APPOINTMENT (OUTPATIENT)
Dept: CARDIOLOGY | Facility: CLINIC | Age: 87
DRG: 812 | End: 2022-01-01
Attending: HOSPITALIST
Payer: MEDICARE

## 2022-01-01 ENCOUNTER — HOSPITAL ENCOUNTER (INPATIENT)
Facility: CLINIC | Age: 87
LOS: 4 days | Discharge: HOSPICE | DRG: 812 | End: 2022-09-18
Attending: EMERGENCY MEDICINE | Admitting: HOSPITALIST
Payer: MEDICARE

## 2022-01-01 ENCOUNTER — APPOINTMENT (OUTPATIENT)
Dept: OCCUPATIONAL THERAPY | Facility: CLINIC | Age: 87
DRG: 435 | End: 2022-01-01
Attending: INTERNAL MEDICINE
Payer: MEDICARE

## 2022-01-01 ENCOUNTER — DOCUMENTATION ONLY (OUTPATIENT)
Dept: OTHER | Facility: CLINIC | Age: 87
End: 2022-01-01

## 2022-01-01 ENCOUNTER — APPOINTMENT (OUTPATIENT)
Dept: ULTRASOUND IMAGING | Facility: CLINIC | Age: 87
DRG: 435 | End: 2022-01-01
Attending: INTERNAL MEDICINE
Payer: MEDICARE

## 2022-01-01 VITALS
DIASTOLIC BLOOD PRESSURE: 65 MMHG | HEIGHT: 69 IN | HEART RATE: 60 BPM | OXYGEN SATURATION: 94 % | BODY MASS INDEX: 25.09 KG/M2 | TEMPERATURE: 98.2 F | RESPIRATION RATE: 18 BRPM | SYSTOLIC BLOOD PRESSURE: 143 MMHG | WEIGHT: 169.4 LBS

## 2022-01-01 VITALS
RESPIRATION RATE: 16 BRPM | TEMPERATURE: 98.4 F | BODY MASS INDEX: 26.23 KG/M2 | OXYGEN SATURATION: 99 % | DIASTOLIC BLOOD PRESSURE: 60 MMHG | WEIGHT: 182.8 LBS | HEART RATE: 56 BPM | SYSTOLIC BLOOD PRESSURE: 143 MMHG

## 2022-01-01 DIAGNOSIS — R60.9 EDEMA, UNSPECIFIED: ICD-10-CM

## 2022-01-01 DIAGNOSIS — K92.2 GASTROINTESTINAL HEMORRHAGE, UNSPECIFIED: ICD-10-CM

## 2022-01-01 DIAGNOSIS — D64.9 ANEMIA, UNSPECIFIED: ICD-10-CM

## 2022-01-01 DIAGNOSIS — D62 ACUTE POSTHEMORRHAGIC ANEMIA: ICD-10-CM

## 2022-01-01 DIAGNOSIS — N28.9 RENAL INSUFFICIENCY: ICD-10-CM

## 2022-01-01 DIAGNOSIS — Z71.89 OTHER SPECIFIED COUNSELING: ICD-10-CM

## 2022-01-01 DIAGNOSIS — M81.8 OTHER OSTEOPOROSIS WITHOUT CURRENT PATHOLOGICAL FRACTURE: ICD-10-CM

## 2022-01-01 DIAGNOSIS — K92.1 GASTROINTESTINAL HEMORRHAGE WITH MELENA: ICD-10-CM

## 2022-01-01 DIAGNOSIS — I48.91 ATRIAL FIBRILLATION WITH RVR (H): ICD-10-CM

## 2022-01-01 DIAGNOSIS — I48.0 PAROXYSMAL ATRIAL FIBRILLATION (H): Primary | ICD-10-CM

## 2022-01-01 DIAGNOSIS — D64.9 ANEMIA, UNSPECIFIED TYPE: ICD-10-CM

## 2022-01-01 DIAGNOSIS — K92.1 GASTROINTESTINAL HEMORRHAGE WITH MELENA: Primary | ICD-10-CM

## 2022-01-01 DIAGNOSIS — Z11.1 ENCOUNTER FOR SCREENING FOR RESPIRATORY TUBERCULOSIS: ICD-10-CM

## 2022-01-01 DIAGNOSIS — R16.0 LIVER MASSES: ICD-10-CM

## 2022-01-01 LAB
ABO/RH(D): NORMAL
ABO/RH(D): NORMAL
ALBUMIN SERPL BCG-MCNC: 3.6 G/DL (ref 3.5–5.2)
ALBUMIN SERPL BCG-MCNC: 3.8 G/DL (ref 3.5–5.2)
ALP SERPL-CCNC: 93 U/L (ref 40–129)
ALP SERPL-CCNC: 99 U/L (ref 40–129)
ALT SERPL W P-5'-P-CCNC: 15 U/L (ref 10–50)
ALT SERPL W P-5'-P-CCNC: 17 U/L (ref 10–50)
ANION GAP SERPL CALCULATED.3IONS-SCNC: 10 MMOL/L (ref 7–15)
ANION GAP SERPL CALCULATED.3IONS-SCNC: 11 MMOL/L (ref 7–15)
ANION GAP SERPL CALCULATED.3IONS-SCNC: 8 MMOL/L (ref 7–15)
ANION GAP SERPL CALCULATED.3IONS-SCNC: 9 MMOL/L (ref 7–15)
ANTIBODY SCREEN: NEGATIVE
ANTIBODY SCREEN: NEGATIVE
APTT PPP: 32 SECONDS (ref 22–38)
AST SERPL W P-5'-P-CCNC: 22 U/L (ref 10–50)
AST SERPL W P-5'-P-CCNC: 27 U/L (ref 10–50)
ATRIAL RATE - MUSE: 110 BPM
ATRIAL RATE - MUSE: 153 BPM
ATRIAL RATE - MUSE: 51 BPM
ATRIAL RATE - MUSE: 65 BPM
BASOPHILS # BLD AUTO: 0 10E3/UL (ref 0–0.2)
BASOPHILS # BLD AUTO: 0.1 10E3/UL (ref 0–0.2)
BASOPHILS NFR BLD AUTO: 0 %
BASOPHILS NFR BLD AUTO: 0 %
BASOPHILS NFR BLD AUTO: 1 %
BILIRUB SERPL-MCNC: 0.2 MG/DL
BILIRUB SERPL-MCNC: 0.2 MG/DL
BLD PROD TYP BPU: NORMAL
BLOOD COMPONENT TYPE: NORMAL
BUN SERPL-MCNC: 16.9 MG/DL (ref 8–23)
BUN SERPL-MCNC: 20.8 MG/DL (ref 8–23)
BUN SERPL-MCNC: 23.1 MG/DL (ref 8–23)
BUN SERPL-MCNC: 23.4 MG/DL (ref 8–23)
BUN SERPL-MCNC: 25.2 MG/DL (ref 8–23)
BUN SERPL-MCNC: 28.6 MG/DL (ref 8–23)
BUN SERPL-MCNC: 32.8 MG/DL (ref 8–23)
CALCIUM SERPL-MCNC: 7.8 MG/DL (ref 8.8–10.2)
CALCIUM SERPL-MCNC: 7.9 MG/DL (ref 8.8–10.2)
CALCIUM SERPL-MCNC: 8.5 MG/DL (ref 8.8–10.2)
CALCIUM SERPL-MCNC: 8.6 MG/DL (ref 8.8–10.2)
CALCIUM SERPL-MCNC: 8.9 MG/DL (ref 8.8–10.2)
CANCER AG19-9 SERPL IA-ACNC: 10 U/ML
CEA SERPL-MCNC: 7.5 NG/ML
CHLORIDE SERPL-SCNC: 103 MMOL/L (ref 98–107)
CHLORIDE SERPL-SCNC: 106 MMOL/L (ref 98–107)
CHLORIDE SERPL-SCNC: 107 MMOL/L (ref 98–107)
CHLORIDE SERPL-SCNC: 108 MMOL/L (ref 98–107)
CHLORIDE SERPL-SCNC: 109 MMOL/L (ref 98–107)
CHLORIDE SERPL-SCNC: 109 MMOL/L (ref 98–107)
CHLORIDE SERPL-SCNC: 110 MMOL/L (ref 98–107)
CODING SYSTEM: NORMAL
CREAT SERPL-MCNC: 1.32 MG/DL (ref 0.67–1.17)
CREAT SERPL-MCNC: 1.44 MG/DL (ref 0.67–1.17)
CREAT SERPL-MCNC: 1.46 MG/DL (ref 0.67–1.17)
CREAT SERPL-MCNC: 1.49 MG/DL (ref 0.67–1.17)
CREAT SERPL-MCNC: 1.49 MG/DL (ref 0.67–1.17)
CREAT SERPL-MCNC: 1.5 MG/DL (ref 0.67–1.17)
CREAT SERPL-MCNC: 1.55 MG/DL (ref 0.67–1.17)
CROSSMATCH: NORMAL
DEPRECATED HCO3 PLAS-SCNC: 23 MMOL/L (ref 22–29)
DEPRECATED HCO3 PLAS-SCNC: 24 MMOL/L (ref 22–29)
DEPRECATED HCO3 PLAS-SCNC: 25 MMOL/L (ref 22–29)
DIASTOLIC BLOOD PRESSURE - MUSE: NORMAL MMHG
EOSINOPHIL # BLD AUTO: 0.3 10E3/UL (ref 0–0.7)
EOSINOPHIL # BLD AUTO: 0.4 10E3/UL (ref 0–0.7)
EOSINOPHIL # BLD AUTO: 0.5 10E3/UL (ref 0–0.7)
EOSINOPHIL NFR BLD AUTO: 10 %
EOSINOPHIL NFR BLD AUTO: 11 %
EOSINOPHIL NFR BLD AUTO: 7 %
EOSINOPHIL NFR BLD AUTO: 7 %
EOSINOPHIL NFR BLD AUTO: 8 %
ERYTHROCYTE [DISTWIDTH] IN BLOOD BY AUTOMATED COUNT: 13.9 % (ref 10–15)
ERYTHROCYTE [DISTWIDTH] IN BLOOD BY AUTOMATED COUNT: 14 % (ref 10–15)
ERYTHROCYTE [DISTWIDTH] IN BLOOD BY AUTOMATED COUNT: 14.1 % (ref 10–15)
ERYTHROCYTE [DISTWIDTH] IN BLOOD BY AUTOMATED COUNT: 14.2 % (ref 10–15)
ERYTHROCYTE [DISTWIDTH] IN BLOOD BY AUTOMATED COUNT: 14.7 % (ref 10–15)
ERYTHROCYTE [DISTWIDTH] IN BLOOD BY AUTOMATED COUNT: 17 % (ref 10–15)
ERYTHROCYTE [DISTWIDTH] IN BLOOD BY AUTOMATED COUNT: 17.5 % (ref 10–15)
GAMMA INTERFERON BACKGROUND BLD IA-ACNC: 0.07 IU/ML
GFR SERPL CREATININE-BSD FRML MDRD: 44 ML/MIN/1.73M2
GFR SERPL CREATININE-BSD FRML MDRD: 45 ML/MIN/1.73M2
GFR SERPL CREATININE-BSD FRML MDRD: 46 ML/MIN/1.73M2
GFR SERPL CREATININE-BSD FRML MDRD: 46 ML/MIN/1.73M2
GFR SERPL CREATININE-BSD FRML MDRD: 47 ML/MIN/1.73M2
GFR SERPL CREATININE-BSD FRML MDRD: 48 ML/MIN/1.73M2
GFR SERPL CREATININE-BSD FRML MDRD: 53 ML/MIN/1.73M2
GLUCOSE BLDC GLUCOMTR-MCNC: 91 MG/DL (ref 70–99)
GLUCOSE BLDC GLUCOMTR-MCNC: 92 MG/DL (ref 70–99)
GLUCOSE SERPL-MCNC: 106 MG/DL (ref 70–99)
GLUCOSE SERPL-MCNC: 112 MG/DL (ref 70–99)
GLUCOSE SERPL-MCNC: 79 MG/DL (ref 70–99)
GLUCOSE SERPL-MCNC: 80 MG/DL (ref 70–99)
GLUCOSE SERPL-MCNC: 82 MG/DL (ref 70–99)
GLUCOSE SERPL-MCNC: 83 MG/DL (ref 70–99)
GLUCOSE SERPL-MCNC: 89 MG/DL (ref 70–99)
HCT VFR BLD AUTO: 23 % (ref 40–53)
HCT VFR BLD AUTO: 23.4 % (ref 40–53)
HCT VFR BLD AUTO: 24 % (ref 40–53)
HCT VFR BLD AUTO: 24.9 % (ref 40–53)
HCT VFR BLD AUTO: 25.9 % (ref 40–53)
HCT VFR BLD AUTO: 27 % (ref 40–53)
HCT VFR BLD AUTO: 27 % (ref 40–53)
HCT VFR BLD AUTO: 29.7 % (ref 40–53)
HCT VFR BLD AUTO: 31.5 % (ref 40–53)
HEMOCCULT STL QL: POSITIVE
HGB BLD-MCNC: 6.6 G/DL (ref 13.3–17.7)
HGB BLD-MCNC: 6.7 G/DL (ref 13.3–17.7)
HGB BLD-MCNC: 7 G/DL (ref 13.3–17.7)
HGB BLD-MCNC: 7.1 G/DL (ref 13.3–17.7)
HGB BLD-MCNC: 7.1 G/DL (ref 13.3–17.7)
HGB BLD-MCNC: 7.6 G/DL (ref 13.3–17.7)
HGB BLD-MCNC: 7.6 G/DL (ref 13.3–17.7)
HGB BLD-MCNC: 7.7 G/DL (ref 13.3–17.7)
HGB BLD-MCNC: 7.7 G/DL (ref 13.3–17.7)
HGB BLD-MCNC: 7.8 G/DL (ref 13.3–17.7)
HGB BLD-MCNC: 8 G/DL (ref 13.3–17.7)
HGB BLD-MCNC: 8.8 G/DL (ref 13.3–17.7)
HGB BLD-MCNC: 9.2 G/DL (ref 13.3–17.7)
HOLD SPECIMEN: NORMAL
IMM GRANULOCYTES # BLD: 0 10E3/UL
IMM GRANULOCYTES NFR BLD: 0 %
IMM GRANULOCYTES NFR BLD: 1 %
IMM GRANULOCYTES NFR BLD: 1 %
INR PPP: 1.12 (ref 0.85–1.15)
INR PPP: 1.37 (ref 0.85–1.15)
INTERPRETATION ECG - MUSE: NORMAL
ISSUE DATE AND TIME: NORMAL
LACTATE SERPL-SCNC: 1.1 MMOL/L (ref 0.7–2)
LYMPHOCYTES # BLD AUTO: 0.5 10E3/UL (ref 0.8–5.3)
LYMPHOCYTES # BLD AUTO: 0.7 10E3/UL (ref 0.8–5.3)
LYMPHOCYTES # BLD AUTO: 0.9 10E3/UL (ref 0.8–5.3)
LYMPHOCYTES # BLD AUTO: 1 10E3/UL (ref 0.8–5.3)
LYMPHOCYTES # BLD AUTO: 1.3 10E3/UL (ref 0.8–5.3)
LYMPHOCYTES NFR BLD AUTO: 13 %
LYMPHOCYTES NFR BLD AUTO: 17 %
LYMPHOCYTES NFR BLD AUTO: 19 %
LYMPHOCYTES NFR BLD AUTO: 21 %
LYMPHOCYTES NFR BLD AUTO: 27 %
M TB IFN-G BLD-IMP: NEGATIVE
M TB IFN-G CD4+ BCKGRND COR BLD-ACNC: 1.31 IU/ML
MCH RBC QN AUTO: 23.2 PG (ref 26.5–33)
MCH RBC QN AUTO: 23.5 PG (ref 26.5–33)
MCH RBC QN AUTO: 26 PG (ref 26.5–33)
MCH RBC QN AUTO: 26.3 PG (ref 26.5–33)
MCH RBC QN AUTO: 26.4 PG (ref 26.5–33)
MCH RBC QN AUTO: 26.4 PG (ref 26.5–33)
MCH RBC QN AUTO: 26.6 PG (ref 26.5–33)
MCH RBC QN AUTO: 26.7 PG (ref 26.5–33)
MCH RBC QN AUTO: 26.9 PG (ref 26.5–33)
MCHC RBC AUTO-ENTMCNC: 28.2 G/DL (ref 31.5–36.5)
MCHC RBC AUTO-ENTMCNC: 28.5 G/DL (ref 31.5–36.5)
MCHC RBC AUTO-ENTMCNC: 28.9 G/DL (ref 31.5–36.5)
MCHC RBC AUTO-ENTMCNC: 29.1 G/DL (ref 31.5–36.5)
MCHC RBC AUTO-ENTMCNC: 29.2 G/DL (ref 31.5–36.5)
MCHC RBC AUTO-ENTMCNC: 29.2 G/DL (ref 31.5–36.5)
MCHC RBC AUTO-ENTMCNC: 29.6 G/DL (ref 31.5–36.5)
MCHC RBC AUTO-ENTMCNC: 29.6 G/DL (ref 31.5–36.5)
MCHC RBC AUTO-ENTMCNC: 29.7 G/DL (ref 31.5–36.5)
MCV RBC AUTO: 81 FL (ref 78–100)
MCV RBC AUTO: 82 FL (ref 78–100)
MCV RBC AUTO: 88 FL (ref 78–100)
MCV RBC AUTO: 90 FL (ref 78–100)
MCV RBC AUTO: 90 FL (ref 78–100)
MCV RBC AUTO: 91 FL (ref 78–100)
MCV RBC AUTO: 93 FL (ref 78–100)
MITOGEN IGNF BCKGRD COR BLD-ACNC: 0 IU/ML
MITOGEN IGNF BCKGRD COR BLD-ACNC: 0 IU/ML
MONOCYTES # BLD AUTO: 0.3 10E3/UL (ref 0–1.3)
MONOCYTES # BLD AUTO: 0.4 10E3/UL (ref 0–1.3)
MONOCYTES # BLD AUTO: 0.6 10E3/UL (ref 0–1.3)
MONOCYTES NFR BLD AUTO: 11 %
MONOCYTES NFR BLD AUTO: 12 %
MONOCYTES NFR BLD AUTO: 8 %
MONOCYTES NFR BLD AUTO: 9 %
MONOCYTES NFR BLD AUTO: 9 %
NEUTROPHILS # BLD AUTO: 2.4 10E3/UL (ref 1.6–8.3)
NEUTROPHILS # BLD AUTO: 2.6 10E3/UL (ref 1.6–8.3)
NEUTROPHILS # BLD AUTO: 2.6 10E3/UL (ref 1.6–8.3)
NEUTROPHILS # BLD AUTO: 2.7 10E3/UL (ref 1.6–8.3)
NEUTROPHILS # BLD AUTO: 3 10E3/UL (ref 1.6–8.3)
NEUTROPHILS NFR BLD AUTO: 55 %
NEUTROPHILS NFR BLD AUTO: 57 %
NEUTROPHILS NFR BLD AUTO: 61 %
NEUTROPHILS NFR BLD AUTO: 66 %
NEUTROPHILS NFR BLD AUTO: 67 %
NRBC # BLD AUTO: 0 10E3/UL
NRBC BLD AUTO-RTO: 0 /100
P AXIS - MUSE: 3 DEGREES
P AXIS - MUSE: 35 DEGREES
P AXIS - MUSE: NORMAL DEGREES
P AXIS - MUSE: NORMAL DEGREES
PATH REPORT.COMMENTS IMP SPEC: ABNORMAL
PATH REPORT.COMMENTS IMP SPEC: YES
PATH REPORT.FINAL DX SPEC: ABNORMAL
PATH REPORT.GROSS SPEC: ABNORMAL
PATH REPORT.MICROSCOPIC SPEC OTHER STN: ABNORMAL
PATH REPORT.RELEVANT HX SPEC: ABNORMAL
PHOTO IMAGE: ABNORMAL
PLATELET # BLD AUTO: 203 10E3/UL (ref 150–450)
PLATELET # BLD AUTO: 204 10E3/UL (ref 150–450)
PLATELET # BLD AUTO: 210 10E3/UL (ref 150–450)
PLATELET # BLD AUTO: 220 10E3/UL (ref 150–450)
PLATELET # BLD AUTO: 243 10E3/UL (ref 150–450)
PLATELET # BLD AUTO: 247 10E3/UL (ref 150–450)
PLATELET # BLD AUTO: 275 10E3/UL (ref 150–450)
PLATELET # BLD AUTO: 291 10E3/UL (ref 150–450)
PLATELET # BLD AUTO: 317 10E3/UL (ref 150–450)
POTASSIUM SERPL-SCNC: 3.6 MMOL/L (ref 3.4–5.3)
POTASSIUM SERPL-SCNC: 3.7 MMOL/L (ref 3.4–5.3)
POTASSIUM SERPL-SCNC: 3.8 MMOL/L (ref 3.4–5.3)
POTASSIUM SERPL-SCNC: 4 MMOL/L (ref 3.4–5.3)
POTASSIUM SERPL-SCNC: 4 MMOL/L (ref 3.4–5.3)
POTASSIUM SERPL-SCNC: 4.1 MMOL/L (ref 3.4–5.3)
POTASSIUM SERPL-SCNC: 4.3 MMOL/L (ref 3.4–5.3)
POTASSIUM SERPL-SCNC: 4.4 MMOL/L (ref 3.4–5.3)
PR INTERVAL - MUSE: 192 MS
PR INTERVAL - MUSE: 198 MS
PR INTERVAL - MUSE: NORMAL MS
PR INTERVAL - MUSE: NORMAL MS
PROT SERPL-MCNC: 6.9 G/DL (ref 6.4–8.3)
PROT SERPL-MCNC: 7 G/DL (ref 6.4–8.3)
QRS DURATION - MUSE: 84 MS
QRS DURATION - MUSE: 86 MS
QRS DURATION - MUSE: 90 MS
QRS DURATION - MUSE: 94 MS
QT - MUSE: 304 MS
QT - MUSE: 308 MS
QT - MUSE: 398 MS
QT - MUSE: 474 MS
QTC - MUSE: 413 MS
QTC - MUSE: 436 MS
QTC - MUSE: 464 MS
QTC - MUSE: 473 MS
QUANTIFERON MITOGEN: 1.38 IU/ML
QUANTIFERON NIL TUBE: 0.07 IU/ML
QUANTIFERON TB1 TUBE: 0.07 IU/ML
QUANTIFERON TB2 TUBE: 0.07
R AXIS - MUSE: -21 DEGREES
R AXIS - MUSE: -42 DEGREES
R AXIS - MUSE: -46 DEGREES
R AXIS - MUSE: -46 DEGREES
RBC # BLD AUTO: 2.66 10E6/UL (ref 4.4–5.9)
RBC # BLD AUTO: 2.69 10E6/UL (ref 4.4–5.9)
RBC # BLD AUTO: 2.85 10E6/UL (ref 4.4–5.9)
RBC # BLD AUTO: 2.85 10E6/UL (ref 4.4–5.9)
RBC # BLD AUTO: 2.96 10E6/UL (ref 4.4–5.9)
RBC # BLD AUTO: 2.96 10E6/UL (ref 4.4–5.9)
RBC # BLD AUTO: 3 10E6/UL (ref 4.4–5.9)
RBC # BLD AUTO: 3.27 10E6/UL (ref 4.4–5.9)
RBC # BLD AUTO: 3.46 10E6/UL (ref 4.4–5.9)
SARS-COV-2 RNA RESP QL NAA+PROBE: NEGATIVE
SARS-COV-2 RNA RESP QL NAA+PROBE: NEGATIVE
SODIUM SERPL-SCNC: 135 MMOL/L (ref 136–145)
SODIUM SERPL-SCNC: 140 MMOL/L (ref 136–145)
SODIUM SERPL-SCNC: 140 MMOL/L (ref 136–145)
SODIUM SERPL-SCNC: 141 MMOL/L (ref 136–145)
SODIUM SERPL-SCNC: 142 MMOL/L (ref 136–145)
SPECIMEN EXPIRATION DATE: NORMAL
SPECIMEN EXPIRATION DATE: NORMAL
SYSTOLIC BLOOD PRESSURE - MUSE: NORMAL MMHG
T AXIS - MUSE: 21 DEGREES
T AXIS - MUSE: 24 DEGREES
T AXIS - MUSE: 27 DEGREES
T AXIS - MUSE: 7 DEGREES
TROPONIN T SERPL HS-MCNC: 47 NG/L
TROPONIN T SERPL HS-MCNC: 57 NG/L
UNIT ABO/RH: NORMAL
UNIT NUMBER: NORMAL
UNIT STATUS: NORMAL
UNIT TYPE ISBT: 7300
VENTRICULAR RATE- MUSE: 140 BPM
VENTRICULAR RATE- MUSE: 142 BPM
VENTRICULAR RATE- MUSE: 51 BPM
VENTRICULAR RATE- MUSE: 65 BPM
WBC # BLD AUTO: 3.9 10E3/UL (ref 4–11)
WBC # BLD AUTO: 3.9 10E3/UL (ref 4–11)
WBC # BLD AUTO: 4.2 10E3/UL (ref 4–11)
WBC # BLD AUTO: 4.4 10E3/UL (ref 4–11)
WBC # BLD AUTO: 4.5 10E3/UL (ref 4–11)
WBC # BLD AUTO: 4.7 10E3/UL (ref 4–11)
WBC # BLD AUTO: 4.7 10E3/UL (ref 4–11)
WBC # BLD AUTO: 4.8 10E3/UL (ref 4–11)
WBC # BLD AUTO: 5.1 10E3/UL (ref 4–11)

## 2022-01-01 PROCEDURE — 86850 RBC ANTIBODY SCREEN: CPT | Performed by: EMERGENCY MEDICINE

## 2022-01-01 PROCEDURE — 250N000013 HC RX MED GY IP 250 OP 250 PS 637: Performed by: INTERNAL MEDICINE

## 2022-01-01 PROCEDURE — 99233 SBSQ HOSP IP/OBS HIGH 50: CPT | Performed by: INTERNAL MEDICINE

## 2022-01-01 PROCEDURE — 96376 TX/PRO/DX INJ SAME DRUG ADON: CPT

## 2022-01-01 PROCEDURE — 250N000011 HC RX IP 250 OP 636: Performed by: INTERNAL MEDICINE

## 2022-01-01 PROCEDURE — 97530 THERAPEUTIC ACTIVITIES: CPT | Mod: GO

## 2022-01-01 PROCEDURE — 85025 COMPLETE CBC W/AUTO DIFF WBC: CPT | Performed by: EMERGENCY MEDICINE

## 2022-01-01 PROCEDURE — 120N000001 HC R&B MED SURG/OB

## 2022-01-01 PROCEDURE — 97530 THERAPEUTIC ACTIVITIES: CPT | Mod: GP | Performed by: PHYSICAL THERAPIST

## 2022-01-01 PROCEDURE — 86923 COMPATIBILITY TEST ELECTRIC: CPT | Performed by: EMERGENCY MEDICINE

## 2022-01-01 PROCEDURE — 85027 COMPLETE CBC AUTOMATED: CPT | Performed by: INTERNAL MEDICINE

## 2022-01-01 PROCEDURE — 99285 EMERGENCY DEPT VISIT HI MDM: CPT | Mod: 25

## 2022-01-01 PROCEDURE — 88342 IMHCHEM/IMCYTCHM 1ST ANTB: CPT | Mod: TC | Performed by: INTERNAL MEDICINE

## 2022-01-01 PROCEDURE — 0FB13ZX EXCISION OF RIGHT LOBE LIVER, PERCUTANEOUS APPROACH, DIAGNOSTIC: ICD-10-PCS | Performed by: RADIOLOGY

## 2022-01-01 PROCEDURE — 97116 GAIT TRAINING THERAPY: CPT | Mod: GP | Performed by: PHYSICAL THERAPIST

## 2022-01-01 PROCEDURE — 250N000009 HC RX 250: Performed by: RADIOLOGY

## 2022-01-01 PROCEDURE — 84132 ASSAY OF SERUM POTASSIUM: CPT | Performed by: HOSPITALIST

## 2022-01-01 PROCEDURE — 80048 BASIC METABOLIC PNL TOTAL CA: CPT | Performed by: INTERNAL MEDICINE

## 2022-01-01 PROCEDURE — 85014 HEMATOCRIT: CPT | Performed by: INTERNAL MEDICINE

## 2022-01-01 PROCEDURE — P9016 RBC LEUKOCYTES REDUCED: HCPCS | Performed by: INTERNAL MEDICINE

## 2022-01-01 PROCEDURE — 97165 OT EVAL LOW COMPLEX 30 MIN: CPT | Mod: GO

## 2022-01-01 PROCEDURE — 36415 COLL VENOUS BLD VENIPUNCTURE: CPT

## 2022-01-01 PROCEDURE — 99239 HOSP IP/OBS DSCHRG MGMT >30: CPT | Performed by: HOSPITALIST

## 2022-01-01 PROCEDURE — 99233 SBSQ HOSP IP/OBS HIGH 50: CPT | Performed by: HOSPITALIST

## 2022-01-01 PROCEDURE — 36415 COLL VENOUS BLD VENIPUNCTURE: CPT | Performed by: INTERNAL MEDICINE

## 2022-01-01 PROCEDURE — C9113 INJ PANTOPRAZOLE SODIUM, VIA: HCPCS | Performed by: INTERNAL MEDICINE

## 2022-01-01 PROCEDURE — 250N000013 HC RX MED GY IP 250 OP 250 PS 637: Performed by: HOSPITALIST

## 2022-01-01 PROCEDURE — 83605 ASSAY OF LACTIC ACID: CPT | Performed by: EMERGENCY MEDICINE

## 2022-01-01 PROCEDURE — 74174 CTA ABD&PLVS W/CONTRAST: CPT | Mod: MA

## 2022-01-01 PROCEDURE — 86301 IMMUNOASSAY TUMOR CA 19-9: CPT | Performed by: INTERNAL MEDICINE

## 2022-01-01 PROCEDURE — 36415 COLL VENOUS BLD VENIPUNCTURE: CPT | Performed by: HOSPITALIST

## 2022-01-01 PROCEDURE — P9604 ONE-WAY ALLOW PRORATED TRIP: HCPCS | Performed by: INTERNAL MEDICINE

## 2022-01-01 PROCEDURE — 93306 TTE W/DOPPLER COMPLETE: CPT | Mod: 26 | Performed by: INTERNAL MEDICINE

## 2022-01-01 PROCEDURE — 85018 HEMOGLOBIN: CPT | Performed by: INTERNAL MEDICINE

## 2022-01-01 PROCEDURE — P9016 RBC LEUKOCYTES REDUCED: HCPCS | Performed by: HOSPITALIST

## 2022-01-01 PROCEDURE — P9603 ONE-WAY ALLOW PRORATED MILES: HCPCS | Performed by: INTERNAL MEDICINE

## 2022-01-01 PROCEDURE — C9113 INJ PANTOPRAZOLE SODIUM, VIA: HCPCS | Performed by: EMERGENCY MEDICINE

## 2022-01-01 PROCEDURE — 86481 TB AG RESPONSE T-CELL SUSP: CPT | Performed by: INTERNAL MEDICINE

## 2022-01-01 PROCEDURE — 82378 CARCINOEMBRYONIC ANTIGEN: CPT | Performed by: INTERNAL MEDICINE

## 2022-01-01 PROCEDURE — 36415 COLL VENOUS BLD VENIPUNCTURE: CPT | Performed by: EMERGENCY MEDICINE

## 2022-01-01 PROCEDURE — C9803 HOPD COVID-19 SPEC COLLECT: HCPCS

## 2022-01-01 PROCEDURE — 99223 1ST HOSP IP/OBS HIGH 75: CPT | Performed by: CLINICAL NURSE SPECIALIST

## 2022-01-01 PROCEDURE — 85730 THROMBOPLASTIN TIME PARTIAL: CPT | Performed by: EMERGENCY MEDICINE

## 2022-01-01 PROCEDURE — 86923 COMPATIBILITY TEST ELECTRIC: CPT | Performed by: HOSPITALIST

## 2022-01-01 PROCEDURE — 36430 TRANSFUSION BLD/BLD COMPNT: CPT

## 2022-01-01 PROCEDURE — 82272 OCCULT BLD FECES 1-3 TESTS: CPT | Performed by: EMERGENCY MEDICINE

## 2022-01-01 PROCEDURE — 99223 1ST HOSP IP/OBS HIGH 75: CPT | Performed by: HOSPITALIST

## 2022-01-01 PROCEDURE — 272N000431 US BIOPSY LIVER

## 2022-01-01 PROCEDURE — 85004 AUTOMATED DIFF WBC COUNT: CPT | Performed by: INTERNAL MEDICINE

## 2022-01-01 PROCEDURE — P9016 RBC LEUKOCYTES REDUCED: HCPCS | Performed by: EMERGENCY MEDICINE

## 2022-01-01 PROCEDURE — 97110 THERAPEUTIC EXERCISES: CPT | Mod: GP | Performed by: PHYSICAL THERAPIST

## 2022-01-01 PROCEDURE — 99239 HOSP IP/OBS DSCHRG MGMT >30: CPT | Performed by: INTERNAL MEDICINE

## 2022-01-01 PROCEDURE — 250N000011 HC RX IP 250 OP 636: Performed by: EMERGENCY MEDICINE

## 2022-01-01 PROCEDURE — 258N000003 HC RX IP 258 OP 636: Performed by: EMERGENCY MEDICINE

## 2022-01-01 PROCEDURE — 255N000002 HC RX 255 OP 636: Performed by: HOSPITALIST

## 2022-01-01 PROCEDURE — U0003 INFECTIOUS AGENT DETECTION BY NUCLEIC ACID (DNA OR RNA); SEVERE ACUTE RESPIRATORY SYNDROME CORONAVIRUS 2 (SARS-COV-2) (CORONAVIRUS DISEASE [COVID-19]), AMPLIFIED PROBE TECHNIQUE, MAKING USE OF HIGH THROUGHPUT TECHNOLOGIES AS DESCRIBED BY CMS-2020-01-R: HCPCS | Performed by: EMERGENCY MEDICINE

## 2022-01-01 PROCEDURE — 96365 THER/PROPH/DIAG IV INF INIT: CPT

## 2022-01-01 PROCEDURE — 80053 COMPREHEN METABOLIC PANEL: CPT | Performed by: EMERGENCY MEDICINE

## 2022-01-01 PROCEDURE — 85027 COMPLETE CBC AUTOMATED: CPT | Performed by: HOSPITALIST

## 2022-01-01 PROCEDURE — 88342 IMHCHEM/IMCYTCHM 1ST ANTB: CPT | Mod: 26 | Performed by: PATHOLOGY

## 2022-01-01 PROCEDURE — 96374 THER/PROPH/DIAG INJ IV PUSH: CPT | Mod: 59

## 2022-01-01 PROCEDURE — 86923 COMPATIBILITY TEST ELECTRIC: CPT | Performed by: INTERNAL MEDICINE

## 2022-01-01 PROCEDURE — 88341 IMHCHEM/IMCYTCHM EA ADD ANTB: CPT | Mod: 26 | Performed by: PATHOLOGY

## 2022-01-01 PROCEDURE — 120N000013 HC R&B IMCU

## 2022-01-01 PROCEDURE — 96375 TX/PRO/DX INJ NEW DRUG ADDON: CPT

## 2022-01-01 PROCEDURE — 85610 PROTHROMBIN TIME: CPT | Performed by: EMERGENCY MEDICINE

## 2022-01-01 PROCEDURE — 88307 TISSUE EXAM BY PATHOLOGIST: CPT | Mod: TC | Performed by: INTERNAL MEDICINE

## 2022-01-01 PROCEDURE — 93005 ELECTROCARDIOGRAM TRACING: CPT

## 2022-01-01 PROCEDURE — 51701 INSERT BLADDER CATHETER: CPT

## 2022-01-01 PROCEDURE — 85025 COMPLETE CBC W/AUTO DIFF WBC: CPT | Performed by: INTERNAL MEDICINE

## 2022-01-01 PROCEDURE — 97161 PT EVAL LOW COMPLEX 20 MIN: CPT | Mod: GP | Performed by: PHYSICAL THERAPIST

## 2022-01-01 PROCEDURE — 258N000003 HC RX IP 258 OP 636: Performed by: INTERNAL MEDICINE

## 2022-01-01 PROCEDURE — 93005 ELECTROCARDIOGRAM TRACING: CPT | Mod: 76

## 2022-01-01 PROCEDURE — 80048 BASIC METABOLIC PNL TOTAL CA: CPT | Performed by: HOSPITALIST

## 2022-01-01 PROCEDURE — 99231 SBSQ HOSP IP/OBS SF/LOW 25: CPT | Performed by: HOSPITALIST

## 2022-01-01 PROCEDURE — 99232 SBSQ HOSP IP/OBS MODERATE 35: CPT | Performed by: INTERNAL MEDICINE

## 2022-01-01 PROCEDURE — 250N000011 HC RX IP 250 OP 636: Performed by: HOSPITALIST

## 2022-01-01 PROCEDURE — 97530 THERAPEUTIC ACTIVITIES: CPT | Mod: GO | Performed by: REHABILITATION PRACTITIONER

## 2022-01-01 PROCEDURE — 84484 ASSAY OF TROPONIN QUANT: CPT | Performed by: EMERGENCY MEDICINE

## 2022-01-01 PROCEDURE — 99221 1ST HOSP IP/OBS SF/LOW 40: CPT | Performed by: CLINICAL NURSE SPECIALIST

## 2022-01-01 PROCEDURE — 93010 ELECTROCARDIOGRAM REPORT: CPT | Performed by: INTERNAL MEDICINE

## 2022-01-01 PROCEDURE — 258N000003 HC RX IP 258 OP 636: Performed by: HOSPITALIST

## 2022-01-01 PROCEDURE — 999N000208 ECHOCARDIOGRAM COMPLETE

## 2022-01-01 PROCEDURE — 88307 TISSUE EXAM BY PATHOLOGIST: CPT | Mod: 26 | Performed by: PATHOLOGY

## 2022-01-01 PROCEDURE — 99233 SBSQ HOSP IP/OBS HIGH 50: CPT | Performed by: CLINICAL NURSE SPECIALIST

## 2022-01-01 PROCEDURE — 250N000009 HC RX 250: Performed by: EMERGENCY MEDICINE

## 2022-01-01 PROCEDURE — 96361 HYDRATE IV INFUSION ADD-ON: CPT

## 2022-01-01 PROCEDURE — 99223 1ST HOSP IP/OBS HIGH 75: CPT | Mod: AI | Performed by: INTERNAL MEDICINE

## 2022-01-01 PROCEDURE — 99231 SBSQ HOSP IP/OBS SF/LOW 25: CPT | Performed by: CLINICAL NURSE SPECIALIST

## 2022-01-01 PROCEDURE — 84132 ASSAY OF SERUM POTASSIUM: CPT

## 2022-01-01 PROCEDURE — 82310 ASSAY OF CALCIUM: CPT | Performed by: INTERNAL MEDICINE

## 2022-01-01 PROCEDURE — 86901 BLOOD TYPING SEROLOGIC RH(D): CPT | Performed by: EMERGENCY MEDICINE

## 2022-01-01 PROCEDURE — 85014 HEMATOCRIT: CPT | Performed by: EMERGENCY MEDICINE

## 2022-01-01 RX ORDER — ATORVASTATIN CALCIUM 40 MG/1
40 TABLET, FILM COATED ORAL EVERY EVENING
Status: DISCONTINUED | OUTPATIENT
Start: 2022-01-01 | End: 2022-01-01 | Stop reason: HOSPADM

## 2022-01-01 RX ORDER — CALCITONIN SALMON 200 [IU]/.09ML
1 SPRAY, METERED NASAL DAILY
Status: CANCELLED | OUTPATIENT
Start: 2022-01-01

## 2022-01-01 RX ORDER — PANTOPRAZOLE SODIUM 40 MG/1
40 TABLET, DELAYED RELEASE ORAL
Status: DISCONTINUED | OUTPATIENT
Start: 2022-01-01 | End: 2022-01-01 | Stop reason: HOSPADM

## 2022-01-01 RX ORDER — AMOXICILLIN 250 MG
1 CAPSULE ORAL 2 TIMES DAILY PRN
Status: CANCELLED | OUTPATIENT
Start: 2022-01-01

## 2022-01-01 RX ORDER — BISACODYL 10 MG
10 SUPPOSITORY, RECTAL RECTAL DAILY PRN
Status: DISCONTINUED | OUTPATIENT
Start: 2022-01-01 | End: 2022-01-01 | Stop reason: HOSPADM

## 2022-01-01 RX ORDER — LIDOCAINE 40 MG/G
CREAM TOPICAL
Status: DISCONTINUED | OUTPATIENT
Start: 2022-01-01 | End: 2022-01-01 | Stop reason: HOSPADM

## 2022-01-01 RX ORDER — LIDOCAINE 40 MG/G
CREAM TOPICAL
Status: CANCELLED | OUTPATIENT
Start: 2022-01-01

## 2022-01-01 RX ORDER — SODIUM CHLORIDE, SODIUM LACTATE, POTASSIUM CHLORIDE, CALCIUM CHLORIDE 600; 310; 30; 20 MG/100ML; MG/100ML; MG/100ML; MG/100ML
INJECTION, SOLUTION INTRAVENOUS CONTINUOUS
Status: ACTIVE | OUTPATIENT
Start: 2022-01-01 | End: 2022-01-01

## 2022-01-01 RX ORDER — LORAZEPAM 2 MG/ML
.25-.5 CONCENTRATE ORAL EVERY 4 HOURS PRN
Qty: 30 ML | Refills: 0 | Status: SHIPPED | OUTPATIENT
Start: 2022-01-01

## 2022-01-01 RX ORDER — METOPROLOL SUCCINATE 25 MG/1
25 TABLET, EXTENDED RELEASE ORAL DAILY
Status: CANCELLED | OUTPATIENT
Start: 2022-01-01

## 2022-01-01 RX ORDER — CALCITONIN SALMON 200 [IU]/.09ML
1 SPRAY, METERED NASAL DAILY
Status: ON HOLD | COMMUNITY
End: 2022-01-01

## 2022-01-01 RX ORDER — FUROSEMIDE 10 MG/ML
20 INJECTION INTRAMUSCULAR; INTRAVENOUS EVERY 8 HOURS
Status: COMPLETED | OUTPATIENT
Start: 2022-01-01 | End: 2022-01-01

## 2022-01-01 RX ORDER — ACETAMINOPHEN 500 MG
500-1000 TABLET ORAL EVERY 6 HOURS PRN
Qty: 100 TABLET | Refills: 0 | Status: SHIPPED | OUTPATIENT
Start: 2022-01-01

## 2022-01-01 RX ORDER — FLUMAZENIL 0.1 MG/ML
0.2 INJECTION, SOLUTION INTRAVENOUS
Status: DISCONTINUED | OUTPATIENT
Start: 2022-01-01 | End: 2022-01-01 | Stop reason: HOSPADM

## 2022-01-01 RX ORDER — CALCITONIN SALMON 200 [IU]/.09ML
1 SPRAY, METERED NASAL DAILY
Status: DISCONTINUED | OUTPATIENT
Start: 2022-01-01 | End: 2022-01-01

## 2022-01-01 RX ORDER — AMOXICILLIN 250 MG
1 CAPSULE ORAL 2 TIMES DAILY PRN
Status: DISCONTINUED | OUTPATIENT
Start: 2022-01-01 | End: 2022-01-01 | Stop reason: HOSPADM

## 2022-01-01 RX ORDER — ACETAMINOPHEN 325 MG/1
650 TABLET ORAL EVERY 6 HOURS PRN
Status: DISCONTINUED | OUTPATIENT
Start: 2022-01-01 | End: 2022-01-01 | Stop reason: HOSPADM

## 2022-01-01 RX ORDER — NALOXONE HYDROCHLORIDE 0.4 MG/ML
0.4 INJECTION, SOLUTION INTRAMUSCULAR; INTRAVENOUS; SUBCUTANEOUS
Status: DISCONTINUED | OUTPATIENT
Start: 2022-01-01 | End: 2022-01-01 | Stop reason: HOSPADM

## 2022-01-01 RX ORDER — HALOPERIDOL 2 MG/ML
.5-1 SOLUTION ORAL EVERY 6 HOURS PRN
Qty: 10 ML | Refills: 0 | Status: SHIPPED | OUTPATIENT
Start: 2022-01-01

## 2022-01-01 RX ORDER — ZINC SULFATE 50(220)MG
220 CAPSULE ORAL DAILY
Status: DISCONTINUED | OUTPATIENT
Start: 2022-01-01 | End: 2022-01-01 | Stop reason: HOSPADM

## 2022-01-01 RX ORDER — AMOXICILLIN 250 MG
2 CAPSULE ORAL 2 TIMES DAILY PRN
Status: DISCONTINUED | OUTPATIENT
Start: 2022-01-01 | End: 2022-01-01 | Stop reason: HOSPADM

## 2022-01-01 RX ORDER — ONDANSETRON 2 MG/ML
4 INJECTION INTRAMUSCULAR; INTRAVENOUS EVERY 6 HOURS PRN
Status: CANCELLED | OUTPATIENT
Start: 2022-01-01

## 2022-01-01 RX ORDER — ONDANSETRON 4 MG/1
4 TABLET, ORALLY DISINTEGRATING ORAL EVERY 6 HOURS PRN
Status: DISCONTINUED | OUTPATIENT
Start: 2022-01-01 | End: 2022-01-01 | Stop reason: HOSPADM

## 2022-01-01 RX ORDER — ASPIRIN 81 MG
100 TABLET, DELAYED RELEASE (ENTERIC COATED) ORAL AT BEDTIME
Status: CANCELLED | OUTPATIENT
Start: 2022-01-01

## 2022-01-01 RX ORDER — NITROGLYCERIN 0.4 MG/1
0.4 TABLET SUBLINGUAL EVERY 5 MIN PRN
Status: DISCONTINUED | OUTPATIENT
Start: 2022-01-01 | End: 2022-01-01 | Stop reason: HOSPADM

## 2022-01-01 RX ORDER — METOPROLOL TARTRATE 25 MG/1
25 TABLET, FILM COATED ORAL 2 TIMES DAILY
Status: DISCONTINUED | OUTPATIENT
Start: 2022-01-01 | End: 2022-01-01

## 2022-01-01 RX ORDER — LANOLIN ALCOHOL/MO/W.PET/CERES
3 CREAM (GRAM) TOPICAL
Status: DISCONTINUED | OUTPATIENT
Start: 2022-01-01 | End: 2022-01-01 | Stop reason: HOSPADM

## 2022-01-01 RX ORDER — DOCUSATE SODIUM 100 MG/1
100 CAPSULE, LIQUID FILLED ORAL
Status: DISCONTINUED | OUTPATIENT
Start: 2022-01-01 | End: 2022-01-01 | Stop reason: HOSPADM

## 2022-01-01 RX ORDER — CITALOPRAM HYDROBROMIDE 20 MG/1
20 TABLET ORAL DAILY
Status: DISCONTINUED | OUTPATIENT
Start: 2022-01-01 | End: 2022-01-01 | Stop reason: HOSPADM

## 2022-01-01 RX ORDER — LANOLIN ALCOHOL/MO/W.PET/CERES
1000 CREAM (GRAM) TOPICAL DAILY
Status: CANCELLED | OUTPATIENT
Start: 2022-01-01

## 2022-01-01 RX ORDER — CITALOPRAM HYDROBROMIDE 20 MG/1
20 TABLET ORAL DAILY
COMMUNITY

## 2022-01-01 RX ORDER — AMLODIPINE BESYLATE 5 MG/1
5 TABLET ORAL AT BEDTIME
Status: DISCONTINUED | OUTPATIENT
Start: 2022-01-01 | End: 2022-01-01 | Stop reason: HOSPADM

## 2022-01-01 RX ORDER — PANTOPRAZOLE SODIUM 40 MG/1
40 TABLET, DELAYED RELEASE ORAL DAILY
Status: DISCONTINUED | OUTPATIENT
Start: 2022-01-01 | End: 2022-01-01

## 2022-01-01 RX ORDER — CITALOPRAM HYDROBROMIDE 20 MG/1
20 TABLET ORAL DAILY
Status: CANCELLED | OUTPATIENT
Start: 2022-01-01

## 2022-01-01 RX ORDER — SENNOSIDES A AND B 8.6 MG/1
1-2 TABLET, FILM COATED ORAL 2 TIMES DAILY PRN
Qty: 100 TABLET | Refills: 0 | Status: SHIPPED | OUTPATIENT
Start: 2022-01-01

## 2022-01-01 RX ORDER — METOPROLOL TARTRATE 25 MG/1
12.5 TABLET, FILM COATED ORAL 2 TIMES DAILY
Qty: 30 TABLET | Refills: 1 | Status: SHIPPED | OUTPATIENT
Start: 2022-01-01

## 2022-01-01 RX ORDER — DILTIAZEM HYDROCHLORIDE 5 MG/ML
10 INJECTION INTRAVENOUS ONCE
Status: COMPLETED | OUTPATIENT
Start: 2022-01-01 | End: 2022-01-01

## 2022-01-01 RX ORDER — PANTOPRAZOLE SODIUM 40 MG/1
40 TABLET, DELAYED RELEASE ORAL DAILY
Status: DISCONTINUED | OUTPATIENT
Start: 2022-01-01 | End: 2022-01-01 | Stop reason: HOSPADM

## 2022-01-01 RX ORDER — ONDANSETRON 4 MG/1
4 TABLET, ORALLY DISINTEGRATING ORAL EVERY 6 HOURS PRN
Status: CANCELLED | OUTPATIENT
Start: 2022-01-01

## 2022-01-01 RX ORDER — VIT A/VIT C/VIT E/ZINC/COPPER 4296-226
CAPSULE ORAL 2 TIMES DAILY
Status: DISCONTINUED | OUTPATIENT
Start: 2022-01-01 | End: 2022-01-01 | Stop reason: RX

## 2022-01-01 RX ORDER — HYDRALAZINE HYDROCHLORIDE 20 MG/ML
10 INJECTION INTRAMUSCULAR; INTRAVENOUS EVERY 4 HOURS PRN
Status: DISCONTINUED | OUTPATIENT
Start: 2022-01-01 | End: 2022-01-01 | Stop reason: HOSPADM

## 2022-01-01 RX ORDER — DIPHENHYDRAMINE HYDROCHLORIDE 50 MG/ML
50 INJECTION INTRAMUSCULAR; INTRAVENOUS
Status: DISCONTINUED | OUTPATIENT
Start: 2022-01-01 | End: 2022-01-01 | Stop reason: CLARIF

## 2022-01-01 RX ORDER — CALCIUM CARBONATE 500(1250)
500 TABLET ORAL DAILY
Status: DISCONTINUED | OUTPATIENT
Start: 2022-01-01 | End: 2022-01-01 | Stop reason: HOSPADM

## 2022-01-01 RX ORDER — HYDROMORPHONE HYDROCHLORIDE 2 MG/1
1-2 TABLET ORAL
Qty: 10 TABLET | Refills: 0 | Status: SHIPPED | OUTPATIENT
Start: 2022-01-01

## 2022-01-01 RX ORDER — FERROUS SULFATE 325(65) MG
325 TABLET ORAL
Status: ON HOLD | COMMUNITY
End: 2022-01-01

## 2022-01-01 RX ORDER — ACETAMINOPHEN 650 MG/1
650 SUPPOSITORY RECTAL EVERY 6 HOURS PRN
Status: DISCONTINUED | OUTPATIENT
Start: 2022-01-01 | End: 2022-01-01 | Stop reason: HOSPADM

## 2022-01-01 RX ORDER — IOPAMIDOL 755 MG/ML
500 INJECTION, SOLUTION INTRAVASCULAR ONCE
Status: COMPLETED | OUTPATIENT
Start: 2022-01-01 | End: 2022-01-01

## 2022-01-01 RX ORDER — FUROSEMIDE 20 MG
10 TABLET ORAL DAILY
Qty: 30 TABLET | Refills: 1 | Status: SHIPPED | OUTPATIENT
Start: 2022-01-01

## 2022-01-01 RX ORDER — LANOLIN ALCOHOL/MO/W.PET/CERES
1000 CREAM (GRAM) TOPICAL DAILY
Status: DISCONTINUED | OUTPATIENT
Start: 2022-01-01 | End: 2022-01-01 | Stop reason: HOSPADM

## 2022-01-01 RX ORDER — POLYETHYLENE GLYCOL 3350 17 G/17G
17 POWDER, FOR SOLUTION ORAL DAILY PRN
Status: DISCONTINUED | OUTPATIENT
Start: 2022-01-01 | End: 2022-01-01 | Stop reason: HOSPADM

## 2022-01-01 RX ORDER — ATORVASTATIN CALCIUM 10 MG/1
10 TABLET, FILM COATED ORAL DAILY
Status: CANCELLED | OUTPATIENT
Start: 2022-01-01

## 2022-01-01 RX ORDER — PANTOPRAZOLE SODIUM 40 MG/1
40 TABLET, DELAYED RELEASE ORAL DAILY
Status: CANCELLED | OUTPATIENT
Start: 2022-01-01

## 2022-01-01 RX ORDER — CALCITONIN SALMON 200 [IU]/.09ML
1 SPRAY, METERED NASAL DAILY
Status: DISCONTINUED | OUTPATIENT
Start: 2022-01-01 | End: 2022-01-01 | Stop reason: HOSPADM

## 2022-01-01 RX ORDER — LIDOCAINE 40 MG/G
CREAM TOPICAL
Status: DISCONTINUED | OUTPATIENT
Start: 2022-01-01 | End: 2022-01-01

## 2022-01-01 RX ORDER — METHYLPREDNISOLONE SODIUM SUCCINATE 125 MG/2ML
125 INJECTION, POWDER, LYOPHILIZED, FOR SOLUTION INTRAMUSCULAR; INTRAVENOUS
Status: DISCONTINUED | OUTPATIENT
Start: 2022-01-01 | End: 2022-01-01 | Stop reason: CLARIF

## 2022-01-01 RX ORDER — FENTANYL CITRATE 50 UG/ML
25-50 INJECTION, SOLUTION INTRAMUSCULAR; INTRAVENOUS EVERY 5 MIN PRN
Status: DISCONTINUED | OUTPATIENT
Start: 2022-01-01 | End: 2022-01-01 | Stop reason: HOSPADM

## 2022-01-01 RX ORDER — ATORVASTATIN CALCIUM 10 MG/1
10 TABLET, FILM COATED ORAL DAILY
Status: DISCONTINUED | OUTPATIENT
Start: 2022-01-01 | End: 2022-01-01 | Stop reason: HOSPADM

## 2022-01-01 RX ORDER — DIPHENHYDRAMINE HYDROCHLORIDE 50 MG/ML
25 INJECTION INTRAMUSCULAR; INTRAVENOUS ONCE
Status: COMPLETED | OUTPATIENT
Start: 2022-01-01 | End: 2022-01-01

## 2022-01-01 RX ORDER — FUROSEMIDE 20 MG
20 TABLET ORAL DAILY
Status: DISCONTINUED | OUTPATIENT
Start: 2022-01-01 | End: 2022-01-01 | Stop reason: HOSPADM

## 2022-01-01 RX ORDER — FUROSEMIDE 10 MG/ML
20 INJECTION INTRAMUSCULAR; INTRAVENOUS ONCE
Status: COMPLETED | OUTPATIENT
Start: 2022-01-01 | End: 2022-01-01

## 2022-01-01 RX ORDER — DILTIAZEM HCL IN NACL,ISO-OSM 125 MG/125
5-15 PLASTIC BAG, INJECTION (ML) INTRAVENOUS CONTINUOUS
Status: DISCONTINUED | OUTPATIENT
Start: 2022-01-01 | End: 2022-01-01

## 2022-01-01 RX ORDER — AMOXICILLIN 250 MG
2 CAPSULE ORAL 2 TIMES DAILY PRN
Status: CANCELLED | OUTPATIENT
Start: 2022-01-01

## 2022-01-01 RX ORDER — ONDANSETRON 2 MG/ML
4 INJECTION INTRAMUSCULAR; INTRAVENOUS EVERY 6 HOURS PRN
Status: DISCONTINUED | OUTPATIENT
Start: 2022-01-01 | End: 2022-01-01 | Stop reason: HOSPADM

## 2022-01-01 RX ORDER — FERROUS SULFATE 325(65) MG
325 TABLET ORAL
Status: CANCELLED | OUTPATIENT
Start: 2022-01-01

## 2022-01-01 RX ORDER — NALOXONE HYDROCHLORIDE 0.4 MG/ML
0.2 INJECTION, SOLUTION INTRAMUSCULAR; INTRAVENOUS; SUBCUTANEOUS
Status: DISCONTINUED | OUTPATIENT
Start: 2022-01-01 | End: 2022-01-01 | Stop reason: HOSPADM

## 2022-01-01 RX ORDER — BISACODYL 10 MG
10 SUPPOSITORY, RECTAL RECTAL DAILY PRN
Qty: 2 SUPPOSITORY | Refills: 0 | Status: SHIPPED | OUTPATIENT
Start: 2022-01-01

## 2022-01-01 RX ORDER — CALCIUM CARBONATE 500 MG/1
1000 TABLET, CHEWABLE ORAL 4 TIMES DAILY PRN
Status: CANCELLED | OUTPATIENT
Start: 2022-01-01

## 2022-01-01 RX ORDER — ATROPINE SULFATE 10 MG/ML
2 SOLUTION/ DROPS OPHTHALMIC EVERY 4 HOURS PRN
Qty: 5 ML | Refills: 0 | Status: SHIPPED | OUTPATIENT
Start: 2022-01-01

## 2022-01-01 RX ORDER — ATORVASTATIN CALCIUM 10 MG/1
10 TABLET, FILM COATED ORAL DAILY
Status: DISCONTINUED | OUTPATIENT
Start: 2022-01-01 | End: 2022-01-01

## 2022-01-01 RX ADMIN — SODIUM CHLORIDE 500 ML: 9 INJECTION, SOLUTION INTRAVENOUS at 12:11

## 2022-01-01 RX ADMIN — FUROSEMIDE 20 MG: 20 TABLET ORAL at 11:01

## 2022-01-01 RX ADMIN — PANTOPRAZOLE SODIUM 40 MG: 40 TABLET, DELAYED RELEASE ORAL at 08:53

## 2022-01-01 RX ADMIN — Medication 5 MG/HR: at 15:41

## 2022-01-01 RX ADMIN — CITALOPRAM HYDROBROMIDE 20 MG: 20 TABLET ORAL at 09:09

## 2022-01-01 RX ADMIN — BRINZOLAMIDE/BRIMONIDINE TARTRATE 1 DROP: 10; 2 SUSPENSION/ DROPS OPHTHALMIC at 08:56

## 2022-01-01 RX ADMIN — CITALOPRAM HYDROBROMIDE 20 MG: 20 TABLET ORAL at 16:45

## 2022-01-01 RX ADMIN — PANTOPRAZOLE SODIUM 40 MG: 40 TABLET, DELAYED RELEASE ORAL at 08:46

## 2022-01-01 RX ADMIN — Medication 200 MG: at 08:45

## 2022-01-01 RX ADMIN — Medication 1000 MCG: at 08:46

## 2022-01-01 RX ADMIN — ATORVASTATIN CALCIUM 10 MG: 10 TABLET, FILM COATED ORAL at 09:42

## 2022-01-01 RX ADMIN — ATORVASTATIN CALCIUM 10 MG: 10 TABLET, FILM COATED ORAL at 08:57

## 2022-01-01 RX ADMIN — ATORVASTATIN CALCIUM 40 MG: 40 TABLET, FILM COATED ORAL at 20:03

## 2022-01-01 RX ADMIN — FUROSEMIDE 20 MG: 20 TABLET ORAL at 09:09

## 2022-01-01 RX ADMIN — IRON SUCROSE 300 MG: 20 INJECTION, SOLUTION INTRAVENOUS at 14:44

## 2022-01-01 RX ADMIN — CITALOPRAM HYDROBROMIDE 20 MG: 20 TABLET ORAL at 08:56

## 2022-01-01 RX ADMIN — METOPROLOL TARTRATE 12.5 MG: 25 TABLET, FILM COATED ORAL at 21:10

## 2022-01-01 RX ADMIN — METOPROLOL TARTRATE 12.5 MG: 25 TABLET, FILM COATED ORAL at 09:09

## 2022-01-01 RX ADMIN — Medication 1000 MCG: at 08:16

## 2022-01-01 RX ADMIN — METOPROLOL TARTRATE 12.5 MG: 25 TABLET, FILM COATED ORAL at 08:56

## 2022-01-01 RX ADMIN — HUMAN ALBUMIN MICROSPHERES AND PERFLUTREN 3 ML: 10; .22 INJECTION, SOLUTION INTRAVENOUS at 12:07

## 2022-01-01 RX ADMIN — BRINZOLAMIDE/BRIMONIDINE TARTRATE 1 DROP: 10; 2 SUSPENSION/ DROPS OPHTHALMIC at 21:57

## 2022-01-01 RX ADMIN — METOPROLOL TARTRATE 12.5 MG: 25 TABLET, FILM COATED ORAL at 09:42

## 2022-01-01 RX ADMIN — AMLODIPINE BESYLATE 5 MG: 5 TABLET ORAL at 21:07

## 2022-01-01 RX ADMIN — BRINZOLAMIDE/BRIMONIDINE TARTRATE 1 DROP: 10; 2 SUSPENSION/ DROPS OPHTHALMIC at 20:03

## 2022-01-01 RX ADMIN — DIPHENHYDRAMINE HYDROCHLORIDE 25 MG: 50 INJECTION, SOLUTION INTRAMUSCULAR; INTRAVENOUS at 17:03

## 2022-01-01 RX ADMIN — APIXABAN 2.5 MG: 2.5 TABLET, FILM COATED ORAL at 13:19

## 2022-01-01 RX ADMIN — METOPROLOL TARTRATE 25 MG: 25 TABLET, FILM COATED ORAL at 21:15

## 2022-01-01 RX ADMIN — CITALOPRAM HYDROBROMIDE 20 MG: 20 TABLET ORAL at 08:16

## 2022-01-01 RX ADMIN — ATORVASTATIN CALCIUM 40 MG: 40 TABLET, FILM COATED ORAL at 20:14

## 2022-01-01 RX ADMIN — CITALOPRAM HYDROBROMIDE 20 MG: 20 TABLET ORAL at 08:47

## 2022-01-01 RX ADMIN — SODIUM CHLORIDE 80 ML: 9 INJECTION, SOLUTION INTRAVENOUS at 18:34

## 2022-01-01 RX ADMIN — ATORVASTATIN CALCIUM 40 MG: 40 TABLET, FILM COATED ORAL at 21:06

## 2022-01-01 RX ADMIN — BRINZOLAMIDE/BRIMONIDINE TARTRATE 1 DROP: 10; 2 SUSPENSION/ DROPS OPHTHALMIC at 16:32

## 2022-01-01 RX ADMIN — METOPROLOL TARTRATE 25 MG: 25 TABLET, FILM COATED ORAL at 10:17

## 2022-01-01 RX ADMIN — ATORVASTATIN CALCIUM 10 MG: 10 TABLET, FILM COATED ORAL at 09:09

## 2022-01-01 RX ADMIN — SODIUM CHLORIDE, POTASSIUM CHLORIDE, SODIUM LACTATE AND CALCIUM CHLORIDE: 600; 310; 30; 20 INJECTION, SOLUTION INTRAVENOUS at 21:56

## 2022-01-01 RX ADMIN — CITALOPRAM HYDROBROMIDE 20 MG: 20 TABLET ORAL at 08:45

## 2022-01-01 RX ADMIN — APIXABAN 2.5 MG: 2.5 TABLET, FILM COATED ORAL at 09:55

## 2022-01-01 RX ADMIN — Medication 1000 MCG: at 08:47

## 2022-01-01 RX ADMIN — ATORVASTATIN CALCIUM 40 MG: 40 TABLET, FILM COATED ORAL at 21:56

## 2022-01-01 RX ADMIN — BRINZOLAMIDE/BRIMONIDINE TARTRATE 1 DROP: 10; 2 SUSPENSION/ DROPS OPHTHALMIC at 21:10

## 2022-01-01 RX ADMIN — ZINC SULFATE 220 MG (50 MG) CAPSULE 220 MG: CAPSULE at 08:54

## 2022-01-01 RX ADMIN — DILTIAZEM HYDROCHLORIDE 10 MG: 5 INJECTION INTRAVENOUS at 13:21

## 2022-01-01 RX ADMIN — LIDOCAINE HYDROCHLORIDE 10 ML: 10 INJECTION, SOLUTION EPIDURAL; INFILTRATION; INTRACAUDAL; PERINEURAL at 14:42

## 2022-01-01 RX ADMIN — PANTOPRAZOLE SODIUM 40 MG: 40 TABLET, DELAYED RELEASE ORAL at 08:57

## 2022-01-01 RX ADMIN — PANTOPRAZOLE SODIUM 40 MG: 40 INJECTION, POWDER, FOR SOLUTION INTRAVENOUS at 13:19

## 2022-01-01 RX ADMIN — PANTOPRAZOLE SODIUM 40 MG: 40 INJECTION, POWDER, FOR SOLUTION INTRAVENOUS at 19:24

## 2022-01-01 RX ADMIN — BRINZOLAMIDE/BRIMONIDINE TARTRATE 1 DROP: 10; 2 SUSPENSION/ DROPS OPHTHALMIC at 21:03

## 2022-01-01 RX ADMIN — BRINZOLAMIDE/BRIMONIDINE TARTRATE 1 DROP: 10; 2 SUSPENSION/ DROPS OPHTHALMIC at 08:51

## 2022-01-01 RX ADMIN — CALCIUM 500 MG: 500 TABLET ORAL at 08:45

## 2022-01-01 RX ADMIN — FUROSEMIDE 20 MG: 10 INJECTION, SOLUTION INTRAMUSCULAR; INTRAVENOUS at 17:53

## 2022-01-01 RX ADMIN — FUROSEMIDE 20 MG: 10 INJECTION, SOLUTION INTRAMUSCULAR; INTRAVENOUS at 14:06

## 2022-01-01 RX ADMIN — PANTOPRAZOLE SODIUM 40 MG: 40 TABLET, DELAYED RELEASE ORAL at 09:09

## 2022-01-01 RX ADMIN — Medication 1000 MCG: at 08:53

## 2022-01-01 RX ADMIN — BRINZOLAMIDE/BRIMONIDINE TARTRATE 1 DROP: 10; 2 SUSPENSION/ DROPS OPHTHALMIC at 08:47

## 2022-01-01 RX ADMIN — BRINZOLAMIDE/BRIMONIDINE TARTRATE 1 DROP: 10; 2 SUSPENSION/ DROPS OPHTHALMIC at 08:20

## 2022-01-01 RX ADMIN — BRINZOLAMIDE/BRIMONIDINE TARTRATE 1 DROP: 10; 2 SUSPENSION/ DROPS OPHTHALMIC at 08:52

## 2022-01-01 RX ADMIN — CITALOPRAM HYDROBROMIDE 20 MG: 20 TABLET ORAL at 08:52

## 2022-01-01 RX ADMIN — FUROSEMIDE 20 MG: 20 TABLET ORAL at 08:57

## 2022-01-01 RX ADMIN — CALCIUM 500 MG: 500 TABLET ORAL at 13:19

## 2022-01-01 RX ADMIN — ZINC SULFATE 220 MG (50 MG) CAPSULE 220 MG: CAPSULE at 13:19

## 2022-01-01 RX ADMIN — PANTOPRAZOLE SODIUM 40 MG: 40 INJECTION, POWDER, FOR SOLUTION INTRAVENOUS at 08:44

## 2022-01-01 RX ADMIN — IOPAMIDOL 72 ML: 755 INJECTION, SOLUTION INTRAVENOUS at 18:34

## 2022-01-01 RX ADMIN — CITALOPRAM HYDROBROMIDE 20 MG: 20 TABLET ORAL at 08:46

## 2022-01-01 RX ADMIN — AMLODIPINE BESYLATE 5 MG: 5 TABLET ORAL at 21:01

## 2022-01-01 RX ADMIN — CALCIUM 500 MG: 500 TABLET ORAL at 08:53

## 2022-01-01 RX ADMIN — Medication 200 MG: at 13:21

## 2022-01-01 RX ADMIN — FUROSEMIDE 20 MG: 10 INJECTION, SOLUTION INTRAMUSCULAR; INTRAVENOUS at 10:07

## 2022-01-01 RX ADMIN — BRINZOLAMIDE/BRIMONIDINE TARTRATE 1 DROP: 10; 2 SUSPENSION/ DROPS OPHTHALMIC at 09:09

## 2022-01-01 RX ADMIN — PANTOPRAZOLE SODIUM 40 MG: 40 INJECTION, POWDER, FOR SOLUTION INTRAVENOUS at 08:30

## 2022-01-01 RX ADMIN — CITALOPRAM HYDROBROMIDE 20 MG: 20 TABLET ORAL at 09:42

## 2022-01-01 RX ADMIN — ATORVASTATIN CALCIUM 40 MG: 40 TABLET, FILM COATED ORAL at 21:01

## 2022-01-01 RX ADMIN — BRINZOLAMIDE/BRIMONIDINE TARTRATE 1 DROP: 10; 2 SUSPENSION/ DROPS OPHTHALMIC at 16:59

## 2022-01-01 RX ADMIN — BRINZOLAMIDE/BRIMONIDINE TARTRATE 1 DROP: 10; 2 SUSPENSION/ DROPS OPHTHALMIC at 09:48

## 2022-01-01 RX ADMIN — BRINZOLAMIDE/BRIMONIDINE TARTRATE 1 DROP: 10; 2 SUSPENSION/ DROPS OPHTHALMIC at 08:55

## 2022-01-01 RX ADMIN — Medication 1000 MCG: at 16:45

## 2022-01-01 RX ADMIN — PANTOPRAZOLE SODIUM 40 MG: 40 INJECTION, POWDER, FOR SOLUTION INTRAVENOUS at 21:56

## 2022-01-01 RX ADMIN — BRINZOLAMIDE/BRIMONIDINE TARTRATE 1 DROP: 10; 2 SUSPENSION/ DROPS OPHTHALMIC at 20:18

## 2022-01-01 RX ADMIN — Medication 200 MG: at 08:53

## 2022-01-01 RX ADMIN — APIXABAN 2.5 MG: 2.5 TABLET, FILM COATED ORAL at 21:01

## 2022-01-01 RX ADMIN — PANTOPRAZOLE SODIUM 40 MG: 40 TABLET, DELAYED RELEASE ORAL at 08:16

## 2022-01-01 RX ADMIN — PANTOPRAZOLE SODIUM 40 MG: 40 TABLET, DELAYED RELEASE ORAL at 09:42

## 2022-01-01 RX ADMIN — ZINC SULFATE 220 MG (50 MG) CAPSULE 220 MG: CAPSULE at 08:45

## 2022-01-01 ASSESSMENT — ACTIVITIES OF DAILY LIVING (ADL)
ADLS_ACUITY_SCORE: 49
DIFFICULTY_EATING/SWALLOWING: NO
ADLS_ACUITY_SCORE: 46
ADLS_ACUITY_SCORE: 45
EQUIPMENT_CURRENTLY_USED_AT_HOME: WALKER, STANDARD
ADLS_ACUITY_SCORE: 45
ADLS_ACUITY_SCORE: 54
ADLS_ACUITY_SCORE: 40
DEPENDENT_IADLS:: CLEANING;COOKING;LAUNDRY;MEAL PREPARATION;SHOPPING;MEDICATION MANAGEMENT;TRANSPORTATION
ADLS_ACUITY_SCORE: 49
ADLS_ACUITY_SCORE: 46
ADLS_ACUITY_SCORE: 46
ADLS_ACUITY_SCORE: 54
ADLS_ACUITY_SCORE: 46
TOILETING_ASSISTANCE: TOILETING DIFFICULTY, REQUIRES EQUIPMENT
TOILETING_ISSUES: YES
CHANGE_IN_FUNCTIONAL_STATUS_SINCE_ONSET_OF_CURRENT_ILLNESS/INJURY: YES
ADLS_ACUITY_SCORE: 46
ADLS_ACUITY_SCORE: 46
ADLS_ACUITY_SCORE: 50
ADLS_ACUITY_SCORE: 50
ADLS_ACUITY_SCORE: 54
FALL_HISTORY_WITHIN_LAST_SIX_MONTHS: YES
BATHING: 1-->ASSISTANCE NEEDED
ADLS_ACUITY_SCORE: 50
ADLS_ACUITY_SCORE: 44
ADLS_ACUITY_SCORE: 50
ADLS_ACUITY_SCORE: 50
ADLS_ACUITY_SCORE: 49
ADLS_ACUITY_SCORE: 38
ADLS_ACUITY_SCORE: 49
ADLS_ACUITY_SCORE: 46
ADLS_ACUITY_SCORE: 50
ADLS_ACUITY_SCORE: 38
FALL_HISTORY_WITHIN_LAST_SIX_MONTHS: YES
ADLS_ACUITY_SCORE: 50
ADLS_ACUITY_SCORE: 44
WALKING_OR_CLIMBING_STAIRS_DIFFICULTY: YES
ADLS_ACUITY_SCORE: 40
ADLS_ACUITY_SCORE: 35
ADLS_ACUITY_SCORE: 46
CONCENTRATING,_REMEMBERING_OR_MAKING_DECISIONS_DIFFICULTY: NO
ADLS_ACUITY_SCORE: 49
DRESSING/BATHING: BATHING DIFFICULTY, ASSISTANCE 1 PERSON
ADLS_ACUITY_SCORE: 50
ADLS_ACUITY_SCORE: 40
ADLS_ACUITY_SCORE: 50
ADLS_ACUITY_SCORE: 50
ADLS_ACUITY_SCORE: 54
ADLS_ACUITY_SCORE: 49
ADLS_ACUITY_SCORE: 38
WEAR_GLASSES_OR_BLIND: YES
ADLS_ACUITY_SCORE: 46
ADLS_ACUITY_SCORE: 49
ADLS_ACUITY_SCORE: 42
ADLS_ACUITY_SCORE: 50
ADLS_ACUITY_SCORE: 50
DRESS: 1-->ASSISTANCE (EQUIPMENT/PERSON) NEEDED
TOILETING: 1-->ASSISTANCE (EQUIPMENT/PERSON) NEEDED (NOT DEVELOPMENTALLY APPROPRIATE)
ADLS_ACUITY_SCORE: 44
ADLS_ACUITY_SCORE: 46
ADLS_ACUITY_SCORE: 44
DIFFICULTY_EATING/SWALLOWING: NO
ADLS_ACUITY_SCORE: 46
ADLS_ACUITY_SCORE: 38
ADLS_ACUITY_SCORE: 50
ADLS_ACUITY_SCORE: 50
CHANGE_IN_FUNCTIONAL_STATUS_SINCE_ONSET_OF_CURRENT_ILLNESS/INJURY: YES
ADLS_ACUITY_SCORE: 50
ADLS_ACUITY_SCORE: 46
NUMBER_OF_TIMES_PATIENT_HAS_FALLEN_WITHIN_LAST_SIX_MONTHS: 1
ADLS_ACUITY_SCORE: 50
ADLS_ACUITY_SCORE: 49
IADL_COMMENTS: SPOUSE COMPLETES ALL IADLS
TOILETING: 1-->ASSISTANCE (EQUIPMENT/PERSON) NEEDED
ADLS_ACUITY_SCORE: 40
TRANSFERRING: 1-->ASSISTANCE (EQUIPMENT/PERSON) NEEDED
TOILETING_ASSISTANCE: TOILETING DIFFICULTY, ASSISTANCE 1 PERSON
ADLS_ACUITY_SCORE: 46
ADLS_ACUITY_SCORE: 50
ADLS_ACUITY_SCORE: 46
ADLS_ACUITY_SCORE: 42
ADLS_ACUITY_SCORE: 46
WEAR_GLASSES_OR_BLIND: YES
ADLS_ACUITY_SCORE: 49
ADLS_ACUITY_SCORE: 50
ADLS_ACUITY_SCORE: 49
ADLS_ACUITY_SCORE: 40
DRESSING/BATHING: BATHING DIFFICULTY, ASSISTANCE 1 PERSON
ADLS_ACUITY_SCORE: 46
ADLS_ACUITY_SCORE: 38
ADLS_ACUITY_SCORE: 42
ADLS_ACUITY_SCORE: 49
ADLS_ACUITY_SCORE: 49
VISION_MANAGEMENT: BIFOCAL
ADLS_ACUITY_SCORE: 46
ADLS_ACUITY_SCORE: 46
ADLS_ACUITY_SCORE: 35
ADLS_ACUITY_SCORE: 44
ADLS_ACUITY_SCORE: 50
DOING_ERRANDS_INDEPENDENTLY_DIFFICULTY: NO
DRESS: 1-->ASSISTANCE (EQUIPMENT/PERSON) NEEDED (NOT DEVELOPMENTALLY APPROPRIATE)
ADLS_ACUITY_SCORE: 49
ADLS_ACUITY_SCORE: 50
ADLS_ACUITY_SCORE: 44
ADLS_ACUITY_SCORE: 40
ADLS_ACUITY_SCORE: 50
ADLS_ACUITY_SCORE: 50
TOILETING: 1-->ASSISTANCE (EQUIPMENT/PERSON) NEEDED (NOT DEVELOPMENTALLY APPROPRIATE)
ADLS_ACUITY_SCORE: 50
COMMUNICATION: DIFFICULTY SPEAKING
ADLS_ACUITY_SCORE: 46
CONCENTRATING,_REMEMBERING_OR_MAKING_DECISIONS_DIFFICULTY: NO
ADLS_ACUITY_SCORE: 46
ADLS_ACUITY_SCORE: 50
ADLS_ACUITY_SCORE: 50
ADLS_ACUITY_SCORE: 38
ADLS_ACUITY_SCORE: 49
HEARING_DIFFICULTY_OR_DEAF: NO
ADLS_ACUITY_SCORE: 40
ADLS_ACUITY_SCORE: 49
ADLS_ACUITY_SCORE: 33
ADLS_ACUITY_SCORE: 46
ADLS_ACUITY_SCORE: 50
ADLS_ACUITY_SCORE: 49
TRANSFERRING: 1-->ASSISTANCE (EQUIPMENT/PERSON) NEEDED (NOT DEVELOPMENTALLY APPROPRIATE)
TOILETING: 1-->ASSISTANCE (EQUIPMENT/PERSON) NEEDED
ADLS_ACUITY_SCORE: 42
ADLS_ACUITY_SCORE: 44
ADLS_ACUITY_SCORE: 54
ADLS_ACUITY_SCORE: 50
WALKING_OR_CLIMBING_STAIRS: AMBULATION DIFFICULTY, ASSISTANCE 1 PERSON
TRANSFERRING: 1-->ASSISTANCE (EQUIPMENT/PERSON) NEEDED (NOT DEVELOPMENTALLY APPROPRIATE)
ADLS_ACUITY_SCORE: 35
WALKING_OR_CLIMBING_STAIRS_DIFFICULTY: YES
ADLS_ACUITY_SCORE: 54
DRESSING/BATHING_DIFFICULTY: YES
ADLS_ACUITY_SCORE: 54
ADLS_ACUITY_SCORE: 49
WALKING_OR_CLIMBING_STAIRS: AMBULATION DIFFICULTY, ASSISTANCE 1 PERSON
ADLS_ACUITY_SCORE: 42
ADLS_ACUITY_SCORE: 50
ADLS_ACUITY_SCORE: 50
ADLS_ACUITY_SCORE: 46
ADLS_ACUITY_SCORE: 35
DIFFICULTY_COMMUNICATING: YES
DRESSING/BATHING_DIFFICULTY: YES
ADLS_ACUITY_SCORE: 44
BATHING: 1-->ASSISTANCE NEEDED
ADLS_ACUITY_SCORE: 49
TRANSFERRING: 1-->ASSISTANCE (EQUIPMENT/PERSON) NEEDED
ADLS_ACUITY_SCORE: 50
ADLS_ACUITY_SCORE: 46
ADLS_ACUITY_SCORE: 49
ADLS_ACUITY_SCORE: 46
ADLS_ACUITY_SCORE: 44
DRESS: 1-->ASSISTANCE (EQUIPMENT/PERSON) NEEDED
TOILETING_ISSUES: YES
ADLS_ACUITY_SCORE: 45
DRESS: 1-->ASSISTANCE (EQUIPMENT/PERSON) NEEDED (NOT DEVELOPMENTALLY APPROPRIATE)
DOING_ERRANDS_INDEPENDENTLY_DIFFICULTY: NO
ADLS_ACUITY_SCORE: 46

## 2022-07-13 NOTE — PROGRESS NOTES
[facility]    Medicine Progress Note - Hospitalist Service       Date of Admission:  10/4/2019  Date of Service: 10/05/2019    Assessment & Plan      84 year old male with a history of CVA with expressive aphasia, HTN, HLD, with recent mechanical fall on 9/30 resulting in an impacted, intra-articular, nondisplaced left distal radius fracture.  Patient returned to the hospital as an outpatient for elective ORIF with Dr. Interiano.  As patient was being evaluated prior to surgery, he was found to be in new onset Atrial Fibrillation with RVR and directly admitted from the pre-op surgical area.      New Onset Atrial Fibrillation with RVR - pt presented with asymptomatic new onset afib with rates up to 140.  No chest pain or shortness of breath.  BP stable.  S/p IV Metoprolol in the pre-op area.  Patient was started on a diltiazem drip.  However his blood pressure became soft and he was given a dose of digoxin, with improvement.  - Converted to sinus rhythm overnight.  Discontinue his amlodipine and start metoprolol 25 mg twice daily  - Appreciate cardiology consultation.  Currently on a heparin drip for anticoagulation, plan to transition to Eliquis after his surgery.  Plavix has been stopped  - Echo showing preserved EF, no regional wall motion 80s    Left Distal Radial Fracture - s/p mechanical fall 9/30.    - Orthopedic Surgery consult. Cleared by cardiology, plan for surgery tomorrow. Bridge with heparin drip  - continue pta Norco prn.      Hx CVA - hx of first CVA in 2004 with multiple TIA per family.  Most recently hospitalized 7/2019 with a Left MCA ischemic stroke.  Pt with expressive aphasia and right sided weakness at baseline. Currently on ASA and Plavix.   - continue PTA Lovastatin  - hold Plavix for now as above, on heparin drip    HTN - Stop PTA Amlodipine, started on metoprolol       Depression/Anxiety - continue pta Celexa  Glaucoma - continue pta eye gtts  GERD - continue pta Protonix    Diet:  Vaccine Information Statement(s) or the Emergency Use Authorization was given today. This has been reviewed, questions answered, and verbal consent given by Patient for injection(s) and administration of Tetanus/Diphtheria/Pertussis (Tdap).      Patient tolerated without incident. See immunization grid for documentation.         Combination Diet Regular Diet Adult    DVT Prophylaxis: Heparin gtt   Pena Catheter: not present  Code Status: DNR/DNI      Disposition Plan   Expected discharge: 2 - 3 days, recommended to prior living arrangement pending recovery from surgery  Entered: Jon Elkins MD 10/05/2019, 2:34 PM       The patient's care was discussed with the Bedside Nurse, Patient and Patient's Family.    Jon Elkins MD  Hospitalist Service  [facility]    ______________________________________________________________________    Interval History   Chart reviewed and patient seen. Case discussed with nursing staff.     Patient feels well, has expressive aphasia, wondering when he can get surgery  Denies any chest pain, shortness of breath. No reports of abdominal pain or vomiting. No new complaints voiced otherwise.       Data reviewed today: I reviewed all medications, new labs and imaging results over the last 24 hours. I personally reviewed    Physical Exam   Vital Signs: Temp: 96.8  F (36  C) Temp src: Oral BP: 129/63 Pulse: 64 Heart Rate: 55 Resp: 18 SpO2: 96 % O2 Device: None (Room air)    Weight: 165 lbs 11.2 oz    GENERAL:  Awake and alert, No acute distress.  PSYCH: appropriate affect, no acute agitation   HEENT:  Neck is Supple, trachea is midline, EOMI, conjunctiva clear  CARDIOVASCULAR: Regular rate and rhythm, Normal S1, S2, no loud murmurs, no rubs or gallops.   PULMONARY:  Clear to auscultation bilaterally. Good air entry on both sides  GI: Abdomen is soft, non tender, non-distended, bowel sounds present. No rebound or guarding   SKIN:  No cyanosis or clubbing, no obvious exanthems on exposed areas   MSK: Extremities are warm and well perfused. No pitting edema, left arm is in a case   Neuro: Awake and oriented, expressive aphasia, right sided weakness     Data   Recent Labs   Lab 10/04/19  1450   WBC 5.2   HGB 12.9*   MCV 94         POTASSIUM 4.0   CHLORIDE 110*   CO2 26   BUN 16    CR 1.12   ANIONGAP 5   WILLIAM 8.5   GLC 89       No results found for this or any previous visit (from the past 24 hour(s)).  Medications     HEParin 750 Units/hr (10/05/19 0836)     - MEDICATION INSTRUCTIONS -         brinzolamide-brimonidine  1 drop Both Eyes BID     citalopram  40 mg Oral Daily     digoxin  125 mcg Oral Daily     lovastatin  40 mg Oral At Bedtime     metoprolol tartrate  25 mg Oral BID     pantoprazole  40 mg Oral QAM AC     sodium chloride (PF)  3 mL Intracatheter Q8H

## 2022-08-04 PROBLEM — K92.2 GASTROINTESTINAL BLEED: Status: ACTIVE | Noted: 2022-01-01

## 2022-08-04 PROBLEM — K92.1 GASTROINTESTINAL HEMORRHAGE WITH MELENA: Status: ACTIVE | Noted: 2022-01-01

## 2022-08-04 NOTE — ED TRIAGE NOTES
Patient has a hx or a stroke so wife primarily speaks for him. Patient had a BM this afternoon and his wife was assisting him in wiping and she noted that the stool appeared to be primarily bright red blood. Wife states patient takes a iron supplement so his stools are always darker.  Patient denies pain.      Triage Assessment     Row Name 08/04/22 1519       Triage Assessment (Adult)    Airway WDL WDL       Respiratory WDL    Respiratory WDL WDL       Skin Circulation/Temperature WDL    Skin Circulation/Temperature WDL WDL       Cardiac WDL    Cardiac WDL WDL       Peripheral/Neurovascular WDL    Peripheral Neurovascular WDL WDL       Cognitive/Neuro/Behavioral WDL    Cognitive/Neuro/Behavioral WDL WDL

## 2022-08-04 NOTE — ED PROVIDER NOTES
History   Chief Complaint:  Rectal Bleeding       HPI   Tk Richmond is a 87 year old male on Eliquis with history of CVA, CKD, atrial fibrillation, HTN, and a lower GI bleed who presents with bright red blood in his stool earlier today. He has not had bright red in his stool before. Patient's wife notes he has had dark stools recently, but attributes this to iron supplements. He is also experiencing some wheezing and leg swelling, which his wife says is normal for him. Here in the ED, his abdomen appears distended. Patient's wife denies vomiting, diarrhea, abdominal pain, chest pain, shortness of breath, and lightheadedness. No alcohol, street drugs, or tobacco use. Patient does not take Aspirin, Motrin, or Ibuprofen on a regular basis.       Review of Systems   All other systems reviewed and are negative.      Allergies:  Contrast Dye    Medications:  Norvasc  Eliquis  Simbrinza  Celexa  Colace  Mevacor  Protonix    Past Medical History:     CVA  HTN  Ascending aortic aneurysm  Lower GI bleed  Anemia  Paroxysmal atrial fibrillation  Acute CVA  Non- rheumatic aortic regurgitation    Hemiparesis and aphasia  CKD, stage 3  Knee osteoarthritis  Hyperlipidemia  Anxiety   Glaucoma    Past Surgical History:    Appendectomy  Cataract extraction  Carotid cerebral angiogram left  ORIF, left wrist  Skin cancer excision from scalp  Total knee arthroplasty, right   Hernia repair    Family History:    Mother: Breast cancer  Sister: Breast cancer, COPD    Social History:  The patient presents to the ED with his wife  Arrives via private vehicle  PCP: Shyam Golden, Family Medicine     Physical Exam     Patient Vitals for the past 24 hrs:   BP Temp Temp src Pulse Resp SpO2 Weight   08/04/22 1945 (!) 166/79 98.1  F (36.7  C) Oral 70 16 96 % --   08/04/22 1932 (!) 167/82 -- -- -- -- -- --   08/04/22 1928 -- 98  F (36.7  C) Oral 69 14 97 % --   08/04/22 1915 (!) 156/77 -- -- 67 14 98 % --   08/04/22 1700 (!)  181/135 -- -- 65 15 100 % --   08/04/22 1521 (!) 159/82 98.1  F (36.7  C) Oral 77 18 99 % 82.9 kg (182 lb 12.8 oz)       Physical Exam  General: Resting on the bed.  Head: No obvious trauma to head.  Ears, Nose, Throat:  External ears normal.  Nose normal.   Eyes:  Conjunctivae clear.  Pupils are equal, round, and reactive.   Neck: Normal range of motion.  Neck supple.   CV: Regular rate and rhythm.  No murmurs.      Respiratory: Effort normal and breath sounds normal.  No wheezing or crackles.   Gastrointestinal: Soft.  + distension. There is no tenderness.  There is no rigidity, no rebound and no guarding.   Neuro: Alert. Moving all extremities appropriately.  Normal speech.    Skin: Skin is warm and dry.  No rash noted.   :  Non tender JACQUELYN.  Melena flecks in stool.     Emergency Department Course     Imaging:  CTA Abdomen Pelvis with Contrast   Final Result   IMPRESSION:   1.  No active extravasation to identify source of GI bleed. Sigmoid diverticulosis, though no visible diverticulitis.   2.  Multiple new hepatic masses, the largest of which measures up to 2.7 cm. These were not present on previous exam and are amenable to percutaneous biopsy.   3.  Large hiatal hernia with majority of stomach in the chest.   4.  Cholelithiasis, without complicating features.   5.  Left renal hypodensities the largest of which appears to be a simple cyst. The smaller two hypodensities are too small to fully characterize.      Please note the above finding of new hepatic masses was discussed by myself to Dr. Schofield at 7:08 p.m. on 8/4/2022. Also discussed was the fact that no active GI bleed was identified.           Report per radiology    Laboratory:  Labs Ordered and Resulted from Time of ED Arrival to Time of ED Departure   COMPREHENSIVE METABOLIC PANEL - Abnormal       Result Value    Sodium 135 (*)     Potassium 4.1      Creatinine 1.50 (*)     Urea Nitrogen 23.4 (*)     Chloride 103      Carbon Dioxide (CO2) 24       Anion Gap 8      Glucose 112 (*)     Calcium 8.9      Protein Total 6.9      Albumin 3.8      Bilirubin Total 0.2      Alkaline Phosphatase 93      AST 27      ALT 17      GFR Estimate 45 (*)    CBC WITH PLATELETS AND DIFFERENTIAL - Abnormal    WBC Count 4.7      RBC Count 2.69 (*)     Hemoglobin 7.1 (*)     Hematocrit 24.9 (*)     MCV 93      MCH 26.4 (*)     MCHC 28.5 (*)     RDW 14.0      Platelet Count 291      % Neutrophils 57      % Lymphocytes 27      % Monocytes 9      % Eosinophils 7      % Basophils 0      % Immature Granulocytes 0      NRBCs per 100 WBC 0      Absolute Neutrophils 2.6      Absolute Lymphocytes 1.3      Absolute Monocytes 0.4      Absolute Eosinophils 0.3      Absolute Basophils 0.0      Absolute Immature Granulocytes 0.0      Absolute NRBCs 0.0     OCCULT BLOOD STOOL - Abnormal    Occult Blood Positive (*)    INR - Normal    INR 1.12     LACTIC ACID WHOLE BLOOD - Normal    Lactic Acid 1.1     COVID-19 VIRUS (CORONAVIRUS) BY PCR   TYPE AND SCREEN, ADULT    ABO/RH(D) B POS      Antibody Screen Negative      SPECIMEN EXPIRATION DATE [id number]     PREPARE RED BLOOD CELLS (UNIT)    CROSSMATCH Compatible      UNIT ABO/RH B Pos      Unit Number [id number]      Unit Status Issued      Blood Component Type Red Blood Cells      Product Code [id number]      CODING SYSTEM IRSM402      UNIT TYPE ISBT 7300      ISSUE DATE AND TIME [id number]     PREPARE RED BLOOD CELLS (UNIT)   TRANSFUSE RED BLOOD CELLS (UNIT)   ABO/RH TYPE AND SCREEN        Emergency Department Course:             Reviewed:  I reviewed nursing notes, vitals, past medical history and Care Everywhere    Assessments:  1650 I obtained history and examined the patient as noted above.     1730 I rechecked the patient and explained findings.     Consults:  1735 I spoke with [facility] hospitalist, who accepts the patient.     Interventions:  1703 Benadryl  25 mg  IV  1924 Protonix  40 mg  IV    Disposition:  The patient was  admitted to the hospital under the care of Dr. Sorto.     Impression & Plan       Medical Decision Makin-year-old male presents with rectal bleeding.  Vital signs are reassuring.  Broad differentials pursued include not limited to GI bleed, upper versus lower GI bleed, anemia, electrolyte, metabolic, renal dysfunction, mesenteric ischemia, ischemic colitis, diverticulitis, medication induced, etc.  CBC shows no leukocytosis but anemia.  Patient's hemoglobin 7.1.  Hgb is down from several months ago at 10.  BMP shows mild elevation creatinine from baseline and mild elevation of BUN which may suggest upper GI bleed.  No other electrolyte or metabolic abdomen is otherwise noted.  Lactate is normal not concerning for mesenteric ischemia or ischemic colitis.  Occult is positive.  CTA was obtained showing no active extravasation.  There are new hepatic masses.  Suggestive of possible colorectal cancer although cannot be confirmed based on CT scan likely will need colonoscopy.  At this point patient does not have active ongoing bleeding.  Patient was given a unit of blood for hemoglobin of 7.1.  Protonix ordered given the patient is on Eliquis.  No drinking history.  Patient was admitted to the hospitalist.    Covid-19  Tk Richmond was evaluated during a global COVID-19 pandemic, which necessitated consideration that the patient might be at risk for infection with the SARS-CoV-2 virus that causes COVID-19.   Applicable protocols for evaluation were followed during the patient's care.   COVID-19 was considered as part of the patient's evaluation.       Diagnosis:    ICD-10-CM    1. Gastrointestinal hemorrhage with melena  K92.1    2. Liver masses  R16.0        Scribe Disclosure:  TANISHA, Angeline Sabillon, am serving as a scribe at 4:48 PM on 2022 to document services personally performed by Cande Schofield MD based on my observations and the provider's statements to me.            Cande Schofield  MD PATEL  08/04/22 3007

## 2022-08-05 NOTE — CONSULTS
GASTROENTEROLOGY CONSULTATION      Tk Richmond  [address] [id number]  87 year old male     Admission Date/Time: 8/4/2022  Primary Care Provider: Shyam Mclean  Referring / Attending Physician: Dr. Anna     We were asked to see the patient in consultation by Dr. Anna for evaluation of gi bleeding.        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    Patient is unable to communicate effectively because of his stroke.  His wife provides additional historical information as she is his primary caregiver.  For about the past month his wife noticed dark black stool.  She thought it was related to his iron tablets.  Then yesterday morning he had red blood mixed with his stool.  This prompted his wife to come to the emergency room.  The patient was not reporting any abdominal pain or rectal pain.  No lightheadedness or dizziness.  The patient's wife did notice some generalized fatigue which have been worsening over the past few months.  The patient does use Eliquis.  He had not been using NSAIDs.  Hemoglobin on admission was 7.1.  Baseline appears to be closer to 11.  A CT angio of his abdomen and pelvis was completed without source of active bleeding identified.  However there were new lesions in the liver concerning for malignancy.  Patient did receive 1 units of packed red cells, awaiting hemoglobin this morning.    In June 2019 the patient was hospitalized for hematochezia.  He did undergo colonoscopy.  The prep was poor however a single actively bleeding colonic angio ectasia was seen and treated with cautery and Hemoclip.  Diverticulosis was present in the sigmoid  colon.       PAST MEDICAL HISTORY:  Patient Active Problem List    Diagnosis Date Noted     Gastrointestinal hemorrhage with melena 08/04/2022     Priority: Medium     Gastrointestinal bleed 08/04/2022     Priority: Medium     Anemia, iron deficiency 12/03/2021     Priority: Medium     Back pain 07/12/2020     Priority: Medium     Paroxysmal atrial fibrillation (H) 10/04/2019     Priority: Medium     Acute ischemic cerebrovascular accident (CVA) involving left middle cerebral artery territory (H) 07/20/2019     Priority: Medium     Acute embolic stroke (H) 07/17/2019     Priority: Medium     Lower GI bleed 06/28/2019     Priority: Medium     Ascending aortic aneurysm (H) 12/20/2018     Priority: Medium     Non-rheumatic aortic regurgitation 12/20/2018     Priority: Medium     CVA (cerebral vascular accident) (H) 02/18/2012     Priority: Medium     CVA 2/18/12       HTN (hypertension) 02/18/2012     Priority: Medium          ROS: A comprehensive ten point review of systems was negative aside from those in mentioned in the HPI.       MEDICATIONS:   Prior to Admission medications    Medication Sig Start Date End Date Taking? Authorizing Provider   amLODIPine (NORVASC) 5 MG tablet Take 5 mg by mouth At Bedtime   Yes Unknown, Entered By History   apixaban ANTICOAGULANT (ELIQUIS) 5 MG tablet Take 1 tablet (5 mg) by mouth 2 times daily 10/7/19  Yes Thania Horner PA-C   brinzolamide-brimonidine (SIMBRINZA) 1-0.2 % ophthalmic suspension Place 1 drop into both eyes 2 times daily    Yes Reported, Patient   calcitonin, salmon, (MIACALCIN) 200 UNIT/ACT nasal spray Spray 1 spray into one nostril alternating nostrils daily Alternate nostril each day.   Yes Unknown, Entered By History   Calcium-Magnesium-Zinc 333-133-5 MG TABS per tablet Take 1 tablet by mouth daily   Yes Unknown, Entered By History   citalopram (CELEXA) 20 MG tablet Take 20 mg by mouth daily   Yes Unknown, Entered By History   docusate sodium (COLACE) 100  MG tablet Take 100 mg by mouth nightly as needed   Yes Reported, Patient   glucosamine-chondroitin 500-400 MG CAPS per capsule Take 1 capsule by mouth 2 times daily At noon and supper   Yes Unknown, Entered By History   lovastatin (MEVACOR) 40 MG tablet Take 40 mg by mouth At Bedtime   Yes Unknown, Entered By History   Multiple Vitamins-Minerals (CENTRUM SILVER ADULT 50+ PO) Take 1 tablet by mouth daily    Yes Reported, Patient   Multiple Vitamins-Minerals (PRESERVISION AREDS PO) Take 1 tablet by mouth 2 times daily   Yes Reported, Patient   pantoprazole (PROTONIX) 40 MG EC tablet Take 40 mg by mouth daily   Yes Unknown, Entered By History   senna-docusate (SENOKOT-S/PERICOLACE) 8.6-50 MG tablet Take 1-2 tablets by mouth 2 times daily as needed for constipation 20  Yes Pham Olea PA-C   vitamin B-12 (CYANOCOBALAMIN) 1000 MCG tablet Take 1,000 mcg by mouth daily    Yes Reported, Patient        ALLERGIES:   Allergies   Allergen Reactions     Contrast Dye Rash        SOCIAL HISTORY:  Social History     Tobacco Use     Smoking status: Former Smoker     Packs/day: 0.00     Years: 10.00     Pack years: 0.00     Types: Cigars     Quit date: 1972     Years since quittin.4     Smokeless tobacco: Never Used   Substance Use Topics     Alcohol use: Yes     Alcohol/week: 0.0 standard drinks     Comment: OCC beer     Drug use: No        FAMILY HISTORY:  Family History   Problem Relation Age of Onset     Breast Cancer Mother      No Known Problems Father      Breast Cancer Sister      No Known Problems Brother      No Known Problems Maternal Grandmother      No Known Problems Maternal Grandfather      No Known Problems Paternal Grandmother      No Known Problems Paternal Grandfather      Diabetes No family hx of      Hypertension No family hx of      Cancer - colorectal No family hx of         PHYSICAL EXAM:     /55 (BP Location: Right arm)   Pulse 78   Temp 98.3  F (36.8  C) (Oral)   Resp 16    Wt 82.9 kg (182 lb 12.8 oz)   SpO2 96%   BMI 26.23 kg/m       PHYSICAL EXAM:  GENERAL:  NAD  SKIN: no suspicious lesions, rashes, jaundice  HEAD: Normocephalic. Atraumatic.  NECK: Neck supple. No adenopathy.   EYES: No scleral icterus  GASTROINTESTINAL: soft, non tender, non distended, no guarding/rebound  JOINT/EXTREMITIES:  Right sided contractures  NEURO: right hemiplegia   PSYCH: Normal affect        ADDITIONAL COMMENTS:   I reviewed the patient's new clinical lab test results.     Recent Labs   Lab 08/04/22  1532   WBC 4.7   RBC 2.69*   HGB 7.1*   HCT 24.9*   MCV 93   MCH 26.4*   MCHC 28.5*   RDW 14.0        Recent Labs   Lab Test 08/05/22  0711 08/04/22  1532 04/30/21  0912   POTASSIUM 4.0 4.1 4.0   CHLORIDE 110* 103 111*   CO2 24 24 26   BUN 20.8 23.4* 23   ANIONGAP 8 8 4     Recent Labs   Lab Test 08/04/22  1532 04/30/21  1124 04/30/21  0912 07/12/20  0955 07/25/19  1036   ALBUMIN 3.8  --  3.3*  --   --    BILITOTAL 0.2  --  0.5  --   --    ALT 17  --  19  --   --    AST 27  --  14  --   --    PROTEIN  --  Negative  --  10* Negative   LIPASE  --   --  280  --   --        Recent Labs   Lab 08/04/22  1532   INR 1.12        IMAGING / ENDOSCOPY     Recent Results (from the past 24 hour(s))   CTA Abdomen Pelvis with Contrast    Narrative    EXAM: CTA ABDOMEN PELVIS WITH CONTRAST  LOCATION: [facility]  DATE/TIME: 8/4/2022 6:30 PM    INDICATION: GI bleed, distended, low hemoglobin.    COMPARISON: CTA of the abdomen and pelvis performed 4/30/2021.    TECHNIQUE: CT angiogram abdomen pelvis during arterial phase of injection of IV contrast. 2D and 3D MIP reconstructions were performed by the CT technologist. Dose reduction techniques were used.    CONTRAST: 72 mL Isovue 370.    FINDINGS: Large hiatal hernia with essentially entire stomach in the chest. Minimal scattered bibasilar atelectasis or scar formation. Degenerative disc disease throughout the lumbar spine. Moderate  compression deformities of L1 and L2, and to a lesser   degree L3. Significant intervertebral disc space narrowing and irregularity at these levels, noted on previous exam with L1 and L2, though progressed with L3 with depression of superior endplate.    Septated cysts noted in the left hepatic lobe measuring up to 2 cm, similar to previous exam. There are multiple new hepatic masses, the largest of which is in the right hepatic lobe measuring 2.5 cm AP x 2.7 cm transverse. At least four lesions are   identified in the liver, measurement provided as largest. These were not present on previous exam. Cholelithiasis without complicating features. The spleen is irregular in shape, though unchanged. Both adrenal glands and pancreas are grossly   unremarkable. Multiple left renal hypodensities, the largest of which is 2.3 cm, relatively unchanged. Other hypodensities are subcentimeter in size. No hydronephrosis bilaterally. No intraperitoneal fluid or air. The bladder is distended and   unremarkable. Sigmoid diverticulosis. No dilated loops of bowel. I do not clearly identify a source of GI hemorrhage. No active extravasation.      Impression    IMPRESSION:  1.  No active extravasation to identify source of GI bleed. Sigmoid diverticulosis, though no visible diverticulitis.  2.  Multiple new hepatic masses, the largest of which measures up to 2.7 cm. These were not present on previous exam and are amenable to percutaneous biopsy.  3.  Large hiatal hernia with majority of stomach in the chest.  4.  Cholelithiasis, without complicating features.  5.  Left renal hypodensities the largest of which appears to be a simple cyst. The smaller two hypodensities are too small to fully characterize.    Please note the above finding of new hepatic masses was discussed by myself to Dr. Schofield at 7:08 p.m. on 8/4/2022. Also discussed was the fact that no active GI bleed was identified.           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    1.  Acute GI bleeding: Patient has not had a bowel movement since admission.  He is hemodynamically stable.  He did receive 1 unit of packed red cells.  CTA yesterday without source of GI bleed identified.  There is however a large hiatal hernia with the majority of the stomach in the chest.  Additional CT findings of new hepatic lesions suspicious for malignancy.  Patient's Eliquis has been held.  Awaiting repeat hemoglobin this morning.  Patient does have a history of a bleeding AVM in the descending colon which was treated in 2019.  Suspicious for bleeding from AVMs versus diverticular bleed in the setting of Eliquis use.    -- No further bowel movements/overt bleeding since admission  -- Eliquis has been put on hold  -- CTA without evidence of active bleed  -- Would favor monitoring at this time given comorbid conditions and no urgent need for endoscopy as no active GI bleeding.   -- Serial hemoglobin, transfusions as needed per medicine team.  -- If he develops hematochezia or melena, in the setting of decreasing hemoglobin, can consider colonoscopy.  -- Discussed this plan with the patient and his wife were both in agreement.    2. Hepatic mass/ lesions : Oncology consulted, suspect ultrasound-guided biopsy for further diagnostics.    I discussed the patient plan with Dr. Carolina, GI staff physician. Thank you for asking us to participate in the care of this patient.    Approximately 45 min of total time was spent providing patient care, including patient evaluation, reviewing documentation/ test results, and .     RACHID Garcia  [facility] ( [place])

## 2022-08-05 NOTE — CONSULTS
IR    Consult noted.     Patient requires NPO status for > 4 hours.    Eliquis to be held for 48 hours and has been held for 24 hours.  Will plan to proceed with biopsy today in order to expedite workup of hepatic masses.    Arnie Tabares

## 2022-08-05 NOTE — PHARMACY-ADMISSION MEDICATION HISTORY
Admission medication history interview status for this patient is complete. See [facility] admission navigator for allergy information, prior to admission medications and immunization status.     Medication history interview done, indicate source(s): Patient's wife  Medication history resources (including written lists, pill bottles, clinic record): Home med list, [employer] dispense records  Pharmacy: [facility]    Changes made to PTA medication list:  Added: Calcitonin nasal spray  Changed: calcium/D --> stanislav/mag/zinc, docusate --> PRN  Reported as Not Taking: none  Removed: scheduled tylenol, lidocaine patch    Actions taken by pharmacist (provider contacted, etc):None     Additional medication history information:None    Medication reconciliation/reorder completed by provider prior to medication history?  N   (Y/N)         Prior to Admission medications    Medication Sig Last Dose Taking? Auth Provider Long Term End Date   amLODIPine (NORVASC) 5 MG tablet Take 5 mg by mouth At Bedtime 8/3/2022 at pm Yes Unknown, Entered By History Yes    apixaban ANTICOAGULANT (ELIQUIS) 5 MG tablet Take 1 tablet (5 mg) by mouth 2 times daily 8/4/2022 at am Yes Thania Horner PA-C     brinzolamide-brimonidine (SIMBRINZA) 1-0.2 % ophthalmic suspension Place 1 drop into both eyes 2 times daily  8/4/2022 at am Yes Reported, Patient     calcitonin, salmon, (MIACALCIN) 200 UNIT/ACT nasal spray Spray 1 spray into one nostril alternating nostrils daily Alternate nostril each day. 8/4/2022 at am Yes Unknown, Entered By History Yes    Calcium-Magnesium-Zinc 333-133-5 MG TABS per tablet Take 1 tablet by mouth daily 8/4/2022 at Unknown time Yes Unknown, Entered By History     citalopram (CELEXA) 20 MG tablet Take 20 mg by mouth daily 8/4/2022 at am Yes Unknown, Entered By History Yes    docusate sodium (COLACE) 100 MG tablet Take 100 mg by mouth nightly as needed Unknown at Unknown time Yes Reported, Patient     glucosamine-chondroitin 500-400  MG CAPS per capsule Take 1 capsule by mouth 2 times daily At noon and supper 8/4/2022 at Unknown time Yes Unknown, Entered By History     lovastatin (MEVACOR) 40 MG tablet Take 40 mg by mouth At Bedtime 8/3/2022 at pm Yes Unknown, Entered By History     Multiple Vitamins-Minerals (CENTRUM SILVER ADULT 50+ PO) Take 1 tablet by mouth daily  8/4/2022 at am Yes Reported, Patient     Multiple Vitamins-Minerals (PRESERVISION AREDS PO) Take 1 tablet by mouth 2 times daily 8/4/2022 at am Yes Reported, Patient     pantoprazole (PROTONIX) 40 MG EC tablet Take 40 mg by mouth daily 8/4/2022 at am Yes Unknown, Entered By History     senna-docusate (SENOKOT-S/PERICOLACE) 8.6-50 MG tablet Take 1-2 tablets by mouth 2 times daily as needed for constipation Unknown at Unknown time Yes Pham Olea PA-C     vitamin B-12 (CYANOCOBALAMIN) 1000 MCG tablet Take 1,000 mcg by mouth daily  8/4/2022 at Unknown time Yes Reported, Patient

## 2022-08-05 NOTE — PLAN OF CARE
End of Shift Summary  For vital signs and complete assessments, please see documentation flowsheets.     Pertinent assessments: Unable to assess orientation due to severe aphasia, per pt wife pt is A&O, /86 at admission, recheck 156/68, 140/59. Voiding well using urinal at bedside. No stools this shift.     Major Shift Events Uneventful    Treatment Plan: IV protonix, GI and onc consults.     Bedside Nurse: Bridget Maldonado RN

## 2022-08-05 NOTE — PROGRESS NOTES
[facility]    Medicine Progress Note - Hospitalist Service    Date of Admission:  8/4/2022    Assessment & Plan          87M with hx of pAF on Eliquis, 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 Unclear if upper or lower (reported BRBPR as well as melena; black stool on rectal exam in ED). Found to have new hepatic masses as well that will need further workup.     PLAN:     -Acute Gastrointestinal Bleeding: IV PPI bid. Trend H/H. NPO  for GI evaluation.     -Acute Blood Loss Anemia: 2/2 the above. Trend H/H. Hold off on Kcentra for now but low threshold to give if acute bleeding occurs.     -Hepatic Masses: Unclear if liver primary vs metastatic dz. GI and oncology consults. Hold off on bx until GIB addressed.     -Essential HTN: Hold scheduled meds given acute bleeding above. PRN hydralazine.     -Chronic Stroke: Home statin. Restart AC as soon as able.     -pAF: Hold Eliquis given acute bleed but restart as soon as able given patient is high risk due to hx of debilitating stroke.     -CKD Stage III: Currently at baseline Cr.          Diet: NPO per Anesthesia Guidelines for Procedure/Surgery Except for: Meds    DVT Prophylaxis: Pneumatic Compression Devices  Pena Catheter: Not present  Central Lines: None  Cardiac Monitoring: None  Code Status: Full Code      Disposition Plan      Expected Discharge Date: 08/07/2022                The patient's care was discussed with the Patient and Patient's Family.    Tiny Phillips MD  Hospitalist Service  [facility]  Securely message with the Vocera Web Console (learn more here)  Text page via [employer] Paging/Directory         Clinically Significant Risk Factors Present on Admission               # Coagulation Defect: home medication list includes an anticoagulant medication             ______________________________________________________________________    Interval History     No abdominal pain or N/V/further BRBPR.    Data reviewed today: I reviewed all medications, new labs and imaging results over the last 24 hours. I personally reviewed no images or EKG's today.    Physical Exam   Vital Signs: Temp: 98.3  F (36.8  C) Temp src: Oral BP: 132/55 Pulse: 78   Resp: 16 SpO2: 96 % O2 Device: None (Room air)    Weight: 182 lbs 12.8 oz    Gen - Alert, awake, follows commands, + aphasia.  Lungs - CTA B anteriorly.  Heart - RR,S1+S2 nml, no m/g/r.  Abd - soft, NT, ND, + BS.  Ext  - trace edema.    Data   Recent Labs   Lab 08/05/22  0711 08/04/22  1532   WBC  --  4.7   HGB  --  7.1*   MCV  --  93   PLT  --  291   INR  --  1.12    135*   POTASSIUM 4.0 4.1   CHLORIDE 110* 103   CO2 24 24   BUN 20.8 23.4*   CR 1.49* 1.50*   ANIONGAP 8 8   WILLIAM 7.8* 8.9   GLC 82 112*   ALBUMIN  --  3.8   PROTTOTAL  --  6.9   BILITOTAL  --  0.2   ALKPHOS  --  93   ALT  --  17   AST  --  27     Recent Results (from the past 24 hour(s))   CTA Abdomen Pelvis with Contrast    Narrative    EXAM: CTA ABDOMEN PELVIS WITH CONTRAST  LOCATION: [facility]  DATE/TIME: 8/4/2022 6:30 PM    INDICATION: GI bleed, distended, low hemoglobin.    COMPARISON: CTA of the abdomen and pelvis performed 4/30/2021.    TECHNIQUE: CT angiogram abdomen pelvis during arterial phase of injection of IV contrast. 2D and 3D MIP reconstructions were performed by the CT technologist. Dose reduction techniques were used.    CONTRAST: 72 mL Isovue 370.    FINDINGS: Large hiatal hernia with essentially entire stomach in the chest. Minimal scattered bibasilar atelectasis or scar formation. Degenerative disc disease throughout the lumbar spine. Moderate compression deformities of L1 and L2, and to a lesser   degree L3. Significant intervertebral disc space narrowing and irregularity at these levels, noted on previous exam with L1 and  L2, though progressed with L3 with depression of superior endplate.    Septated cysts noted in the left hepatic lobe measuring up to 2 cm, similar to previous exam. There are multiple new hepatic masses, the largest of which is in the right hepatic lobe measuring 2.5 cm AP x 2.7 cm transverse. At least four lesions are   identified in the liver, measurement provided as largest. These were not present on previous exam. Cholelithiasis without complicating features. The spleen is irregular in shape, though unchanged. Both adrenal glands and pancreas are grossly   unremarkable. Multiple left renal hypodensities, the largest of which is 2.3 cm, relatively unchanged. Other hypodensities are subcentimeter in size. No hydronephrosis bilaterally. No intraperitoneal fluid or air. The bladder is distended and   unremarkable. Sigmoid diverticulosis. No dilated loops of bowel. I do not clearly identify a source of GI hemorrhage. No active extravasation.      Impression    IMPRESSION:  1.  No active extravasation to identify source of GI bleed. Sigmoid diverticulosis, though no visible diverticulitis.  2.  Multiple new hepatic masses, the largest of which measures up to 2.7 cm. These were not present on previous exam and are amenable to percutaneous biopsy.  3.  Large hiatal hernia with majority of stomach in the chest.  4.  Cholelithiasis, without complicating features.  5.  Left renal hypodensities the largest of which appears to be a simple cyst. The smaller two hypodensities are too small to fully characterize.    Please note the above finding of new hepatic masses was discussed by myself to Dr. Schofield at 7:08 p.m. on 8/4/2022. Also discussed was the fact that no active GI bleed was identified.       Medications       atorvastatin  40 mg Oral QPM     pantoprazole (PROTONIX) IV  40 mg Intravenous BID

## 2022-08-05 NOTE — ED NOTES
[facility]  ED Nurse Handoff Report    Tk Richmond is a 87 year old male   ED Chief complaint: Rectal Bleeding  . ED Diagnosis:   Final diagnoses:   Gastrointestinal hemorrhage with melena   Liver masses     Allergies:   Allergies   Allergen Reactions     Contrast Dye Rash       Code Status: Full Code  Activity level - Baseline/Home:  Assist X 2. Activity Level - Current:   Assist X 2. Lift room needed: No. Bariatric: No   Needed: No   Isolation: No. Infection: Not Applicable.     Vital Signs:   Vitals:    08/04/22 1700 08/04/22 1915 08/04/22 1928 08/04/22 1932   BP: (!) 181/135 (!) 156/77  (!) 167/82   Pulse: 65 67 69    Resp: 15 14 14    Temp:   98  F (36.7  C)    TempSrc:   Oral    SpO2: 100% 98% 97%    Weight:           Cardiac Rhythm:  ,      Pain level:    Patient confused: Yes. Patient Falls Risk: Yes.   Elimination Status: Has voided   Patient Report - Initial Complaint: Rectal bleeding. Focused Assessment: HPI   Tk Richmond is a 87 year old male on Eliquis with history of CVA, CKD, atrial fibrillation, HTN, and a lower GI bleed who presents with bright red blood in his stool earlier today. He has not had bright red in his stool before. Patient's wife notes he has had dark stools recently, but attributes this to iron supplements. He is also experiencing some wheezing and leg swelling, which his wife says is normal for him. Here in the ED, his abdomen appears distended. Patient's wife denies vomiting, diarrhea, abdominal pain, chest pain, shortness of breath, and lightheadedness. No alcohol, street drugs, or tobacco use. Patient does not take Aspirin, Motrin, or Ibuprofen on a regular basis.         Review of Systems   All other systems reviewed and are negative.      Tests Performed: labs, CT. Abnormal Results:   CTA Abdomen Pelvis with Contrast   Final Result   IMPRESSION:   1.  No active extravasation to identify source of GI bleed. Sigmoid diverticulosis, though no  visible diverticulitis.   2.  Multiple new hepatic masses, the largest of which measures up to 2.7 cm. These were not present on previous exam and are amenable to percutaneous biopsy.   3.  Large hiatal hernia with majority of stomach in the chest.   4.  Cholelithiasis, without complicating features.   5.  Left renal hypodensities the largest of which appears to be a simple cyst. The smaller two hypodensities are too small to fully characterize.      Please note the above finding of new hepatic masses was discussed by myself to Dr. Schofield at 7:08 p.m. on 8/4/2022. Also discussed was the fact that no active GI bleed was identified.           Labs Ordered and Resulted from Time of ED Arrival to Time of ED Departure   COMPREHENSIVE METABOLIC PANEL - Abnormal       Result Value    Sodium 135 (*)     Potassium 4.1      Creatinine 1.50 (*)     Urea Nitrogen 23.4 (*)     Chloride 103      Carbon Dioxide (CO2) 24      Anion Gap 8      Glucose 112 (*)     Calcium 8.9      Protein Total 6.9      Albumin 3.8      Bilirubin Total 0.2      Alkaline Phosphatase 93      AST 27      ALT 17      GFR Estimate 45 (*)    CBC WITH PLATELETS AND DIFFERENTIAL - Abnormal    WBC Count 4.7      RBC Count 2.69 (*)     Hemoglobin 7.1 (*)     Hematocrit 24.9 (*)     MCV 93      MCH 26.4 (*)     MCHC 28.5 (*)     RDW 14.0      Platelet Count 291      % Neutrophils 57      % Lymphocytes 27      % Monocytes 9      % Eosinophils 7      % Basophils 0      % Immature Granulocytes 0      NRBCs per 100 WBC 0      Absolute Neutrophils 2.6      Absolute Lymphocytes 1.3      Absolute Monocytes 0.4      Absolute Eosinophils 0.3      Absolute Basophils 0.0      Absolute Immature Granulocytes 0.0      Absolute NRBCs 0.0     OCCULT BLOOD STOOL - Abnormal    Occult Blood Positive (*)    INR - Normal    INR 1.12     LACTIC ACID WHOLE BLOOD - Normal    Lactic Acid 1.1     COVID-19 VIRUS (CORONAVIRUS) BY PCR   TYPE AND SCREEN, ADULT    ABO/RH(D) B POS       Antibody Screen Negative      SPECIMEN EXPIRATION DATE [id number]     PREPARE RED BLOOD CELLS (UNIT)    CROSSMATCH Compatible      UNIT ABO/RH B Pos      Unit Number [id number]      Unit Status Issued      Blood Component Type Red Blood Cells      Product Code F7377I94      CODING SYSTEM [id number]      UNIT TYPE ISBT 7300      ISSUE DATE AND TIME [id number]     PREPARE RED BLOOD CELLS (UNIT)   TRANSFUSE RED BLOOD CELLS (UNIT)   ABO/RH TYPE AND SCREEN     .   Treatments provided: see MAR, blood transfusion  Family Comments: wife at bedside  OBS brochure/video discussed/provided to patient:  N/A  ED Medications:   Medications   pantoprazole (PROTONIX) IV push injection 40 mg (has no administration in time range)   melatonin tablet 1 mg (has no administration in time range)   ondansetron (ZOFRAN ODT) ODT tab 4 mg (has no administration in time range)     Or   ondansetron (ZOFRAN) injection 4 mg (has no administration in time range)   hydrALAZINE (APRESOLINE) injection 10 mg (has no administration in time range)   diphenhydrAMINE (BENADRYL) injection 25 mg (25 mg Intravenous Given 8/4/22 1703)   pantoprazole (PROTONIX) IV push injection 40 mg (40 mg Intravenous Given 8/4/22 1924)   CT Scan Flush (80 mLs Intravenous Given 8/4/22 1834)   iopamidol (ISOVUE-370) solution 500 mL (72 mLs Intravenous Given 8/4/22 1834)     Drips infusing:  Yes  For the majority of the shift, the patient's behavior Green. Interventions performed were N/a.    Sepsis treatment initiated: No     Patient tested for COVID 19 prior to admission: YES    ED Nurse Name/Phone Number: Joanne Watters RN,   7:44 PM  RECEIVING UNIT ED HANDOFF REVIEW    Above ED Nurse Handoff Report was reviewed: Yes  Reviewed by: Bridget Maldonado RN on August 4, 2022 at 8:42 PM

## 2022-08-05 NOTE — PLAN OF CARE
Goal Outcome Evaluation:             To Do:  End of Shift Summary  For vital signs and complete assessments, please see documentation flowsheets.     Pertinent assessments: Aphasia   wife at bedside to assist with cares and communication ---  Lower extremities swullon at +2---- states very weak -- tolerating clear liquids --  Major Shift Events Liver Biopsy done  Treatment Plan: monitor  Bedside Nurse: Almaz Alston RN

## 2022-08-05 NOTE — PROGRESS NOTES
Liver biopsy done and patient tolerated well. Vital signs stable, report to primary RN and core samples sent to lab in normal saline. Patient returned to 5th floor via stretcher in stable condition with US tech.

## 2022-08-05 NOTE — PROGRESS NOTES
SPIRITUAL HEALTH SERVICES Progress Note  RH  Med Surg 5    Saw pt Tk Richmond per regular admission request for [facility]. Tk' wife Norma was present during the visit. Norma was serving as  for Tk who had suffered a stroke a few years back and was limited in verbal responses. Tk was oriented to [facility]. Reflective conversation was provided to facilitate the processing of thoughts and feelings.      Illness Narrative -   o Norma informed me that there was blood in Tk' urine. The DrAissatou has done a few tests and biopsy. They both are waiting patiently for the results of these tests.      Distress -   o No major distress at this time. Just waiting for the results.      Coping -   o Tk has wonderful support in Norma.  o He identifies as [employer]. Prayer was welcomed. Norma also requested [employer] Communion for Tk. Norma informed me that she would be contacting [place] [employer] to schedule a  visit.      Meaning-Making -   o Nothing discussed at this time      Plan -   o Tk and Norma were informed how to request further  support. This author and other chaplains remain available per pt/family request.    Jovanny Jacob MDIV  Intern   Phone: [phone]

## 2022-08-05 NOTE — PROGRESS NOTES
[facility] Follow Up Note:    Assessment and Plan:  Tk Richmond is a 87 year old male patient admitted with GI bleed,    1.Acute gastrointestinal bleed presented with dark stool and blood in stool  - Previously has had iron deficiency andGI bleed, this was thought to be from angiectasia of descending colon  - Previously has received IV iron in our office  - Due to previous anemia has also had a bone marrow biopsy that was unrevealing for any bone marrow pathology  - Recently has been on Eliquis for stroke  - Eliquis has been held  - Holding off on Kcentra for now as hemoglobin is stable  - Reviewed GI note, no plans for any procedures currently    PLAN:   - Watching hemoglobin  - No procedures unless condition declines or hemoglobin drops significantly    2. Multiple liver lesions on CT scan, new since April 2021  - Suspicious for malignancy  - We will order CEA and CA 19-9  - We will plan for biopsy  - I discussed with wife and she is not certain about treatment but would like an answer for what is causing this foreclosure and for further decision-making.    PLAN:  - We will order biopsy, timing to be determined on overall status  - Ordering now as he is off Eliquis.    3. CKD stage III  - Creatinine at baseline    4. CVA  - Currently off Eliquis    5. FULL CODE    D/w Dr. Phillips and wife.  Appreciate collaboration.      Subjective:    A lot of history obtained from wife, she noticed dark stool that she thought was from iron she also noted blood in stool.Patient does have aphasia.    Scheduled Medications:  Reviewed active medications    Labs:  CBC RESULTS: Recent Labs   Lab Test 08/05/22  1020 08/04/22  1532 04/30/21  0912 07/12/20  0749   WBC  --  4.7 5.4 5.6   HGB 7.7* 7.1* 12.1* 12.2*   HCT  --  24.9* 36.6* 36.7*   MCV  --  93 92 94   PLT  --  291 166 143*       CMP  Recent Labs   Lab 08/05/22  0711 08/04/22  1532    135*   POTASSIUM 4.0 4.1   CHLORIDE 110* 103   CO2 24 24    ANIONGAP 8 8   GLC 82 112*   BUN 20.8 23.4*   CR 1.49* 1.50*   GFRESTIMATED 46* 45*   WILLIAM 7.8* 8.9   PROTTOTAL  --  6.9   ALBUMIN  --  3.8   BILITOTAL  --  0.2   ALKPHOS  --  93   AST  --  27   ALT  --  17       INR  Recent Labs   Lab 08/04/22  1532   INR 1.12       Objective/Physical Exam:  Blood pressure 132/55, pulse 78, temperature 98.3  F (36.8  C), temperature source Oral, resp. rate 16, weight 82.9 kg (182 lb 12.8 oz), SpO2 96 %.  General appearance:alert, oriented, no acute distress  HEENT:sclera anicteric, no pallor, no thrush or mucositis.  Lungs: chest is clear to auscultation, no rales or rhonchi appreciated.  Abdomen: soft, NT, ND , BS normal  Ext: no edema or rashes    Angely Nielson MD  [facility]  8/5/2022 10:59 AM    Time: 35 minutes with patient , 30 minutes in counseling and coordination of care

## 2022-08-05 NOTE — IR NOTE
RADIOLOGY POST PROCEDURE NOTE    Patient name: Tk Richmond  MRN: [id number]  : 1935    Pre-procedure diagnosis: New hepatic masses  Post-procedure diagnosis: Same    Procedure Date/Time: 2022  3:01 PM  Procedure: US guided biopsy of right hepatic lobe mass, about 4 cm in diameter.  7 samples obtained with 18 g core biopsy needle.  Gelfoam slurry in tract upon completion.  Estimated blood loss: 7 ml  Specimen(s) collected with description: 18 g core biopsy samples obtained.    The patient tolerated the procedure well with no immediate complications.  Significant findings:  Please see above.    See imaging dictation for procedural details.    Provider name: Arnie Umaña MD  Assistant(s):None

## 2022-08-05 NOTE — H&P
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 Unclear if upper or lower (reported BRBPR as well as melena; black stool on rectal exam in ED). Found to have new hepatic masses as well that will need further workup.    PLAN:    -Acute Gastrointestinal Bleeding: IV PPI bid. Trend H/H. NPO@[place] for GI evaluation.    -Acute Blood Loss Anemia: 2/2 the above. Trend H/H. Hold off on Kcentra for now but low threshold to give if acute bleeding occurs.    -Hepatic Masses: Unclear if liver primary vs metastatic dz. GI and oncology consults. Hold off on bx until GIB addressed.    -Essential HTN: Hold scheduled meds given acute bleeding above. PRN hydralazine.    -Chronic Stroke: Home statin. Restart AC as soon as able.    -pAF: Hold Eliquis given acute bleed but restart as soon as able given patient is high risk due to hx of debilitating stroke.    -CKD Stage III: Currently at baseline Cr.    -DVT Prophy: SCDs.    -Disposition: Med/surg with tele.      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 Most of the history is taken from patient's wife due to patient's underlying aphasia. Wife says that she has noted patient to have dark stools for months but thought it was because he was taking iron tablets. This morning started having BRBPR. Bleeding enough that blood fills the toilet bowel. Denies lightheadedness or dizziness. Denies abdominal pain. No NSAID use. Patient is on Eliquis for afib last dose was this morning. In the ED, patient hypertensive 's. Other  VSS. Hg 7.1 (BL 11-12). CTA GI bleed study completed without evidence of acute bleeding but did show liver masses concerning for malignancy. Started on PPI, 1U pRBCs, and admitted to medicine.    Review of Systems     A Comprehensive greater than 10 system review of systems was carried out.  Pertinent positives and negatives are noted above.  Otherwise negative for contributory information.     Past Medical History     Past Medical History:   Diagnosis Date     Acute embolic stroke (H) 7/17/2019     Acute ischemic cerebrovascular accident (CVA) involving left middle cerebral artery territory (H) 7/20/2019     Anemia, iron deficiency 12/3/2021     Aortic root dilatation (H)      Ascending aortic aneurysm (H)      CVA (cerebral vascular accident) (H) 2/18/2012    CVA 2/18/12     Former smoker      Glaucoma      Hyperlipidemia LDL goal <100 2/18/2012     Hypertension      Iron deficiency anemia      Lower GI bleed 6/28/2019     Mild aortic regurgitation     mild to moderate     Paroxysmal atrial fibrillation (H) 10/4/2019     Stroke (H)     2004, speech deficit     Medications     Current Outpatient Medications   Medication Sig Dispense Refill     acetaminophen (TYLENOL) 500 MG tablet Take 2 tablets (1,000 mg) by mouth 3 times daily (Patient not taking: Reported on 12/5/2021)       amLODIPine (NORVASC) 5 MG tablet Take 5 mg by mouth daily       apixaban ANTICOAGULANT (ELIQUIS) 5 MG tablet Take 1 tablet (5 mg) by mouth 2 times daily 60 tablet 3     brinzolamide-brimonidine (SIMBRINZA) 1-0.2 % ophthalmic suspension Place 1 drop into both eyes 2 times daily        Calcium Carbonate-Vitamin D (CALCIUM 600+D PO) Take 1 tablet by mouth 2 times daily At noon and supper       citalopram (CELEXA) 40 MG tablet Take 40 mg by mouth daily       docusate sodium (COLACE) 100 MG tablet Take 100 mg by mouth At Bedtime        glucosamine-chondroitin 500-400 MG CAPS per capsule Take 1 capsule by mouth 2 times daily At noon and supper        HYDROcodone-acetaminophen (NORCO) 5-325 MG tablet Take 1 tablet by mouth every 6 hours as needed for pain (Patient not taking: Reported on 12/5/2021) 6 tablet 0     Lidocaine (LIDOCARE) 4 % Patch Place 1 patch onto the skin every 24 hours To prevent lidocaine toxicity, patient should be patch free for 12 hrs daily. (Patient not taking: Reported on 12/5/2021)       lovastatin (MEVACOR) 40 MG tablet Take 40 mg by mouth At Bedtime       methocarbamol (ROBAXIN) 750 MG tablet Take 1 tablet (750 mg) by mouth 3 times daily as needed for muscle spasms (Patient not taking: Reported on 12/5/2021) 15 tablet 0     Multiple Vitamins-Minerals (CENTRUM SILVER ADULT 50+ PO) Take 1 tablet by mouth daily        Multiple Vitamins-Minerals (PRESERVISION AREDS PO) Take by mouth 2 times daily       pantoprazole (PROTONIX) 40 MG EC tablet Take 40 mg by mouth daily       senna-docusate (SENOKOT-S/PERICOLACE) 8.6-50 MG tablet Take 1-2 tablets by mouth 2 times daily as needed for constipation       vitamin B-12 (CYANOCOBALAMIN) 1000 MCG tablet Take 1,000 mcg by mouth daily        Past Surgical History     Past Surgical History:   Procedure Laterality Date     APPENDECTOMY       CATARACT EXTRACTION Bilateral      IR CAROTID CEREBRAL ANGIOGRAM LEFT  7/17/2019     OPEN REDUCTION INTERNAL FIXATION WRIST Left 10/4/2019    Procedure: Open reduction internal fixation, left distal radius fracture;  Surgeon: Krystal Interiano MD;  Location:  OR     OPEN REDUCTION INTERNAL FIXATION WRIST Left 10/6/2019    Procedure: Open reduction internal fixation, left distal radius fracture.  ;  Surgeon: Krystal Interiano MD;  Location:  OR     ORTHOPEDIC SURGERY       SKIN CANCER EXCISION       [facility] TOTAL KNEE ARTHROPLASTY Right 10/7/2016    Procedure: RIGHT KNEE TOTAL ARTHROPLASTY;  Surgeon: Jesus Alberto Kern MD;  Location: [facility] OR;  Service: Orthopedics     Family History     Family History   Problem Relation Age of Onset     Breast Cancer Mother       No Known Problems Father      Breast Cancer Sister      No Known Problems Brother      No Known Problems Maternal Grandmother      No Known Problems Maternal Grandfather      No Known Problems Paternal Grandmother      No Known Problems Paternal Grandfather      Diabetes No family hx of      Hypertension No family hx of      Cancer - colorectal No family hx of      Allergies     Allergies   Allergen Reactions     Contrast Dye Rash     Social History     Social History     Tobacco Use     Smoking status: Former Smoker     Packs/day: 0.00     Years: 10.00     Pack years: 0.00     Types: Cigars     Quit date: 1972     Years since quittin.4     Smokeless tobacco: Never Used   Substance Use Topics     Alcohol use: Yes     Alcohol/week: 0.0 standard drinks     Comment: OCC beer     Physical Exam   Blood pressure (!) 167/82, pulse 69, temperature 98  F (36.7  C), temperature source Oral, resp. rate 14, weight 82.9 kg (182 lb 12.8 oz), SpO2 97 %.    General: Ill appearing, cooperative with exam, in NAD.  HEENT: Atraumatic. No erythema in posterior pharynx.  Lymph: No cervical or inguinal lymphadenopathy.  Cardiac: RRR. No murmurs.  Lungs: CTAB. Nl WOB.  Abd: Non-tender. No rebound or gaurding. Nl bowel sounds.  Ext: No edema. 2+ pulses.  Skin: No rashes, abrasions, or contusions.  Psych: A&Ox3. Nl affect.  Neuro: Right side strength 3/5 compared to left side. Severe aphasia.    Labs & Imaging     Reviewed and Pertinent results discussed in assessment and plan.

## 2022-08-06 NOTE — PROGRESS NOTES
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 Unclear if upper or lower (reported BRBPR as well as melena; black stool on rectal exam in ED). Found to have new hepatic masses as well that will need further workup.     PLAN:     -Acute Gastrointestinal Bleeding:Likely diverticular. No active bleeding.     -Acute Blood Loss Anemia: 2/2 the above. Trend H/H. S/p 1 unit PRBC in ER and additional unit 8/6.     -Hepatic Masses: Unclear if liver primary vs metastatic dz. GI and oncology consults. S/p liver biopsy 8/5.     -Essential HTN: Hold scheduled meds given acute bleeding above. PRN hydralazine.     -Chronic Stroke: Home statin. Restart AC as soon as able.     -pAF: Hold Eliquis given acute bleed but restart as soon as able given patient is high risk due to hx of debilitating stroke.     -CKD Stage III: Currently at baseline Cr.            Diet: Regular Diet Adult    DVT Prophylaxis: Pneumatic Compression Devices  Pena Catheter: Not present  Central Lines: None  Cardiac Monitoring: None  Code Status: Full Code      Disposition Plan           The patient's care was discussed with the Patient and Patient's Family.    Tiny Phillips MD  Hospitalist Service  [facility]  Securely message with the Vocera Web Console (learn more here)  Text page via [employer] Paging/Directory         Clinically Significant Risk Factors Present on Admission                     ______________________________________________________________________    Interval History     No abdominal pain or N/V/further BRBPR.    Data reviewed today: I reviewed all medications, new labs and imaging results over the last 24  hours. I personally reviewed no images or EKG's today.    Physical Exam   Vital Signs: Temp: 98.3  F (36.8  C) Temp src: Oral BP: 130/57 Pulse: 66   Resp: 20 SpO2: 95 % O2 Device: None (Room air)    Weight: 182 lbs 12.8 oz    Gen - Alert, awake, follows commands, + aphasia.  Lungs - CTA B anteriorly.  Heart - RR,S1+S2 nml, no m/g/r.  Abd - soft, NT, ND, + BS.  Ext  - trace edema.    Data   Recent Labs   Lab 08/06/22  0708 08/05/22  1759 08/05/22  1020 08/05/22  0711 08/04/22  1532   WBC 3.9*  --   --   --  4.7   HGB 7.0* 7.6* 7.7*  --  7.1*   MCV 90  --   --   --  93     --   --   --  291   INR  --   --   --   --  1.12     --   --  142 135*   POTASSIUM 4.1  --   --  4.0 4.1   CHLORIDE 108*  --   --  110* 103   CO2 24  --   --  24 24   BUN 16.9  --   --  20.8 23.4*   CR 1.55*  --   --  1.49* 1.50*   ANIONGAP 8  --   --  8 8   WILLIAM 7.9*  --   --  7.8* 8.9   GLC 83  --   --  82 112*   ALBUMIN  --   --   --   --  3.8   PROTTOTAL  --   --   --   --  6.9   BILITOTAL  --   --   --   --  0.2   ALKPHOS  --   --   --   --  93   ALT  --   --   --   --  17   AST  --   --   --   --  27     Recent Results (from the past 24 hour(s))   US Biopsy Liver    Narrative    US BIOPSY LIVER 8/5/2022 3:08 PM    HISTORY: 87-year-old patient with new hepatic masses. Patient has had  GI bleeding with anemia, though hepatic masses identified on CT  angiogram of the abdomen and pelvis performed the day prior. Request  now made for biopsy.    TECHNIQUE: Patient was brought to the ultrasound department and  informed consent obtained with both patient and wife. Skin overlying  the right upper quadrant was prepped and draped in standard sterile  fashion. 1% lidocaine used for local anesthesia. With continuous  ultrasound guidance, a 17-gauge guide needle was advanced into the  right hepatic lobe. 18-gauge core biopsy samples were obtained through  the guide needle in the right hepatic lobe mass, numbering 7 in total.  Gelfoam slurry  applied through the needle tract upon completion. No  sedation administered. 10 mL of 1% lidocaine. Imaging demonstrates  needle placement within the right hepatic lobe mass.      Impression    IMPRESSION: Successful right hepatic lobe liver mass biopsy. 7 samples  obtained and sent to the lab for evaluation. Gelfoam slurry in needle  tract upon completion.     Medications       atorvastatin  40 mg Oral QPM     brinzolamide-brimonidine  1 drop Both Eyes BID     [Held by provider] calcitonin (salmon)  1 spray Alternating Nostrils Daily     citalopram  20 mg Oral Daily     cyanocobalamin  1,000 mcg Oral Daily     pantoprazole (PROTONIX) IV  40 mg Intravenous BID

## 2022-08-06 NOTE — CONSULTS
Care Management Initial Consult    General Information  Assessment completed with: Patient, Spouse or significant other, Wife Norma  Type of CM/SW Visit: Initial Assessment    Primary Care Provider verified and updated as needed: Yes   Readmission within the last 30 days: no previous admission in last 30 days         Communication Assessment  Patient's communication style: spoken language (English or Bilingual) (aphasia)    Hearing Difficulty or Deaf: no   Wear Glasses or Blind: yes    Cognitive  Cognitive/Neuro/Behavioral: WDL                      Living Environment:   People in home: spouse  Norma  Current living Arrangements: house ([facility])      Able to return to prior arrangements: no TCU recommended       Family/Social Support:  Care provided by: spouse/significant other                  Description of Support System:   supportive       Current Resources:   Patient receiving home care services: No     Community Resources:    Equipment currently used at home: grab bar, tub/shower, walker, standard      Lifestyle & Psychosocial Needs:  Social Determinants of Health     Tobacco Use: Medium Risk     Smoking Tobacco Use: Former Smoker     Smokeless Tobacco Use: Never Used   Alcohol Use: Not on file   Financial Resource Strain: Not on file   Food Insecurity: Not on file   Transportation Needs: Not on file   Physical Activity: Not on file   Stress: Not on file   Social Connections: Not on file   Intimate Partner Violence: Not on file   Depression: Not on file   Housing Stability: Not on file       Functional Status:  Prior to admission patient needed assistance:   Dependent ADLs:: Ambulation-walker, Bathing, Dressing, Grooming, Transfers  Dependent IADLs:: Cleaning, Cooking, Laundry, Meal Preparation, Shopping, Medication Management, Transportation             Additional Information:  Met with patient and wife Norma after OT recommendations of TCU were made. Patient lives at home with his wife who is his primary  caregiver after his CVA. He is normally able to get around with a walker and her assist but is currently below his baseline. His wife has been providing all physical cares if needed. He is unable to get in their shower as it is too small for her to assist him while he is in it so she assists with sponge bathing.  Norma has been able to provide transportation when needed. She does have a cleaning service that comes in 2x/wk and they help keep an eye on hime so she can run errands. She has also been able to get out to [employer] but is never gone for long periods. She states he stays in his chair and does not attempt to get up while she is gone.    Patient and wife are in agreement for TCU placement on discharge which ARNIE is to be determined. He has been at [facility] in the past but she would like something closer and was inquiring about [facility] in [place].   Pt/family was provided with the Medicare Compare list for SNF.  Discussed associated Medicare star ratings to assist with choice for referrals/discharge planning.    Norma would like referrals sent to [facility] and [facility]. Referrals sent with ARNIE of Monday per Dr. Phillips  Wife would like to transport if able.   Reviewed out of pocket cost for [facility] transport, $81.80 for base rate and $5.26 per mile to the destination.   Will continue to follow for discharge planning.     Kiara Paul RN BSN OCN  Care Coordinator  [facility]  [phone]

## 2022-08-06 NOTE — PLAN OF CARE
Goal Outcome Evaluation:      End of Shift Summary  For vital signs and complete assessments, please see documentation flowsheets.     Pertinent assessments: Aphasia, gets frustrated at times when trying to communicate. Denies pain. No rectal bleeding. In bed this shift, right sided weakness from previous CVA. Tolerating clear liquid diet.   Major Shift Events none  Treatment Plan: Protonix, monitor for bleeding.   Bedside Nurse: Tania Rea RN

## 2022-08-06 NOTE — PLAN OF CARE
Goal Outcome Evaluation:                To Do:  End of Shift Summary  For vital signs and complete assessments, please see documentation flowsheets.     Pertinent assessments:  Alert -- aphasia-- wife at bedside to assist in cares and communication---up in chair and commode with gait belt and anne steady -- 1UPC infused without problems --- appetite good --- using urinal--  Major Shift Events seems more alert   Treatment Plan: monitor  Bedside Nurse: Almaz Alston RN

## 2022-08-06 NOTE — PROGRESS NOTES
[facility] Follow Up Note:    Assessment and Plan:  Tk Richmond is a 87 year old male patient admitted with GI bleed,    1.Acute gastrointestinal bleed presented with dark stool and blood in stool  - Previously has had iron deficiency andGI bleed, this was thought to be from angiectasia of descending colon  - Previously has received IV iron in our office  - Due to previous anemia has also had a bone marrow biopsy that was unrevealing for any bone marrow pathology  - Recently has been on Eliquis for stroke  - Eliquis has been held  - Holding off on Kcentra for now as hemoglobin is stable  - Reviewed GI note, no plans for any procedures currently    PLAN:   - Watching hemoglobin, getting transfusion August 6.  - No procedures unless condition declines or hemoglobin drops significantly    2. Multiple liver lesions on CT scan, new since April 2021  - Suspicious for malignancy  - CA 19-9 pending, CEA was normal.  - Liver biopsy August 5 results pending.      PLAN:  - Follow-up on liver biopsy.    3. CKD stage III  - Creatinine at baseline, may be contributing to anemia.    4. CVA  - Currently off Eliquis    5. FULL CODE        Subjective:    A lot of history obtained from wife, patient has aphasia and unable to give history.    He has history of dark stools for months and continues to have dark stools.  Had liver biopsy August 5 and the results pending.  CEA normal.    Scheduled Medications:  Reviewed active medications    Labs:  CBC RESULTS: Recent Labs   Lab Test 08/05/22  1020 08/04/22  1532 04/30/21  0912 07/12/20  0749   WBC  --  4.7 5.4 5.6   HGB 7.7* 7.1* 12.1* 12.2*   HCT  --  24.9* 36.6* 36.7*   MCV  --  93 92 94   PLT  --  291 166 143*       CMP  Recent Labs   Lab 08/06/22  0708 08/05/22  0711 08/04/22  1532    142 135*   POTASSIUM 4.1 4.0 4.1   CHLORIDE 108* 110* 103   CO2 24 24 24   ANIONGAP 8 8 8   GLC 83 82 112*   BUN 16.9 20.8 23.4*   CR 1.55* 1.49* 1.50*   GFRESTIMATED 44*  46* 45*   WILLIAM 7.9* 7.8* 8.9   PROTTOTAL  --   --  6.9   ALBUMIN  --   --  3.8   BILITOTAL  --   --  0.2   ALKPHOS  --   --  93   AST  --   --  27   ALT  --   --  17       INR  Recent Labs   Lab 08/04/22  1532   INR 1.12       Objective/Physical Exam:  Blood pressure 130/57, pulse 66, temperature 97.8  F (36.6  C), temperature source Oral, resp. rate 20, weight 82.9 kg (182 lb 12.8 oz), SpO2 95 %.  General appearance:alert, oriented, no acute distress  HEENT:sclera anicteric, no pallor, no thrush or mucositis.  Lungs: No respiratory distress.  Abdomen: soft, NT, ND , BS normal  Ext: 1+ edema in the lower legs.  Neurological: Nonfocal he has aphasia.

## 2022-08-06 NOTE — PROGRESS NOTES
"   08/06/22 1400   Quick Adds   Type of Visit Initial PT Evaluation   Living Environment   People in Home spouse   Current Living Arrangements house  ([facility])   Home Accessibility stairs to enter home   Number of Stairs, Main Entrance 3   Stair Railings, Main Entrance railings on both sides of stairs   Transportation Anticipated family or friend will provide   Self-Care   Usual Activity Tolerance moderate   Current Activity Tolerance fair   Equipment Currently Used at Home raised toilet seat;walker, rolling   Fall history within last six months yes   Number of times patient has fallen within last six months   (spouse reports \"many\")   Activity/Exercise/Self-Care Comment 4WW for transfers and ambulation   General Information   Onset of Illness/Injury or Date of Surgery 08/04/22   Referring Physician Tiny Phillips MD   Patient/Family Therapy Goals Statement (PT) Pt no stated goals, wanting to get back into bed currently   Pertinent History of Current Problem (include personal factors and/or comorbidities that impact the POC) 87M with hx of pAF on Eliquis, 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 Unclear if upper or lower (reported BRBPR as well as melena; black stool on rectal exam in ED). Found to have new hepatic masses as well that will need further workup.   Existing Precautions/Restrictions fall   Cognition   Affect/Mental Status (Cognition) unable/difficult to assess  (aphasia making it difficult to assess)   Follows Commands (Cognition) follows one-step commands   Posture    Posture Forward head position;Protracted shoulders   Range of Motion (ROM)   Range of Motion ROM deficits secondary to swelling   Strength (Manual Muscle Testing)   Strength Comments able to perform against gravity in B LEs, but functional weakness noted   Bed Mobility   Comment, (Bed Mobility) mod A x " 1   Transfers   Comment, (Transfers) mod A x 1 with anne steady   Gait/Stairs (Locomotion)   Comment, (Gait/Stairs) able to take 5 steps forward, but limited foot cleardance on the R side   Balance   Balance Comments high risk due to R Sided weakness   Clinical Impression   Criteria for Skilled Therapeutic Intervention Yes, treatment indicated   PT Diagnosis (PT) decreased functional mobility   Influenced by the following impairments decreased strength, decreased ROM   Functional limitations due to impairments decreased bed mob, transfers, ambulation   Clinical Presentation (PT Evaluation Complexity) Stable/Uncomplicated   Clinical Presentation Rationale improving   Clinical Decision Making (Complexity) low complexity   Planned Therapy Interventions (PT) balance training;bed mobility training;gait training;home exercise program;strengthening;transfer training;risk factor education;neuromuscular re-education;home program guidelines;progressive activity/exercise   Risk & Benefits of therapy have been explained evaluation/treatment results reviewed;care plan/treatment goals reviewed;risks/benefits reviewed;current/potential barriers reviewed;participants included;spouse/significant other;patient;participants voiced agreement with care plan  (pt will need reinforcement)   PT Discharge Planning   PT Discharge Recommendation (DC Rec) Transitional Care Facility   PT Rationale for DC Rec Pt needing mod A x 1 for basic mobility tasks at this time. Wife unable to provide this   PT Brief overview of current status Pt able to take steps forward and backwards. Poor foot clearance   Total Evaluation Time   Total Evaluation Time (Minutes) 10   Physical Therapy Goals   PT Frequency 5x/week   PT Predicted Duration/Target Date for Goal Attainment 08/07/22   PT Goals Bed Mobility;Transfers;Gait   PT: Bed Mobility Modified independent;Supine to/from sit   PT: Transfers Modified independent;Sit to/from stand;Bed to/from chair;Assistive  device   PT: Gait Modified independent;Rolling walker;25 feet

## 2022-08-06 NOTE — PROGRESS NOTES
"   08/06/22 0956   Quick Adds   Type of Visit Initial Occupational Therapy Evaluation   Living Environment   People in Home spouse   Current Living Arrangements house  ([facility])   Home Accessibility stairs to enter home   Number of Stairs, Main Entrance 3   Stair Railings, Main Entrance railings on both sides of stairs   Transportation Anticipated family or friend will provide   Living Environment Comments Pt lives with spouse in [facility], 3 JAY, all needs met on main level, RTS, wife reports has been sponge bathing due to small size of shower.   Self-Care   Usual Activity Tolerance moderate   Current Activity Tolerance fair   Equipment Currently Used at Home raised toilet seat;walker, rolling   Fall history within last six months yes   Number of times patient has fallen within last six months   (spouse reports \"many\")   Activity/Exercise/Self-Care Comment Spouse provided info due to pt's aphasia: Pt able to assist with ADLs, however inconsistent ability. Wife will help with socks, sponge bathing, toileting as needed. At times, pt can stand and transfer with 4ww, however spouse also needs to assist. Spouse reports pt has been getting progressively weaker for past month.   Instrumental Activities of Daily Living (IADL)   IADL Comments Spouse completes all IADLs   General Information   Onset of Illness/Injury or Date of Surgery 08/04/22   Referring Physician Tiny Phillips MD, MD   Patient/Family Therapy Goal Statement (OT) Pt/spouse open to rehab options   Additional Occupational Profile Info/Pertinent History of Current Problem Per chart: Pt is an 87 year old male admitted with GI bleed, weakness and new hepatic masses. PMH significant for CVA w/ R hemiparesis and expressive aphasia.   Existing Precautions/Restrictions fall   Cognitive Status Examination   Cognitive Status Comments Pt with baseline expressive aphasia; spouse assists with communication. Able to follow commands   Visual Perception   Visual " "Impairment/Limitations corrective lenses full-time   Sensory   Sensory Comments Indicates RUE \"numbness\"; denies other sensory impairments   Pain Assessment   Patient Currently in Pain No   Range of Motion Comprehensive   Comment, General Range of Motion RUE AROM limited due to hemiparesis; LUE WFL   Strength Comprehensive (MMT)   Comment, General Manual Muscle Testing (MMT) Assessment RUE baseline weakness, able to move shoulder against gravity, light grasp; LUE weakness   Coordination   Upper Extremity Coordination Right UE impaired   Gross Motor Coordination RUE Impaired   Fine Motor Coordination RUE Impaired   Bed Mobility   Bed Mobility supine-sit;sit-supine   Supine-Sit [place] (Bed Mobility) unable to assess   Sit-Supine [place] (Bed Mobility) unable to assess   Transfers   Transfers bed-chair transfer;sit-stand transfer;toilet transfer   Transfer Skill: Bed to Chair/Chair to Bed   Bed-Chair [place] (Transfers) dependent (less than 25% patient effort)  (Natalia Steady)   Sit-Stand Transfer   Sit-Stand [place] (Transfers) maximum assist (25% patient effort)   Assistive Device (Sit-Stand Transfers) walker, front-wheeled   Toilet Transfer   [place] Level (Toilet Transfer) unable to assess   Balance   Balance Comments Min A while standing   Activities of Daily Living   [facility] Assessment/Intervention upper body dressing;lower body dressing;grooming;toileting   Upper Body Dressing Assessment/Training   [place] Level (Upper Body Dressing) maximum assist (25% patient effort)   Lower Body Dressing Assessment/Training   [place] Level (Lower Body Dressing) dependent (less than 25% patient effort)   Grooming Assessment/Training   [place] Level (Grooming) minimum assist (75% patient effort)   Toileting   [place] Level (Toileting) dependent (less than 25% patient effort)   Clinical Impression   Criteria for Skilled Therapeutic Interventions Met (OT) Yes, treatment indicated   OT " Diagnosis Impaired ADLs, mobility   OT Problem List-Impairments impacting ADL problems related to;activity tolerance impaired;balance;strength   ADL comments/analysis Pt below baseline   Assessment of Occupational Performance 3-5 Performance Deficits   Identified Performance Deficits Grooming, dressing, toileting, transfers   Planned Therapy Interventions (OT) ADL retraining;balance training;fine motor coordination training;strengthening;transfer training;home program guidelines;progressive activity/exercise   Clinical Decision Making Complexity (OT) low complexity   Risk & Benefits of therapy have been explained evaluation/treatment results reviewed;care plan/treatment goals reviewed;risks/benefits reviewed;current/potential barriers reviewed;participants voiced agreement with care plan;participants included;patient   OT Discharge Planning   OT Discharge Recommendation (DC Rec) Transitional Care Facility   OT Rationale for DC Rec Pt is below baseline level of functioning, limited by weakness and impaired activity tolerance. Recommend ongoing skilled OT while IP and in TCU setting to improve strength, functional activity tolerance, balance and safety needed for daily tasks.   Total Evaluation Time (Minutes)   Total Evaluation Time (Minutes) 10   OT Goals   Therapy Frequency (OT) 5 times/wk   OT Predicted Duration/Target Date for Goal Attainment 08/11/22   OT Goals Hygiene/Grooming;Lower Body Dressing;Toilet Transfer/Toileting   OT: Hygiene/Grooming supervision/stand-by assist;from wheelchair   OT: Lower Body Dressing Minimal assist;from wheelchair   OT: Toilet Transfer/Toileting Supervision/stand-by assist;toilet transfer;cleaning and garment management;using adaptive equipment

## 2022-08-07 NOTE — PROGRESS NOTES
[facility] Follow Up Note:    Assessment and Plan:  Tk Richmond is a 87 year old male patient admitted with GI bleed,    1.Acute gastrointestinal bleed presented with dark stool and blood in stool  - Previously has had iron deficiency andGI bleed, this was thought to be from angiectasia of descending colon  - Previously has received IV iron in our office  - Due to previous anemia has also had a bone marrow biopsy that was unrevealing for any bone marrow pathology  - Recently has been on Eliquis for stroke  - Eliquis has been held  - Holding off on Kcentra for now as hemoglobin is stable  - Reviewed GI note, no plans for any procedures currently    PLAN:   - Watching hemoglobin, getting transfusion August 6.  - No procedures unless condition declines or hemoglobin drops significantly    2. Multiple liver lesions on CT scan, new since April 2021  - Suspicious for malignancy  - CA 19-9 normal, CEA was normal.  - Liver biopsy August 5 results pending.      PLAN:  - Follow-up on liver biopsy.    3. CKD stage III  - Creatinine at baseline, may be contributing to anemia.    4. CVA  - Currently off Eliquis    5. FULL CODE        Subjective:    Patient has aphasia and unable to give history.  Wife is at bedside.  He had no active bleeding.  He has history of dark stools for months and continues to have dark stools.  Had liver biopsy August 5 and the results pending.  CEA normal.  CA 19-9 is normal.    Scheduled Medications:  Reviewed active medications    Labs:  CBC RESULTS: Recent Labs   Lab Test 08/05/22  1020 08/04/22  1532 04/30/21  0912 07/12/20  0749   WBC  --  4.7 5.4 5.6   HGB 7.7* 7.1* 12.1* 12.2*   HCT  --  24.9* 36.6* 36.7*   MCV  --  93 92 94   PLT  --  291 166 143*       CMP  Recent Labs   Lab 08/06/22  0708 08/05/22  0711 08/04/22  1532    142 135*   POTASSIUM 4.1 4.0 4.1   CHLORIDE 108* 110* 103   CO2 24 24 24   ANIONGAP 8 8 8   GLC 83 82 112*   BUN 16.9 20.8 23.4*   CR 1.55*  1.49* 1.50*   GFRESTIMATED 44* 46* 45*   WILLIAM 7.9* 7.8* 8.9   PROTTOTAL  --   --  6.9   ALBUMIN  --   --  3.8   BILITOTAL  --   --  0.2   ALKPHOS  --   --  93   AST  --   --  27   ALT  --   --  17       INR  Recent Labs   Lab 08/04/22  1532   INR 1.12       Objective/Physical Exam:  Blood pressure (!) 150/55, pulse 56, temperature 97.7  F (36.5  C), temperature source Oral, resp. rate 24, weight 82.9 kg (182 lb 12.8 oz), SpO2 97 %.  General appearance:alert, oriented, no acute distress  HEENT:sclera anicteric, no pallor, no thrush or mucositis.  Lungs: No respiratory distress.  Abdomen: soft, NT, ND  Ext: 1+ edema in the lower legs.  Neurological: Nonfocal he has aphasia.

## 2022-08-07 NOTE — PLAN OF CARE
Goal Outcome Evaluation:      End of Shift Summary  For vital signs and complete assessments, please see documentation flowsheets.     Pertinent assessments: Alert and oriented. Difficulty with word finding, becomes frustrated. Denies pain. Incontinent of small amount of urine. Appeared to sleep well   Major Shift Events   Treatment Plan: monitor  Bedside Nurse: Tania Rea RN

## 2022-08-07 NOTE — PLAN OF CARE
Goal Outcome Evaluation:                    To Do:  End of Shift Summary  For vital signs and complete assessments, please see documentation flowsheets.     Pertinent assessments:Alert -- up on room with walker -gait belt and assist of 1   -- up in chair most of day-- wife at bedside to assist with communication and cares --- appetite good -- uses urinal and commode but also  changes of depends -----word finding issues with frustration   Major Shift Events more alert and stronger  Treatment Plan: TCU

## 2022-08-07 NOTE — PROGRESS NOTES
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 Unclear if upper or lower (reported BRBPR as well as melena; black stool on rectal exam in ED). Found to have new hepatic masses as well that will need further workup.     PLAN:     -Acute Gastrointestinal Bleeding:Likely diverticular. No active bleeding.     -Acute Blood Loss Anemia: 2/2 the above. Trend H/H. S/p 1 unit PRBC in ER and additional unit 8/6.     -Hepatic Masses: Unclear if liver primary vs metastatic dz. GI and oncology consults. S/p liver biopsy 8/5.     -Essential HTN: Hold scheduled meds given acute bleeding above. PRN hydralazine.     -Chronic Stroke: Home statin. Restart AC as soon as able. Consider restarting AC 8/8.     -pAF: Hold Eliquis given acute bleed but restart as soon as able given patient is high risk due to hx of debilitating stroke.  Consider restarting AC 8/8.     -CKD Stage III: Currently at baseline Cr.              Diet: Regular Diet Adult    DVT Prophylaxis: Pneumatic Compression Devices  Pena Catheter: Not present  Central Lines: None  Cardiac Monitoring: None  Code Status: Full Code      Disposition Plan       TCU 8/8 if Hb stable.     The patient's care was discussed with the Patient and Patient's Family.    Tiny Phillips MD  Hospitalist Service  [facility]  Securely message with the Vocera Web Console (learn more here)  Text page via [employer] Paging/Directory         Clinically Significant Risk Factors Present on Admission                     ______________________________________________________________________    Interval History     No abdominal pain or N/V/further BRBPR.    Data reviewed today: I  reviewed all medications, new labs and imaging results over the last 24 hours. I personally reviewed no images or EKG's today.    Physical Exam   Vital Signs: Temp: 97.7  F (36.5  C) Temp src: Oral BP: (!) 150/55 Pulse: 56   Resp: 24 SpO2: 97 % O2 Device: None (Room air)    Weight: 182 lbs 12.8 oz    Gen - Alert, awake, follows commands, + aphasia.  Lungs - CTA B anteriorly.  Heart - RR,S1+S2 nml, no m/g/r.  Abd - soft, NT, ND, + BS.  Ext  - trace edema.    Data   Recent Labs   Lab 08/06/22  0708 08/05/22  1759 08/05/22  1020 08/05/22  0711 08/04/22  1532   WBC 3.9*  --   --   --  4.7   HGB 7.0* 7.6* 7.7*  --  7.1*   MCV 90  --   --   --  93     --   --   --  291   INR  --   --   --   --  1.12     --   --  142 135*   POTASSIUM 4.1  --   --  4.0 4.1   CHLORIDE 108*  --   --  110* 103   CO2 24  --   --  24 24   BUN 16.9  --   --  20.8 23.4*   CR 1.55*  --   --  1.49* 1.50*   ANIONGAP 8  --   --  8 8   WILLIAM 7.9*  --   --  7.8* 8.9   GLC 83  --   --  82 112*   ALBUMIN  --   --   --   --  3.8   PROTTOTAL  --   --   --   --  6.9   BILITOTAL  --   --   --   --  0.2   ALKPHOS  --   --   --   --  93   ALT  --   --   --   --  17   AST  --   --   --   --  27     No results found for this or any previous visit (from the past 24 hour(s)).  Medications       atorvastatin  40 mg Oral QPM     brinzolamide-brimonidine  1 drop Both Eyes BID     [Held by provider] calcitonin (salmon)  1 spray Alternating Nostrils Daily     citalopram  20 mg Oral Daily     cyanocobalamin  1,000 mcg Oral Daily     pantoprazole  40 mg Oral QAM AC

## 2022-08-08 NOTE — PROGRESS NOTES
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  - No procedures unless condition declines or hemoglobin drops significantly     2. Multiple liver lesions on CT scan, new since April 2021  - Suspicious for malignancy  - CA 19-9 normal, CEA was normal.  - Liver biopsy August 5 results pending.        PLAN:  - Follow-up on liver biopsy.     3. CKD stage III  - Creatinine at baseline, may be contributing to anemia.     4. CVA  - Was off Eliquis, this was just resumed on 8/8/2022, watching for bleeding     5. FULL CODE    - Noted request for palliative care to help with POLST assistance.    --possible TCU at discahrge    Subjective:    Denies any new symptoms at this point, speech is garbled.      Labs:  All labs reviewed    CBCRecent Labs   Lab 08/07/22  1055 08/06/22  0708 08/05/22  1759 08/05/22  1020 08/04/22  1532   WBC 3.9* 3.9*  --   --  4.7   HGB 8.8* 7.0* 7.6*   < > 7.1*   MCV 91 90  --   --  93    204  --   --  291    < > = values in this interval not displayed.       CMP  Recent Labs   Lab 08/06/22  0708 08/05/22  0711 08/04/22  1532    142 135*   POTASSIUM 4.1 4.0 4.1   CHLORIDE 108* 110* 103   CO2 24 24 24   ANIONGAP 8 8 8   GLC 83 82 112*   BUN 16.9 20.8 23.4*   CR 1.55* 1.49* 1.50*   GFRESTIMATED 44* 46* 45*    WILLIAM 7.9* 7.8* 8.9   PROTTOTAL  --   --  6.9   ALBUMIN  --   --  3.8   BILITOTAL  --   --  0.2   ALKPHOS  --   --  93   AST  --   --  27   ALT  --   --  17       INR  Recent Labs   Lab 08/04/22  1532   INR 1.12       Blood CultureNo results for input(s): CULT in the last 168 hours.      Angely Nielson MD  [facility]  8/8/2022 11:18 AM

## 2022-08-08 NOTE — PROGRESS NOTES
Chart check  hgb has remained stable at 7 range, chart reviewed without any reports of GI bleeding.    Signing off but please call if patients starts to bleed and family/patient want to pursue procedures.  Noted that palliative care is following patient.    Allie Nunes MD  [facility]

## 2022-08-08 NOTE — PLAN OF CARE
Pertinent assessments: Pt A & Oriented x4, denies pain. Wife at bedside and assisting with communication. Having some frustration with word finding ability.    Major Shift Events Uneventful    Treatment Plan: TCU at discharge, PT/OT, SW, Pall, encourage out of bed activity.

## 2022-08-08 NOTE — PLAN OF CARE
End of Shift Summary  For vital signs and complete assessments, please see documentation flowsheets    Pertinent assessments: VSS, Oriented x4, denies pain, has difficulty communicating, becomes frustrated at times. Resting quiet between cares.    Major Shift Events Uneventful    Treatment Plan: TCU at discharge    Bedside Nurse: Bridget Maldonado RN

## 2022-08-08 NOTE — PROGRESS NOTES
Care Management Follow Up    Length of Stay (days): 4    Expected Discharge Date: 08/09/2022     Concerns to be Addressed:       Patient plan of care discussed at interdisciplinary rounds: Yes    Anticipated Discharge Disposition: Transitional Care     Anticipated Discharge Services:  therapy  Anticipated Discharge DME:  none    Patient/family educated on Medicare website which has current facility and service quality ratings:  yes  Education Provided on the Discharge Plan:  yes  Patient/Family in Agreement with the Plan: yes    Referrals Placed by CM/SW:  none  Private pay costs discussed: Not applicable    Additional Information:  Admission liaison from [facility] called to say they have a shared room available tomorrow after 1 PM.  Spoke with pt and wife, informed them that [facility] would not have a bed open for a few days and [facility] has shared room, they agreed to take the bed. MD aware and agrees with discharge tomorrow if pt does not bleed.  Wife will provide transportation.      [id number]    No Medellin RN, BSN, PHN, CCM  Care Coordinator  [facility]  [phone]

## 2022-08-08 NOTE — CONSULTS
"[facility]  Palliative Care Consultation   Text Page    Assessment & Plan   Tk Richmond is a 87 year old male who was admitted on 8/4/2022.   Consulted by Hospitalist Garo Rossi MD to assist with POLST assistance.    Recommendations:  1. Goals of Care- No CPR- Do NOT Intubate - Restorative care.  Hospitalization goals discussed with patient and his spouse/healthcare agent Ml \"Norma\" Basilio.  Decisional Capacity- Unreliable, status post stroke with aphasia. Patient does not have an advance directive. Per  informed consent policy next of kin should be involved in all consent and decision making. His wife Ml \"Norma\" Basilio is next-of-kin. (Copy of Advanced Directive sent to [employer] to be verified.)  POLST - Complete indicating the patient's request for SELECTIVE TREATMENT.    2.  Generalized weakness - Appreciate input of Occupational Therapist Heydi Vick, OT and Physical Therapist Shelli Barry, PT as patient is below baseline level of functioning and the plan is to dismiss to TCU.       3. Spiritual Care  Appreciate input of  Intern Jovanny Jackson  Spiritual Background: Yarsani    4. Care Planning  Appreciate input of  Gem Paul RN in TCU dismissal planning.   Medications for discharge - none from a palliative perspective    Medical Decision Making and Goals of Care:  Discussed on August 8, 2022 with JULIO Martin CNS:     Met Tk as he was resting in bed. His wife \"Norma\" at bedside introduced herself. In asking Tk permission to discuss his care with Norma, he motioned approvingly. Norma offered concern that Tk is weaker than he had been at home, as he has little activity since admission. Norma acknowledged the plan to dismiss to TCU. I explained our role in palliative care in discussing goals of care. Norma explained the hope that Tk would get stronger at TCU and return home. In asking about an " "Advanced Directive, Norma gave me a copy which I have e-mailed to [employer] to be placed in the EMR. We discussed Tk wishes and explored CPR. Norma acknowledged \"I know he doesn't want any heroic measures or to be on a ventilator.\" We discussed the use of a POLST and completed the document indicating Tk' preference for SELECTIVE TREATMENT.     Thank you for involving us in the patient's care.       Jacy Barton MS, RN, CNS, APRN, ACHPN, FAACVPR  Pain and Palliative Care  Pager [phone]  Office [phone]       Time Spent on this Encounter   Total unit/floor time 12:45 PM until 2:10 PM, time consisted of the following, examination of the patient, reviewing the record and completing documentation. >50% of time spent in counseling and coordination of care, Bedside Nurse Natalie Lopez RN and Hospitalist Garo Johnson MD.  Time spend counseling with patient and family consisted of the following topics, goals of care and care planning for discharge.    Understanding of disease process:   This has been discussed with the patient's wife, Norma acknowledged his multiple - co morbidities .    History of Present Illness   History is obtained from the electronic health record and patient's spouse    Tk Richmond is a 87 year old male who was admitted 8/4/2022 with GI bleed. Upon workup CT demonstrated multiple new hepatic masses with liver biopsy on 8/5/2022 currently pending. Appreciate input of Gi and Oncology as the patient's Eliquis was resumed today. 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     Past Medical History   I have reviewed this patient's medical history and updated it with pertinent information if needed.   Past Medical History:   Diagnosis Date     Acute embolic stroke (H) 7/17/2019 "     Acute ischemic cerebrovascular accident (CVA) involving left middle cerebral artery territory (H) 7/20/2019     Anemia, iron deficiency 12/3/2021     Aortic root dilatation (H)      Ascending aortic aneurysm (H)      CVA (cerebral vascular accident) (H) 2/18/2012    CVA 2/18/12     Former smoker      Glaucoma      Hyperlipidemia LDL goal <100 2/18/2012     Hypertension      Iron deficiency anemia      Lower GI bleed 6/28/2019     Mild aortic regurgitation     mild to moderate     Paroxysmal atrial fibrillation (H) 10/4/2019     Stroke (H)     2004, speech deficit       Past Surgical History   I have reviewed this patient's surgical history and updated it with pertinent information if needed.  Past Surgical History:   Procedure Laterality Date     APPENDECTOMY       CATARACT EXTRACTION Bilateral      IR CAROTID CEREBRAL ANGIOGRAM LEFT  7/17/2019     OPEN REDUCTION INTERNAL FIXATION WRIST Left 10/4/2019    Procedure: Open reduction internal fixation, left distal radius fracture;  Surgeon: Krystal Interiano MD;  Location:  OR     OPEN REDUCTION INTERNAL FIXATION WRIST Left 10/6/2019    Procedure: Open reduction internal fixation, left distal radius fracture.  ;  Surgeon: Krystal Interiano MD;  Location:  OR     ORTHOPEDIC SURGERY       SKIN CANCER EXCISION       ZZC TOTAL KNEE ARTHROPLASTY Right 10/7/2016    Procedure: RIGHT KNEE TOTAL ARTHROPLASTY;  Surgeon: Jesus Alberto Kern MD;  Location: [facility];  Service: Orthopedics       Prior to Admission Medications   Prior to Admission Medications   Prescriptions Last Dose Informant Patient Reported? Taking?   Calcium-Magnesium-Zinc 333-133-5 MG TABS per tablet 8/4/2022 at Unknown time  Yes Yes   Sig: Take 1 tablet by mouth daily   Multiple Vitamins-Minerals (CENTRUM SILVER ADULT 50+ PO) 8/4/2022 at am Spouse/Significant Other Yes Yes   Sig: Take 1 tablet by mouth daily    Multiple Vitamins-Minerals (PRESERVISION AREDS PO) 8/4/2022 at am Spouse/Significant  Other Yes Yes   Sig: Take 1 tablet by mouth 2 times daily   amLODIPine (NORVASC) 5 MG tablet 8/3/2022 at pm Spouse/Significant Other Yes Yes   Sig: Take 5 mg by mouth At Bedtime   apixaban ANTICOAGULANT (ELIQUIS) 5 MG tablet 8/4/2022 at am Spouse/Significant Other No Yes   Sig: Take 1 tablet (5 mg) by mouth 2 times daily   brinzolamide-brimonidine (SIMBRINZA) 1-0.2 % ophthalmic suspension 8/4/2022 at am Spouse/Significant Other Yes Yes   Sig: Place 1 drop into both eyes 2 times daily    calcitonin, salmon, (MIACALCIN) 200 UNIT/ACT nasal spray 8/4/2022 at am  Yes Yes   Sig: Spray 1 spray into one nostril alternating nostrils daily Alternate nostril each day.   citalopram (CELEXA) 20 MG tablet 8/4/2022 at am  Yes Yes   Sig: Take 20 mg by mouth daily   docusate sodium (COLACE) 100 MG tablet Unknown at Unknown time Spouse/Significant Other Yes Yes   Sig: Take 100 mg by mouth nightly as needed   glucosamine-chondroitin 500-400 MG CAPS per capsule 8/4/2022 at Unknown time Spouse/Significant Other Yes Yes   Sig: Take 1 capsule by mouth 2 times daily At noon and supper   lovastatin (MEVACOR) 40 MG tablet 8/3/2022 at pm Spouse/Significant Other Yes Yes   Sig: Take 40 mg by mouth At Bedtime   pantoprazole (PROTONIX) 40 MG EC tablet 8/4/2022 at am Spouse/Significant Other Yes Yes   Sig: Take 40 mg by mouth daily   senna-docusate (SENOKOT-S/PERICOLACE) 8.6-50 MG tablet Unknown at Unknown time  No Yes   Sig: Take 1-2 tablets by mouth 2 times daily as needed for constipation   vitamin B-12 (CYANOCOBALAMIN) 1000 MCG tablet 8/4/2022 at Unknown time Spouse/Significant Other Yes Yes   Sig: Take 1,000 mcg by mouth daily       Facility-Administered Medications: None     Allergies   Allergies   Allergen Reactions     Contrast Dye Rash       Social History        Living situation: Lived with his wife Norma prior to admission       Support system: Wife and daughter       Actual/Potential Caregiver: Spouse  History of substance use/abuse:   reports that he quit smoking about 50 years ago. His smoking use included cigars. He smoked 0.00 packs per day for 10.00 years. He has never used smokeless tobacco.  reports current alcohol use.    Family History   I have reviewed this patient's family history and updated it with pertinent information if needed.   Family History   Problem Relation Age of Onset     Breast Cancer Mother      No Known Problems Father      Breast Cancer Sister      No Known Problems Brother      No Known Problems Maternal Grandmother      No Known Problems Maternal Grandfather      No Known Problems Paternal Grandmother      No Known Problems Paternal Grandfather      Diabetes No family hx of      Hypertension No family hx of      Cancer - colorectal No family hx of        Review of Systems   The 10 point Review of Systems is negative other than noted in the HPI or here.     Palliative Symptom Review (0=no symptom/no concern, 1=mild, 2=moderate, 3=severe):      Pain: 0-none      Fatigue: 0-none      Nausea: 0-none      Constipation: 0-none      Diarrhea: 0-none      Depressive Symptoms: 0-none      Anxiety: 0-none      Drowsiness: 0-none      Poor Appetite: 0-none      Shortness of Breath: 0-none      Insomnia: 0-none        Physical Exam   Temp:  [97.8  F (36.6  C)-98.6  F (37  C)] 98.4  F (36.9  C)  Pulse:  [55-73] 68  Resp:  [18-24] 18  BP: (124-159)/(61-72) 144/70  SpO2:  [95 %-98 %] 96 %  182 lbs 12.8 oz  Exam:  GEN:  Robust elderly  male with dysarthric speech. Alert, responds to name, appears comfortable, no apparent distress.  HEENT:  Normocephalic/atraumatic, no scleral icterus, no nasal discharge, mouth moist.  CV:  RRR, S1, S2; no murmurs or other irregularities noted.  +3 DP/PT pulses bilaterally; no edema bilaterally.  RESP:  Clear to auscultation bilaterally without rales/rhonchi/wheezing/retractions.  Symmetric chest rise on inhalation noted.  Normal respiratory effort.  ABD:  Rounded, soft,  non-tender/non-distended.  +BS  EXT:  Edema & pulses as noted above.  CMS intact x 4.     M/S:   Denies pain with palpation of extremities.    SKIN:  Warm and dry to touch, no exanthems noted in the visualized areas.    NEURO: Right hand contractures, otherwise deferred.  PAIN BEHAVIOR: Cooperative  Psych:  Calm, cooperative, mumbled/dysarthric speech.      Data   Results for orders placed or performed during the hospital encounter of 08/04/22   CTA Abdomen Pelvis with Contrast     Status: None    Narrative    EXAM: CTA ABDOMEN PELVIS WITH CONTRAST  LOCATION: [facility]  DATE/TIME: 8/4/2022 6:30 PM    INDICATION: GI bleed, distended, low hemoglobin.    COMPARISON: CTA of the abdomen and pelvis performed 4/30/2021.    TECHNIQUE: CT angiogram abdomen pelvis during arterial phase of injection of IV contrast. 2D and 3D MIP reconstructions were performed by the CT technologist. Dose reduction techniques were used.    CONTRAST: 72 mL Isovue 370.    FINDINGS: Large hiatal hernia with essentially entire stomach in the chest. Minimal scattered bibasilar atelectasis or scar formation. Degenerative disc disease throughout the lumbar spine. Moderate compression deformities of L1 and L2, and to a lesser   degree L3. Significant intervertebral disc space narrowing and irregularity at these levels, noted on previous exam with L1 and L2, though progressed with L3 with depression of superior endplate.    Septated cysts noted in the left hepatic lobe measuring up to 2 cm, similar to previous exam. There are multiple new hepatic masses, the largest of which is in the right hepatic lobe measuring 2.5 cm AP x 2.7 cm transverse. At least four lesions are   identified in the liver, measurement provided as largest. These were not present on previous exam. Cholelithiasis without complicating features. The spleen is irregular in shape, though unchanged. Both adrenal glands and pancreas are grossly   unremarkable.  Multiple left renal hypodensities, the largest of which is 2.3 cm, relatively unchanged. Other hypodensities are subcentimeter in size. No hydronephrosis bilaterally. No intraperitoneal fluid or air. The bladder is distended and   unremarkable. Sigmoid diverticulosis. No dilated loops of bowel. I do not clearly identify a source of GI hemorrhage. No active extravasation.      Impression    IMPRESSION:  1.  No active extravasation to identify source of GI bleed. Sigmoid diverticulosis, though no visible diverticulitis.  2.  Multiple new hepatic masses, the largest of which measures up to 2.7 cm. These were not present on previous exam and are amenable to percutaneous biopsy.  3.  Large hiatal hernia with majority of stomach in the chest.  4.  Cholelithiasis, without complicating features.  5.  Left renal hypodensities the largest of which appears to be a simple cyst. The smaller two hypodensities are too small to fully characterize.    Please note the above finding of new hepatic masses was discussed by myself to Dr. Schofield at 7:08 p.m. on 8/4/2022. Also discussed was the fact that no active GI bleed was identified.     US Biopsy Liver     Status: None    Narrative    US BIOPSY LIVER 8/5/2022 3:08 PM    HISTORY: 87-year-old patient with new hepatic masses. Patient has had  GI bleeding with anemia, though hepatic masses identified on CT  angiogram of the abdomen and pelvis performed the day prior. Request  now made for biopsy.    TECHNIQUE: Patient was brought to the ultrasound department and  informed consent obtained with both patient and wife. Skin overlying  the right upper quadrant was prepped and draped in standard sterile  fashion. 1% lidocaine used for local anesthesia. With continuous  ultrasound guidance, a 17-gauge guide needle was advanced into the  right hepatic lobe. 18-gauge core biopsy samples were obtained through  the guide needle in the right hepatic lobe mass, numbering 7 in total.  Gelfoam slurry  applied through the needle tract upon completion. No  sedation administered. 10 mL of 1% lidocaine. Imaging demonstrates  needle placement within the right hepatic lobe mass.      Impression    IMPRESSION: Successful right hepatic lobe liver mass biopsy. 7 samples  obtained and sent to the lab for evaluation. Gelfoam slurry in needle  tract upon completion.    OBINNA TIMMONS MD         SYSTEM ID:  [id number]   Comprehensive metabolic panel     Status: Abnormal   Result Value Ref Range    Sodium 135 (L) 136 - 145 mmol/L    Potassium 4.1 3.4 - 5.3 mmol/L    Creatinine 1.50 (H) 0.67 - 1.17 mg/dL    Urea Nitrogen 23.4 (H) 8.0 - 23.0 mg/dL    Chloride 103 98 - 107 mmol/L    Carbon Dioxide (CO2) 24 22 - 29 mmol/L    Anion Gap 8 7 - 15 mmol/L    Glucose 112 (H) 70 - 99 mg/dL    Calcium 8.9 8.8 - 10.2 mg/dL    Protein Total 6.9 6.4 - 8.3 g/dL    Albumin 3.8 3.5 - 5.2 g/dL    Bilirubin Total 0.2 <=1.2 mg/dL    Alkaline Phosphatase 93 40 - 129 U/L    AST 27 10 - 50 U/L    ALT 17 10 - 50 U/L    GFR Estimate 45 (L) >60 mL/min/1.73m2   Extra Tube ([place] Draw)     Status: None    Narrative    The following orders were created for panel order Extra Tube ([place] Draw).  Procedure                               Abnormality         Status                     ---------                               -----------         ------                     Extra Blue Top Tube[[id number]]                              Final result               Extra Red Top Tube[[id number]]                               Final result               Extra Green Top (Lithium...[[id number]]                      Final result               Extra Purple Top Tube[[id number]]                            Final result                 Please view results for these tests on the individual orders.   INR     Status: Normal   Result Value Ref Range    INR 1.12 0.85 - 1.15   CBC with platelets and differential     Status: Abnormal   Result Value Ref Range    WBC Count 4.7 4.0 - 11.0  10e3/uL    RBC Count 2.69 (L) 4.40 - 5.90 10e6/uL    Hemoglobin 7.1 (L) 13.3 - 17.7 g/dL    Hematocrit 24.9 (L) 40.0 - 53.0 %    MCV 93 78 - 100 fL    MCH 26.4 (L) 26.5 - 33.0 pg    MCHC 28.5 (L) 31.5 - 36.5 g/dL    RDW 14.0 10.0 - 15.0 %    Platelet Count 291 150 - 450 10e3/uL    % Neutrophils 57 %    % Lymphocytes 27 %    % Monocytes 9 %    % Eosinophils 7 %    % Basophils 0 %    % Immature Granulocytes 0 %    NRBCs per 100 WBC 0 <1 /100    Absolute Neutrophils 2.6 1.6 - 8.3 10e3/uL    Absolute Lymphocytes 1.3 0.8 - 5.3 10e3/uL    Absolute Monocytes 0.4 0.0 - 1.3 10e3/uL    Absolute Eosinophils 0.3 0.0 - 0.7 10e3/uL    Absolute Basophils 0.0 0.0 - 0.2 10e3/uL    Absolute Immature Granulocytes 0.0 <=0.4 10e3/uL    Absolute NRBCs 0.0 10e3/uL   Extra Blue Top Tube     Status: None   Result Value Ref Range    Hold Specimen JIC    Extra Red Top Tube     Status: None   Result Value Ref Range    Hold Specimen JIC    Extra Green Top (Lithium Heparin) Tube     Status: None   Result Value Ref Range    Hold Specimen JIC    Extra Purple Top Tube     Status: None   Result Value Ref Range    Hold Specimen JIC    Stool: occult blood     Status: Abnormal   Result Value Ref Range    Occult Blood Positive (A) Negative   Lactic acid whole blood     Status: Normal   Result Value Ref Range    Lactic Acid 1.1 0.7 - 2.0 mmol/L   Asymptomatic COVID-19 Virus (Coronavirus) by PCR Nasopharyngeal     Status: Normal    Specimen: Nasopharyngeal; Swab   Result Value Ref Range    SARS CoV2 PCR Negative Negative    Narrative    Testing was performed using the Xpert Xpress SARS-CoV-2 Assay on the   Cepheid Gene-Xpert Instrument Systems. Additional information about   this Emergency Use Authorization (EUA) assay can be found via the Lab   Guide. This test should be ordered for the detection of SARS-CoV-2 in   individuals who meet SARS-CoV-2 clinical and/or epidemiological   criteria. Test performance is unknown in asymptomatic patients. This   test  is for in vitro diagnostic use under the FDA EUA for   laboratories certified under CLIA to perform high complexity testing.   This test has not been FDA cleared or approved. A negative result   does not rule out the presence of PCR inhibitors in the specimen or   target RNA in concentration below the limit of detection for the   assay. The possibility of a false negative should be considered if   the patient's recent exposure or clinical presentation suggests   COVID-19. This test was validated by the [facility]. This laboratory is certified under the Clinical Laboratory Improvement Amendments of 1988 (CLIA-88) as qualified to perform high complexity laboratory testing.     Basic metabolic panel     Status: Abnormal   Result Value Ref Range    Creatinine 1.49 (H) 0.67 - 1.17 mg/dL    Sodium 142 136 - 145 mmol/L    Potassium 4.0 3.4 - 5.3 mmol/L    Urea Nitrogen 20.8 8.0 - 23.0 mg/dL    Chloride 110 (H) 98 - 107 mmol/L    Carbon Dioxide (CO2) 24 22 - 29 mmol/L    Anion Gap 8 7 - 15 mmol/L    Glucose 82 70 - 99 mg/dL    GFR Estimate 46 (L) >60 mL/min/1.73m2    Calcium 7.8 (L) 8.8 - 10.2 mg/dL   Hemoglobin     Status: Abnormal   Result Value Ref Range    Hemoglobin 7.7 (L) 13.3 - 17.7 g/dL   CEA     Status: None   Result Value Ref Range    CEA 7.5 ng/mL   Cancer antigen 19-9     Status: None   Result Value Ref Range    Cancer Antigen 19-9 10 <=35 U/mL   Extra Tube     Status: None    Narrative    The following orders were created for panel order Extra Tube.  Procedure                               Abnormality         Status                     ---------                               -----------         ------                     Extra Purple Top Tube[[id number]]                            Final result                 Please view results for these tests on the individual orders.   Extra Purple Top Tube     Status: None   Result Value Ref Range    Hold Specimen JIC    Hemoglobin      Status: Abnormal   Result Value Ref Range    Hemoglobin 7.6 (L) 13.3 - 17.7 g/dL   Basic metabolic panel     Status: Abnormal   Result Value Ref Range    Creatinine 1.55 (H) 0.67 - 1.17 mg/dL    Sodium 140 136 - 145 mmol/L    Potassium 4.1 3.4 - 5.3 mmol/L    Urea Nitrogen 16.9 8.0 - 23.0 mg/dL    Chloride 108 (H) 98 - 107 mmol/L    Carbon Dioxide (CO2) 24 22 - 29 mmol/L    Anion Gap 8 7 - 15 mmol/L    Glucose 83 70 - 99 mg/dL    GFR Estimate 44 (L) >60 mL/min/1.73m2    Calcium 7.9 (L) 8.8 - 10.2 mg/dL   CBC with platelets and differential     Status: Abnormal   Result Value Ref Range    WBC Count 3.9 (L) 4.0 - 11.0 10e3/uL    RBC Count 2.66 (L) 4.40 - 5.90 10e6/uL    Hemoglobin 7.0 (L) 13.3 - 17.7 g/dL    Hematocrit 24.0 (L) 40.0 - 53.0 %    MCV 90 78 - 100 fL    MCH 26.3 (L) 26.5 - 33.0 pg    MCHC 29.2 (L) 31.5 - 36.5 g/dL    RDW 13.9 10.0 - 15.0 %    Platelet Count 204 150 - 450 10e3/uL    % Neutrophils 61 %    % Lymphocytes 17 %    % Monocytes 9 %    % Eosinophils 11 %    % Basophils 1 %    % Immature Granulocytes 1 %    NRBCs per 100 WBC 0 <1 /100    Absolute Neutrophils 2.4 1.6 - 8.3 10e3/uL    Absolute Lymphocytes 0.7 (L) 0.8 - 5.3 10e3/uL    Absolute Monocytes 0.4 0.0 - 1.3 10e3/uL    Absolute Eosinophils 0.4 0.0 - 0.7 10e3/uL    Absolute Basophils 0.0 0.0 - 0.2 10e3/uL    Absolute Immature Granulocytes 0.0 <=0.4 10e3/uL    Absolute NRBCs 0.0 10e3/uL   CBC with platelets and differential     Status: Abnormal   Result Value Ref Range    WBC Count 3.9 (L) 4.0 - 11.0 10e3/uL    RBC Count 3.27 (L) 4.40 - 5.90 10e6/uL    Hemoglobin 8.8 (L) 13.3 - 17.7 g/dL    Hematocrit 29.7 (L) 40.0 - 53.0 %    MCV 91 78 - 100 fL    MCH 26.9 26.5 - 33.0 pg    MCHC 29.6 (L) 31.5 - 36.5 g/dL    RDW 14.2 10.0 - 15.0 %    Platelet Count 243 150 - 450 10e3/uL    % Neutrophils 67 %    % Lymphocytes 13 %    % Monocytes 11 %    % Eosinophils 8 %    % Basophils 1 %    % Immature Granulocytes 0 %    NRBCs per 100 WBC 0 <1 /100    Absolute  Neutrophils 2.7 1.6 - 8.3 10e3/uL    Absolute Lymphocytes 0.5 (L) 0.8 - 5.3 10e3/uL    Absolute Monocytes 0.4 0.0 - 1.3 10e3/uL    Absolute Eosinophils 0.3 0.0 - 0.7 10e3/uL    Absolute Basophils 0.0 0.0 - 0.2 10e3/uL    Absolute Immature Granulocytes 0.0 <=0.4 10e3/uL    Absolute NRBCs 0.0 10e3/uL   CBC with platelets     Status: Abnormal   Result Value Ref Range    WBC Count 4.2 4.0 - 11.0 10e3/uL    RBC Count 2.96 (L) 4.40 - 5.90 10e6/uL    Hemoglobin 7.8 (L) 13.3 - 17.7 g/dL    Hematocrit 27.0 (L) 40.0 - 53.0 %    MCV 91 78 - 100 fL    MCH 26.4 (L) 26.5 - 33.0 pg    MCHC 28.9 (L) 31.5 - 36.5 g/dL    RDW 14.1 10.0 - 15.0 %    Platelet Count 203 150 - 450 10e3/uL   Extra Tube     Status: None (In process)    Narrative    The following orders were created for panel order Extra Tube.  Procedure                               Abnormality         Status                     ---------                               -----------         ------                     Extra Green Top (Lithium...[[id number]]                      In process                   Please view results for these tests on the individual orders.   Adult Type and Screen     Status: None   Result Value Ref Range    ABO/RH(D) B POS     Antibody Screen Negative Negative    SPECIMEN EXPIRATION DATE [id number]    Prepare red blood cells (unit)     Status: None   Result Value Ref Range    CROSSMATCH Compatible     UNIT ABO/RH B Pos     Unit Number [id number]     Unit Status Transfused     Blood Component Type Red Blood Cells     Product Code R8285I73     CODING SYSTEM [id number]     UNIT TYPE ISBT 7300     ISSUE DATE AND TIME [id number]    Prepare red blood cells (unit)     Status: None   Result Value Ref Range    CROSSMATCH Compatible     UNIT ABO/RH B Pos     Unit Number [id number]     Unit Status Transfused     Blood Component Type Red Blood Cells     Product Code [id number]     CODING SYSTEM [id number]     UNIT TYPE ISBT 7300     ISSUE DATE AND TIME  [id number]    CBC + differential     Status: Abnormal    Narrative    The following orders were created for panel order CBC + differential.  Procedure                               Abnormality         Status                     ---------                               -----------         ------                     CBC with platelets and d...[[id number]]  Abnormal            Final result                 Please view results for these tests on the individual orders.   ABO/Rh type and screen     Status: None    Narrative    The following orders were created for panel order ABO/Rh type and screen.  Procedure                               Abnormality         Status                     ---------                               -----------         ------                     Adult Type and Screen[[id number]]                            Edited Result - FINAL        Please view results for these tests on the individual orders.   CBC with Platelets & Differential     Status: Abnormal    Narrative    The following orders were created for panel order CBC with Platelets & Differential.  Procedure                               Abnormality         Status                     ---------                               -----------         ------                     CBC with platelets and d...[[id number]]  Abnormal            Final result                 Please view results for these tests on the individual orders.   CBC with Platelets & Differential     Status: Abnormal    Narrative    The following orders were created for panel order CBC with Platelets & Differential.  Procedure                               Abnormality         Status                     ---------                               -----------         ------                     CBC with platelets and d...[[id number]]  Abnormal            Final result                 Please view results for these tests on the individual orders.

## 2022-08-08 NOTE — PROGRESS NOTES
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 Unclear if upper or lower (reported BRBPR as well as melena; black stool on rectal exam in ED). Found to have new hepatic masses as well that will need further workup.     PLAN:     -Acute Gastrointestinal Bleeding:Likely diverticular. No active bleeding.  -Most recent hemoglobin stable above 8 g  -Started and resume his home DOAC  -Will check CBC in the next 24 hours and see if there is any concerns for bleeding tendencies     -Acute Blood Loss Anemia: 2/2 the above. Trend H/H. S/p 1 unit PRBC in ER and additional unit 8/6.     -Hepatic Masses: Unclear if liver primary vs metastatic dz. GI and oncology consults. S/p liver biopsy 8/5.   -Has a pending liver biopsy results    -Essential HTN:   -I resumed his home regimen of amlodipine given increasing blood pressure levels and no further issues of bleeding symptoms     -Chronic Stroke: Home statin. Restart AC as soon as able. Consider restarting AC 8/8.     -pAF: Hold Eliquis given acute bleed but restart as soon as able given patient is high risk due to hx of debilitating stroke.  -I restarted his anticoagulation     -CKD Stage III: Currently at baseline Cr.     Physical deconditioning  -Plans for TCU discharge  -PT, OT following  -Please do out of bed to chair activities as much as he can tolerate.  -Requested social service evaluation for discharge planning disposition    I requested palliative care input for POLST assistance  -Please see paper copy of his living will in his paper chart.  I requested that this living will should be scanned in epic.         Diet: Regular Diet Adult    DVT Prophylaxis:  DOAC  Pena Catheter: Not present  Central Lines: None  Cardiac Monitoring: None  Code Status: Full Code      Disposition Plan     Expected Discharge Date: Anticipating likely in the next 24 hours once TCU bed is available and no further symptoms of bleeding as he is anticoagulation just restarted today.    Discharge Delays: Lab Result Pending (enter specific test & time in comments)  Destination: inpatient rehabilitation facility          The patient's care was discussed with the Patient and Patient's Family.    Garo Rossi MD, MD  Hospitalist Service  [facility]  Securely message with the Vocera Web Console (learn more here)  Text page via [employer] Paging/Directory         Clinically Significant Risk Factors Present on Admission                     ______________________________________________________________________    Interval History   I assume service care today.  Seen and examined.  Chart reviewed.  Family updated as patient's wife Ms. Green present at bedside during my encounter.  I found that is laying comfortably in bed, following simple verbal instructions, mental state remain at baseline, baseline dysarthria.  Reportedly tolerating oral diet and no complaints of nausea, vomiting or abdominal pain.  Wife stated that he had a BM earlier today and no reported concerns for bleeding tendencies such as melanotic stools or bright red blood per rectum.  Currently not requiring any oxygen support.  Remain afebrile.        Data reviewed today: I reviewed all medications, new labs and imaging results over the last 24 hours. I personally reviewed .    Physical Exam   Vital Signs: Temp: 98.4  F (36.9  C) Temp src: Oral BP: (!) 144/70 Pulse: 68   Resp: 18 SpO2: 96 % O2 Device: None (Room air)    Weight: 182 lbs 12.8 oz  HEENT; Atraumatic, normocephalic, pinkish conjuctiva, pupils bilateral reactive   Skin: warm and moist, no rashes  Lymphatics: no cervical or axillary  lymphandenopathy  Lungs: equal chest expansion, clear to auscultation, no wheezes, no stridor, no crackles,   Heart: normal rate, normal rhythm, no rubs or gallops.   Abdomen: normal bowel sounds, no tenderness, no peritoneal signs, no guarding  Extremities: no deformities, bilateral lower extremities nonpitting edema   Neuro; follow commands, alert and oriented x2, spontaneous  Speech but at baseline dysarthriat, moves all extremities spontaneously  Psych; no hallucination, euthymic mood, not agitated        Data   Recent Labs   Lab 08/07/22  1055 08/06/22  0708 08/05/22  1759 08/05/22  1020 08/05/22  0711 08/04/22  1532   WBC 3.9* 3.9*  --   --   --  4.7   HGB 8.8* 7.0* 7.6*   < >  --  7.1*   MCV 91 90  --   --   --  93    204  --   --   --  291   INR  --   --   --   --   --  1.12   NA  --  140  --   --  142 135*   POTASSIUM  --  4.1  --   --  4.0 4.1   CHLORIDE  --  108*  --   --  110* 103   CO2  --  24  --   --  24 24   BUN  --  16.9  --   --  20.8 23.4*   CR  --  1.55*  --   --  1.49* 1.50*   ANIONGAP  --  8  --   --  8 8   WILLIAM  --  7.9*  --   --  7.8* 8.9   GLC  --  83  --   --  82 112*   ALBUMIN  --   --   --   --   --  3.8   PROTTOTAL  --   --   --   --   --  6.9   BILITOTAL  --   --   --   --   --  0.2   ALKPHOS  --   --   --   --   --  93   ALT  --   --   --   --   --  17   AST  --   --   --   --   --  27    < > = values in this interval not displayed.     No results found for this or any previous visit (from the past 24 hour(s)).  Medications       amLODIPine  5 mg Oral At Bedtime     apixaban ANTICOAGULANT  5 mg Oral BID     atorvastatin  10 mg Oral Daily     atorvastatin  40 mg Oral QPM     brinzolamide-brimonidine  1 drop Both Eyes BID     [Held by provider] calcitonin (salmon)  1 spray Alternating Nostrils Daily     Calcium-Magnesium-Zinc  1 tablet Oral Daily     citalopram  20 mg Oral Daily     cyanocobalamin  1,000 mcg Oral Daily     pantoprazole  40 mg Oral QAM AC     PreserVision AREDS    Oral BID

## 2022-08-09 NOTE — PROGRESS NOTES
It was reported to me that the patient had some blood per rectum, minimal amount this morning  Has a pending CBC.  Hold DOAC this morning  Cancel planned discharge today.

## 2022-08-09 NOTE — PLAN OF CARE
End of Shift Summary  For vital signs and complete assessments, please see documentation flowsheets.     Pertinent assessments: VSS. Afebrile. LS clear. BS x4. 2+ BLE edema noted. Pt denies pain and nausea. Regular diet, good appetite. Ax2 with Belt and Walker to ambulate. PIV - SL. Incontinent of urine x2. Band-Aid over Liver biopsy site, CDI, no drainage noted. No rectal bleeding noted during shift. Pt continues to have issues expressing self due to aphasia. A&O, Alarm on bed for safety. Slept well.     Major Shift Events: none    Treatment Plan: Continue to monitor for rectal bleeding, Plan to discharge to TCU today.

## 2022-08-09 NOTE — PROGRESS NOTES
Care Management Follow Up    Length of Stay (days): 5    Expected Discharge Date: 08/10/2022     Concerns to be Addressed:  discharge planning  Patient plan of care discussed at interdisciplinary rounds: Yes    Anticipated Discharge Disposition: Transitional Care     Anticipated Discharge Services:  none  Anticipated Discharge DME:  franco    Patient/family educated on Medicare website which has current facility and service quality ratings:  yes  Education Provided on the Discharge Plan:  yes  Patient/Family in Agreement with the Plan: yes    Referrals Placed by CM/SW:  Post acute facilities  Private pay costs discussed: private room/amenity fees and transportation costs    Additional Information:  The pt's discharge for today has been canceled.  Kaden spoke with Susana at [facility], [phone], to update her that the pt's discharge has been pushed back until tomorrow.  There are no barriers to the pt discharging to [facility] tomorrow.    CM will continue with discharge planning and will be available as needed until discharge.      AZALIA Pickering, [facility]  Inpatient Care Coordination  [facility]  [phone]

## 2022-08-09 NOTE — PROGRESS NOTES
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  - No procedures unless condition declines or hemoglobin drops significantly     2. Multiple liver lesions on CT scan, new since April 2021  - Suspicious for malignancy  - CA 19-9 normal, CEA was normal.  - Liver biopsy August 5 results pending.        PLAN:  - Follow-up on liver biopsy.     3. CKD stage III  - Creatinine at baseline, may be contributing to anemia.     4. CVA  - Was off Eliquis, this was just resumed on 8/8/2022, watching for bleeding     5. FULL CODE     - Noted request for palliative care to help with POLST assistance.     --possible TCU at discahrge    Discharge not held due to bright red blood per rectum x1, I also discussed with wife that at this point we do not need to wait for pathology outpatient follow-up can be arranged.    Subjective:    Feeling about the same, wife's questions about biopsy and neck steps.      Labs:  All labs reviewed    CBCRecent Labs   Lab 08/09/22  0858 08/08/22  1112 08/07/22  1055   WBC 5.1 4.2 3.9*   HGB 9.2* 7.8* 8.8*   MCV 91 91 91    203 243       CMP  Recent Labs   Lab 08/06/22  0708 08/05/22  0711 08/04/22  1532    142 135*   POTASSIUM 4.1 4.0 4.1   CHLORIDE 108* 110* 103   CO2 24 24 24   ANIONGAP 8 8 8   GLC 83 82 112*    BUN 16.9 20.8 23.4*   CR 1.55* 1.49* 1.50*   GFRESTIMATED 44* 46* 45*   WILLIAM 7.9* 7.8* 8.9   PROTTOTAL  --   --  6.9   ALBUMIN  --   --  3.8   BILITOTAL  --   --  0.2   ALKPHOS  --   --  93   AST  --   --  27   ALT  --   --  17       INR  Recent Labs   Lab 08/04/22  1532   INR 1.12       Blood CultureNo results for input(s): CULT in the last 168 hours.      Angely Nielson MD  [facility]  8/9/2022 11:15 AM

## 2022-08-09 NOTE — PLAN OF CARE
Pertinent assessments: VSS on room air. Afebrile. Alert and oriented. Word finding difficulty. Right sided weakness following CVA. Up with SBA, gait belt, and walker. Tolerating a regular diet. Hgb: 7.8.    Major Shift Events: code status changed to DNR/DNI.    Treatment Plan: Discharge to TCU, encourage activity, and Eliquis.

## 2022-08-09 NOTE — DISCHARGE SUMMARY
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 fib on anticoagulation that was resumed prior to discharge  Prior history of CVA  Hypertension  Dyslipidemia  CKD stage III  Physical deconditioning      Follow-ups Needed After Discharge   Follow-up Appointments     Follow Up and recommended labs and tests      Follow up with [facility] physician.  The following labs/tests are   recommended: Repeat CBC  Follow-up with oncology service as you do still have a pending liver   biopsy results  Follow-up with GI service if with worsening anemia.              Addendum: Please see my discussion in earlier progress notes with findings of adenocarcinoma on the hepatic lesion biopsy.  Patient's wife stated that they are no longer interested in pursuing further work-up, testing or treatment regarding this recent findings of adenocarcinoma    Unresulted Labs Ordered in the Past 30 Days of this Admission     Date and Time Order Name Status Description    8/5/2022  2:46 PM Surgical Pathology Exam In process       These results will be followed up by oncology and PCP    Discharge Disposition   Discharged to rehabilitation facility  Condition at discharge: Stable      Hospital Course            Continuing service care today for this very pleasant 87-year-old gentleman.  No significant reported events overnight.  Continues to demonstrate stable hemodynamics with no recurrence of any GI bleeding symptoms.  Hemoglobin stable at  7.8.  Underwent work-up ER for GI bleeding with accompanying worsening anemia requiring packed RBC transfusion.  CTA of the abdomen did not show obvious extravasation/source of bleeding.  As likely diverticular bleed for etiology of bleeding.  No endoscopy was done due to no active bleeding symptoms.  Appreciate input from GI service.  Seen also by oncology here for hepatic masses that was subsequently biopsied with pending results.  He will need to follow-up with oncology as outpatient.  Resumption of his blood thinners (DOAC) pursued prior to discharge and currently tolerating it with no GI bleeding symptoms.  Dosage was decreased to 2.5 mg twice daily due to creatinine clearance and above 80 years old.  He will be discharged going to TCU setting.       I will refer to excerpts of prior progress notes as listed below for details of his hospital stay.       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 Unclear if upper or lower (reported BRBPR as well as melena; black stool on rectal exam in ED). Found to have new hepatic masses as well that will need further workup.     PLAN:     -Acute Gastrointestinal Bleeding:Likely diverticular. No active bleeding.  -Most recent hemoglobin stable above 8 g  -Started and resume his home DOAC  -Will check CBC in the next 24 hours and see if there is any concerns for bleeding tendencies     -Acute Blood Loss Anemia: 2/2 the above. Trend H/H. S/p 1 unit PRBC in ER and additional unit 8/6.     -Hepatic Masses: Unclear if liver primary vs metastatic dz. GI and oncology consults. S/p liver biopsy 8/5.   -Has a pending liver biopsy results    -Essential HTN:   -I resumed his home regimen of amlodipine given increasing blood pressure levels and no further issues of bleeding symptoms     -Chronic Stroke: Home statin. Restart AC  as soon as able. Consider restarting AC 8/8.     -pAF: Hold Eliquis given acute bleed but restart as soon as able given patient is high risk due to hx of debilitating stroke.  -I restarted his anticoagulation     -CKD Stage III: Currently at baseline Cr.     Physical deconditioning  -Plans for TCU discharge  -PT, OT following  -Please do out of bed to chair activities as much as he can tolerate.  -Requested social service evaluation for discharge planning disposition    I requested palliative care input for POLST assistance  -Please see paper copy of his living will in his paper chart.  I requested that this living will should be scanned in epic.        Consultations This Hospital Stay   GASTROENTEROLOGY IP CONSULT  HEMATOLOGY & ONCOLOGY IP CONSULT  HEMATOLOGY & ONCOLOGY IP CONSULT  PHYSICAL THERAPY ADULT IP CONSULT  OCCUPATIONAL THERAPY ADULT IP CONSULT  INTERVENTIONAL RADIOLOGY ADULT/PEDS IP CONSULT  CARE MANAGEMENT / SOCIAL WORK IP CONSULT  CARE MANAGEMENT / SOCIAL WORK IP CONSULT  SOCIAL WORK IP CONSULT  PALLIATIVE CARE ADULT IP CONSULT  PHYSICAL THERAPY ADULT IP CONSULT  OCCUPATIONAL THERAPY ADULT IP CONSULT    Code Status   No CPR- Do NOT Intubate    Time Spent on this Encounter   I, Garo Rossi MD, MD, personally saw the patient today and spent greater than 30 minutes discharging this patient.       Garo Rossi MD, MD  [facility] [address] [facility]  [address] [place] [address]  Phone: [phone]  Fax: [phone]  ______________________________________________________________________    Physical Exam   Vital Signs: Temp: 98.2  F (36.8  C) Temp src: Oral BP: 135/65 Pulse: 58   Resp: 16 SpO2: 97 % O2 Device: None (Room air)    Weight: 182 lbs 12.8 oz  HEENT; Atraumatic, normocephalic, pinkish conjuctiva, pupils bilateral reactive   Skin: warm and moist, no rashes  Lungs: equal chest expansion, clear to auscultation, no wheezes, no stridor, no crackles,    Heart: normal rate, normal rhythm, no rubs or gallops.   Abdomen: normal bowel sounds, no tenderness, no peritoneal signs, no guarding  Extremities: no deformities, no edema   Neuro; follow commands, alert and oriented x2, spontaneous speech, baseline dysarthria moves all extremities spontaneously  Psych; no hallucination, euthymic mood, not agitated           Primary Care Physician   Shyam Mclean    Discharge Orders      General info for SNF    Length of Stay Estimate: Short Term Care: Estimated # of Days <30  Condition at Discharge: Improving  Level of care:skilled   Rehabilitation Potential: Fair  Admission H&P remains valid and up-to-date: Yes  Recent Chemotherapy: N/A  Use Nursing Home Standing Orders: Yes     Mantoux instructions    Give two-step Mantoux (PPD) Per Facility Policy Yes     Follow Up and recommended labs and tests    Follow up with intermediate physician.  The following labs/tests are recommended: Repeat CBC  Follow-up with oncology service as you do still have a pending liver biopsy results  Follow-up with GI service if with worsening anemia.     Reason for your hospital stay    Admitted for GI bleeding, worsening anemia requiring PRBC transfusion. No further bleeding, and chronic anticoagulation was restarted     Activity - Up with nursing assistance     No CPR- Do NOT Intubate     Physical Therapy Adult Consult    Evaluate and treat as clinically indicated.    Reason: Physical deconditioning     Occupational Therapy Adult Consult    Evaluate and treat as clinically indicated.    Reason: Physical deconditioning     Fall precautions     Diet    Follow this diet upon discharge: Orders Placed This Encounter      Regular Diet Adult       Significant Results and Procedures   Most Recent 3 CBC's:Recent Labs   Lab Test 08/08/22  1112 08/07/22  1055 08/06/22  0708   WBC 4.2 3.9* 3.9*   HGB 7.8* 8.8* 7.0*   MCV 91 91 90    243 204     Most Recent 3 BMP's:Recent Labs   Lab Test  08/06/22  0708 08/05/22  0711 08/04/22  1532    142 135*   POTASSIUM 4.1 4.0 4.1   CHLORIDE 108* 110* 103   CO2 24 24 24   BUN 16.9 20.8 23.4*   CR 1.55* 1.49* 1.50*   ANIONGAP 8 8 8   WILLIAM 7.9* 7.8* 8.9   GLC 83 82 112*     Most Recent 3 Hemoglobins:Recent Labs   Lab Test 08/08/22  1112 08/07/22  1055 08/06/22  0708   HGB 7.8* 8.8* 7.0*     Most Recent 3 Troponin's:Recent Labs   Lab Test 04/30/21  0912 07/13/19  1223   TROPI <0.015 <0.015     Most Recent 6 Bacteria Isolates From Any Culture (See EPIC Reports for Culture Details):No lab results found.  Most Recent Urinalysis:Recent Labs   Lab Test 04/30/21  1124   COLOR Straw   APPEARANCE Clear   URINEGLC Negative   URINEBILI Negative   URINEKETONE Negative   SG 1.022   UBLD Negative   URINEPH 7.5*   PROTEIN Negative   NITRITE Negative   LEUKEST Negative   RBCU <1   WBCU <1   ,   Results for orders placed or performed during the hospital encounter of 08/04/22   CTA Abdomen Pelvis with Contrast    Narrative    EXAM: CTA ABDOMEN PELVIS WITH CONTRAST  LOCATION: [facility]  DATE/TIME: 8/4/2022 6:30 PM    INDICATION: GI bleed, distended, low hemoglobin.    COMPARISON: CTA of the abdomen and pelvis performed 4/30/2021.    TECHNIQUE: CT angiogram abdomen pelvis during arterial phase of injection of IV contrast. 2D and 3D MIP reconstructions were performed by the CT technologist. Dose reduction techniques were used.    CONTRAST: 72 mL Isovue 370.    FINDINGS: Large hiatal hernia with essentially entire stomach in the chest. Minimal scattered bibasilar atelectasis or scar formation. Degenerative disc disease throughout the lumbar spine. Moderate compression deformities of L1 and L2, and to a lesser   degree L3. Significant intervertebral disc space narrowing and irregularity at these levels, noted on previous exam with L1 and L2, though progressed with L3 with depression of superior endplate.    Septated cysts noted in the left hepatic lobe  measuring up to 2 cm, similar to previous exam. There are multiple new hepatic masses, the largest of which is in the right hepatic lobe measuring 2.5 cm AP x 2.7 cm transverse. At least four lesions are   identified in the liver, measurement provided as largest. These were not present on previous exam. Cholelithiasis without complicating features. The spleen is irregular in shape, though unchanged. Both adrenal glands and pancreas are grossly   unremarkable. Multiple left renal hypodensities, the largest of which is 2.3 cm, relatively unchanged. Other hypodensities are subcentimeter in size. No hydronephrosis bilaterally. No intraperitoneal fluid or air. The bladder is distended and   unremarkable. Sigmoid diverticulosis. No dilated loops of bowel. I do not clearly identify a source of GI hemorrhage. No active extravasation.      Impression    IMPRESSION:  1.  No active extravasation to identify source of GI bleed. Sigmoid diverticulosis, though no visible diverticulitis.  2.  Multiple new hepatic masses, the largest of which measures up to 2.7 cm. These were not present on previous exam and are amenable to percutaneous biopsy.  3.  Large hiatal hernia with majority of stomach in the chest.  4.  Cholelithiasis, without complicating features.  5.  Left renal hypodensities the largest of which appears to be a simple cyst. The smaller two hypodensities are too small to fully characterize.    Please note the above finding of new hepatic masses was discussed by myself to Dr. Schofield at 7:08 p.m. on 8/4/2022. Also discussed was the fact that no active GI bleed was identified.     US Biopsy Liver    Narrative    US BIOPSY LIVER 8/5/2022 3:08 PM    HISTORY: 87-year-old patient with new hepatic masses. Patient has had  GI bleeding with anemia, though hepatic masses identified on CT  angiogram of the abdomen and pelvis performed the day prior. Request  now made for biopsy.    TECHNIQUE: Patient was brought to the ultrasound  department and  informed consent obtained with both patient and wife. Skin overlying  the right upper quadrant was prepped and draped in standard sterile  fashion. 1% lidocaine used for local anesthesia. With continuous  ultrasound guidance, a 17-gauge guide needle was advanced into the  right hepatic lobe. 18-gauge core biopsy samples were obtained through  the guide needle in the right hepatic lobe mass, numbering 7 in total.  Gelfoam slurry applied through the needle tract upon completion. No  sedation administered. 10 mL of 1% lidocaine. Imaging demonstrates  needle placement within the right hepatic lobe mass.      Impression    IMPRESSION: Successful right hepatic lobe liver mass biopsy. 7 samples  obtained and sent to the lab for evaluation. Gelfoam slurry in needle  tract upon completion.    OBINNA TIMMONS MD         SYSTEM ID:  [id number]       Discharge Medications   Current Discharge Medication List      CONTINUE these medications which have CHANGED    Details   apixaban ANTICOAGULANT (ELIQUIS) 2.5 MG tablet Take 1 tablet (2.5 mg) by mouth 2 times daily  Qty: 60 tablet, Refills: 0    Associated Diagnoses: Paroxysmal atrial fibrillation (H)         CONTINUE these medications which have NOT CHANGED    Details   amLODIPine (NORVASC) 5 MG tablet Take 5 mg by mouth At Bedtime      brinzolamide-brimonidine (SIMBRINZA) 1-0.2 % ophthalmic suspension Place 1 drop into both eyes 2 times daily       calcitonin, salmon, (MIACALCIN) 200 UNIT/ACT nasal spray Spray 1 spray into one nostril alternating nostrils daily Alternate nostril each day.      Calcium-Magnesium-Zinc 333-133-5 MG TABS per tablet Take 1 tablet by mouth daily      citalopram (CELEXA) 20 MG tablet Take 20 mg by mouth daily      docusate sodium (COLACE) 100 MG tablet Take 100 mg by mouth nightly as needed      glucosamine-chondroitin 500-400 MG CAPS per capsule Take 1 capsule by mouth 2 times daily At noon and supper      lovastatin (MEVACOR) 40 MG  tablet Take 40 mg by mouth At Bedtime      !! Multiple Vitamins-Minerals (CENTRUM SILVER ADULT 50+ PO) Take 1 tablet by mouth daily       !! Multiple Vitamins-Minerals (PRESERVISION AREDS PO) Take 1 tablet by mouth 2 times daily      pantoprazole (PROTONIX) 40 MG EC tablet Take 40 mg by mouth daily      senna-docusate (SENOKOT-S/PERICOLACE) 8.6-50 MG tablet Take 1-2 tablets by mouth 2 times daily as needed for constipation    Associated Diagnoses: Drug-induced constipation      vitamin B-12 (CYANOCOBALAMIN) 1000 MCG tablet Take 1,000 mcg by mouth daily        !! - Potential duplicate medications found. Please discuss with provider.        Allergies   Allergies   Allergen Reactions     Contrast Dye Rash

## 2022-08-09 NOTE — PROGRESS NOTES
"[facility]  Palliative Care Progress Note  Text Page    Recommendations:  1. Goals of Care - No CPR- Do NOT Intubate - Restorative care.  Hospitalization goals discussed with patient and his spouse/healthcare agent Ml \"Norma\" Basilio.  Decisional Capacity- Unreliable, status post stroke with aphasia. Patient does not have an advance directive. Per  informed consent policy next of kin should be involved in all consent and decision making. His wife Ml \"Norma\" Basilio is next-of-kin. (Copy of Advanced Directive sent to [employer] to be verified.)  POLST - Dated 8/8/2022 indicating the patient's request for SELECTIVE TREATMENT.     2.  Generalized weakness - Appreciate input of Occupational Therapist Heydi Vick, OT and Physical Therapist Shelli Barry, ESCOBAR as patient is below baseline level of functioning and the plan is to dismiss to TCU.       3. Spiritual Care  Appreciate input of  Intern Jovanny Jackson  Spiritual Background: Faith     4. Care Planning  Appreciate arrangements made by  [facility] Pickering for tomorrows TCU dismissal plan.   Medications for discharge - none from a palliative perspective     Medical Decision Making and Goals of Care:  Discussed on August 9, 2022 with JULIO Huerta CNS:     Met with Tk as he was resting in bed. His wife Norma at bedside was asking about \"his blood results from this morning\" given Tk' dismissal was held today due to rectal bleeding. Hgb up to 9.2 and Hematocrit up to 31.5 compared to yesterdays labs.  No other questions or concerns.    Jacy Barton MS, RN, CNS, APRN, ACHPN, FAACVPR  Pain and Palliative Care  Pager [phone]  Office [phone]       Time Spent on this Encounter   Total unit/floor time 2:20 Pm until 2:35 PM, time consisted of the following, examination of the patient, reviewing the record and completing documentation. >50% of time spent in counseling and " coordination of care.  Time spend counseling with patient and family consisted of the following topics, care planning for discharge and symptom management.  Time spent in coordination of care with Bedside Nurse Almaz Alston RN and Hospitalist Garo Rossi MD.     Interval History   Chart reviewed - dismissal held due to rectal bleed this morning    Review of Systems    CONSTITUTIONAL: NEGATIVE for fever, chills, change in weight  ENT/MOUTH: NEGATIVE for ear, mouth and throat problems  RESP: NEGATIVE for significant cough or SOB  CV: NEGATIVE for chest pain, palpitations or peripheral edema    Palliative Symptom Review (0=no symptom/no concern, 1=mild, 2=moderate, 3=severe):      Pain: 0-none      Fatigue: 1-mild      Nausea: 0-none      Constipation: 0-none      Diarrhea: 0-none      Depressive Symptoms: 0-none      Anxiety: 0-none      Drowsiness: 0-none      Poor Appetite: 0-none      Shortness of Breath: 0-none      Insomnia: 0-none        Physical Exam   Temp:  [97.9  F (36.6  C)-98.4  F (36.9  C)] 98.2  F (36.8  C)  Pulse:  [58-63] 58  Resp:  [16-18] 16  BP: (135-140)/(64-66) 135/65  SpO2:  [97 %] 97 %  182 lbs 12.8 oz  GEN:  Alert, responds to name and appears comfortable, no apparent distress.  HEENT:  Normocephalic/atraumatic, no scleral icterus, no nasal discharge, mouth moist.  RESP:  Symmetric chest rise on inhalation noted.  Normal respiratory effort.      Medications       amLODIPine  5 mg Oral At Bedtime     [Held by provider] apixaban ANTICOAGULANT  2.5 mg Oral BID     atorvastatin  40 mg Oral QPM     brinzolamide-brimonidine  1 drop Both Eyes BID     [Held by provider] calcitonin (salmon)  1 spray Alternating Nostrils Daily     calcium carbonate (OS-WILLIAM) 500 mg (elemental) tablet  500 mg Oral Daily    And     magnesium oxide  200 mg Oral Daily    And     zinc sulfate  220 mg Oral Daily     citalopram  20 mg Oral Daily     cyanocobalamin  1,000 mcg Oral Daily     pantoprazole  40 mg Oral QAM AC        Data   Results for orders placed or performed during the hospital encounter of 08/04/22 (from the past 24 hour(s))   CBC with platelets   Result Value Ref Range    WBC Count 5.1 4.0 - 11.0 10e3/uL    RBC Count 3.46 (L) 4.40 - 5.90 10e6/uL    Hemoglobin 9.2 (L) 13.3 - 17.7 g/dL    Hematocrit 31.5 (L) 40.0 - 53.0 %    MCV 91 78 - 100 fL    MCH 26.6 26.5 - 33.0 pg    MCHC 29.2 (L) 31.5 - 36.5 g/dL    RDW 14.2 10.0 - 15.0 %    Platelet Count 247 150 - 450 10e3/uL

## 2022-08-10 NOTE — PLAN OF CARE
Occupational Therapy Discharge Summary    Reason for therapy discharge:    Discharged to transitional care facility.    Progress towards therapy goal(s). See goals on Care Plan in [facility] electronic health record for goal details.  Goals not met.  Barriers to achieving goals:   discharge from facility.    Therapy recommendation(s):    Continued therapy is recommended.  Rationale/Recommendations:  OT eval and treat at TCU.      **Pt not seen by discharging therapist on this date, note written based on previous treating therapist's notes and recommendations

## 2022-08-10 NOTE — PROGRESS NOTES
Care Management Discharge Note    Discharge Date: 08/10/2022       Discharge Disposition: Transitional Care    Discharge Services:  None     Discharge DME:      Discharge Transportation: family or friend will provide    Private pay costs discussed: private room/amenity fees and transportation costs    PAS Confirmation Code: [id number] ([id number])  Patient/family educated on Medicare website which has current facility and service quality ratings:  yes    Education Provided on the Discharge Plan:  yes  Persons Notified of Discharge Plans: patient/wife  Patient/Family in Agreement with the Plan: yes    Handoff Referral Completed: No    Additional Information:  Patient has discharge orders to TCU. [facility] has accepted. Called [facility] admissions ph#[phone]. Spoke with Jil notifying of discharge. Facility will be available at 11 am. Discharge orders faxed. Nursing and patient/wife updated.     Wife will provide transportation.     Hawk Borjas RN Case Manager  Inpatient Care Coordination   [facility]   [phone]      Hawk Borjas RN

## 2022-08-10 NOTE — PLAN OF CARE
Goal Outcome Evaluation:      End of Shift Summary  For vital signs and complete assessments, please see documentation flowsheets.     Pertinent assessments: Alert and oriented. Denies pain. Slept well overnight. No rectal bleeding    Major Shift Events: none    Treatment Plan: Continue to monitor for rectal bleeding, Plan to discharge to TCU today.     Bedside Nurse: Tania Rea RN

## 2022-08-10 NOTE — PROGRESS NOTES
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 Unclear if upper or lower (reported BRBPR as well as melena; black stool on rectal exam in ED). Found to have new hepatic masses as well that will need further workup.     PLAN:     -Acute Gastrointestinal Bleeding:Likely diverticular. No active bleeding.  -Most recent hemoglobin stable above 8 g  -Started and resume his home DOAC  -Will check CBC in the next 24 hours and see if there is any concerns for bleeding tendencies     -Acute Blood Loss Anemia: 2/2 the above. Trend H/H. S/p 1 unit PRBC in ER and additional unit 8/6.     -Hepatic Masses: Unclear if liver primary vs metastatic dz. GI and oncology consults. S/p liver biopsy 8/5.   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    -Appreciate input from oncology service and discussed this result with family.  I spoke with the patient's and decisions made not to further pursue testing or work-up with this findings.  This was also conveyed to me by oncology service.  Spoke with patient's wife as well if Tk continues to have some issues related to GI bleeding then this might be a possible new source.  And might not be a good candidate for continuous anticoagulation.      -Essential HTN:   -I resumed his home regimen of amlodipine given increasing blood pressure levels and no further issues of bleeding symptoms     -Chronic Stroke: Home statin. Restart AC as soon as able. Consider restarting AC 8/8.     -pAF: Hold Eliquis given acute bleed but restart as soon as able  given patient is high risk due to hx of debilitating stroke.  -I restarted his anticoagulation     -CKD Stage III: Currently at baseline Cr.     Physical deconditioning  -Plans for TCU discharge  -PT, OT following  -Please do out of bed to chair activities as much as he can tolerate.  -Requested social service evaluation for discharge planning disposition    I requested palliative care input for POLST assistance  -Please see paper copy of his living will in his paper chart.  I requested that this living will should be scanned in epic.         Diet: Regular Diet Adult  Diet    DVT Prophylaxis: DOAC  Pena Catheter: Not present  Central Lines: None  Cardiac Monitoring: None  Code Status: No CPR- Do NOT Intubate      Disposition Plan     Expected Discharge Date: We will proceed with planned discharge today as it was postponed yesterday due to concerns for rectal bleeding  Discharge Delays: Lab Result Pending (enter specific test & time in comments)  Destination: inpatient rehabilitation facility          The patient's care was discussed with the Patient and Patient's Family.    Garo Rossi MD, MD  Hospitalist Service  [facility]  Securely message with the Vocera Web Console (learn more here)  Text page via Unified Social Paging/Directory         Clinically Significant Risk Factors Present on Admission                      ______________________________________________________________________    Interval History   Continuing service care today.  Seen and examined.  Chart reviewed.  Family updated as patient's wife Ms. Green present at bedside during my encounter.  No significant reported events overnight.  No reported recurrence of rectal bleeding.  Hemoglobin remained stable.  Stable hemodynamics.  Tolerating oral diet.  No nausea, vomiting.  Not requiring oxygen support.        Data reviewed today: I reviewed all medications, new labs and imaging results over the last 24 hours. I personally  reviewed .    Physical Exam   Vital Signs: Temp: 98.4  F (36.9  C) Temp src: Oral BP: (!) 143/60 Pulse: 56   Resp: 16 SpO2: 99 % O2 Device: None (Room air)    Weight: 182 lbs 12.8 oz  HEENT; Atraumatic, normocephalic, pinkish conjuctiva, pupils bilateral reactive   Skin: warm and moist, no rashes  Lymphatics: no cervical or axillary lymphandenopathy  Lungs: equal chest expansion, clear to auscultation, no wheezes, no stridor, no crackles,   Heart: normal rate, normal rhythm, no rubs or gallops.   Abdomen: normal bowel sounds, no tenderness, no peritoneal signs, no guarding  Extremities: no deformities, bilateral lower extremities nonpitting edema   Neuro; follow commands, alert and oriented x2, spontaneous  Speech but at baseline dysarthriat, moves all extremities spontaneously  Psych; no hallucination, euthymic mood, not agitated        Data   Recent Labs   Lab 08/10/22  0740 08/09/22  0858 08/08/22  1112 08/07/22  1055 08/06/22  0708 08/05/22  1020 08/05/22  0711 08/04/22  1532   WBC 4.4 5.1 4.2   < > 3.9*  --   --  4.7   HGB 8.0* 9.2* 7.8*   < > 7.0*   < >  --  7.1*   MCV 90 91 91   < > 90  --   --  93    247 203   < > 204  --   --  291   INR  --   --   --   --   --   --   --  1.12   NA  --   --   --   --  140  --  142 135*   POTASSIUM  --   --   --   --  4.1  --  4.0 4.1   CHLORIDE  --   --   --   --  108*  --  110* 103   CO2  --   --   --   --  24  --  24 24   BUN  --   --   --   --  16.9  --  20.8 23.4*   CR  --   --   --   --  1.55*  --  1.49* 1.50*   ANIONGAP  --   --   --   --  8  --  8 8   WILLIAM  --   --   --   --  7.9*  --  7.8* 8.9   GLC  --   --   --   --  83  --  82 112*   ALBUMIN  --   --   --   --   --   --   --  3.8   PROTTOTAL  --   --   --   --   --   --   --  6.9   BILITOTAL  --   --   --   --   --   --   --  0.2   ALKPHOS  --   --   --   --   --   --   --  93   ALT  --   --   --   --   --   --   --  17   AST  --   --   --   --   --   --   --  27    < > = values in this interval not  displayed.     No results found for this or any previous visit (from the past 24 hour(s)).  Medications       amLODIPine  5 mg Oral At Bedtime     apixaban ANTICOAGULANT  2.5 mg Oral BID     atorvastatin  40 mg Oral QPM     brinzolamide-brimonidine  1 drop Both Eyes BID     [Held by provider] calcitonin (salmon)  1 spray Alternating Nostrils Daily     calcium carbonate (OS-WILLIAM) 500 mg (elemental) tablet  500 mg Oral Daily    And     magnesium oxide  200 mg Oral Daily    And     zinc sulfate  220 mg Oral Daily     citalopram  20 mg Oral Daily     cyanocobalamin  1,000 mcg Oral Daily     pantoprazole  40 mg Oral QAM AC

## 2022-08-10 NOTE — PLAN OF CARE
Physical Therapy Discharge Summary    Reason for therapy discharge:    Discharged to transitional care facility.    Progress towards therapy goal(s). See goals on Care Plan in [facility] electronic health record for goal details.  Goals not met.  Barriers to achieving goals:   discharge from facility.    Therapy recommendation(s):    Continued therapy is recommended.  Rationale/Recommendations:  PT eval and treat at TCU.      **Pt not seen by discharging therapist on this date, note written based on previous treating therapist's notes and recommendations

## 2022-08-10 NOTE — PROGRESS NOTES
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  - No procedures unless condition declines or hemoglobin drops significantly     2. Multiple liver lesions on CT scan, new since April 2021  - Suspicious for malignancy  - CA 19-9 normal, CEA was normal.  - Liver biopsy August 5 results pending.        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  - With ongoing rectal bleeding which is happened likely this is a colonic primary  - Discussed with wife with his overall performance status he did not want colonoscopy, further work-up or chemotherapy.Keeping his performance status in mind.  This is a the best option for his quality of life     3. CKD stage III  - Creatinine at baseline, may be contributing to anemia.     4. CVA  - Was off Eliquis, this was just resumed on 8/8/2022, watching for bleeding    - If Bleeding persists, he may not be a candidate for long-term anticoagulation this will need to get revisited   5. FULL CODE     - Noted request for palliative care to help with POLST assistance.DNR/ DNI      TCU at [facility]  Subjective:    Patient has trouble with speech, wife does not want him to  hear too much about the cancer I discussed about this with patient his wife, she does not want aggressive treatment she is sad to hear the news.  Support provided.  We both agree that more aggressive treatment will cause more harm than good.      Labs:  All labs reviewed    CBCRecent Labs   Lab 08/10/22  0740 08/09/22  0858 08/08/22  1112   WBC 4.4 5.1 4.2   HGB 8.0* 9.2* 7.8*   MCV 90 91 91    247 203       CMP  Recent Labs   Lab 08/06/22  0708 08/05/22  0711 08/04/22  1532    142 135*   POTASSIUM 4.1 4.0 4.1   CHLORIDE 108* 110* 103   CO2 24 24 24   ANIONGAP 8 8 8   GLC 83 82 112*   BUN 16.9 20.8 23.4*   CR 1.55* 1.49* 1.50*   GFRESTIMATED 44* 46* 45*   WILLIAM 7.9* 7.8* 8.9   PROTTOTAL  --   --  6.9   ALBUMIN  --   --  3.8   BILITOTAL  --   --  0.2   ALKPHOS  --   --  93   AST  --   --  27   ALT  --   --  17       INR  Recent Labs   Lab 08/04/22  1532   INR 1.12       Blood CultureNo results for input(s): CULT in the last 168 hours.    D/w Dr. Flo Nielson MD  [facility]  8/10/2022 11:26 AM    Time: 35 minutes with patient  in counseling and coordination of care

## 2022-08-11 NOTE — PROGRESS NOTES
[facility] Resource [place]    Background: Care Coordination referral placed from [facility] discharge report for reason of patient meeting criteria for a TCM outreach call by [facility] team.    Assessment: Upon chart review, CCRC Team member will cancel/close the referral for TCM outreach due to reason below:    Patient is not established within [facility] and CCRC team member noted patient discharged to TCU/ARU/LTACH.    Plan: Care Coordination referral for TCM outreach canceled.      Crys Valverde  Community Health Worker  [facility], [facility]  [phone]    *Connected Care Resource Team does NOT follow patient ongoing. Referrals are identified based on internal discharge reports and the outreach is to ensure patient has an understanding of their discharge instructions.

## 2022-09-14 PROBLEM — N28.9 RENAL INSUFFICIENCY: Status: ACTIVE | Noted: 2022-01-01

## 2022-09-14 PROBLEM — D64.9 ANEMIA, UNSPECIFIED TYPE: Status: ACTIVE | Noted: 2022-01-01

## 2022-09-14 PROBLEM — I48.91 ATRIAL FIBRILLATION WITH RVR (H): Status: ACTIVE | Noted: 2022-01-01

## 2022-09-14 NOTE — ED NOTES
[facility]  ED Nurse Handoff Report    Tk Richmond is a 87 year old male   ED Chief complaint: Abnormal Labs  . ED Diagnosis:   Final diagnoses:   Anemia, unspecified type   Renal insufficiency   Atrial fibrillation with RVR (H)     Allergies:   Allergies   Allergen Reactions     Contrast Dye Rash       Code Status: DNR / DNI  Activity level - Baseline/Home:  Assist X 1. Activity Level - Current:   Assist X 2. Lift room needed: No. Bariatric: No   Needed: No   Isolation: No. Infection: Not Applicable.     Vital Signs:   Vitals:    09/14/22 1300 09/14/22 1315 09/14/22 1320 09/14/22 1332   BP: 119/85 (!) 136/93 133/87 (!) 146/97   Pulse: (!) 140 (!) 141 (!) 134 (!) 142   Resp: 15 16 19 16   Temp:    98  F (36.7  C)   TempSrc:    Oral   SpO2:   100% 99%       Cardiac Rhythm:  ,      Pain level:    Patient confused: Yes. Patient Falls Risk: Yes.   Elimination Status: Has voided   Patient Report - Initial Complaint: Abnormal labs. Focused Assessment:  87 year old male who presents with abnormal labs.  History obtained from patient and supplemented by chart review.  Patient hospitalized at this facility from 8/4/2022 - 8/9/2022 for an acute GI bleed likely secondary to diverticulosis, and worsening anemia secondary to acute blood loss.  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  CTA of the abdomen/pelvis was done at that time that did not show evidence of active extravasation or source of bleeding.  During her hospital stay, oncology was consulted, and hepatic masses were subsequently biopsied.  Prior to discharge, his anticoagulation was resumed, though dose was decreased to 2.5 mg twice daily for renal function.  Per chart review, patient did follow-up with PCP in 9/12/2022 following hospital stay.  Has been getting more short of breath, and almost passed out.  Laboratory studies were  obtained at that time, revealing a hemoglobin of 6.6.     He was referred to the ED by oncology for further evaluation.  Here in ER, he denies any specific complaints.  Specifically, he denies any chest pain, chest pressure, shortness of breath, nor abdominal pain.  Wife acknowledges the patient was quite dizzy 2 days previous while sitting on the toilet.  No new focal neurologic deficits have been noted.  Patient continues on Eliquis, and likely his last dose was this morning.  Tests Performed: Labs. Abnormal Results:   Labs Ordered and Resulted from Time of ED Arrival to Time of ED Departure   INR - Abnormal       Result Value    INR 1.37 (*)    COMPREHENSIVE METABOLIC PANEL - Abnormal    Sodium 142      Potassium 4.3      Chloride 106      Carbon Dioxide (CO2) 25      Anion Gap 11      Urea Nitrogen 23.1 (*)     Creatinine 1.44 (*)     Calcium 8.9      Glucose 106 (*)     Alkaline Phosphatase 99      AST 22      ALT 15      Protein Total 7.0      Albumin 3.6      Bilirubin Total 0.2      GFR Estimate 48 (*)    TROPONIN T, HIGH SENSITIVITY - Abnormal    Troponin T, High Sensitivity 47 (*)    CBC WITH PLATELETS AND DIFFERENTIAL - Abnormal    WBC Count 4.5      RBC Count 2.85 (*)     Hemoglobin 6.6 (*)     Hematocrit 23.4 (*)     MCV 82      MCH 23.2 (*)     MCHC 28.2 (*)     RDW 17.5 (*)     Platelet Count 275      % Neutrophils 66      % Lymphocytes 19      % Monocytes 8      % Eosinophils 7      % Basophils 0      % Immature Granulocytes 0      NRBCs per 100 WBC 0      Absolute Neutrophils 3.0      Absolute Lymphocytes 0.9      Absolute Monocytes 0.3      Absolute Eosinophils 0.3      Absolute Basophils 0.0      Absolute Immature Granulocytes 0.0      Absolute NRBCs 0.0     PARTIAL THROMBOPLASTIN TIME - Normal    aPTT 32     COVID-19 VIRUS (CORONAVIRUS) BY PCR   TYPE AND SCREEN, ADULT    ABO/RH(D) B POS      Antibody Screen Negative      SPECIMEN EXPIRATION DATE [id number]     PREPARE RED BLOOD CELLS (UNIT)     Blood Component Type Red Blood Cells      Product Code [id number]      Unit Status Transfused      Unit Number [id number]      CROSSMATCH Compatible      CODING SYSTEM [id number]      ISSUE DATE AND TIME [id number]      UNIT ABO/RH B+      UNIT TYPE ISBT 7300     PREPARE RED BLOOD CELLS (UNIT)   RED BLOOD CELLS HAVE AVAILABLE (UNIT)   TRANSFUSE RED BLOOD CELLS (UNIT)   ABO/RH TYPE AND SCREEN     No orders to display      Treatments provided: See MAR  Family Comments: Wife at bedside.  OBS brochure/video discussed/provided to patient:  Yes  ED Medications:   Medications   0.9% sodium chloride BOLUS (0 mLs Intravenous Stopped 9/14/22 1320)   diltiazem (CARDIZEM) injection 10 mg (10 mg Intravenous Given 9/14/22 1321)   pantoprazole (PROTONIX) IV push injection 40 mg (40 mg Intravenous Given 9/14/22 1319)     Drips infusing:  Yes  For the majority of the shift, the patient's behavior Green. Interventions performed were NA.    Sepsis treatment initiated: No     Patient tested for COVID 19 prior to admission: YES    ED Nurse Name/Phone Number: Thania De La Cruz RN,   1:36 PM  RECEIVING UNIT ED HANDOFF REVIEW    Above ED Nurse Handoff Report was reviewed: Yes  Reviewed by: Jennifer Allen RN on September 14, 2022 at 4:28 PM

## 2022-09-14 NOTE — ED TRIAGE NOTES
Pt here for hemoglobin of 6.2, wife states he's had issues with low hemoglobin and has had a few transfusions recently. Hx of stroke and recent stay at [facility] TCU. ABC's intact, alert. Unable to answer questions appropriately due to stroke.

## 2022-09-14 NOTE — PHARMACY-ADMISSION MEDICATION HISTORY
Admission medication history interview status for this patient is complete. See [facility] admission navigator for allergy information, prior to admission medications and immunization status.     Medication history interview source(s):pt wife  Medication history resources (including written lists, pill bottles, clinic record):EPIC list, [employer], [employer] from 8/9/22, wife's list  Primary pharmacy:[facility]    Changes made to PTA medication list:  Added: ferrous sulfate, apixaban 5 mg bid  Deleted: apixaban 2.5mg bid, senna-docusate 1-2 tabs bid prn  Changed: docusate from at bedtime prn to qhs    Actions taken by pharmacist (provider contacted, etc):None     Additional medication history information:pt wife list states celexa 40mg daily, however, per sure scripts, pt has been on 20mg since 11/2021.  Did not change entry (per wife, giving pt 1 celexa tab).  Per [facility] discharge 8/9/22, apixaban was decreased to 2.5mg po bid.  Wife states she has been giving pt 5mg bid since pt discharged from [facility] last week, Thursday.  Changed PTA med list to 5mg bid as pt has been taking that for close to a week.  If dose reduced, will need to educate wife.    Medication reconciliation/reorder completed by provider prior to medication history? No      For patients on insulin therapy:N    Prior to Admission medications    Medication Sig Last Dose Taking? Auth Provider Long Term End Date   amLODIPine (NORVASC) 5 MG tablet Take 5 mg by mouth At Bedtime 9/13/2022 at pm Yes Unknown, Entered By History Yes    apixaban ANTICOAGULANT (ELIQUIS) 5 MG tablet Take 5 mg by mouth 2 times daily 9/14/2022 at am Yes Unknown, Entered By History     brinzolamide-brimonidine (SIMBRINZA) 1-0.2 % ophthalmic suspension Place 1 drop into both eyes 2 times daily  9/14/2022 at am Yes Reported, Patient     calcitonin (salmon) (MIACALCIN) 200 UNIT/ACT nasal spray Spray 1 spray into one nostril alternating nostrils daily Alternate nostril each day.  9/14/2022 at am Yes Unknown, Entered By History Yes    Calcium-Magnesium-Zinc 333-133-5 MG TABS per tablet Take 1 tablet by mouth daily 9/14/2022 at am Yes Unknown, Entered By History     citalopram (CELEXA) 20 MG tablet Take 20 mg by mouth daily 9/14/2022 at am Yes Unknown, Entered By History Yes    docusate sodium (COLACE) 100 MG tablet Take 100 mg by mouth At Bedtime 9/13/2022 at pm Yes Reported, Patient     ferrous sulfate (FEROSUL) 325 (65 Fe) MG tablet Take 325 mg by mouth daily (with breakfast) 9/14/2022 at am Yes Unknown, Entered By History     glucosamine-chondroitin 500-400 MG CAPS per capsule Take 1 capsule by mouth 2 times daily At noon and supper 9/13/2022 at supper Yes Unknown, Entered By History     lovastatin (MEVACOR) 40 MG tablet Take 40 mg by mouth At Bedtime 9/13/2022 at hs Yes Unknown, Entered By History     Multiple Vitamins-Minerals (CENTRUM SILVER ADULT 50+ PO) Take 1 tablet by mouth daily  9/14/2022 at Unknown time Yes Reported, Patient     Multiple Vitamins-Minerals (PRESERVISION AREDS PO) Take 1 tablet by mouth 2 times daily 9/13/2022 at Unknown time Yes Reported, Patient     pantoprazole (PROTONIX) 40 MG EC tablet Take 40 mg by mouth daily 9/14/2022 at am Yes Unknown, Entered By History     vitamin B-12 (CYANOCOBALAMIN) 1000 MCG tablet Take 1,000 mcg by mouth daily  9/14/2022 at am Yes Reported, Patient

## 2022-09-14 NOTE — ED NOTES
Pt converted to afib, normal rate no rvr. Repeat EKG completed, MD notified. Order to stop diltiazem at this time.

## 2022-09-14 NOTE — H&P
[facility]  Hospitalist H&P    Name: Tk Richmond      MRN: [id number]  YOB: 1935    Age: 87 year old  Date of admission: 9/14/2022  Primary care provider: Shyam Mclean            Assessment and Plan:     Tk Richmond is a 87-year-old male with a history of paroxysmal atrial fibrillation on anticoagulation, stroke with residual deficits, gastrointestinal bleeding, chronic kidney disease, and recent diagnosis of mucinous adenocarcinoma of gastrointestinal origin, presented to [facility] for evaluation of anemia.    1.  Acute on chronic anemia:  Hemoglobin was most recently in the low to mid 7 range.  On followup with his oncologist, it is now 6.6.  There is no obvious evidence of ongoing gastrointestinal bleeding.  He does report dark stools, but is also on an iron supplement.  Regardless, he does not want any endoscopic procedures.  For now, we will give 1 unit of packed red blood cells and recheck hemoglobin tomorrow morning.  There is no sign of hemodynamic instability at this time.    2.  Rapid atrial fibrillation:  He does have a history of paroxysmal atrial fibrillation.  He takes Eliquis for stroke prophylaxis.  We will hold this for now given his mild drop in hemoglobin, until further gastrointestinal bleeding has been ruled out.  He will receive 10 mg of IV diltiazem in the Emergency Department and we will monitor for effect.  He also received a small bolus, but does have evidence of mild volume overload with significant peripheral edema.  I do not see that he takes any rate control medications, so we will start metoprolol 25 mg p.o. b.i.d. for now and adjust as needed.    3.  History of cerebrovascular accident with residual: No new neurological deficits.  As above, we will be holding anticoagulation with his acute anemia.    4.  Hypertension:  Blood pressure is stable.  We will hold his evssy-xu-zettxzcaq amlodipine to make room for AV  jumana blocking agents.    5.  Chronic kidney disease:  Creatinine appears baseline.  Avoid nephrotoxins.    6.  Recent diagnosis of mucinous adenocarcinoma of gastrointestinal origin:  He follows with Dr. Nielson.  He has declined further workup with colonoscopy and chemotherapy due to his performance status.    7.  The patient will be admitted as an inpatient.    CODE STATUS:  DNR/DNI.    **Spoke with Dr White, HR not improving so will admit to [facility] and start diltiazem drip.            Chief Complaint:     Abnormal labs.         History of Present Illness:   Tk Richmond is a 87-year-old male with a history of paroxysmal atrial fibrillation, GI bleeding, mucinous adenocarcinoma of GI origin, stage III chronic kidney disease, and stroke, presents to [facility] for evaluation of anemia. History is obtained from my discussion with the patient and his wife at the bedside.  I also discussed the case with the ED physician.  The electronic medical record was also reviewed.    The patient was just hospitalized last month for GI bleeding.  He did not undergo any GI procedures.  Bleeding subsided with holding of his anticoagulation.  Imaging showed evidence of a liver mass and he underwent a biopsy.  This ultimately revealed mucinous adenocarcinoma of GI origin.  Oncology was consulted.  Given the patient's performance status, he declined further workup with colonoscopy and chemotherapy.  His anticoagulation was resumed that he takes for stroke and atrial fibrillation.  He was discharged on 8/10.  The patient had been doing reasonably well after a short stay at rehabilitation.  He is now back home with his wife.  He had routine labs drawn with Dr. Nielson showing a hemoglobin of 6.2.  He was directed to the emergency department for evaluation.    Here in the hospital, his temperature is 98.1, heart rate 141, blood pressure 132/82, respiratory rate 13, and oxygen saturation 100% on room air.  Labs show a  creatinine of 1.4, glucose of 106 with troponin of 47.  Hemoglobin is 6.6.  Remainder of the basic metabolic panel, liver function tests and CBC are within normal range.  The patient will be receiving 1 unit of packed red blood cells.  He received 500 mL of normal saline.  EKG shows rapid atrial fibrillation.  He will receive 10 mg of IV diltiazem and then be admitted for further management.            Past Medical History:     Past Medical History:   Diagnosis Date     Acute embolic stroke (H) 7/17/2019     Acute ischemic cerebrovascular accident (CVA) involving left middle cerebral artery territory (H) 7/20/2019     Anemia, iron deficiency 12/3/2021     Aortic root dilatation (H)      Ascending aortic aneurysm (H)      CVA (cerebral vascular accident) (H) 2/18/2012    CVA 2/18/12     Former smoker      Glaucoma      Hyperlipidemia LDL goal <100 2/18/2012     Hypertension      Iron deficiency anemia      Lower GI bleed 6/28/2019     Mild aortic regurgitation     mild to moderate     Paroxysmal atrial fibrillation (H) 10/4/2019     Stroke (H)     2004, speech deficit             Past Surgical History:     Past Surgical History:   Procedure Laterality Date     APPENDECTOMY       CATARACT EXTRACTION Bilateral      IR CAROTID CEREBRAL ANGIOGRAM LEFT  7/17/2019     OPEN REDUCTION INTERNAL FIXATION WRIST Left 10/4/2019    Procedure: Open reduction internal fixation, left distal radius fracture;  Surgeon: Krystal Interiano MD;  Location:  OR     OPEN REDUCTION INTERNAL FIXATION WRIST Left 10/6/2019    Procedure: Open reduction internal fixation, left distal radius fracture.  ;  Surgeon: Krystal Interiano MD;  Location:  OR     ORTHOPEDIC SURGERY       SKIN CANCER EXCISION       [facility] TOTAL KNEE ARTHROPLASTY Right 10/7/2016    Procedure: RIGHT KNEE TOTAL ARTHROPLASTY;  Surgeon: Jesus Alberto Kern MD;  Location: [facility];  Service: Orthopedics             Social History:     Social History     Tobacco Use     Smoking  status: Former Smoker     Packs/day: 0.00     Years: 10.00     Pack years: 0.00     Types: Cigars     Quit date: 1972     Years since quittin.6     Smokeless tobacco: Never Used   Substance Use Topics     Alcohol use: Yes     Alcohol/week: 0.0 standard drinks     Comment: OCC beer             Family History:   The family history was fully reviewed and non-contributory in this case.         Allergies:     Allergies   Allergen Reactions     Contrast Dye Rash             Medications:     Prior to Admission medications    Medication Sig Last Dose Taking? Auth Provider Long Term End Date   amLODIPine (NORVASC) 5 MG tablet Take 5 mg by mouth At Bedtime 2022 at pm Yes Unknown, Entered By History Yes    apixaban ANTICOAGULANT (ELIQUIS) 5 MG tablet Take 5 mg by mouth 2 times daily 2022 at am Yes Unknown, Entered By History     brinzolamide-brimonidine (SIMBRINZA) 1-0.2 % ophthalmic suspension Place 1 drop into both eyes 2 times daily  2022 at am Yes Reported, Patient     calcitonin (salmon) (MIACALCIN) 200 UNIT/ACT nasal spray Spray 1 spray into one nostril alternating nostrils daily Alternate nostril each day. 2022 at am Yes Unknown, Entered By History Yes    Calcium-Magnesium-Zinc 333-133-5 MG TABS per tablet Take 1 tablet by mouth daily 2022 at am Yes Unknown, Entered By History     citalopram (CELEXA) 20 MG tablet Take 20 mg by mouth daily 2022 at am Yes Unknown, Entered By History Yes    docusate sodium (COLACE) 100 MG tablet Take 100 mg by mouth At Bedtime 2022 at pm Yes Reported, Patient     ferrous sulfate (FEROSUL) 325 (65 Fe) MG tablet Take 325 mg by mouth daily (with breakfast) 2022 at am Yes Unknown, Entered By History     glucosamine-chondroitin 500-400 MG CAPS per capsule Take 1 capsule by mouth 2 times daily At noon and supper 2022 at supper Yes Unknown, Entered By History     lovastatin (MEVACOR) 40 MG tablet Take 40 mg by mouth At Bedtime 2022 at hs  Yes Unknown, Entered By History     Multiple Vitamins-Minerals (CENTRUM SILVER ADULT 50+ PO) Take 1 tablet by mouth daily  9/14/2022 at Unknown time Yes Reported, Patient     Multiple Vitamins-Minerals (PRESERVISION AREDS PO) Take 1 tablet by mouth 2 times daily 9/13/2022 at Unknown time Yes Reported, Patient     pantoprazole (PROTONIX) 40 MG EC tablet Take 40 mg by mouth daily 9/14/2022 at am Yes Unknown, Entered By History     vitamin B-12 (CYANOCOBALAMIN) 1000 MCG tablet Take 1,000 mcg by mouth daily  9/14/2022 at am Yes Reported, Patient               Review of Systems:     A Comprehensive greater than 10 system review of systems was carried out.  Pertinent positives and negatives are noted above.  Otherwise negative for contributory information.           Physical Exam:   Blood pressure 110/86, pulse (!) 141, temperature 98  F (36.7  C), temperature source Oral, resp. rate 15, SpO2 95 %.  Wt Readings from Last 1 Encounters:   08/04/22 82.9 kg (182 lb 12.8 oz)     Exam:  GENERAL: No apparent distress. Awake, alert, and fully oriented.  HEENT: Normocephalic, atraumatic. Extraocular movements intact.  CARDIOVASCULAR: Tachy IRR IRR without murmurs or rubs. No S3.  PULMONARY: Clear to auscultation bilaterally.  ABDOMINAL: Soft, non-tender, non-distended. Bowel sounds normoactive.   EXTREMITIES: No cyanosis or clubbing. 2+ edema.  NEUROLOGICAL: CN 2-12 grossly intact, no focal neurological deficits.  DERMATOLOGICAL: No rash, ulcer, bruising, nor jaundice.          Data:   EKG:  Personally reviewed. Rapid atrial fibrillation.    Laboratory:  Recent Labs   Lab 09/14/22  1141   WBC 4.5   HGB 6.6*   HCT 23.4*   MCV 82        Recent Labs   Lab 09/14/22  1141      POTASSIUM 4.3   CHLORIDE 106   CO2 25   ANIONGAP 11   *   BUN 23.1*   CR 1.44*   GFRESTIMATED 48*   WILLIAM 8.9     No results for input(s): TROPONIN, TROPI, TROPR, TROPONINIS in the last 168 hours.    Invalid input(s): TROP, TROPONINIES, TNIH  No  results for input(s): CULT in the last 168 hours.    Imaging:  No results found for this or any previous visit (from the past 24 hour(s)).    Keagan Rao DO MPH  [facility] Hospitalist  [facility] [place] [address]  2022    D: 2022   T: 2022   MT: [id number]    Name:     AVIS GOODE  MRN:      [id number]        Account:     [id number]   :      1935           Admitted:    2022       Document: [id number]    cc:  Shyam Mclean DO

## 2022-09-14 NOTE — ED PROVIDER NOTES
History     Chief Complaint:  Abnormal Labs       HPI:  Tk Richmond is a 87 year old male who presents with abnormal labs.  History obtained from patient and supplemented by chart review.  Patient hospitalized at this facility from 8/4/2022 - 8/9/2022 for an acute GI bleed likely secondary to diverticulosis, and worsening anemia secondary to acute blood loss.  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  CTA of the abdomen/pelvis was done at that time that did not show evidence of active extravasation or source of bleeding.  During her hospital stay, oncology was consulted, and hepatic masses were subsequently biopsied.  Prior to discharge, his anticoagulation was resumed, though dose was decreased to 2.5 mg twice daily for renal function.  Per chart review, patient did follow-up with PCP in 9/12/2022 following hospital stay.  Has been getting more short of breath, and almost passed out.  Laboratory studies were obtained at that time, revealing a hemoglobin of 6.6.    He was referred to the ED by oncology for further evaluation.  Here in ER, he denies any specific complaints.  Specifically, he denies any chest pain, chest pressure, shortness of breath, nor abdominal pain.  Wife acknowledges the patient was quite dizzy 2 days previous while sitting on the toilet.  No new focal neurologic deficits have been noted.  Patient continues on Eliquis, and likely his last dose was this morning.    Allergies:  Contrast Dye     Medications:    No current outpatient medications on file.      Past Medical History:    Past Medical History:   Diagnosis Date     Acute embolic stroke (H) 7/17/2019     Acute ischemic cerebrovascular accident (CVA) involving left middle cerebral artery territory (H) 7/20/2019     Anemia, iron deficiency 12/3/2021     Aortic root dilatation (H)      Ascending aortic aneurysm (H)      CVA  (cerebral vascular accident) (H) 2/18/2012     Former smoker      Glaucoma      Hyperlipidemia LDL goal <100 2/18/2012     Hypertension      Iron deficiency anemia      Lower GI bleed 6/28/2019     Mild aortic regurgitation      Paroxysmal atrial fibrillation (H) 10/4/2019     Stroke (H)      Patient Active Problem List    Diagnosis Date Noted     Renal insufficiency 09/14/2022     Priority: Medium     Atrial fibrillation with RVR (H) 09/14/2022     Priority: Medium     Anemia, unspecified type 09/14/2022     Priority: Medium     Gastrointestinal hemorrhage with melena 08/04/2022     Priority: Medium     Gastrointestinal bleed 08/04/2022     Priority: Medium     Anemia, iron deficiency 12/03/2021     Priority: Medium     Back pain 07/12/2020     Priority: Medium     Paroxysmal atrial fibrillation (H) 10/04/2019     Priority: Medium     Acute ischemic cerebrovascular accident (CVA) involving left middle cerebral artery territory (H) 07/20/2019     Priority: Medium     Acute embolic stroke (H) 07/17/2019     Priority: Medium     Lower GI bleed 06/28/2019     Priority: Medium     Ascending aortic aneurysm (H) 12/20/2018     Priority: Medium     Non-rheumatic aortic regurgitation 12/20/2018     Priority: Medium     CVA (cerebral vascular accident) (H) 02/18/2012     Priority: Medium     CVA 2/18/12       HTN (hypertension) 02/18/2012     Priority: Medium        Past Surgical History:    Past Surgical History:   Procedure Laterality Date     APPENDECTOMY       CATARACT EXTRACTION Bilateral      IR CAROTID CEREBRAL ANGIOGRAM LEFT  7/17/2019     OPEN REDUCTION INTERNAL FIXATION WRIST Left 10/4/2019    Procedure: Open reduction internal fixation, left distal radius fracture;  Surgeon: Krystal Interiano MD;  Location:  OR     OPEN REDUCTION INTERNAL FIXATION WRIST Left 10/6/2019    Procedure: Open reduction internal fixation, left distal radius fracture.  ;  Surgeon: Krystal Interiano MD;  Location:  OR     ORTHOPEDIC SURGERY        SKIN CANCER EXCISION       [facility] TOTAL KNEE ARTHROPLASTY Right 10/7/2016    Procedure: RIGHT KNEE TOTAL ARTHROPLASTY;  Surgeon: Jesus Alberto Kern MD;  Location: [facility];  Service: Orthopedics        Family History:    family history includes Breast Cancer in his mother and sister; No Known Problems in his brother, father, maternal grandfather, maternal grandmother, paternal grandfather, and paternal grandmother.    Social History:   reports that he quit smoking about 50 years ago. His smoking use included cigars. He smoked 0.00 packs per day for 10.00 years. He has never used smokeless tobacco. He reports current alcohol use. He reports that he does not use drugs.    Review of Systems:  10 point ROS is negative aside from that mentioned in the HPI      Physical Exam     Patient Vitals for the past 24 hrs:   BP Temp Temp src Pulse Resp SpO2   09/14/22 1702 (!) 154/75 98.4  F (36.9  C) Oral 68 18 98 %   09/14/22 1645 -- -- -- 66 18 98 %   09/14/22 1630 (!) 150/83 -- -- 65 18 97 %   09/14/22 1625 -- -- -- 59 18 96 %   09/14/22 1615 (!) 120/99 -- -- (!) 133 16 100 %   09/14/22 1607 (!) 125/98 98  F (36.7  C) -- (!) 138 18 96 %   09/14/22 1547 (!) 130/94 98  F (36.7  C) -- (!) 140 20 98 %   09/14/22 1530 (!) 124/91 -- -- (!) 140 17 97 %   09/14/22 1517 (!) 132/97 98.1  F (36.7  C) -- (!) 141 19 96 %   09/14/22 1447 (!) 119/94 98  F (36.7  C) -- (!) 133 17 94 %   09/14/22 1430 110/86 -- -- (!) 141 15 95 %   09/14/22 1417 (!) 120/94 98  F (36.7  C) Oral (!) 135 12 99 %   09/14/22 1400 114/82 -- -- (!) 137 15 93 %   09/14/22 1347 115/82 98  F (36.7  C) Oral (!) 135 12 98 %   09/14/22 1332 (!) 146/97 98  F (36.7  C) Oral (!) 142 16 99 %   09/14/22 1320 133/87 -- -- (!) 134 19 100 %   09/14/22 1315 (!) 136/93 -- -- (!) 141 16 --   09/14/22 1300 119/85 -- -- (!) 140 15 --   09/14/22 1245 138/81 -- -- (!) 140 26 --   09/14/22 1230 (!) 129/98 -- -- (!) 125 19 98 %   09/14/22 1215 121/85 -- -- (!) 140 15 98 %    09/14/22 1200 115/75 -- -- (!) 149 14 97 %   09/14/22 1145 132/82 -- -- (!) 141 13 100 %   09/14/22 1130 127/85 -- -- (!) 149 -- 99 %   09/14/22 1118 113/81 98.1  F (36.7  C) Temporal (!) 145 18 100 %      General:   Well-nourished   Speaking in full sentences  Eyes:   Conjunctiva without injection or scleral icterus  ENT:   Moist mucous membranes   Nares patent   Pinnae normal  Neck:   Full ROM   No stiffness appreciated  Resp:   Lungs CTAB   No crackles, wheezing or audible rubs   Good air movement  CV:    Irregularly irregular rate and rhythm   S1 and S2 present   No murmur, gallop or rub  GI:   BS present   Abdomen soft without distention   Non-tender to light and deep palpation   No guarding or rebound tenderness  Skin:   Warm, dry, well perfused   No rashes or open wounds on exposed skin  MSK:   Moves all extremities   No focal deformities or swelling  Neuro:   Alert   Right-sided weakness    Psych:   Normal affect, normal mood        Emergency Department Course     ECG  ECG results from 09/14/22   EKG 12-lead, tracing only     Value    Systolic Blood Pressure     Diastolic Blood Pressure     Ventricular Rate 142    Atrial Rate 110    LA Interval     QRS Duration 84        QTc 473    P Axis     R AXIS -46    T Axis 21    Interpretation ECG      Atrial fibrillation with rapid ventricular response  Left anterior fascicular block  Posterior infarct , age undetermined  Abnormal ECG  When compared with ECG of 12-JUL-2020 07:51,  Current undetermined rhythm precludes rhythm comparison, needs review  Left anterior fascicular block is now Present  ST now depressed in Anterolateral leads     EKG 12 lead     Value    Systolic Blood Pressure     Diastolic Blood Pressure     Ventricular Rate 140    Atrial Rate 153    LA Interval     QRS Duration 86        QTc 464    P Axis     R AXIS -46    T Axis 24    Interpretation ECG      Atrial fibrillation with rapid ventricular response  Left anterior fascicular  block  Posterior infarct (cited on or before 14-SEP-2022)  Abnormal ECG  When compared with ECG of 14-SEP-2022 11:33, (unconfirmed)  Previous ECG has undetermined rhythm, needs review         Laboratory:  Laboratory findings were communicated with the patient who voiced understanding of the findings.  Labs Ordered and Resulted from Time of ED Arrival to Time of ED Departure   INR - Abnormal       Result Value    INR 1.37 (*)    COMPREHENSIVE METABOLIC PANEL - Abnormal    Sodium 142      Potassium 4.3      Chloride 106      Carbon Dioxide (CO2) 25      Anion Gap 11      Urea Nitrogen 23.1 (*)     Creatinine 1.44 (*)     Calcium 8.9      Glucose 106 (*)     Alkaline Phosphatase 99      AST 22      ALT 15      Protein Total 7.0      Albumin 3.6      Bilirubin Total 0.2      GFR Estimate 48 (*)    TROPONIN T, HIGH SENSITIVITY - Abnormal    Troponin T, High Sensitivity 47 (*)    CBC WITH PLATELETS AND DIFFERENTIAL - Abnormal    WBC Count 4.5      RBC Count 2.85 (*)     Hemoglobin 6.6 (*)     Hematocrit 23.4 (*)     MCV 82      MCH 23.2 (*)     MCHC 28.2 (*)     RDW 17.5 (*)     Platelet Count 275      % Neutrophils 66      % Lymphocytes 19      % Monocytes 8      % Eosinophils 7      % Basophils 0      % Immature Granulocytes 0      NRBCs per 100 WBC 0      Absolute Neutrophils 3.0      Absolute Lymphocytes 0.9      Absolute Monocytes 0.3      Absolute Eosinophils 0.3      Absolute Basophils 0.0      Absolute Immature Granulocytes 0.0      Absolute NRBCs 0.0     TROPONIN T, HIGH SENSITIVITY - Abnormal    Troponin T, High Sensitivity 57 (*)    PARTIAL THROMBOPLASTIN TIME - Normal    aPTT 32     COVID-19 VIRUS (CORONAVIRUS) BY PCR - Normal    SARS CoV2 PCR Negative     TYPE AND SCREEN, ADULT    ABO/RH(D) B POS      Antibody Screen Negative      SPECIMEN EXPIRATION DATE [id number]     PREPARE RED BLOOD CELLS (UNIT)    Blood Component Type Red Blood Cells      Product Code [id number]      Unit Status Transfused      Unit  Number [id number]      CROSSMATCH Compatible      CODING SYSTEM [id number]      ISSUE DATE AND TIME [id number]      UNIT ABO/RH B+      UNIT TYPE ISBT 7300     PREPARE RED BLOOD CELLS (UNIT)   RED BLOOD CELLS HAVE AVAILABLE (UNIT)   TRANSFUSE RED BLOOD CELLS (UNIT)   ABO/RH TYPE AND SCREEN        Interventions:  Medications   diltiazem (CARDIZEM) 125 mg in sodium chloride 0.7 % 125 mL infusion (0 mg/hr Intravenous Stopped 9/14/22 1629)   sodium chloride (PF) 0.9% PF flush 3 mL (has no administration in time range)   sodium chloride (PF) 0.9% PF flush 3 mL (has no administration in time range)   nitroGLYcerin (NITROSTAT) sublingual tablet 0.4 mg (has no administration in time range)   0.9% sodium chloride BOLUS (0 mLs Intravenous Stopped 9/14/22 1320)   diltiazem (CARDIZEM) injection 10 mg (10 mg Intravenous Given 9/14/22 1321)   pantoprazole (PROTONIX) IV push injection 40 mg (40 mg Intravenous Given 9/14/22 1319)      Emergency Department Course / Reassessment:  Nursing notes and vitals reviewed.  11:22 AM: I performed an exam of the patient as documented above.        ED Course as of 09/14/22 1711   Wed Sep 14, 2022   1228 Patient re-evaluated   1444 Updated and discussed with Dr. Rao.   1603 Patient re-evaluated.  Remains with tachycardia.  Dilt drip just started.  Repeat troponin mildly increased at 57.   1610 Patient re-evaluated.  Denies CP, pressure, nor SOB   1620 Dr. Rao updated    1628 Patient converted to sinus rhythm     I discussed the treatment plan with the patient and spouse.  They are in agreement with hospitalization at this time.  Case was discussed with Dr. Rao, who has graciously accepted the patient for admission to cardiac telemetry.  Questions were answered prior to transfer of care.      Impression & Plan      Medical Decision Making:  Tk Richmond is a 87 year old male who presents to the ER for evaluation of abnormal labs.  Vital signs on presentation reveal elevated HR, though  are otherwise unremarkable.  History, exam, and ED course as outlined above.  Presenting evaluation likely multifactorial.  Patient found to be anemic, with declining Hgb compared with previous value from 9/6.  Repeat hemoglobin remained stable at 6.6 from 2 days previous.  He describes melanotic stool, though also iron supplementation.  Differential does include recurrent GI bleed, as well as iron deficiency anemia as previous iron studies have suggested such.  Here in the ER, he denies any complaints of abdominal pain, and has no localizing tenderness on exam. Will provide 40 mg IV protonix.  I feel this is unlikely represent acute surgical intra-abdominal pathology, and feel CT imaging can be deferred safely at this time.  With regards to patient's hemodynamics, he was found to be in atrial fibrillation with RVR.  He is on Eliquis, took most recent dose this morning.  His initial EKG does reveal ST depression in leads V2 through V4, and high-sensitivity troponin returned mildly elevated at 47.  Clinically this is most consistent with type II demand ischemia in the setting of anemia.  He denies any symptoms of chest pain or chest pressure.  Repeat EKG demonstrates persistence of ST depression without other evolving changes.  In light of concerns for anemia, as well as atypical symptoms, do feel further systemic anticoagulation for ACS can be deferred safely.  Serial troponin testing will be indicated to evaluate for significant change as well as monitoring for any other ischemic symptoms.  Gentle fluid hydration initiated with some improvements in HR, though given persistent tachycardia, milligrams IV diltiazem will be administered.  After informed written and verbal consent, patient also will be given 1 unit PRBCs given Hgb less than 7.  Labs reveal stable renal insufficiency.  Results clinical impression discussed with patient and spouse present at bedside.  We will plan for hospital admission to cardiac  telemetry under the care of Dr. Rao.  Questions answered prior to admission.    Addendum: Patient remained in persistent atrial fibrillation with RVR.  Diltiazem drip about to be increased, though patient spontaneously converted to sinus rhythm.  ST depression in the anterior precordial leads has since resolved.  Repeat high-sensitivity troponin increased to 57, though in the absence of other ischemic symptoms, feel this is unlikely represent acute coronary occlusion/acute coronary syndrome, but rather type II demand ischemia in the setting of anemia and atrial fibrillation.  We will continue to monitor symptoms closely.  Hospitalist service updated.    Diagnosis:    ICD-10-CM    1. Anemia, unspecified type  D64.9    2. Renal insufficiency  N28.9    3. Atrial fibrillation with RVR (H)  I48.91       9/14/2022   Arnie White MD Roach, Brian Donald, MD  09/14/22 1713

## 2022-09-15 NOTE — PLAN OF CARE
Goal Outcome Evaluation:      Call from lab. Hgb 6.7. Primary RN S. Will notified.              Paged critical result to Dr. Rao.   Radha Delgado RN

## 2022-09-15 NOTE — PLAN OF CARE
Goal Outcome Evaluation:      Aox3 (disoriented to situation).  Ambulated with 2 gb/walker. Potassium protocol WNL - recheck next AM 9/16. Hgb 6.7 this morning - 1 unit blood given. Echo performed at bedside - results pending. Tele shows sinus ingrid - oral lopressor given per MD. Moderate expressive aphasia. Palliative and hemonc consulted. No pain, SOB, NV. IV Iron given after blood transfusion.  Radha Delgado RN on 9/15/2022 at 2:28 PM                    Please advise- patient is requesting referral to [facility]       Spoke to patient, advise that there is no documentation of every having anxiety issues- this will need to be addressed by pcp. Advised that it can be about 6 months before seeing Ana and if he wants to start with seeing Yuliana that may be an option.     Records received from [facility]- this was actually for an allergic reaction to clindamycin 8/5/18.

## 2022-09-15 NOTE — UTILIZATION REVIEW
Admission Status; Secondary Review Determination    Under the authority of the Utilization Management Committee, the utilization review process indicated a secondary review on the above patient. The review outcome is based on review of the medical records, discussions with staff, and applying clinical experience noted on the date of the review.    (x) Inpatient Status Appropriate - This patient's medical care is consistent with medical management for inpatient care and reasonable inpatient medical practice.    RATIONALE FOR DETERMINATION: 87-year-old male with history of paroxysmal atrial fibrillation on anticoagulation with prior history of stroke, history of gastrointestinal bleeding, recent diagnosis of mucinous adenocarcinoma of the gastrointestinal origin now presents with severe iron deficiency anemia.  Though patient does not have signs of overt acute bleeding, patient's hemoglobin is unchanged after 1 unit of packed red blood cells.  Patient also clinically has signs and symptoms of volume overload.  Patient will require greater than 2 nights in the hospital for initial management of patient's progressive worsening iron deficiency anemia with failure to respond to initial blood transfusion and need to manage volume status.    At the time of admission with the information available to the attending physician more than 2 nights Hospital complex care was anticipated, based on patient risk of adverse outcome if treated as outpatient and complex care required. Inpatient admission is appropriate based on the Medicare guidelines.    This document was produced using voice recognition software    The information on this document is developed by the utilization review team in order for the business office to ensure compliance. This only denotes the appropriateness of proper admission status and does not reflect the quality of care rendered.    The definitions of Inpatient Status and Observation Status used in  making the determination above are those provided in the CMS Coverage Manual, Chapter 1 and Chapter 6, section 70.4.    Sincerely,    Rory Doran MD  Utilization Review  Physician Advisor  [facility].

## 2022-09-15 NOTE — PLAN OF CARE
Goal Outcome Evaluation:    Plan Of Care Reviewed with : Patient.  Overall Patient condition : No change.       A&O X3. Disoriented to time. Severe expressive aphasia  VSS HR 52, /59, afebrile. Up with extensive of 2 with gait belt and walker. Purwick intact. Turn and repositioned. Denied pain. Tele SB. Continue to monitor.

## 2022-09-15 NOTE — PROVIDER NOTIFICATION
Provider Notified : HR is 47. BP is 153/72. has Lasix to administer. Do you want Lasix administered with low HR? Advice?

## 2022-09-15 NOTE — PLAN OF CARE
Goal Outcome Evaluation:      A&OX3. Disoriented to time and sometimes situation. Severe aphasia. VSS oxygen 97% on room air. Audibly expiratory wheezing, with slight coarse/crackle on auscultation.  CVA deficit due to right side : upper and lower extremities to the right. Up with extensive assist of 2 with walker and gait belt. Denied pain and discomfort. Reg diet. Purwick intact and draining. Continue to POC and monitoring.

## 2022-09-15 NOTE — PROGRESS NOTES
Hematology and oncology chart note.     83-year-old male who has had history of bleeding hemorrhoids has also had a colonoscopy in the past year I do not have the results available at the time of this dictation.  In reviewing his labs he had evidence of iron deficiency, ferritin was as low at 8 and hemoglobin of 10.5 at one point in 2018.  And he did receive p.o. iron and this improved his iron.  Most recent 2/27/2018 he wanted to get off iron as iron total was 57, percent sat at 29 binding capacity 20 hemoglobin 11.5 ferritin was a 24.    He has no GI symptoms of dyspepsia at this point.  Energy, appetite, mood are okay.  He has no B type symptoms at this point    Received IV iron for 2 doses in July , 2019 October 2021: Normal ferritin, B12, hemoglobin declined by 3 gram    12/2/2021: Bone marrow biopsy shows absent iron stores no overt evidence of dysplasia, mild erythroid hyperplasia, neutrophils is mild irregular segmentation, cytogenetics negative    1 unit PRBC given for hemoglobin of 7    12/21 and 12/28/21: IV iron    3/15/2022: 1 dose of iron.      Now admitted again with anemia. Pt was scheduled to meet with Dr Nielson for discussion about goals of care.   D/w Dr Nielson, agree with current management. She will meet with pt in am for formal consult.     Thank you for consult.   Baldemar Lemos

## 2022-09-15 NOTE — CONSULTS
"[facility]  Palliative Care Consultation   Text Page    Assessment & Plan   Tk Richmond is a 87 year old male who was admitted on 9/14/2022.   Consulted by Hospitalist Keagan Rao MD to assist patient with metastatic mucinous adenocarcinoma of liver, GI origin. Does not wish to pursue aggressive intervention.     Recommendations:  1. Goals of Care- No CPR- Do NOT Intubate - Restorative care.  Hospitalization goals discussed with patient and wife/healthcare agent, Norma.  Decisional Capacity - Questionable, as patient has expressive aphasia, but responds appropriately with non-verbal motions. Patient has an advance directive dated 7/26/2022.  His spouse Ml \"Norma\" Basilio is named healthcare agent. His daughter, Naomi Richmond and grandchild, Scott Richmond are co-alternate health care agent.  POLST - Confirmed document dated 8/8/2022 which indicates preference for SELECTIVE TREATMENT    2. Generalized weakness - Consider therapy consultation. Patient and wife agree they do not want him to go to TCU, but would prefer to return home with home health support.     3. Dyspnea - Patient denies on room air. Patient and wife interested in continuing with blood transfusions as needed.     4. Spiritual Care  Consultation placed for  to follow.  Spiritual Background: Hoahaoism    5. Care Planning  Appreciate Care of case management..  Medications for discharge - none from a palliative perspective    Medical Decision Making and Goals of Care:  Discussed on September 15, 2022 with JULIO Martin CNS:     Delighted to see Tk again. Despite his asphasia, he smiled and motioned for me to join he and his wife. Spouse, Norma also recognized me from Tk' hospitalization last month. She acknowledged the plan \"he needs a transfusion for the low blood.\" We discussed the recent events, as Tk was dismissed to TCU and had only been home a week before this " "rehospitalization. Norma offered \"Whatever can be done to keep him comfortable.\" We explored the hospice benefit. Norma acknowledged \"I know there will come a time when he needs hospice. He's been getting weaker, but we're not ready for it yet.\" She explained the challenges Tk had while at [facility]. \"The first roommate was okay and then he didn't have a roommate for a week, but that last roommate couldn't hear. It was okay when the TV was on, but when he was on the phone yelling. It was impossible.\" Norma would prefer to take Tk home with support of home care.       Thank you for involving us in the patient's care.     Jacy Barton MS, RN, CNS, APRN, ACHPN, FAACVPR  Pain and Palliative Care  Pager [phone]  Office [phone]       Time Spent on this Encounter   Total unit/floor time 10:05 AM until 10:50 AM, time consisted of the following, examination of the patient, reviewing the record and completing documentation. >50% of time spent in counseling and coordination of care, Bedside Nurse Chari Fay RN and Hospitalist Keagan Rao DO.  Time spend counseling with patient and spouse consisted of the following topics, goals of care, care planning for discharge and symptom management.    Understanding of disease process:   This has been discussed with the patient's wife has basic understanding of disease process. .    History of Present Illness   History is obtained from the electronic health record and patient's spouse    Tk Richmond is a 87 year old male who was admitted 9/14/2022 for anemia. The patient is known to me from his August hospitalization for GI bleed in which hepatic masses were discovered and liver biopsy was obtained 8/5/2022. He was dismissed to [facility] and last week returned home under his wife's supervision. The 8/5/2022 biopsy revealed the diagnosis of mucinous adenocarcinoma of GI origin. 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     Past Medical History   I have reviewed this patient's medical history and updated it with pertinent information if needed.   Past Medical History:   Diagnosis Date     Acute embolic stroke (H) 7/17/2019     Acute ischemic cerebrovascular accident (CVA) involving left middle cerebral artery territory (H) 7/20/2019     Anemia, iron deficiency 12/3/2021     Aortic root dilatation (H)      Ascending aortic aneurysm (H)      CVA (cerebral vascular accident) (H) 2/18/2012    CVA 2/18/12     Former smoker      Glaucoma      Hyperlipidemia LDL goal <100 2/18/2012     Hypertension      Iron deficiency anemia      Lower GI bleed 6/28/2019     Mild aortic regurgitation     mild to moderate     Paroxysmal atrial fibrillation (H) 10/4/2019     Stroke (H)     2004, speech deficit       Past Surgical History   I have reviewed this patient's surgical history and updated it with pertinent information if needed.  Past Surgical History:   Procedure Laterality Date     APPENDECTOMY       CATARACT EXTRACTION Bilateral      IR CAROTID CEREBRAL ANGIOGRAM LEFT  7/17/2019     OPEN REDUCTION INTERNAL FIXATION WRIST Left 10/4/2019    Procedure: Open reduction internal fixation, left distal radius fracture;  Surgeon: Krystal Interiano MD;  Location:  OR     OPEN REDUCTION INTERNAL FIXATION WRIST Left 10/6/2019    Procedure: Open reduction internal fixation, left distal radius fracture.  ;  Surgeon: Krystal Interiano MD;  Location:  OR     ORTHOPEDIC SURGERY       SKIN CANCER EXCISION       [facility] TOTAL KNEE ARTHROPLASTY Right 10/7/2016    Procedure: RIGHT KNEE TOTAL ARTHROPLASTY;  Surgeon: Jesus Alberto Kern MD;  Location: [facility];  Service: Orthopedics       Prior to Admission Medications   Prior to Admission Medications   Prescriptions Last Dose Informant Patient Reported? Taking?   Calcium-Magnesium-Zinc 333-133-5 MG TABS per tablet 9/14/2022 at am  Yes  Yes   Sig: Take 1 tablet by mouth daily   Multiple Vitamins-Minerals (CENTRUM SILVER ADULT 50+ PO) 9/14/2022 at Unknown time Spouse/Significant Other Yes Yes   Sig: Take 1 tablet by mouth daily    Multiple Vitamins-Minerals (PRESERVISION AREDS PO) 9/13/2022 at Unknown time Spouse/Significant Other Yes Yes   Sig: Take 1 tablet by mouth 2 times daily   amLODIPine (NORVASC) 5 MG tablet 9/13/2022 at pm Spouse/Significant Other Yes Yes   Sig: Take 5 mg by mouth At Bedtime   apixaban ANTICOAGULANT (ELIQUIS) 5 MG tablet 9/14/2022 at am  Yes Yes   Sig: Take 5 mg by mouth 2 times daily   brinzolamide-brimonidine (SIMBRINZA) 1-0.2 % ophthalmic suspension 9/14/2022 at am Spouse/Significant Other Yes Yes   Sig: Place 1 drop into both eyes 2 times daily    calcitonin (salmon) (MIACALCIN) 200 UNIT/ACT nasal spray 9/14/2022 at am  Yes Yes   Sig: Spray 1 spray into one nostril alternating nostrils daily Alternate nostril each day.   citalopram (CELEXA) 20 MG tablet 9/14/2022 at am  Yes Yes   Sig: Take 20 mg by mouth daily   docusate sodium (COLACE) 100 MG tablet 9/13/2022 at pm Spouse/Significant Other Yes Yes   Sig: Take 100 mg by mouth At Bedtime   ferrous sulfate (FEROSUL) 325 (65 Fe) MG tablet 9/14/2022 at am  Yes Yes   Sig: Take 325 mg by mouth daily (with breakfast)   glucosamine-chondroitin 500-400 MG CAPS per capsule 9/13/2022 at supper Spouse/Significant Other Yes Yes   Sig: Take 1 capsule by mouth 2 times daily At noon and supper   lovastatin (MEVACOR) 40 MG tablet 9/13/2022 at hs Spouse/Significant Other Yes Yes   Sig: Take 40 mg by mouth At Bedtime   pantoprazole (PROTONIX) 40 MG EC tablet 9/14/2022 at am Spouse/Significant Other Yes Yes   Sig: Take 40 mg by mouth daily   vitamin B-12 (CYANOCOBALAMIN) 1000 MCG tablet 9/14/2022 at am Spouse/Significant Other Yes Yes   Sig: Take 1,000 mcg by mouth daily       Facility-Administered Medications: None     Allergies   Allergies   Allergen Reactions     Contrast Dye Rash        Social History        Living situation: Lives with his spouse, Norma       Support system: Wife and daughter       Actual/Potential Caregiver:  Spouse  History of substance use/abuse:  reports that he quit smoking about 50 years ago. His smoking use included cigars. He smoked 0.00 packs per day for 10.00 years. He has never used smokeless tobacco.  reports current alcohol use.  Family History   I have reviewed this patient's family history and updated it with pertinent information if needed.   Family History   Problem Relation Age of Onset     Breast Cancer Mother      No Known Problems Father      Breast Cancer Sister      No Known Problems Brother      No Known Problems Maternal Grandmother      No Known Problems Maternal Grandfather      No Known Problems Paternal Grandmother      No Known Problems Paternal Grandfather      Diabetes No family hx of      Hypertension No family hx of      Cancer - colorectal No family hx of        Review of Systems   The 10 point Review of Systems is negative other than noted in the HPI or here.     Palliative Symptom Review (0=no symptom/no concern, 1=mild, 2=moderate, 3=severe):      Pain: 0-none      Fatigue: 1-mild      Nausea: 0-none      Constipation: 0-none      Diarrhea: 2-moderate      Depressive Symptoms: 0-none      Anxiety: 0-none      Drowsiness: 0-none      Poor Appetite: 0-none      Shortness of Breath: 0-none      Insomnia: 0-none        Physical Exam   Temp:  [98  F (36.7  C)-98.7  F (37.1  C)] 98.4  F (36.9  C)  Pulse:  [] 64  Resp:  [12-26] 20  BP: (110-154)/(53-99) 145/62  SpO2:  [93 %-100 %] 98 %  175 lbs 6.4 oz  Exam:  GEN:  Robust elderly  male. Alert, responds to name and appropriately motions yes and no, appears comfortable, no apparent distress.  HEENT:  Normocephalic/atraumatic, no scleral icterus, no nasal discharge, mouth moist.  CV:  RRR, S1, S2; no murmurs or other irregularities noted.  +3 DP/PT pulses bilaterally; no edema  bilaterally.  RESP:  Clear to auscultation bilaterally without rales/rhonchi/wheezing/retractions.  Symmetric chest rise on inhalation noted.  Normal respiratory effort.  ABD:  Rounded, soft, non-tender/non-distended.  +BS  EXT:  Right hand contractures.    M/S:   No wincing or grimace with palpation of extremities.    SKIN:  Pale, warm and dry to touch, no exanthems noted in the visualized areas.    NEURO: Deferred right hemiplegia.  PAIN BEHAVIOR: Cooperative  Psych:  Normal affect, calm, cooperative, diarthric mumbling speech.     Data   Results for orders placed or performed during the hospital encounter of 09/14/22   INR     Status: Abnormal   Result Value Ref Range    INR 1.37 (H) 0.85 - 1.15   Partial thromboplastin time     Status: Normal   Result Value Ref Range    aPTT 32 22 - 38 Seconds   Comprehensive metabolic panel     Status: Abnormal   Result Value Ref Range    Sodium 142 136 - 145 mmol/L    Potassium 4.3 3.4 - 5.3 mmol/L    Chloride 106 98 - 107 mmol/L    Carbon Dioxide (CO2) 25 22 - 29 mmol/L    Anion Gap 11 7 - 15 mmol/L    Urea Nitrogen 23.1 (H) 8.0 - 23.0 mg/dL    Creatinine 1.44 (H) 0.67 - 1.17 mg/dL    Calcium 8.9 8.8 - 10.2 mg/dL    Glucose 106 (H) 70 - 99 mg/dL    Alkaline Phosphatase 99 40 - 129 U/L    AST 22 10 - 50 U/L    ALT 15 10 - 50 U/L    Protein Total 7.0 6.4 - 8.3 g/dL    Albumin 3.6 3.5 - 5.2 g/dL    Bilirubin Total 0.2 <=1.2 mg/dL    GFR Estimate 48 (L) >60 mL/min/1.73m2   [place] Draw     Status: None    Narrative    The following orders were created for panel order [place] Draw.  Procedure                               Abnormality         Status                     ---------                               -----------         ------                     Extra Red Top Tube[[id number]]                               Final result                 Please view results for these tests on the individual orders.   Troponin T, High Sensitivity (now)     Status: Abnormal   Result Value Ref  Range    Troponin T, High Sensitivity 47 (H) <=22 ng/L   CBC with platelets and differential     Status: Abnormal   Result Value Ref Range    WBC Count 4.5 4.0 - 11.0 10e3/uL    RBC Count 2.85 (L) 4.40 - 5.90 10e6/uL    Hemoglobin 6.6 (LL) 13.3 - 17.7 g/dL    Hematocrit 23.4 (L) 40.0 - 53.0 %    MCV 82 78 - 100 fL    MCH 23.2 (L) 26.5 - 33.0 pg    MCHC 28.2 (L) 31.5 - 36.5 g/dL    RDW 17.5 (H) 10.0 - 15.0 %    Platelet Count 275 150 - 450 10e3/uL    % Neutrophils 66 %    % Lymphocytes 19 %    % Monocytes 8 %    % Eosinophils 7 %    % Basophils 0 %    % Immature Granulocytes 0 %    NRBCs per 100 WBC 0 <1 /100    Absolute Neutrophils 3.0 1.6 - 8.3 10e3/uL    Absolute Lymphocytes 0.9 0.8 - 5.3 10e3/uL    Absolute Monocytes 0.3 0.0 - 1.3 10e3/uL    Absolute Eosinophils 0.3 0.0 - 0.7 10e3/uL    Absolute Basophils 0.0 0.0 - 0.2 10e3/uL    Absolute Immature Granulocytes 0.0 <=0.4 10e3/uL    Absolute NRBCs 0.0 10e3/uL   Extra Red Top Tube     Status: None   Result Value Ref Range    Hold Specimen JI    Asymptomatic COVID-19 Virus (Coronavirus) by PCR Nasopharyngeal     Status: Normal    Specimen: Nasopharyngeal; Swab   Result Value Ref Range    SARS CoV2 PCR Negative Negative    Narrative    Testing was performed using the Xpert Xpress SARS-CoV-2 Assay on the   Cepheid Gene-Xpert Instrument Systems. Additional information about   this Emergency Use Authorization (EUA) assay can be found via the Lab   Guide. This test should be ordered for the detection of SARS-CoV-2 in   individuals who meet SARS-CoV-2 clinical and/or epidemiological   criteria. Test performance is unknown in asymptomatic patients. This   test is for in vitro diagnostic use under the FDA EUA for   laboratories certified under CLIA to perform high complexity testing.   This test has not been FDA cleared or approved. A negative result   does not rule out the presence of PCR inhibitors in the specimen or   target RNA in concentration below the limit of  detection for the   assay. The possibility of a false negative should be considered if   the patient's recent exposure or clinical presentation suggests   COVID-19. This test was validated by the [facility]. This laboratory is certified under the Clinical Laboratory Improvement Amendments of 1988 (CLIA-88) as qualified to perform high complexity laboratory testing.     Troponin T, High Sensitivity (now)     Status: Abnormal   Result Value Ref Range    Troponin T, High Sensitivity 57 (H) <=22 ng/L   Glucose by meter     Status: Normal   Result Value Ref Range    GLUCOSE BY METER POCT 92 70 - 99 mg/dL   Basic metabolic panel     Status: Abnormal   Result Value Ref Range    Sodium 141 136 - 145 mmol/L    Potassium 4.0 3.4 - 5.3 mmol/L    Chloride 109 (H) 98 - 107 mmol/L    Carbon Dioxide (CO2) 24 22 - 29 mmol/L    Anion Gap 8 7 - 15 mmol/L    Urea Nitrogen 25.2 (H) 8.0 - 23.0 mg/dL    Creatinine 1.46 (H) 0.67 - 1.17 mg/dL    Calcium 8.6 (L) 8.8 - 10.2 mg/dL    Glucose 89 70 - 99 mg/dL    GFR Estimate 47 (L) >60 mL/min/1.73m2   CBC with platelets     Status: Abnormal   Result Value Ref Range    WBC Count 4.8 4.0 - 11.0 10e3/uL    RBC Count 2.85 (L) 4.40 - 5.90 10e6/uL    Hemoglobin 6.7 (LL) 13.3 - 17.7 g/dL    Hematocrit 23.0 (L) 40.0 - 53.0 %    MCV 81 78 - 100 fL    MCH 23.5 (L) 26.5 - 33.0 pg    MCHC 29.1 (L) 31.5 - 36.5 g/dL    RDW 17.0 (H) 10.0 - 15.0 %    Platelet Count 210 150 - 450 10e3/uL   Glucose by meter     Status: Normal   Result Value Ref Range    GLUCOSE BY METER POCT 91 70 - 99 mg/dL   EKG 12-lead, tracing only     Status: None   Result Value Ref Range    Systolic Blood Pressure  mmHg    Diastolic Blood Pressure  mmHg    Ventricular Rate 142 BPM    Atrial Rate 110 BPM    MN Interval  ms    QRS Duration 84 ms     ms    QTc 473 ms    P Axis  degrees    R AXIS -46 degrees    T Axis 21 degrees    Interpretation ECG       Undetermined rhythm  Left anterior fascicular  block  Posterior infarct , age undetermined  Abnormal ECG  When compared with ECG of 12-JUL-2020 07:51,  Current undetermined rhythm precludes rhythm comparison, needs review  Left anterior fascicular block is now Present  ST now depressed in Anterolateral leads     EKG 12 lead     Status: None   Result Value Ref Range    Systolic Blood Pressure  mmHg    Diastolic Blood Pressure  mmHg    Ventricular Rate 140 BPM    Atrial Rate 153 BPM    LA Interval  ms    QRS Duration 86 ms     ms    QTc 464 ms    P Axis  degrees    R AXIS -46 degrees    T Axis 24 degrees    Interpretation ECG       Atrial fibrillation with rapid ventricular response  Left anterior fascicular block  Posterior infarct (cited on or before 14-SEP-2022)  Abnormal ECG  When compared with ECG of 14-SEP-2022 11:33, (unconfirmed)  Previous ECG has undetermined rhythm, needs review     Adult Type and Screen     Status: None   Result Value Ref Range    ABO/RH(D) B POS     Antibody Screen Negative Negative    SPECIMEN EXPIRATION DATE [id number]    Prepare red blood cells (unit)     Status: None   Result Value Ref Range    Blood Component Type Red Blood Cells     Product Code S1108S79     Unit Status Transfused     Unit Number [id number]     CROSSMATCH Compatible     CODING SYSTEM [id number]     ISSUE DATE AND TIME [id number]     UNIT ABO/RH B+     UNIT TYPE ISBT 7300    CBC with platelets differential     Status: Abnormal    Narrative    The following orders were created for panel order CBC with platelets differential.  Procedure                               Abnormality         Status                     ---------                               -----------         ------                     CBC with platelets and d...[[id number]]  Abnormal            Final result                 Please view results for these tests on the individual orders.   ABO/Rh type and screen     Status: None    Narrative    The following orders were created for panel order  ABO/Rh type and screen.  Procedure                               Abnormality         Status                     ---------                               -----------         ------                     Adult Type and Screen[[id number]]                            Edited Result - FINAL        Please view results for these tests on the individual orders.

## 2022-09-15 NOTE — PROGRESS NOTES
[facility]    Hospitalist Progress Note  Name: Tk Richmond    MRN: [id number]  Provider:  Keagan Rao DO MPH  Date of Service: 09/15/2022    Summary of Stay: Tk Richmond is a 87-year-old male with a history of paroxysmal atrial fibrillation on anticoagulation, stroke with residual deficits, gastrointestinal bleeding, chronic kidney disease, and recent diagnosis of mucinous adenocarcinoma of gastrointestinal origin, presented to [facility] for evaluation of anemia.     1.  Acute on chronic anemia:  Hemoglobin was most recently in the low to mid 7 range.  There is no obvious evidence of ongoing gastrointestinal bleeding.  He does report dark stools, but is also on an iron supplement.  Regardless, he does not want any endoscopic procedures.  Hemoglobin on admission was 6.6, given 1 unit(s) PRBC 9/15 and still only 6.7 today so will give another 1 unit(s).  There is no sign of hemodynamic instability at this time.     2.  Rapid atrial fibrillation:  He does have a history of paroxysmal atrial fibrillation.  He takes Eliquis for stroke prophylaxis.  We will hold this for now given his mild drop in hemoglobin, until further gastrointestinal bleeding has been ruled out.  He will receive 10 mg of IV diltiazem in the Emergency Department and was started on a diltiazem drip but is now rate controlled this has been discontinued and will discontinue IMC orders.  We will continue metoprolol 25 mg p.o. b.i.d. for now and adjust as needed but currently he has good rate control.     3.  History of cerebrovascular accident with residual: No new neurological deficits.  As above, we will be holding anticoagulation with his acute anemia.     4.  Hypertension:  Blood pressure is stable.  We will hold his [id number] amlodipine to make room for AV jumana blocking agents.     5.  Chronic kidney disease:  Creatinine appears baseline.       6.  Recent diagnosis of mucinous adenocarcinoma  of gastrointestinal origin:  He follows with Dr. Nielson.  He has declined further workup with colonoscopy and chemotherapy due to his performance status.  Given the recurrent anemia and malignancy that he does not plan to treat, will ask for palliative care consultation for goals of care discussion with the patient and wife.    7.  Volume overload:  He has notable peripheral edema.  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  Will check a baseline TTE and start Lasix 20 mg IV q8h x2.     DVT Prophylaxis: DOAC - hold for now.  Code Status: No CPR- Do NOT Intubate  Diet: Combination Diet Regular Diet Adult    Pena Catheter: Not present  Disposition: Expected discharge in 2 days to home. Goals prior to discharge include diuresis, transfuse and palliative consult.   Incidental Findings: As above.  Family updated today: Will be updated by medical student.     Interval History   The patient reports doing well. No chest pain or shortness of breath. No nausea, vomiting, diarrhea, constipation. No fevers. No other specific complaints identified.     -Data reviewed today: I personally reviewed all new labs and imaging results over the last 24 hours.     Physical Exam   Temp: 98.6  F (37  C) Temp src: Oral BP: (!) 143/66 Pulse: 64   Resp: 20 SpO2: 99 % O2 Device: None (Room air)    Vitals:    09/14/22 1702 09/15/22 0500   Weight: 82 kg (180 lb 11.2 oz) 79.6 kg (175 lb 6.4 oz)     Vital Signs with Ranges  Temp:  [98  F (36.7  C)-98.7  F (37.1  C)] 98.6  F (37  C)  Pulse:  [] 64  Resp:  [12-26] 20  BP: (110-154)/(53-99) 143/66  SpO2:  [93 %-100 %] 99 %  I/O last 3 completed shifts:  In: 740 [P.O.:740]  Out: 425 [Urine:425]    GENERAL: No apparent distress. Awake, alert, and fully oriented.  HEENT: Normocephalic, atraumatic. Extraocular movements intact.  CARDIOVASCULAR: Regular rate and rhythm without murmurs or rubs. No S3.  PULMONARY: Clear bilaterally.  GASTROINTESTINAL: Soft, non-tender, non-distended. Bowel  sounds normoactive.   EXTREMITIES: No cyanosis or clubbing. 2+ edema.  NEUROLOGICAL: CN 2-12 grossly intact, baseline neurological deficits.  DERMATOLOGICAL: No rash, ulcer, bruising, nor jaundice.     Medications     - MEDICATION INSTRUCTIONS -         furosemide  20 mg Intravenous Q8H     metoprolol tartrate  25 mg Oral BID     sodium chloride (PF)  3 mL Intracatheter Q8H     Data     Laboratory:  Recent Labs   Lab 09/15/22  0729 09/14/22  1141   WBC 4.8 4.5   HGB 6.7* 6.6*   HCT 23.0* 23.4*   MCV 81 82    275     Recent Labs   Lab 09/15/22  0729 09/15/22  0222 09/14/22  1747 09/14/22  1141     --   --  142   POTASSIUM 4.0  --   --  4.3   CHLORIDE 109*  --   --  106   CO2 24  --   --  25   ANIONGAP 8  --   --  11   GLC 89 91 92 106*   BUN 25.2*  --   --  23.1*   CR 1.46*  --   --  1.44*   GFRESTIMATED 47*  --   --  48*   WILLIAM 8.6*  --   --  8.9     No results for input(s): CULT in the last 168 hours.    Imaging:  No results found for this or any previous visit (from the past 24 hour(s)).      Keagan Rao DO MPH  [facility] Hospitalist  [address] [place] [address]  09/15/2022

## 2022-09-16 NOTE — PROGRESS NOTES
[facility] Follow Up Note:    Assessment and Plan:    Tk Richmond is a 87 year old male patient admitted with GI bleed,     1.Acute on chronic anemia  - Presumed from recurrent GI bleed  - Hemoglobin at 6.6  - 2 units of packed red blood cells given  - IV iron given     PLAN:   - Discussed with wife and is pursuing hospice we will plan against future ongoing     2. 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  Wife is decision-maker  - All in agreement with hospice would like to take him home     3. CKD stage III  - Creatinine at baseline, may be contributing to anemia.     4. CVA  - Has been on and off Eliquis in the past month most recently has been on.  Held on this admission    PLAN:    - Keeping risk of bleeding and stroke in mind with bleeding being the bigger risk may stop Eliquis at time of discharge.  Will discuss with primary team    5. DNR/ DNI       Subjective:    Is aphasic, somewhat recognize me.  Only what he mostly says is good.  Appears in no acute distress.      Labs:  All labs reviewed    CBCRecent Labs   Lab 09/15/22  0729 09/14/22  1141   WBC 4.8 4.5   HGB 6.7* 6.6*   MCV 81 82    275       CMP  Recent Labs   Lab 09/16/22  0657 09/15/22  0729 09/15/22  0222 09/14/22  1747 09/14/22  1141   NA  --  141  --   --  142   POTASSIUM 3.6 4.0  --   --  4.3   CHLORIDE  --  109*  --   --  106   CO2  --  24  --   --  25   ANIONGAP  --  8  --   --  11   GLC  --  89 91 92 106*   BUN  --  25.2*  --   --  23.1*   CR  --  1.46*  --   --  1.44*   GFRESTIMATED  --  47*  --   --  48*   WILLIAM  --  8.6*  --   --  8.9   PROTTOTAL  --   --   --   --  7.0   ALBUMIN  --   --   --   --  3.6    BILITOTAL  --   --   --   --  0.2   ALKPHOS  --   --   --   --  99   AST  --   --   --   --  22   ALT  --   --   --   --  15       INR  Recent Labs   Lab 09/14/22  1141   INR 1.37*       Blood CultureNo results for input(s): CULT in the last 168 hours.    In no acute distress   Angely Nielson MD  [facility]  9/16/2022 8:48 AM    Time: 35 minutes with patient , 30 minutes in counseling and coordination of care

## 2022-09-16 NOTE — CONSULTS
Care Management Note    Length of Stay (days): 2    Expected Discharge Date: 09/17/2022     Concerns to be Addressed: discharge planning - hospice      Patient plan of care discussed at interdisciplinary rounds: Yes    Anticipated Discharge Disposition: Home      Anticipated Discharge Services: [facility]  Anticipated Discharge DME: Hospice agency to coordinate and deliver (hospital bed, over the bed table, commode, etc)    Education Provided on the Discharge Plan: yes  Patient/Family in Agreement with the Plan: yes    Referrals Placed by CM/SW: [facility]  Private pay costs discussed: Not applicable    Additional Information:  Received update from palliative that plan is for home with hospice. Pt/spouse Norma wanting to use [facility].     Met with pt and pt's spouse Norma this date and introduced self and social work role. Confirmed desire to discharge home with [facility] support. Per Norma, she has family coming to the house tomorrow to move furniture to prepare for the hospice equipment to be delivered. SW inquired if the plan is to discharge tomorrow and Norma did not know if they would be ready. SW provided education that hospice equipment can be delivered quite quickly but verbalized understanding if the family/house was not ready. BULL confirmed house address and Norma's contact number (p: [phone]). Advised Norma that writer will reach out to [facility] and have their liaison contact her to discuss equipment/timeline. Norma verbalized understanding and agreement.     BULL placed phone call to [facility] liaison Joanne p: [phone]. Discussed referral and provided pt's spouse Norma's contact information. Joanne plans to give Norma a call and then will update writer.     ADDENDUM @ 1440:    Received call from Joanne with [facility] with update that plan is for hospice equipment to be delivered tomorrow (9/16) after 3pm. Then plan for hospital discharge  on Sunday. Joanne stated they can do a hospice admission any time on Sunday.     BULL met with pt/pt's spouse Norma to discuss discharge time for Sunday (9/18). Norma agreeable to 11am discharge. Family is planning to transport.     Placed phone call to Joanne with [facility] to provide update. Joanne agreeable to 11am discharge and they will plan for a hospice admission between 12:30-1pm. [facility] hospice does not need orders faxed, they can access them through [employer]. Will need referral to Hospice order placed in discharge orders. Comfort meds to be filled and sent with pt.     Social work will continue to follow and assist with discharge planning as needed.    CECILIA Veloz, LSW  Inpatient Care Coordination  [facility], [facility]  [phone]    CECILIA Tellez

## 2022-09-16 NOTE — PLAN OF CARE
Goal Outcome Evaluation:    A&OX3. Disoriented to time and sometimes situation. VSS HR was low: 45 bpm, but went up to 55 bpm. Iron infusion completed. Denied pain. Iv lasix administered with moderate output. +2 to 3 edema to BLE. Reg diet. Oncology consulted to see patient and wife at 0800 am tomorrow. Patient and spouse was updated on this.   Continue to monitor.

## 2022-09-16 NOTE — PROGRESS NOTES
AOx4. Answers orientation questions in yes no form.  Activity: Ax 1 w gb and walker. Up walking in room.  O2: RA. Wheezes ezpiratory.   B&B: Incontinent of bladder. External catheter in place. 1 loose BM during shift. Pt is continent of bowel.  Diet: Reg  PIV: SL. Patent.   Tele: SR-SB.  BLE edema.  Blanchable redness on bottom. Pt repositioned in bed for comfort.    D/c plans: Likely going home w hospice tomorrow. Pt has meds being held for discharge by discharge pharmacy. Pt will need to get tose before leaving.

## 2022-09-16 NOTE — PLAN OF CARE
Oriented to self. Aphasia. VSS. LS expiratory wheezes. Denies pain. BLE edema. Tele SB. External catheter in place. Blanchable redness to buttocks.

## 2022-09-16 NOTE — PROGRESS NOTES
"[facility]  Palliative Care Progress Note  Text Page    Assessment & Plan     Tk Richmond is a 87 year old male who was admitted on 9/14/2022.   Consulted by Hospitalist Keagan Rao MD to assist patient with metastatic mucinous adenocarcinoma of liver, GI origin. Does not wish to pursue aggressive intervention.     Recommendations:  1. Goals of Care- No CPR- Do NOT Intubate - Restorative care with plan to dismiss with the support of [facility].  Hospitalization goals discussed with patient and wife/healthcare agent, Norma.  Decisional Capacity - Questionable, as patient has expressive aphasia, but responds appropriately with non-verbal motions. Patient has an advance directive dated 7/26/2022.  His spouse Ml \"Norma\" Basilio is named healthcare agent. His daughter, Naomi Richmond and grandchild, Scott Richmond are co-alternate health care agent.  POLST - Complete indicating preference for COMFORT-FOCUSED TREATMENT     2. Generalized weakness - Patient and wife request that Tk return home with the support of [facility].      3. Dyspnea - Patient denies on room air. Patient and wife request [facility] on dismissal.      4. Spiritual Care  Consultation placed for  to follow.  Spiritual Background: Jew     5. Care Planning  Appreciate Care of case management..  Medications for discharge - Medications are ordered for hospice dismissal      Medical Decision Making and Goals of Care:  Discussed on September 16, 2022 with JULIO Huerta CNS:     The patient's wife, Norma called our office at 8:31 AM and explained \"We're ready to start hospice.\" She explained that after speaking with Oncologist Angely Nielson MD they made the decision to start hospice. Norma request [facility] as they have a family member who is using their service. We completed a POLST indicating the preference for COMFORT-FOCUSED TREATMENT and hospice medications " are ordered for dismissal.     Jacy Barton MS, RN, CNS, APRN, ACHPN, FAACVPR  Pain and Palliative Care  Pager [phone]  Office [phone]     Time Spent on this Encounter   Total unit/floor time 8:31 AM until 9:30 AM, time consisted of the following, examination of the patient, reviewing the record and completing documentation. >50% of time spent in counseling and coordination of care.  Time spend counseling with patient and family consisted of the following topics, goals of care, care planning for discharge and symptom management.  Time spent in coordination of care with Bedside Nurse Christa Soria RN, Hospitalist Keagan Rao DO and  EDI Veloz.     Interval History   Chart reviewed - no new complaints      Palliative Symptom Review (0=no symptom/no concern, 1=mild, 2=moderate, 3=severe):      Pain: 0-none      Fatigue: 1-mild      Nausea: 0-none      Constipation: 0-none      Diarrhea: 0-none      Depressive Symptoms: 0-none      Anxiety: 0-none      Drowsiness: 0-none      Poor Appetite: 1-mild      Shortness of Breath: 0-none      Insomnia: 0-none          Physical Exam   Temp:  [97.7  F (36.5  C)-98.5  F (36.9  C)] 97.7  F (36.5  C)  Pulse:  [45-62] 54  Resp:  [18-22] 22  BP: (128-153)/(54-72) 148/63  SpO2:  [92 %-99 %] 96 %  176 lbs 3.2 oz  GEN:  Alert, appropriately responding non-verbally to questions, appears comfortable, no apparent distress.   HEENT:  Normocephalic/atraumatic, no scleral icterus, no nasal discharge, mouth moist.  RESP:    Symmetric chest rise on inhalation noted.  Normal respiratory effort.  PAIN BEHAVIOR: Cooperative  Psych:  Normal affect.  Calm, cooperative, conversant appropriately.    Medications     - MEDICATION INSTRUCTIONS -         atorvastatin  10 mg Oral Daily     brinzolamide-brimonidine  1 drop Both Eyes BID     citalopram  20 mg Oral Daily     iron sucrose  300 mg Intravenous Q24H     metoprolol tartrate  12.5 mg Oral BID     pantoprazole  40 mg  Oral Daily     sodium chloride (PF)  3 mL Intracatheter Q8H       Data   Results for orders placed or performed during the hospital encounter of 22 (from the past 24 hour(s))   Echocardiogram Complete    Narrative    [id number]  [id number]  [id number]  [id number]^FACUNDO^NIKITA^DOUGIE     [facility]  Echocardiography Laboratory  [address] [place] [address]     Name: AVIS GOODE  MRN: [id number]  : 1935  Study Date: 09/15/2022 11:29 AM  Age: 87 yrs  Gender: Male  Patient Location: [facility]  Reason For Study: CHF  Ordering Physician: NIKITA LOREDO  Referring Physician: Shyam Barroso MD  Performed By: Brie Sims RDCS     BSA: 2.0 m2  Height: 70 in  Weight: 175 lb  HR: 49  BP: 128/60 mmHg  ______________________________________________________________________________  Procedure  Complete Portable Echo Adult. Optison (NDC #[id number]) given intravenously.  ______________________________________________________________________________  Interpretation Summary     Poor acoustic windows  The left ventricle is normal in size.  There is mild concentric left ventricular hypertrophy.  The left ventricular ejection fraction is normal.  Left ventricular diastolic function is normal.  No regional wall motion abnormalities noted.  The right ventricle is normal in structure, function and size.  There is mild trileaflet aortic sclerosis.  There is mild (1+) aortic regurgitation.  No hemodynamically significant valve issues.  The ascending aorta is Moderately dilated, 4.7 cm compared to a measurement of  4.6 cm done 10 + years ago (2012).  Technically difficult, suboptimal study. Contrast was used without apparent  complications.  ______________________________________________________________________________  Left Ventricle  The left ventricle is normal in size. There is mild concentric left  ventricular hypertrophy. The left ventricular ejection fraction is normal.  Diastolic Doppler  findings (E/E' ratio and/or other parameters) suggest left  ventricular filling pressures are indeterminate. Left ventricular diastolic  function is normal. No regional wall motion abnormalities noted.     Right Ventricle  The right ventricle is normal in structure, function and size.     Atria  The left atrium is mildly dilated. Right atrial size is normal. There is no  atrial shunt seen.     Mitral Valve  The mitral valve leaflets appear normal. There is no evidence of stenosis,  fluttering, or prolapse. There is trace mitral regurgitation.     Tricuspid Valve  Normal tricuspid valve. There is mild (1+) tricuspid regurgitation. The right  ventricular systolic pressure is approximated at 23mmHg plus the right atrial  pressure. IVC diameter <2.1 cm collapsing >50% with sniff suggests a normal RA  pressure of 3 mmHg.     Aortic Valve  There is mild trileaflet aortic sclerosis. There is mild (1+) aortic  regurgitation. No aortic stenosis is present.     Pulmonic Valve  There is trace pulmonic valvular regurgitation.     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     Pericardium  There is no pericardial effusion.     Rhythm  Sinus rhythm was noted.  ______________________________________________________________________________  MMode/2D Measurements & Calculations  IVSd: 1.2 cm     LVIDd: 4.0 cm  LVIDs: 1.8 cm  LVPWd: 1.3 cm  FS: 54.8 %  LV mass(C)d: 171.5 grams  LV mass(C)dI: 87.0 grams/m2  Ao root diam: 3.7 cm  LA dimension: 3.1 cm  asc Aorta Diam: 4.7 cm  LA/Ao: 0.84  LA Volume (BP): 67.3 ml  LA Volume Index (BP): 34.2 ml/m2  RWT: 0.64     Doppler Measurements & Calculations  MV E max noe: 72.6 cm/sec  MV A max noe: 70.7 cm/sec  MV E/A: 1.0  MV dec time: 0.23 sec  PA acc time: 0.08 sec  TR max noe: 229.0 cm/sec  TR max P.0 mmHg  E/E' av.9  Lateral E/e': 10.4  Medial E/e': 9.4     ______________________________________________________________________________  Report approved by: Gregg Peña,  Monae 09/15/2022 02:28 PM         EKG 12 lead   Result Value Ref Range    Systolic Blood Pressure  mmHg    Diastolic Blood Pressure  mmHg    Ventricular Rate 51 BPM    Atrial Rate 51 BPM    KS Interval 192 ms    QRS Duration 94 ms     ms    QTc 436 ms    P Axis 35 degrees    R AXIS -21 degrees    T Axis 27 degrees    Interpretation ECG Sinus bradycardia  Otherwise normal ECG      Potassium   Result Value Ref Range    Potassium 3.6 3.4 - 5.3 mmol/L

## 2022-09-16 NOTE — PROGRESS NOTES
[facility]    Hospitalist Progress Note  Name: Tk Richmond    MRN: [id number]  Provider:  Keagan Rao DO MPH  Date of Service: 09/16/2022    Summary of Stay: Tk Richmond is a 87-year-old male with a history of paroxysmal atrial fibrillation on anticoagulation, stroke with residual deficits, gastrointestinal bleeding, chronic kidney disease, and recent diagnosis of mucinous adenocarcinoma of gastrointestinal origin, presented to [facility] for evaluation of anemia.     1.  Acute on chronic anemia:  Hemoglobin was most recently in the low to mid 7 range.  There is no obvious evidence of ongoing gastrointestinal bleeding.  He does report dark stools, but is also on an iron supplement.  Regardless, he does not want any endoscopic procedures.  Hemoglobin on admission was 6.6, given 1 unit(s) PRBC 9/15 and still only 6.7 yesterday so gave another 1 unit(s).  There is no sign of hemodynamic instability at this time.  Received IV Fe.  Hematology consulted.     2.  Rapid atrial fibrillation:  He does have a history of paroxysmal atrial fibrillation.  He takes Eliquis for stroke prophylaxis.  We will hold this for now given his mild drop in hemoglobin.  He received 10 mg of IV diltiazem in the Emergency Department and was started on a diltiazem drip but is now rate controlled this has been discontinued and will discontinue IMC orders.  We will continue metoprolol 12.5 mg p.o. b.i.d. for now and adjust as needed but currently he has good rate control.     3.  History of cerebrovascular accident with residual: No new neurological deficits.  As above, we will be holding anticoagulation with his acute anemia.     4.  Hypertension:  Blood pressure is stable.  We will hold his qokxy-xc-powqwauup amlodipine to make room for AV jumana blocking agents.     5.  Chronic kidney disease:  Creatinine appears baseline.       6.  Recent diagnosis of mucinous adenocarcinoma of gastrointestinal  origin:  He follows with Dr. Nielson.  He has declined further workup with colonoscopy and chemotherapy due to his performance status.  Given the recurrent anemia and malignancy that he does not plan to treat, asked for palliative and Onc consults.  Dr Nielson met with patient and family, planning on hospice at this time.    7.  Volume overload:  He has notable peripheral edema.  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  Relatively unremarkable TTE.  Received IV lasix, will transition to oral lasix 20 mg daily.    DVT Prophylaxis: DOAC - hold for now.  Code Status: No CPR- Do NOT Intubate  Diet: Combination Diet Regular Diet Adult    Pena Catheter: Not present  Disposition: Expected discharge to home with hospice arrangements in place.  Incidental Findings: As above.  Family updated today: Yes, wife at bedside.     Interval History   The patient reports doing well. No chest pain or shortness of breath. No nausea, vomiting, diarrhea, constipation. No fevers. No other specific complaints identified.     -Data reviewed today: I personally reviewed all new labs and imaging results over the last 24 hours.     Physical Exam   Temp: 97.7  F (36.5  C) Temp src: Oral BP: (!) 148/63 Pulse: 63   Resp: 22 SpO2: 96 % O2 Device: None (Room air)    Vitals:    09/14/22 1702 09/15/22 0500 09/16/22 0500   Weight: 82 kg (180 lb 11.2 oz) 79.6 kg (175 lb 6.4 oz) 79.9 kg (176 lb 3.2 oz)     Vital Signs with Ranges  Temp:  [97.7  F (36.5  C)-98.5  F (36.9  C)] 97.7  F (36.5  C)  Pulse:  [45-63] 63  Resp:  [18-22] 22  BP: (132-153)/(54-72) 148/63  SpO2:  [92 %-98 %] 96 %  I/O last 3 completed shifts:  In: 994 [P.O.:640]  Out: 2725 [Urine:2725]    GENERAL: No apparent distress. Awake, alert, and fully oriented.  HEENT: Normocephalic, atraumatic. Extraocular movements intact.  CARDIOVASCULAR: Regular rate and rhythm without murmurs or rubs. No S3.  PULMONARY: Clear bilaterally.  GASTROINTESTINAL: Soft, non-tender, non-distended. Bowel  sounds normoactive.   EXTREMITIES: No cyanosis or clubbing. 2+ edema.  NEUROLOGICAL: CN 2-12 grossly intact, baseline neurological deficits.  DERMATOLOGICAL: No rash, ulcer, bruising, nor jaundice.     Medications     - MEDICATION INSTRUCTIONS -         atorvastatin  10 mg Oral Daily     brinzolamide-brimonidine  1 drop Both Eyes BID     citalopram  20 mg Oral Daily     furosemide  20 mg Oral Daily     metoprolol tartrate  12.5 mg Oral BID     pantoprazole  40 mg Oral Daily     sodium chloride (PF)  3 mL Intracatheter Q8H     Data     Laboratory:  Recent Labs   Lab 09/15/22  0729 22  1141   WBC 4.8 4.5   HGB 6.7* 6.6*   HCT 23.0* 23.4*   MCV 81 82    275     Recent Labs   Lab 22  0657 09/15/22  0729 09/15/22  0222 22  1747 22  1141   NA  --  141  --   --  142   POTASSIUM 3.6 4.0  --   --  4.3   CHLORIDE  --  109*  --   --  106   CO2  --  24  --   --  25   ANIONGAP  --  8  --   --  11   GLC  --  89 91 92 106*   BUN  --  25.2*  --   --  23.1*   CR  --  1.46*  --   --  1.44*   GFRESTIMATED  --  47*  --   --  48*   WILLIAM  --  8.6*  --   --  8.9     No results for input(s): CULT in the last 168 hours.    Imaging:  Recent Results (from the past 24 hour(s))   Echocardiogram Complete    Narrative    [id number]  [id number]  [id number]  [id number]^FACUNDO^NIKITA^DOUGIE     [facility]  Echocardiography Laboratory  [address] [place] [address]     Name: AVIS GOODE  MRN: [id number]  : 1935  Study Date: 09/15/2022 11:29 AM  Age: 87 yrs  Gender: Male  Patient Location: [facility]  Reason For Study: CHF  Ordering Physician: NIKITA LOREDO  Referring Physician: Shyam Barroso MD  Performed By: Brie Sims RDCS     BSA: 2.0 m2  Height: 70 in  Weight: 175 lb  HR: 49  BP: 128/60 mmHg  ______________________________________________________________________________  Procedure  Complete Portable Echo Adult. Optison (NDC #[id number]) given  intravenously.  ______________________________________________________________________________  Interpretation Summary     Poor acoustic windows  The left ventricle is normal in size.  There is mild concentric left ventricular hypertrophy.  The left ventricular ejection fraction is normal.  Left ventricular diastolic function is normal.  No regional wall motion abnormalities noted.  The right ventricle is normal in structure, function and size.  There is mild trileaflet aortic sclerosis.  There is mild (1+) aortic regurgitation.  No hemodynamically significant valve issues.  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  Technically difficult, suboptimal study. Contrast was used without apparent  complications.  ______________________________________________________________________________  Left Ventricle  The left ventricle is normal in size. There is mild concentric left  ventricular hypertrophy. The left ventricular ejection fraction is normal.  Diastolic Doppler findings (E/E' ratio and/or other parameters) suggest left  ventricular filling pressures are indeterminate. Left ventricular diastolic  function is normal. No regional wall motion abnormalities noted.     Right Ventricle  The right ventricle is normal in structure, function and size.     Atria  The left atrium is mildly dilated. Right atrial size is normal. There is no  atrial shunt seen.     Mitral Valve  The mitral valve leaflets appear normal. There is no evidence of stenosis,  fluttering, or prolapse. There is trace mitral regurgitation.     Tricuspid Valve  Normal tricuspid valve. There is mild (1+) tricuspid regurgitation. The right  ventricular systolic pressure is approximated at 23mmHg plus the right atrial  pressure. IVC diameter <2.1 cm collapsing >50% with sniff suggests a normal RA  pressure of 3 mmHg.     Aortic Valve  There is mild trileaflet aortic sclerosis. There is  mild (1+) aortic  regurgitation. No aortic stenosis is present.     Pulmonic Valve  There is trace pulmonic valvular regurgitation.     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     Pericardium  There is no pericardial effusion.     Rhythm  Sinus rhythm was noted.  ______________________________________________________________________________  MMode/2D Measurements & Calculations  IVSd: 1.2 cm     LVIDd: 4.0 cm  LVIDs: 1.8 cm  LVPWd: 1.3 cm  FS: 54.8 %  LV mass(C)d: 171.5 grams  LV mass(C)dI: 87.0 grams/m2  Ao root diam: 3.7 cm  LA dimension: 3.1 cm  asc Aorta Diam: 4.7 cm  LA/Ao: 0.84  LA Volume (BP): 67.3 ml  LA Volume Index (BP): 34.2 ml/m2  RWT: 0.64     Doppler Measurements & Calculations  MV E max noe: 72.6 cm/sec  MV A max noe: 70.7 cm/sec  MV E/A: 1.0  MV dec time: 0.23 sec  PA acc time: 0.08 sec  TR max noe: 229.0 cm/sec  TR max P.0 mmHg  E/E' av.9  Lateral E/e': 10.4  Medial E/e': 9.4     ______________________________________________________________________________  Report approved by: Monae Miller 09/15/2022 02:28 PM               Keagan Rao DO, MPH  [facility] hospital Hospitalist  [address] [place] [address]  2022

## 2022-09-17 NOTE — PLAN OF CARE
"Goal Outcome Evaluation:    Oriented to self. Expressive aphasia. Makes needs known. Pleasant and cooperative. External catheter in place. Denies pain, chest pain/pressure or SOB. Tele discontinued. Slept between cares. Continue plan of care.     /62 (BP Location: Left arm)   Pulse 56   Temp 98.2  F (36.8  C) (Oral)   Resp 16   Ht 1.753 m (5' 9\")   Wt 79.9 kg (176 lb 3.2 oz)   SpO2 98%   BMI 26.02 kg/m            "

## 2022-09-17 NOTE — PROGRESS NOTES
"AOx4. Answers orientation questions in yes no form.  Activity: Ax 1 w gb and walker. Up walking in room.  O2: RA.   B&B: Incontinent of bladder. External catheter in place. 1 BM during shift. Up walking to bathroom. Pt is continent of bowel.  Diet: Reg  PIV: SL. Patent.   Tele: SR-SB.  BLE edema.  Blanchable redness on bottom. Pt repositioned in bed for comfort.     D/c plans: Going home w hospice tomorrow. Pt has meds being held for discharge by discharge pharmacy. Pt will need to get tose before leaving    I messaged provider at 6:18pm  \"Hi. I think yo said to discharge VS and any cares that are not comfort measures but I'm not seeing an order for comfort cares & orders suggest to continue doing VS. Can you change this please? Robink, thanks!\"    Provider responded at 6:19pm  \"Ok, no need to do VS.\"  "

## 2022-09-17 NOTE — PROVIDER NOTIFICATION
Spoke w provider bc tele orders disappeared. Admitting hospitalist said pt is comfort cares and tele was not needed. Orders have yet to be updated.

## 2022-09-17 NOTE — PROGRESS NOTES
HEM- ONC chart check    Awaiting hospice placement  Please call with questions/concerns    Robina Shipman MD  MNO

## 2022-09-17 NOTE — PROGRESS NOTES
[facility]    Hospitalist Progress Note  Name: Tk Richmond    MRN: [id number]  Provider:  Keagan Rao DO MPH  Date of Service: 09/17/2022    Summary of Stay: Tk Richmond is a 87-year-old male with a history of paroxysmal atrial fibrillation on anticoagulation, stroke with residual deficits, gastrointestinal bleeding, chronic kidney disease, and recent diagnosis of mucinous adenocarcinoma of gastrointestinal origin, presented to [facility] for evaluation of anemia.     1.  Acute on chronic anemia:  Hemoglobin was most recently in the low to mid 7 range.  There is no obvious evidence of ongoing gastrointestinal bleeding.  He does report dark stools, but is also on an iron supplement.  Regardless, he does not want any endoscopic procedures.  Hemoglobin on admission was 6.6, given 1 unit(s) PRBC 9/15 and still only 6.7 yesterday so gave another 1 unit(s).  There is no sign of hemodynamic instability at this time.  Received IV Fe.  Hematology consulted.     2.  Rapid atrial fibrillation:  He does have a history of paroxysmal atrial fibrillation.  He takes Eliquis for stroke prophylaxis.  We will hold this for now given his mild drop in hemoglobin.  He received 10 mg of IV diltiazem in the Emergency Department and was started on a diltiazem drip but is now rate controlled this has been discontinued.  We will continue metoprolol 12.5 mg p.o. b.i.d. for now and adjust as needed but currently he has good rate control.     3.  History of cerebrovascular accident with residual: No new neurological deficits.  As above, we will be holding anticoagulation with his acute anemia.     4.  Hypertension:  Blood pressure is stable.  We will hold his tyjfl-is-eusfbxmwd amlodipine to make room for AV jumana blocking agents.     5.  Chronic kidney disease:  Creatinine appears baseline.       6.  Recent diagnosis of mucinous adenocarcinoma of gastrointestinal origin:  He follows with   [facility].  He has declined further workup with colonoscopy and chemotherapy due to his performance status.  Given the recurrent anemia and malignancy that he does not plan to treat, asked for palliative and Onc consults.  Dr Nielson met with patient and family, planning on hospice at this time.    7.  Volume overload:  He has notable peripheral edema.  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  Relatively unremarkable TTE.  Received IV lasix, transitioned to oral lasix 20 mg daily.    DVT Prophylaxis: DOAC - hold for now.  Code Status: No CPR- Do NOT Intubate  Diet: Combination Diet Regular Diet Adult    Pena Catheter: Not present  Disposition: Expected discharge to home with hospice arrangements in place tomorrow.  Incidental Findings: As above.  Family updated today: No.     Interval History   The patient reports doing well. No chest pain or shortness of breath. No nausea, vomiting, diarrhea, constipation. No fevers. No other specific complaints identified.     -Data reviewed today: I personally reviewed all new labs and imaging results over the last 24 hours.     Physical Exam   Temp: 98.6  F (37  C) Temp src: Oral BP: 136/64 Pulse: 56   Resp: 16 SpO2: 97 % O2 Device: None (Room air)    Vitals:    09/15/22 0500 09/16/22 0500 09/17/22 0524   Weight: 79.6 kg (175 lb 6.4 oz) 79.9 kg (176 lb 3.2 oz) 76.8 kg (169 lb 6.4 oz)     Vital Signs with Ranges  Temp:  [98.2  F (36.8  C)-98.6  F (37  C)] 98.6  F (37  C)  Pulse:  [56-63] 56  Resp:  [16-20] 16  BP: (133-153)/(62-68) 136/64  SpO2:  [97 %-98 %] 97 %  I/O last 3 completed shifts:  In: 480 [P.O.:480]  Out: 1400 [Urine:1400]    GENERAL: No apparent distress. Awake, alert, and fully oriented.  HEENT: Normocephalic, atraumatic. Extraocular movements intact.  CARDIOVASCULAR: Regular rate and rhythm without murmurs or rubs. No S3.  PULMONARY: Clear bilaterally.  GASTROINTESTINAL: Soft, non-tender, non-distended. Bowel sounds normoactive.   EXTREMITIES: No cyanosis or  clubbing. 2+ edema.  NEUROLOGICAL: CN 2-12 grossly intact, baseline neurological deficits.  DERMATOLOGICAL: No rash, ulcer, bruising, nor jaundice.     Medications     - MEDICATION INSTRUCTIONS -         atorvastatin  10 mg Oral Daily     brinzolamide-brimonidine  1 drop Both Eyes BID     citalopram  20 mg Oral Daily     furosemide  20 mg Oral Daily     metoprolol tartrate  12.5 mg Oral BID     pantoprazole  40 mg Oral Daily     sodium chloride (PF)  3 mL Intracatheter Q8H     Data     Laboratory:  Recent Labs   Lab 09/15/22  0729 09/14/22  1141   WBC 4.8 4.5   HGB 6.7* 6.6*   HCT 23.0* 23.4*   MCV 81 82    275     Recent Labs   Lab 09/17/22  0714 09/16/22  0657 09/15/22  0729 09/15/22  0222 09/14/22  1747 09/14/22  1141   NA  --   --  141  --   --  142   POTASSIUM 3.7 3.6 4.0  --   --  4.3   CHLORIDE  --   --  109*  --   --  106   CO2  --   --  24  --   --  25   ANIONGAP  --   --  8  --   --  11   GLC  --   --  89 91 92 106*   BUN  --   --  25.2*  --   --  23.1*   CR  --   --  1.46*  --   --  1.44*   GFRESTIMATED  --   --  47*  --   --  48*   WILLIAM  --   --  8.6*  --   --  8.9     No results for input(s): CULT in the last 168 hours.    Imaging:  No results found for this or any previous visit (from the past 24 hour(s)).      Keagan Rao DO MPH  [facility] Hospitalist  [address] [place] [address]  09/17/2022

## 2022-09-18 NOTE — DISCHARGE SUMMARY
Hospitalist Discharge Summary  [facility]    Tk Richmond MRN# [id number]   YOB: 1935 Age: 87 year old     Date of Admission:  9/14/2022  Date of Discharge:  9/18/2022 11:02 AM  Admitting Physician:  Keagan Rao DO  Discharge Physician:  Keagan Rao DO  Discharging Service:  Hospitalist     Primary Provider: Shyam Mclean          Discharge Diagnosis:     Acute on chronic anemia  Rapid atrial fibrillation  History of stroke with residual deficits  Hypertension  Chronic kidney disease  Metastatic mucinous adenocarcinoma of GI origin  Volume overload             Discharge Disposition:     Discharged to home           Allergies:     Allergies   Allergen Reactions     Contrast Dye Rash              Discharge Medications:     Discharge Medication List as of 9/18/2022 10:23 AM      START taking these medications    Details   acetaminophen (TYLENOL) 500 MG tablet Take 1-2 tablets (500-1,000 mg) by mouth every 6 hours as needed for mild pain or fever, Disp-100 tablet, R-0, E-Prescribe      atropine 1 % ophthalmic solution Take 2 drops by mouth, place under tongue or place inside cheek every 4 hours as needed for secretions, Disp-5 mL, R-0, E-Prescribe      bisacodyl (DULCOLAX) 10 MG suppository Place 1 suppository (10 mg) rectally daily as needed for constipation, Disp-2 suppository, R-0, E-Prescribe      furosemide (LASIX) 20 MG tablet Take 0.5 tablets (10 mg) by mouth daily, Disp-30 tablet, R-1, E-Prescribe      haloperidol (HALDOL) 2 MG/ML (HIGH CONC) solution Take 0.25-0.5 mLs (0.5-1 mg) by mouth or place under tongue every 6 hours as needed for agitation or other (nausea), Disp-10 mL, R-0, E-Prescribe      HYDROmorphone (DILAUDID) 2 MG tablet Take 0.5-1 tablets (1-2 mg) by mouth or place under tongue every 2 hours as needed for pain or shortness of breath / dyspnea, Disp-10 tablet, R-0, [facility]      LORazepam (ATIVAN) 2 MG/ML  (HIGH CONC) oral solution Take 0.125-0.25 mLs (0.25-0.5 mg) by mouth or place under tongue every 4 hours as needed for anxiety (restlessness), Disp-30 mL, R-0, E-Prescribe      metoprolol tartrate (LOPRESSOR) 25 MG tablet Take 0.5 tablets (12.5 mg) by mouth 2 times daily, Disp-30 tablet, R-1, E-Prescribe      senna (SENNA LAXATIVE) 8.6 MG tablet Take 1-2 tablets by mouth 2 times daily as needed for constipation, Disp-100 tablet, R-0, E-Prescribe         CONTINUE these medications which have NOT CHANGED    Details   brinzolamide-brimonidine (SIMBRINZA) 1-0.2 % ophthalmic suspension Place 1 drop into both eyes 2 times daily , Historical      citalopram (CELEXA) 20 MG tablet Take 20 mg by mouth daily, Historical      glucosamine-chondroitin 500-400 MG CAPS per capsule Take 1 capsule by mouth 2 times daily At noon and supper, Historical      pantoprazole (PROTONIX) 40 MG EC tablet Take 40 mg by mouth daily, Historical         STOP taking these medications       amLODIPine (NORVASC) 5 MG tablet Comments:   Reason for Stopping:         apixaban ANTICOAGULANT (ELIQUIS) 5 MG tablet Comments:   Reason for Stopping:         calcitonin (salmon) (MIACALCIN) 200 UNIT/ACT nasal spray Comments:   Reason for Stopping:         Calcium-Magnesium-Zinc 333-133-5 MG TABS per tablet Comments:   Reason for Stopping:         docusate sodium (COLACE) 100 MG tablet Comments:   Reason for Stopping:         ferrous sulfate (FEROSUL) 325 (65 Fe) MG tablet Comments:   Reason for Stopping:         lovastatin (MEVACOR) 40 MG tablet Comments:   Reason for Stopping:         Multiple Vitamins-Minerals (CENTRUM SILVER ADULT 50+ PO) Comments:   Reason for Stopping:         Multiple Vitamins-Minerals (PRESERVISION AREDS PO) Comments:   Reason for Stopping:         vitamin B-12 (CYANOCOBALAMIN) 1000 MCG tablet Comments:   Reason for Stopping:                      Condition on Discharge:     Discharge condition: Stable   Discharge vitals: Blood pressure  "(!) 143/65, pulse 60, temperature 98.2  F (36.8  C), temperature source Oral, resp. rate 18, height 1.753 m (5' 9\"), weight 76.8 kg (169 lb 6.4 oz), SpO2 94 %.   Code status on discharge: Comfort Care      BASIC PHYSICAL EXAMINATION:  GENERAL: No apparent distress.  CARDIOVASCULAR: Regular rate and rhythm without murmurs.  PULMONARY: Clear to auscultation bilaterally.   GASTROINTESTINAL: Abdomen soft, non-tender.  EXTREMITIES: No edema, pulses intact.  NEUROLOGIC: Baseline deficits.            History of Illness:   See detailed admission note for full details.               Procedures excluding imaging which is summarized below:     Please see details in the electronic medical record.           Consultations:     PALLIATIVE CARE ADULT IP CONSULT  HEMATOLOGY & ONCOLOGY IP CONSULT  SPIRITUAL HEALTH SERVICES IP CONSULT  CARE MANAGEMENT / SOCIAL WORK IP CONSULT  CARE MANAGEMENT / SOCIAL WORK IP CONSULT          Significant Results:     Results for orders placed or performed during the hospital encounter of 22   Echocardiogram Complete    Narrative    [id number]  [id number]  [id number]  [id number]^DOUGIE     [facility]  Echocardiography Laboratory  [address] [place] [address]     Name: AVIS GOODE  MRN: [id number]  : 1935  Study Date: 09/15/2022 11:29 AM  Age: 87 yrs  Gender: Male  Patient Location: [facility]  Reason For Study: CHF  Ordering Physician: NIKITA LOREDO  Referring Physician: Shyam Barroso MD  Performed By: Brie Sims RDCS     BSA: 2.0 m2  Height: 70 in  Weight: 175 lb  HR: 49  BP: 128/60 mmHg  ______________________________________________________________________________  Procedure  Complete Portable Echo Adult. Optison (NDC #[id number]) given intravenously.  ______________________________________________________________________________  Interpretation Summary     Poor acoustic windows  The left ventricle is normal in size.  There is mild " concentric left ventricular hypertrophy.  The left ventricular ejection fraction is normal.  Left ventricular diastolic function is normal.  No regional wall motion abnormalities noted.  The right ventricle is normal in structure, function and size.  There is mild trileaflet aortic sclerosis.  There is mild (1+) aortic regurgitation.  No hemodynamically significant valve issues.  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  Technically difficult, suboptimal study. Contrast was used without apparent  complications.  ______________________________________________________________________________  Left Ventricle  The left ventricle is normal in size. There is mild concentric left  ventricular hypertrophy. The left ventricular ejection fraction is normal.  Diastolic Doppler findings (E/E' ratio and/or other parameters) suggest left  ventricular filling pressures are indeterminate. Left ventricular diastolic  function is normal. No regional wall motion abnormalities noted.     Right Ventricle  The right ventricle is normal in structure, function and size.     Atria  The left atrium is mildly dilated. Right atrial size is normal. There is no  atrial shunt seen.     Mitral Valve  The mitral valve leaflets appear normal. There is no evidence of stenosis,  fluttering, or prolapse. There is trace mitral regurgitation.     Tricuspid Valve  Normal tricuspid valve. There is mild (1+) tricuspid regurgitation. The right  ventricular systolic pressure is approximated at 23mmHg plus the right atrial  pressure. IVC diameter <2.1 cm collapsing >50% with sniff suggests a normal RA  pressure of 3 mmHg.     Aortic Valve  There is mild trileaflet aortic sclerosis. There is mild (1+) aortic  regurgitation. No aortic stenosis is present.     Pulmonic Valve  There is trace pulmonic valvular regurgitation.     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      Pericardium  There is no pericardial effusion.     Rhythm  Sinus rhythm was noted.  ______________________________________________________________________________  MMode/2D Measurements & Calculations  IVSd: 1.2 cm     LVIDd: 4.0 cm  LVIDs: 1.8 cm  LVPWd: 1.3 cm  FS: 54.8 %  LV mass(C)d: 171.5 grams  LV mass(C)dI: 87.0 grams/m2  Ao root diam: 3.7 cm  LA dimension: 3.1 cm  asc Aorta Diam: 4.7 cm  LA/Ao: 0.84  LA Volume (BP): 67.3 ml  LA Volume Index (BP): 34.2 ml/m2  RWT: 0.64     Doppler Measurements & Calculations  MV E max noe: 72.6 cm/sec  MV A max noe: 70.7 cm/sec  MV E/A: 1.0  MV dec time: 0.23 sec  PA acc time: 0.08 sec  TR max noe: 229.0 cm/sec  TR max P.0 mmHg  E/E' av.9  Lateral E/e': 10.4  Medial E/e': 9.4     ______________________________________________________________________________  Report approved by: Monae Miller 09/15/2022 02:28 PM               Transthoracic Echocardiogram Results:  No results found for this or any previous visit (from the past 4320 hour(s)).             Pending Results:     Unresulted Labs Ordered in the Past 30 Days of this Admission     No orders found from 8/15/2022 to 9/15/2022.                      Discharge Instructions and Follow-Up:     Discharge instructions and follow-up:   Discharge Procedure Orders   Hospice Referral   Standing Status: Future   Referral Priority: Routine: Next available opening Referral Type: Consultation   Number of Visits Requested: 1     Reason for your hospital stay   Order Comments: Hospice.     Follow-up and recommended labs and tests    Order Comments: Home hospice.     Activity   Order Comments: Your activity upon discharge: activity as tolerated     Order Specific Question Answer Comments   Is discharge order? Yes      No CPR- Do NOT Intubate     Order Specific Question Answer Comments   Code status determined by: Discussion with patient/ legal decision maker      Diet   Order Comments: Follow this diet upon discharge:  Orders Placed This Encounter      Combination Diet Regular Diet Adult     Order Specific Question Answer Comments   Is discharge order? Yes              Hospital Course:     Tk Richmond is a 87-year-old male with a history of paroxysmal atrial fibrillation on anticoagulation, stroke with residual deficits, gastrointestinal bleeding, chronic kidney disease, and recent diagnosis of mucinous adenocarcinoma of gastrointestinal origin, presented to [facility] for evaluation of anemia.  He was given blood transfusion for support.  There was no evidence of GI bleeding.  He was initially in rapid atrial fibrillation but started on low-dose metoprolol with very good rate control.  Given his recurrent anemia and previous GI bleeding concerns will be holding anticoagulation indefinitely.  Oncology was consulted due to his recent diagnosis of metastatic mucinous adenocarcinoma.  He met with his oncologist, Dr. Nielson, and agreed with his family support to enroll in hospice at home.  The patient was seen, examined, and counseled on this day. The hospitalization and plan of care was reviewed with the patient and wife extensively. All questions were addressed and the patient agreed to follow-up as noted above.      Total time spent in face to face contact with the patient and coordinating discharge was:  35 Minutes    Keagan Rao DO MPH  [facility] Hospitalist  [address] [place] [address]  09/19/2022

## 2022-09-18 NOTE — PLAN OF CARE
Temp: 98.2  F (36.8  C) Temp src: Oral BP: (!) 143/65 Pulse: 60   Resp: 18 SpO2: 94 % O2 Device: None (Room air)       Orientation:  A&O x4  VS: VSS  Pain:  Denies  Tele:  None  Activity:  A1  Resp:  LS: clear  GI:  WNL  : WNL  Skin:  WNL  Lines: discontinued  Diet: Reg  Labs:  Potassium protocol  Plan: Hospice  Discharge:     Pt discharged at 11:15  With wife.  Writer went over discharge summary with pt. All concerns addressed. Pt verbalized understanding of discharge summary. Pt discharge with all belong. Discharge paper signed and filed in chart.

## 2022-09-18 NOTE — PLAN OF CARE
Goal Outcome Evaluation:      Oriented to self. Expressive aphasia. Makes needs known. Pt. On comfort cares. Pleasant and cooperative. External catheter in place. Denies pain, chest pain/pressure or SOB. Tele discontinued. Slept between cares. Plan to go home with wife on hospice. Continue plan of care.

## 2022-09-19 NOTE — PROGRESS NOTES
[facility] Care Resource [place]    Background: Transitional Care Management program auto-identified and prompting a chart review by [facility] Resource Center team.    Assessment: Upon chart review, [facility] Team member will cancel/close this episode of Transitional Care Management program due to reason below:    Patient has been discharged with Hospice Care    Plan: Transitional Care Management episode closed per reason above.      Massiel Uribe MA  Connected Care Resource Center, [facility]    *Connected Care Resource Team does NOT follow patient ongoing. Referrals are identified based on internal discharge reports and the outreach is to ensure patient has an understanding of their discharge instructions.

## 2023-05-17 NOTE — PLAN OF CARE
Patient discharged at 2:43 PM to Discharged to rehabilitation facility, [facility]. IV was discontinued by LINA. Pain at time of discharge was 0/10. Belongings returned to patient.  Discharge instructions and medications reviewed with patient/wife.  Patient verbalized understanding and all questions were answered.   At time of discharge, patient condition was stable and left the unit via WC escorted by NA and transported to facility by wife.       Pt informed

## (undated) DEVICE — SOL WATER IRRIG 1000ML BOTTLE 2F7114

## (undated) DEVICE — LINEN FULL SHEET 5511

## (undated) DEVICE — DRILL BIT HANDINN 2.5MM DB2.5

## (undated) DEVICE — GLOVE PROTEXIS W/NEU-THERA 6.5  2D73TE65

## (undated) DEVICE — SLING ARM LG 79-99157

## (undated) DEVICE — PAD CHUX UNDERPAD 23X24" 7136

## (undated) DEVICE — GLOVE PROTEXIS BLUE W/NEU-THERA 7.0  2D73EB70

## (undated) DEVICE — NDL SCLEROTHERAPY 25GA CARR-LOCK  00711811

## (undated) DEVICE — PROBE BIPOLAR HEMOSTASIS GOLD 07FRX210CM M00560150

## (undated) DEVICE — CAST BUCKET

## (undated) DEVICE — SU PROLENE 4-0 PS-2 18" 8682G

## (undated) DEVICE — LINEN HALF SHEET 5512

## (undated) DEVICE — DRAPE C-ARM MINI 5423

## (undated) DEVICE — TOURNIQUET CUFF 18" REPRO RED 60-7070-103

## (undated) DEVICE — DRILL BIT HANDINN 2.0MM FDB 2.0

## (undated) DEVICE — PREP POVIDONE IODINE SOLUTION 10% 4OZ

## (undated) DEVICE — CAST PADDING 4" UNSTERILE 9044

## (undated) DEVICE — IMPLANTABLE DEVICE: Type: IMPLANTABLE DEVICE | Site: RADIUS | Status: NON-FUNCTIONAL

## (undated) DEVICE — PREP SKIN SCRUB TRAY 4461A

## (undated) DEVICE — KIT ENDO TURNOVER/PROCEDURE W/CLEAN A SCOPE LINERS 103888

## (undated) DEVICE — PREP POVIDONE IODINE SCRUB 7.5% 4OZ APL82212

## (undated) DEVICE — BAG CLEAR TRASH 1.3M 39X33" P4040C

## (undated) DEVICE — TRAP FINGER GRASP DEVICE SM  9906

## (undated) DEVICE — SUCTION CANISTER MEDIVAC LINER 3000ML W/LID 65651-530

## (undated) DEVICE — BNDG ELASTIC 4" DBL LENGTH UNSTERILE 6611-14

## (undated) DEVICE — SU MONOCRYL 4-0 PC-3 18" UND Y845G

## (undated) DEVICE — DRSG STERI STRIP 1/2X4" R1547

## (undated) DEVICE — SU VICRYL 3-0 RB-1 27" UND J215H

## (undated) DEVICE — PACK HAND SOP32HARMO

## (undated) DEVICE — CLIP HEMOCLIP ENDOSCOPIC INSTINCT 2.8X230CM INSC-7-230-SS

## (undated) DEVICE — DRSG GAUZE 4X4" TRAY

## (undated) DEVICE — CAST PADDING 4" STERILE 9044S

## (undated) DEVICE — TUBING SUCTION 12"X1/4" N612

## (undated) DEVICE — DRSG KERLIX FLUFFS X5

## (undated) DEVICE — SOL NACL 0.9% IRRIG 1000ML BOTTLE 2F7124

## (undated) DEVICE — GLOVE PROTEXIS MICRO 7.0  2D73PM70

## (undated) DEVICE — CAST PLASTER SPLINT XTRA FAST 5X30" 7392

## (undated) DEVICE — DRSG KERLIX 4 1/2"X4YDS ROLL 6730

## (undated) RX ORDER — NEOSTIGMINE METHYLSULFATE 1 MG/ML
VIAL (ML) INJECTION
Status: DISPENSED
Start: 2019-10-06

## (undated) RX ORDER — LIDOCAINE HYDROCHLORIDE 10 MG/ML
INJECTION, SOLUTION EPIDURAL; INFILTRATION; INTRACAUDAL; PERINEURAL
Status: DISPENSED
Start: 2019-10-04

## (undated) RX ORDER — FENTANYL CITRATE 50 UG/ML
INJECTION, SOLUTION INTRAMUSCULAR; INTRAVENOUS
Status: DISPENSED
Start: 2019-10-06

## (undated) RX ORDER — ACETAMINOPHEN 325 MG/1
TABLET ORAL
Status: DISPENSED
Start: 2019-10-04

## (undated) RX ORDER — FENTANYL CITRATE 50 UG/ML
INJECTION, SOLUTION INTRAMUSCULAR; INTRAVENOUS
Status: DISPENSED
Start: 2019-10-04

## (undated) RX ORDER — PROPOFOL 10 MG/ML
INJECTION, EMULSION INTRAVENOUS
Status: DISPENSED
Start: 2019-10-06

## (undated) RX ORDER — ONDANSETRON 2 MG/ML
INJECTION INTRAMUSCULAR; INTRAVENOUS
Status: DISPENSED
Start: 2019-10-04

## (undated) RX ORDER — PROPOFOL 10 MG/ML
INJECTION, EMULSION INTRAVENOUS
Status: DISPENSED
Start: 2019-10-04

## (undated) RX ORDER — GLYCOPYRROLATE 0.2 MG/ML
INJECTION INTRAMUSCULAR; INTRAVENOUS
Status: DISPENSED
Start: 2019-10-06

## (undated) RX ORDER — BUPIVACAINE HYDROCHLORIDE 2.5 MG/ML
INJECTION, SOLUTION EPIDURAL; INFILTRATION; INTRACAUDAL
Status: DISPENSED
Start: 2019-10-04

## (undated) RX ORDER — PHENYLEPHRINE HCL IN 0.9% NACL 1 MG/10 ML
SYRINGE (ML) INTRAVENOUS
Status: DISPENSED
Start: 2019-10-06

## (undated) RX ORDER — DEXAMETHASONE SODIUM PHOSPHATE 4 MG/ML
INJECTION, SOLUTION INTRA-ARTICULAR; INTRALESIONAL; INTRAMUSCULAR; INTRAVENOUS; SOFT TISSUE
Status: DISPENSED
Start: 2019-10-06

## (undated) RX ORDER — METOPROLOL TARTRATE 1 MG/ML
INJECTION, SOLUTION INTRAVENOUS
Status: DISPENSED
Start: 2019-10-04

## (undated) RX ORDER — BUPIVACAINE HYDROCHLORIDE 2.5 MG/ML
INJECTION, SOLUTION EPIDURAL; INFILTRATION; INTRACAUDAL
Status: DISPENSED
Start: 2019-10-06

## (undated) RX ORDER — HYDRALAZINE HYDROCHLORIDE 20 MG/ML
INJECTION INTRAMUSCULAR; INTRAVENOUS
Status: DISPENSED
Start: 2019-10-06

## (undated) RX ORDER — ONDANSETRON 2 MG/ML
INJECTION INTRAMUSCULAR; INTRAVENOUS
Status: DISPENSED
Start: 2019-10-06

## (undated) RX ORDER — LIDOCAINE HYDROCHLORIDE 10 MG/ML
INJECTION, SOLUTION EPIDURAL; INFILTRATION; INTRACAUDAL; PERINEURAL
Status: DISPENSED
Start: 2019-10-06

## (undated) RX ORDER — LABETALOL 20 MG/4 ML (5 MG/ML) INTRAVENOUS SYRINGE
Status: DISPENSED
Start: 2019-10-06

## (undated) RX ORDER — FENTANYL CITRATE 50 UG/ML
INJECTION, SOLUTION INTRAMUSCULAR; INTRAVENOUS
Status: DISPENSED
Start: 2019-06-29

## (undated) RX ORDER — CEFAZOLIN SODIUM 2 G/100ML
INJECTION, SOLUTION INTRAVENOUS
Status: DISPENSED
Start: 2019-10-04

## (undated) RX ORDER — DEXAMETHASONE SODIUM PHOSPHATE 4 MG/ML
INJECTION, SOLUTION INTRA-ARTICULAR; INTRALESIONAL; INTRAMUSCULAR; INTRAVENOUS; SOFT TISSUE
Status: DISPENSED
Start: 2019-10-04